# Patient Record
Sex: MALE | Race: WHITE | Employment: UNEMPLOYED | URBAN - METROPOLITAN AREA
[De-identification: names, ages, dates, MRNs, and addresses within clinical notes are randomized per-mention and may not be internally consistent; named-entity substitution may affect disease eponyms.]

---

## 2020-11-03 PROBLEM — I73.9 PERIPHERAL VASCULAR DISEASE (HCC): Status: RESOLVED | Noted: 2020-11-03 | Resolved: 2020-11-03

## 2020-11-03 PROBLEM — E78.5 HYPERLIPIDEMIA: Status: RESOLVED | Noted: 2020-11-03 | Resolved: 2020-11-03

## 2022-07-08 ENCOUNTER — HOSPITAL ENCOUNTER (INPATIENT)
Age: 62
LOS: 13 days | Discharge: NURSING, REHAB OR LONG-TERM CARE | DRG: 181 | End: 2022-07-21
Attending: EMERGENCY MEDICINE | Admitting: FAMILY MEDICINE
Payer: COMMERCIAL

## 2022-07-08 ENCOUNTER — APPOINTMENT (OUTPATIENT)
Dept: GENERAL RADIOLOGY | Age: 62
DRG: 181 | End: 2022-07-08
Payer: COMMERCIAL

## 2022-07-08 ENCOUNTER — APPOINTMENT (OUTPATIENT)
Dept: CT IMAGING | Age: 62
DRG: 181 | End: 2022-07-08
Payer: COMMERCIAL

## 2022-07-08 ENCOUNTER — ANESTHESIA EVENT (OUTPATIENT)
Dept: OPERATING ROOM | Age: 62
DRG: 181 | End: 2022-07-08
Payer: COMMERCIAL

## 2022-07-08 ENCOUNTER — ANESTHESIA (OUTPATIENT)
Dept: OPERATING ROOM | Age: 62
DRG: 181 | End: 2022-07-08
Payer: COMMERCIAL

## 2022-07-08 DIAGNOSIS — J96.02 ACUTE RESPIRATORY FAILURE WITH HYPERCAPNIA (HCC): ICD-10-CM

## 2022-07-08 DIAGNOSIS — R20.0 LEFT LEG NUMBNESS: ICD-10-CM

## 2022-07-08 DIAGNOSIS — T82.599A: Primary | ICD-10-CM

## 2022-07-08 DIAGNOSIS — E87.20 LACTIC ACIDOSIS: ICD-10-CM

## 2022-07-08 PROBLEM — I70.229 CRITICAL ISCHEMIA OF LOWER EXTREMITY (HCC): Status: ACTIVE | Noted: 2022-07-08

## 2022-07-08 LAB
-: NORMAL
ABSOLUTE EOS #: 0 K/UL (ref 0–0.4)
ABSOLUTE EOS #: 0 K/UL (ref 0–0.4)
ABSOLUTE EOS #: 0.14 K/UL (ref 0–0.4)
ABSOLUTE IMMATURE GRANULOCYTE: 0 K/UL (ref 0–0.3)
ABSOLUTE IMMATURE GRANULOCYTE: 0 K/UL (ref 0–0.3)
ABSOLUTE IMMATURE GRANULOCYTE: 0.3 K/UL (ref 0–0.3)
ABSOLUTE LYMPH #: 1.08 K/UL (ref 1–4.8)
ABSOLUTE LYMPH #: 2.68 K/UL (ref 1–4.8)
ABSOLUTE LYMPH #: 4.69 K/UL (ref 1–4.8)
ABSOLUTE MONO #: 0.54 K/UL (ref 0.1–0.8)
ABSOLUTE MONO #: 1.14 K/UL (ref 0.1–0.8)
ABSOLUTE MONO #: 1.19 K/UL (ref 0.1–0.8)
ALBUMIN SERPL-MCNC: 4.4 G/DL (ref 3.5–5.2)
ALBUMIN/GLOBULIN RATIO: 1.7 (ref 1–2.5)
ALLEN TEST: ABNORMAL
ALP BLD-CCNC: 93 U/L (ref 40–129)
ALT SERPL-CCNC: 25 U/L (ref 5–41)
ANION GAP SERPL CALCULATED.3IONS-SCNC: 12 MMOL/L (ref 9–17)
ANION GAP SERPL CALCULATED.3IONS-SCNC: 19 MMOL/L (ref 9–17)
ANION GAP: 11 MMOL/L (ref 7–16)
AST SERPL-CCNC: 27 U/L
BASOPHILS # BLD: 0 % (ref 0–2)
BASOPHILS # BLD: 0 % (ref 0–2)
BASOPHILS # BLD: 1 % (ref 0–2)
BASOPHILS ABSOLUTE: 0 K/UL (ref 0–0.2)
BASOPHILS ABSOLUTE: 0 K/UL (ref 0–0.2)
BASOPHILS ABSOLUTE: 0.3 K/UL (ref 0–0.2)
BILIRUB SERPL-MCNC: 0.35 MG/DL (ref 0.3–1.2)
BILIRUBIN URINE: NEGATIVE
BUN BLDV-MCNC: 18 MG/DL (ref 8–23)
BUN BLDV-MCNC: 18 MG/DL (ref 8–23)
CALCIUM IONIZED: 0.95 MMOL/L (ref 1.13–1.33)
CALCIUM SERPL-MCNC: 10 MG/DL (ref 8.6–10.4)
CALCIUM SERPL-MCNC: 8.4 MG/DL (ref 8.6–10.4)
CASTS UA: NORMAL /LPF (ref 0–8)
CHLORIDE BLD-SCNC: 104 MMOL/L (ref 98–107)
CHLORIDE BLD-SCNC: 107 MMOL/L (ref 98–107)
CO2: 16 MMOL/L (ref 20–31)
CO2: 22 MMOL/L (ref 20–31)
COLOR: YELLOW
CREAT SERPL-MCNC: 1.08 MG/DL (ref 0.7–1.2)
CREAT SERPL-MCNC: 1.16 MG/DL (ref 0.7–1.2)
EOSINOPHILS RELATIVE PERCENT: 0 % (ref 1–4)
EOSINOPHILS RELATIVE PERCENT: 0 % (ref 1–4)
EOSINOPHILS RELATIVE PERCENT: 1 % (ref 1–4)
EPITHELIAL CELLS UA: NORMAL /HPF (ref 0–5)
FIO2: 100
FIO2: 60
FIO2: 80
GFR AFRICAN AMERICAN: >60 ML/MIN
GFR AFRICAN AMERICAN: >60 ML/MIN
GFR NON-AFRICAN AMERICAN: 37 ML/MIN
GFR NON-AFRICAN AMERICAN: >60 ML/MIN
GFR NON-AFRICAN AMERICAN: >60 ML/MIN
GFR SERPL CREATININE-BSD FRML MDRD: 45 ML/MIN
GFR SERPL CREATININE-BSD FRML MDRD: ABNORMAL ML/MIN/{1.73_M2}
GLUCOSE BLD-MCNC: 125 MG/DL (ref 74–100)
GLUCOSE BLD-MCNC: 148 MG/DL (ref 74–100)
GLUCOSE BLD-MCNC: 150 MG/DL (ref 70–99)
GLUCOSE BLD-MCNC: 164 MG/DL (ref 74–100)
GLUCOSE BLD-MCNC: 169 MG/DL (ref 75–110)
GLUCOSE BLD-MCNC: 193 MG/DL (ref 70–99)
GLUCOSE BLD-MCNC: 218 MG/DL (ref 74–100)
GLUCOSE BLD-MCNC: 259 MG/DL (ref 75–110)
GLUCOSE BLD-MCNC: 308 MG/DL (ref 74–100)
GLUCOSE BLD-MCNC: 367 MG/DL (ref 74–100)
GLUCOSE URINE: ABNORMAL
HCO3 VENOUS: 16.6 MMOL/L (ref 22–29)
HCT VFR BLD CALC: 43.3 % (ref 40.7–50.3)
HCT VFR BLD CALC: 44.8 % (ref 40.7–50.3)
HCT VFR BLD CALC: 47.8 % (ref 40.7–50.3)
HEMOGLOBIN: 14.4 G/DL (ref 13–17)
HEMOGLOBIN: 14.6 G/DL (ref 13–17)
HEMOGLOBIN: 16 G/DL (ref 13–17)
IMMATURE GRANULOCYTES: 0 %
IMMATURE GRANULOCYTES: 0 %
IMMATURE GRANULOCYTES: 1 %
KETONES, URINE: NEGATIVE
LACTIC ACID, SEPSIS WHOLE BLOOD: 7.1 MMOL/L (ref 0.5–1.9)
LACTIC ACID, SEPSIS WHOLE BLOOD: 8 MMOL/L (ref 0.5–1.9)
LACTIC ACID, WHOLE BLOOD: 6.5 MMOL/L (ref 0.7–2.1)
LEUKOCYTE ESTERASE, URINE: NEGATIVE
LYMPHOCYTES # BLD: 33 % (ref 24–44)
LYMPHOCYTES # BLD: 4 % (ref 24–44)
LYMPHOCYTES # BLD: 9 % (ref 24–44)
MAGNESIUM: 2.1 MG/DL (ref 1.6–2.6)
MCH RBC QN AUTO: 31.5 PG (ref 25.2–33.5)
MCH RBC QN AUTO: 32 PG (ref 25.2–33.5)
MCH RBC QN AUTO: 32.1 PG (ref 25.2–33.5)
MCHC RBC AUTO-ENTMCNC: 32.6 G/DL (ref 28.4–34.8)
MCHC RBC AUTO-ENTMCNC: 33.3 G/DL (ref 28.4–34.8)
MCHC RBC AUTO-ENTMCNC: 33.5 G/DL (ref 28.4–34.8)
MCV RBC AUTO: 96 FL (ref 82.6–102.9)
MCV RBC AUTO: 96.2 FL (ref 82.6–102.9)
MCV RBC AUTO: 96.8 FL (ref 82.6–102.9)
MODE: ABNORMAL
MONOCYTES # BLD: 2 % (ref 1–7)
MONOCYTES # BLD: 4 % (ref 1–7)
MONOCYTES # BLD: 8 % (ref 1–7)
MORPHOLOGY: ABNORMAL
MORPHOLOGY: ABNORMAL
MORPHOLOGY: NORMAL
MYOGLOBIN: 120 NG/ML (ref 28–72)
MYOGLOBIN: ABNORMAL NG/ML (ref 28–72)
MYOGLOBIN: ABNORMAL NG/ML (ref 28–72)
NEGATIVE BASE EXCESS, ART: 12 (ref 0–2)
NEGATIVE BASE EXCESS, ART: 13 (ref 0–2)
NEGATIVE BASE EXCESS, ART: 18 (ref 0–2)
NEGATIVE BASE EXCESS, ART: 4 (ref 0–2)
NEGATIVE BASE EXCESS, ART: 4 (ref 0–2)
NEGATIVE BASE EXCESS, ART: 5 (ref 0–2)
NEGATIVE BASE EXCESS, ART: 7 (ref 0–2)
NEGATIVE BASE EXCESS, VEN: 18 (ref 0–2)
NITRITE, URINE: NEGATIVE
NRBC AUTOMATED: 0 PER 100 WBC
NRBC AUTOMATED: 0.1 PER 100 WBC
NRBC AUTOMATED: 0.1 PER 100 WBC
NUCLEATED RED BLOOD CELLS: 1 PER 100 WBC
O2 DEVICE/FLOW/%: ABNORMAL
O2 SAT, VEN: 45 % (ref 60–85)
PARTIAL THROMBOPLASTIN TIME: 23 SEC (ref 20.5–30.5)
PARTIAL THROMBOPLASTIN TIME: 82.5 SEC (ref 20.5–30.5)
PCO2, VEN: 76.6 MM HG (ref 41–51)
PDW BLD-RTO: 12.9 % (ref 11.8–14.4)
PDW BLD-RTO: 13.1 % (ref 11.8–14.4)
PDW BLD-RTO: 13.2 % (ref 11.8–14.4)
PH UA: 5.5 (ref 5–8)
PH VENOUS: 6.94 (ref 7.32–7.43)
PHOSPHORUS: 5.6 MG/DL (ref 2.5–4.5)
PLATELET # BLD: 274 K/UL (ref 138–453)
PLATELET # BLD: 320 K/UL (ref 138–453)
PLATELET # BLD: ABNORMAL K/UL (ref 138–453)
PLATELET, FLUORESCENCE: 283 K/UL (ref 138–453)
PLATELET, IMMATURE FRACTION: 12.6 % (ref 1.1–10.3)
PMV BLD AUTO: 12.9 FL (ref 8.1–13.5)
PMV BLD AUTO: 13 FL (ref 8.1–13.5)
PO2, VEN: 40.4 MM HG (ref 30–50)
POC BUN: 16 MG/DL (ref 8–26)
POC CHLORIDE: 115 MMOL/L (ref 98–107)
POC CREATININE: 1.86 MG/DL (ref 0.51–1.19)
POC HCO3: 12.1 MMOL/L (ref 21–28)
POC HCO3: 18.7 MMOL/L (ref 21–28)
POC HCO3: 20.3 MMOL/L (ref 21–28)
POC HCO3: 20.3 MMOL/L (ref 21–28)
POC HCO3: 20.9 MMOL/L (ref 21–28)
POC HCO3: 22 MMOL/L (ref 21–28)
POC HCO3: 23 MMOL/L (ref 21–28)
POC HEMATOCRIT: 37 % (ref 41–53)
POC HEMATOCRIT: 40 % (ref 41–53)
POC HEMATOCRIT: 40 % (ref 41–53)
POC HEMATOCRIT: 43 % (ref 41–53)
POC HEMATOCRIT: 43 % (ref 41–53)
POC HEMOGLOBIN: 12.5 G/DL (ref 13.5–17.5)
POC HEMOGLOBIN: 13.4 G/DL (ref 13.5–17.5)
POC HEMOGLOBIN: 13.5 G/DL (ref 13.5–17.5)
POC HEMOGLOBIN: 14.5 G/DL (ref 13.5–17.5)
POC HEMOGLOBIN: 14.7 G/DL (ref 13.5–17.5)
POC IONIZED CALCIUM: 1.06 MMOL/L (ref 1.15–1.33)
POC IONIZED CALCIUM: 1.26 MMOL/L (ref 1.15–1.33)
POC IONIZED CALCIUM: 1.35 MMOL/L (ref 1.15–1.33)
POC IONIZED CALCIUM: 1.42 MMOL/L (ref 1.15–1.33)
POC IONIZED CALCIUM: 1.43 MMOL/L (ref 1.15–1.33)
POC LACTIC ACID: 2.74 MMOL/L (ref 0.56–1.39)
POC LACTIC ACID: 3.85 MMOL/L (ref 0.56–1.39)
POC LACTIC ACID: 5.66 MMOL/L (ref 0.56–1.39)
POC O2 SATURATION: 100 % (ref 94–98)
POC O2 SATURATION: 100 % (ref 94–98)
POC O2 SATURATION: 90 % (ref 94–98)
POC O2 SATURATION: 91 % (ref 94–98)
POC O2 SATURATION: 92 % (ref 94–98)
POC O2 SATURATION: 96 % (ref 94–98)
POC O2 SATURATION: 99 % (ref 94–98)
POC PCO2: 37.4 MM HG (ref 35–48)
POC PCO2: 41.2 MM HG (ref 35–48)
POC PCO2: 44.4 MM HG (ref 35–48)
POC PCO2: 48.6 MM HG (ref 35–48)
POC PCO2: 55.4 MM HG (ref 35–48)
POC PCO2: 61.6 MM HG (ref 35–48)
POC PCO2: 82.6 MM HG (ref 35–48)
POC PH: 7 (ref 7.35–7.45)
POC PH: 7.08 (ref 7.35–7.45)
POC PH: 7.09 (ref 7.35–7.45)
POC PH: 7.22 (ref 7.35–7.45)
POC PH: 7.23 (ref 7.35–7.45)
POC PH: 7.3 (ref 7.35–7.45)
POC PH: 7.36 (ref 7.35–7.45)
POC PO2: 117.8 MM HG (ref 83–108)
POC PO2: 155.9 MM HG (ref 83–108)
POC PO2: 224.4 MM HG (ref 83–108)
POC PO2: 293.2 MM HG (ref 83–108)
POC PO2: 71.5 MM HG (ref 83–108)
POC PO2: 87.5 MM HG (ref 83–108)
POC PO2: 91 MM HG (ref 83–108)
POC POTASSIUM: 4 MMOL/L (ref 3.5–4.5)
POC POTASSIUM: 4.3 MMOL/L (ref 3.5–4.5)
POC POTASSIUM: 4.3 MMOL/L (ref 3.5–4.5)
POC POTASSIUM: 4.8 MMOL/L (ref 3.5–4.5)
POC POTASSIUM: 4.9 MMOL/L (ref 3.5–4.5)
POC SODIUM: 142 MMOL/L (ref 138–146)
POC SODIUM: 147 MMOL/L (ref 138–146)
POC SODIUM: 149 MMOL/L (ref 138–146)
POC TCO2: 20 MMOL/L (ref 22–30)
POC TCO2: 22 MMOL/L (ref 22–30)
POC TCO2: 24 MMOL/L (ref 22–30)
POTASSIUM SERPL-SCNC: 4.1 MMOL/L (ref 3.7–5.3)
POTASSIUM SERPL-SCNC: 5.2 MMOL/L (ref 3.7–5.3)
PRO-BNP: 4424 PG/ML
PRO-BNP: 85 PG/ML
PROTEIN UA: ABNORMAL
RBC # BLD: 4.5 M/UL (ref 4.21–5.77)
RBC # BLD: 4.63 M/UL (ref 4.21–5.77)
RBC # BLD: 4.98 M/UL (ref 4.21–5.77)
RBC UA: NORMAL /HPF (ref 0–4)
SAMPLE SITE: ABNORMAL
SEG NEUTROPHILS: 58 % (ref 36–66)
SEG NEUTROPHILS: 85 % (ref 36–66)
SEG NEUTROPHILS: 94 % (ref 36–66)
SEGMENTED NEUTROPHILS ABSOLUTE COUNT: 25.33 K/UL (ref 1.8–7.7)
SEGMENTED NEUTROPHILS ABSOLUTE COUNT: 25.38 K/UL (ref 1.8–7.7)
SEGMENTED NEUTROPHILS ABSOLUTE COUNT: 8.23 K/UL (ref 1.8–7.7)
SODIUM BLD-SCNC: 139 MMOL/L (ref 135–144)
SODIUM BLD-SCNC: 141 MMOL/L (ref 135–144)
SPECIFIC GRAVITY UA: 1.05 (ref 1–1.03)
TOTAL CK: 189 U/L (ref 39–308)
TOTAL CK: ABNORMAL U/L (ref 39–308)
TOTAL CK: ABNORMAL U/L (ref 39–308)
TOTAL PROTEIN: 7 G/DL (ref 6.4–8.3)
TROPONIN, HIGH SENSITIVITY: 18 NG/L (ref 0–22)
TROPONIN, HIGH SENSITIVITY: 27 NG/L (ref 0–22)
TROPONIN, HIGH SENSITIVITY: 430 NG/L (ref 0–22)
TURBIDITY: CLEAR
URINE HGB: ABNORMAL
UROBILINOGEN, URINE: NORMAL
WBC # BLD: 14.2 K/UL (ref 3.5–11.3)
WBC # BLD: 27 K/UL (ref 3.5–11.3)
WBC # BLD: 29.8 K/UL (ref 3.5–11.3)
WBC UA: NORMAL /HPF (ref 0–5)

## 2022-07-08 PROCEDURE — 84295 ASSAY OF SERUM SODIUM: CPT

## 2022-07-08 PROCEDURE — 82550 ASSAY OF CK (CPK): CPT

## 2022-07-08 PROCEDURE — 86850 RBC ANTIBODY SCREEN: CPT

## 2022-07-08 PROCEDURE — 96366 THER/PROPH/DIAG IV INF ADDON: CPT

## 2022-07-08 PROCEDURE — 2500000003 HC RX 250 WO HCPCS: Performed by: SURGERY

## 2022-07-08 PROCEDURE — 82947 ASSAY GLUCOSE BLOOD QUANT: CPT

## 2022-07-08 PROCEDURE — 10701619 HC VASCULAR GRAFT

## 2022-07-08 PROCEDURE — 6360000002 HC RX W HCPCS: Performed by: STUDENT IN AN ORGANIZED HEALTH CARE EDUCATION/TRAINING PROGRAM

## 2022-07-08 PROCEDURE — 2500000003 HC RX 250 WO HCPCS: Performed by: STUDENT IN AN ORGANIZED HEALTH CARE EDUCATION/TRAINING PROGRAM

## 2022-07-08 PROCEDURE — 81001 URINALYSIS AUTO W/SCOPE: CPT

## 2022-07-08 PROCEDURE — C1757 CATH, THROMBECTOMY/EMBOLECT: HCPCS | Performed by: SURGERY

## 2022-07-08 PROCEDURE — 37799 UNLISTED PX VASCULAR SURGERY: CPT

## 2022-07-08 PROCEDURE — 82803 BLOOD GASES ANY COMBINATION: CPT

## 2022-07-08 PROCEDURE — 2580000003 HC RX 258: Performed by: STUDENT IN AN ORGANIZED HEALTH CARE EDUCATION/TRAINING PROGRAM

## 2022-07-08 PROCEDURE — 2000000000 HC ICU R&B

## 2022-07-08 PROCEDURE — 6370000000 HC RX 637 (ALT 250 FOR IP): Performed by: STUDENT IN AN ORGANIZED HEALTH CARE EDUCATION/TRAINING PROGRAM

## 2022-07-08 PROCEDURE — 86920 COMPATIBILITY TEST SPIN: CPT

## 2022-07-08 PROCEDURE — 75710 ARTERY X-RAYS ARM/LEG: CPT | Performed by: SURGERY

## 2022-07-08 PROCEDURE — 2580000003 HC RX 258: Performed by: SURGERY

## 2022-07-08 PROCEDURE — 37226 PR REVSC OPN/PRQ FEM/POP W/STNT/ANGIOP SM VSL: CPT | Performed by: SURGERY

## 2022-07-08 PROCEDURE — 35302 RECHANNELING OF ARTERY: CPT | Performed by: SURGERY

## 2022-07-08 PROCEDURE — 96361 HYDRATE IV INFUSION ADD-ON: CPT

## 2022-07-08 PROCEDURE — 6360000002 HC RX W HCPCS

## 2022-07-08 PROCEDURE — 87040 BLOOD CULTURE FOR BACTERIA: CPT

## 2022-07-08 PROCEDURE — 04CL0ZZ EXTIRPATION OF MATTER FROM LEFT FEMORAL ARTERY, OPEN APPROACH: ICD-10-PCS | Performed by: SURGERY

## 2022-07-08 PROCEDURE — 36415 COLL VENOUS BLD VENIPUNCTURE: CPT

## 2022-07-08 PROCEDURE — 04CK0ZZ EXTIRPATION OF MATTER FROM RIGHT FEMORAL ARTERY, OPEN APPROACH: ICD-10-PCS | Performed by: SURGERY

## 2022-07-08 PROCEDURE — 93005 ELECTROCARDIOGRAM TRACING: CPT | Performed by: STUDENT IN AN ORGANIZED HEALTH CARE EDUCATION/TRAINING PROGRAM

## 2022-07-08 PROCEDURE — 82330 ASSAY OF CALCIUM: CPT

## 2022-07-08 PROCEDURE — B41DZZZ FLUOROSCOPY OF AORTA AND BILATERAL LOWER EXTREMITY ARTERIES: ICD-10-PCS | Performed by: SURGERY

## 2022-07-08 PROCEDURE — 70450 CT HEAD/BRAIN W/O DYE: CPT

## 2022-07-08 PROCEDURE — C1725 CATH, TRANSLUMIN NON-LASER: HCPCS | Performed by: SURGERY

## 2022-07-08 PROCEDURE — 87635 SARS-COV-2 COVID-19 AMP PRB: CPT

## 2022-07-08 PROCEDURE — 0BH18EZ INSERTION OF ENDOTRACHEAL AIRWAY INTO TRACHEA, VIA NATURAL OR ARTIFICIAL OPENING ENDOSCOPIC: ICD-10-PCS | Performed by: STUDENT IN AN ORGANIZED HEALTH CARE EDUCATION/TRAINING PROGRAM

## 2022-07-08 PROCEDURE — 6370000000 HC RX 637 (ALT 250 FOR IP)

## 2022-07-08 PROCEDURE — 71045 X-RAY EXAM CHEST 1 VIEW: CPT

## 2022-07-08 PROCEDURE — 80048 BASIC METABOLIC PNL TOTAL CA: CPT

## 2022-07-08 PROCEDURE — 2700000000 HC OXYGEN THERAPY PER DAY

## 2022-07-08 PROCEDURE — 94002 VENT MGMT INPAT INIT DAY: CPT

## 2022-07-08 PROCEDURE — P9045 ALBUMIN (HUMAN), 5%, 250 ML: HCPCS

## 2022-07-08 PROCEDURE — 75635 CT ANGIO ABDOMINAL ARTERIES: CPT

## 2022-07-08 PROCEDURE — 35875 REMOVAL OF CLOT IN GRAFT: CPT | Performed by: SURGERY

## 2022-07-08 PROCEDURE — C1768 GRAFT, VASCULAR: HCPCS | Performed by: SURGERY

## 2022-07-08 PROCEDURE — 2500000003 HC RX 250 WO HCPCS

## 2022-07-08 PROCEDURE — 2580000003 HC RX 258

## 2022-07-08 PROCEDURE — 94761 N-INVAS EAR/PLS OXIMETRY MLT: CPT

## 2022-07-08 PROCEDURE — 85014 HEMATOCRIT: CPT

## 2022-07-08 PROCEDURE — 04UK0KZ SUPPLEMENT RIGHT FEMORAL ARTERY WITH NONAUTOLOGOUS TISSUE SUBSTITUTE, OPEN APPROACH: ICD-10-PCS | Performed by: SURGERY

## 2022-07-08 PROCEDURE — 6360000002 HC RX W HCPCS: Performed by: SURGERY

## 2022-07-08 PROCEDURE — 5A1955Z RESPIRATORY VENTILATION, GREATER THAN 96 CONSECUTIVE HOURS: ICD-10-PCS | Performed by: STUDENT IN AN ORGANIZED HEALTH CARE EDUCATION/TRAINING PROGRAM

## 2022-07-08 PROCEDURE — 0KNT0ZZ RELEASE LEFT LOWER LEG MUSCLE, OPEN APPROACH: ICD-10-PCS | Performed by: SURGERY

## 2022-07-08 PROCEDURE — 83605 ASSAY OF LACTIC ACID: CPT

## 2022-07-08 PROCEDURE — 04UL0KZ SUPPLEMENT LEFT FEMORAL ARTERY WITH NONAUTOLOGOUS TISSUE SUBSTITUTE, OPEN APPROACH: ICD-10-PCS | Performed by: SURGERY

## 2022-07-08 PROCEDURE — 99285 EMERGENCY DEPT VISIT HI MDM: CPT

## 2022-07-08 PROCEDURE — C1725 CATH, TRANSLUMIN NON-LASER: HCPCS

## 2022-07-08 PROCEDURE — 6360000004 HC RX CONTRAST MEDICATION: Performed by: SURGERY

## 2022-07-08 PROCEDURE — 3700000000 HC ANESTHESIA ATTENDED CARE: Performed by: SURGERY

## 2022-07-08 PROCEDURE — C1894 INTRO/SHEATH, NON-LASER: HCPCS | Performed by: SURGERY

## 2022-07-08 PROCEDURE — 83735 ASSAY OF MAGNESIUM: CPT

## 2022-07-08 PROCEDURE — 85055 RETICULATED PLATELET ASSAY: CPT

## 2022-07-08 PROCEDURE — 36600 WITHDRAWAL OF ARTERIAL BLOOD: CPT

## 2022-07-08 PROCEDURE — 80053 COMPREHEN METABOLIC PANEL: CPT

## 2022-07-08 PROCEDURE — 84520 ASSAY OF UREA NITROGEN: CPT

## 2022-07-08 PROCEDURE — 6360000004 HC RX CONTRAST MEDICATION: Performed by: STUDENT IN AN ORGANIZED HEALTH CARE EDUCATION/TRAINING PROGRAM

## 2022-07-08 PROCEDURE — C1768 GRAFT, VASCULAR: HCPCS

## 2022-07-08 PROCEDURE — 83874 ASSAY OF MYOGLOBIN: CPT

## 2022-07-08 PROCEDURE — 36620 INSERTION CATHETER ARTERY: CPT

## 2022-07-08 PROCEDURE — 27602 DECOMPRESSION OF LOWER LEG: CPT | Performed by: SURGERY

## 2022-07-08 PROCEDURE — 86900 BLOOD TYPING SEROLOGIC ABO: CPT

## 2022-07-08 PROCEDURE — 3700000001 HC ADD 15 MINUTES (ANESTHESIA): Performed by: SURGERY

## 2022-07-08 PROCEDURE — 85025 COMPLETE CBC W/AUTO DIFF WBC: CPT

## 2022-07-08 PROCEDURE — 80051 ELECTROLYTE PANEL: CPT

## 2022-07-08 PROCEDURE — 85730 THROMBOPLASTIN TIME PARTIAL: CPT

## 2022-07-08 PROCEDURE — 82565 ASSAY OF CREATININE: CPT

## 2022-07-08 PROCEDURE — 84484 ASSAY OF TROPONIN QUANT: CPT

## 2022-07-08 PROCEDURE — 10701619 HC MISC CATH TRANSLUM ANGIO

## 2022-07-08 PROCEDURE — 2709999900 HC NON-CHARGEABLE SUPPLY: Performed by: SURGERY

## 2022-07-08 PROCEDURE — 94660 CPAP INITIATION&MGMT: CPT

## 2022-07-08 PROCEDURE — 3600000017 HC SURGERY HYBRID ADDL 15MIN: Performed by: SURGERY

## 2022-07-08 PROCEDURE — 96368 THER/DIAG CONCURRENT INF: CPT

## 2022-07-08 PROCEDURE — 3600000007 HC SURGERY HYBRID BASE: Performed by: SURGERY

## 2022-07-08 PROCEDURE — 84100 ASSAY OF PHOSPHORUS: CPT

## 2022-07-08 PROCEDURE — 84132 ASSAY OF SERUM POTASSIUM: CPT

## 2022-07-08 PROCEDURE — 96365 THER/PROPH/DIAG IV INF INIT: CPT

## 2022-07-08 PROCEDURE — 83880 ASSAY OF NATRIURETIC PEPTIDE: CPT

## 2022-07-08 PROCEDURE — 82374 ASSAY BLOOD CARBON DIOXIDE: CPT

## 2022-07-08 PROCEDURE — 047K0DZ DILATION OF RIGHT FEMORAL ARTERY WITH INTRALUMINAL DEVICE, OPEN APPROACH: ICD-10-PCS | Performed by: SURGERY

## 2022-07-08 PROCEDURE — 94681 O2 UPTK CO2 OUTP % O2 XTRC: CPT

## 2022-07-08 PROCEDURE — 96375 TX/PRO/DX INJ NEW DRUG ADDON: CPT

## 2022-07-08 PROCEDURE — 6370000000 HC RX 637 (ALT 250 FOR IP): Performed by: SURGERY

## 2022-07-08 PROCEDURE — A4217 STERILE WATER/SALINE, 500 ML: HCPCS | Performed by: SURGERY

## 2022-07-08 PROCEDURE — 86901 BLOOD TYPING SEROLOGIC RH(D): CPT

## 2022-07-08 DEVICE — PATCH VASC W0.8XL8CM PERIPH BOV PERICARD N PVC N DEHP CRSS: Type: IMPLANTABLE DEVICE | Site: GROIN | Status: FUNCTIONAL

## 2022-07-08 DEVICE — GRAFT STENT 0.035 IN 9 MMX5 CM 8 FRX120 CM VIABAHN: Type: IMPLANTABLE DEVICE | Site: OTHER | Status: FUNCTIONAL

## 2022-07-08 RX ORDER — NITROGLYCERIN 20 MG/100ML
5-200 INJECTION INTRAVENOUS CONTINUOUS
Status: DISCONTINUED | OUTPATIENT
Start: 2022-07-08 | End: 2022-07-11

## 2022-07-08 RX ORDER — FENTANYL CITRATE 50 UG/ML
100 INJECTION, SOLUTION INTRAMUSCULAR; INTRAVENOUS ONCE
Status: COMPLETED | OUTPATIENT
Start: 2022-07-08 | End: 2022-07-08

## 2022-07-08 RX ORDER — SODIUM CHLORIDE, SODIUM LACTATE, POTASSIUM CHLORIDE, CALCIUM CHLORIDE 600; 310; 30; 20 MG/100ML; MG/100ML; MG/100ML; MG/100ML
INJECTION, SOLUTION INTRAVENOUS CONTINUOUS
Status: DISCONTINUED | OUTPATIENT
Start: 2022-07-08 | End: 2022-07-13

## 2022-07-08 RX ORDER — INSULIN LISPRO 100 [IU]/ML
0-18 INJECTION, SOLUTION INTRAVENOUS; SUBCUTANEOUS
Status: DISCONTINUED | OUTPATIENT
Start: 2022-07-09 | End: 2022-07-09

## 2022-07-08 RX ORDER — FENTANYL CITRATE 50 UG/ML
50 INJECTION, SOLUTION INTRAMUSCULAR; INTRAVENOUS ONCE
Status: DISCONTINUED | OUTPATIENT
Start: 2022-07-08 | End: 2022-07-11

## 2022-07-08 RX ORDER — HEPARIN SODIUM 1000 [USP'U]/ML
INJECTION, SOLUTION INTRAVENOUS; SUBCUTANEOUS PRN
Status: DISCONTINUED | OUTPATIENT
Start: 2022-07-08 | End: 2022-07-08 | Stop reason: SDUPTHER

## 2022-07-08 RX ORDER — LIDOCAINE HYDROCHLORIDE 10 MG/ML
INJECTION, SOLUTION INFILTRATION; PERINEURAL
Status: DISPENSED
Start: 2022-07-08 | End: 2022-07-08

## 2022-07-08 RX ORDER — FENTANYL CITRATE 50 UG/ML
50 INJECTION, SOLUTION INTRAMUSCULAR; INTRAVENOUS ONCE
Status: COMPLETED | OUTPATIENT
Start: 2022-07-08 | End: 2022-07-08

## 2022-07-08 RX ORDER — HEPARIN SODIUM 1000 [USP'U]/ML
40 INJECTION, SOLUTION INTRAVENOUS; SUBCUTANEOUS PRN
Status: DISCONTINUED | OUTPATIENT
Start: 2022-07-08 | End: 2022-07-18

## 2022-07-08 RX ORDER — SODIUM CHLORIDE, SODIUM LACTATE, POTASSIUM CHLORIDE, AND CALCIUM CHLORIDE .6; .31; .03; .02 G/100ML; G/100ML; G/100ML; G/100ML
1000 INJECTION, SOLUTION INTRAVENOUS ONCE
Status: COMPLETED | OUTPATIENT
Start: 2022-07-08 | End: 2022-07-09

## 2022-07-08 RX ORDER — FENTANYL CITRATE 50 UG/ML
INJECTION, SOLUTION INTRAMUSCULAR; INTRAVENOUS PRN
Status: DISCONTINUED | OUTPATIENT
Start: 2022-07-08 | End: 2022-07-08 | Stop reason: SDUPTHER

## 2022-07-08 RX ORDER — HEPARIN SODIUM 1000 [USP'U]/ML
4000 INJECTION, SOLUTION INTRAVENOUS; SUBCUTANEOUS PRN
Status: DISCONTINUED | OUTPATIENT
Start: 2022-07-08 | End: 2022-07-18

## 2022-07-08 RX ORDER — PROPOFOL 10 MG/ML
INJECTION, EMULSION INTRAVENOUS PRN
Status: DISCONTINUED | OUTPATIENT
Start: 2022-07-08 | End: 2022-07-08 | Stop reason: SDUPTHER

## 2022-07-08 RX ORDER — ROCURONIUM BROMIDE 10 MG/ML
INJECTION, SOLUTION INTRAVENOUS PRN
Status: DISCONTINUED | OUTPATIENT
Start: 2022-07-08 | End: 2022-07-08 | Stop reason: SDUPTHER

## 2022-07-08 RX ORDER — SODIUM CHLORIDE 9 MG/ML
INJECTION, SOLUTION INTRAVENOUS PRN
Status: DISCONTINUED | OUTPATIENT
Start: 2022-07-08 | End: 2022-07-10

## 2022-07-08 RX ORDER — HEPARIN SODIUM 1000 [USP'U]/ML
INJECTION, SOLUTION INTRAVENOUS; SUBCUTANEOUS
Status: DISPENSED
Start: 2022-07-08 | End: 2022-07-09

## 2022-07-08 RX ORDER — CALCIUM CHLORIDE 100 MG/ML
INJECTION INTRAVENOUS; INTRAVENTRICULAR PRN
Status: DISCONTINUED | OUTPATIENT
Start: 2022-07-08 | End: 2022-07-08 | Stop reason: SDUPTHER

## 2022-07-08 RX ORDER — INSULIN LISPRO 100 [IU]/ML
0-9 INJECTION, SOLUTION INTRAVENOUS; SUBCUTANEOUS NIGHTLY
Status: DISCONTINUED | OUTPATIENT
Start: 2022-07-08 | End: 2022-07-09

## 2022-07-08 RX ORDER — IODIXANOL 320 MG/ML
INJECTION, SOLUTION INTRAVASCULAR
Status: DISPENSED
Start: 2022-07-08 | End: 2022-07-08

## 2022-07-08 RX ORDER — SODIUM CHLORIDE 9 MG/ML
INJECTION, SOLUTION INTRAVENOUS PRN
Status: DISCONTINUED | OUTPATIENT
Start: 2022-07-08 | End: 2022-07-13

## 2022-07-08 RX ORDER — HEPARIN SODIUM 1000 [USP'U]/ML
INJECTION, SOLUTION INTRAVENOUS; SUBCUTANEOUS
Status: DISPENSED
Start: 2022-07-08 | End: 2022-07-08

## 2022-07-08 RX ORDER — MAGNESIUM HYDROXIDE 1200 MG/15ML
LIQUID ORAL CONTINUOUS PRN
Status: COMPLETED | OUTPATIENT
Start: 2022-07-08 | End: 2022-07-08

## 2022-07-08 RX ORDER — HEPARIN SODIUM AND DEXTROSE 10000; 5 [USP'U]/100ML; G/100ML
5-30 INJECTION INTRAVENOUS CONTINUOUS
Status: DISCONTINUED | OUTPATIENT
Start: 2022-07-08 | End: 2022-07-18

## 2022-07-08 RX ORDER — DEXTROSE MONOHYDRATE 50 MG/ML
100 INJECTION, SOLUTION INTRAVENOUS PRN
Status: DISCONTINUED | OUTPATIENT
Start: 2022-07-08 | End: 2022-07-14

## 2022-07-08 RX ORDER — SODIUM CHLORIDE 0.9 % (FLUSH) 0.9 %
5-40 SYRINGE (ML) INJECTION EVERY 12 HOURS SCHEDULED
Status: DISCONTINUED | OUTPATIENT
Start: 2022-07-08 | End: 2022-07-13

## 2022-07-08 RX ORDER — NICARDIPINE HYDROCHLORIDE 0.1 MG/ML
INJECTION INTRAVENOUS
Status: DISPENSED
Start: 2022-07-08 | End: 2022-07-08

## 2022-07-08 RX ORDER — IODIXANOL 320 MG/ML
INJECTION, SOLUTION INTRAVASCULAR PRN
Status: DISCONTINUED | OUTPATIENT
Start: 2022-07-08 | End: 2022-07-08 | Stop reason: ALTCHOICE

## 2022-07-08 RX ORDER — SODIUM CHLORIDE, SODIUM LACTATE, POTASSIUM CHLORIDE, CALCIUM CHLORIDE 600; 310; 30; 20 MG/100ML; MG/100ML; MG/100ML; MG/100ML
INJECTION, SOLUTION INTRAVENOUS CONTINUOUS PRN
Status: DISCONTINUED | OUTPATIENT
Start: 2022-07-08 | End: 2022-07-08 | Stop reason: SDUPTHER

## 2022-07-08 RX ORDER — MAGNESIUM SULFATE IN WATER 40 MG/ML
2000 INJECTION, SOLUTION INTRAVENOUS ONCE
Status: COMPLETED | OUTPATIENT
Start: 2022-07-08 | End: 2022-07-09

## 2022-07-08 RX ORDER — ALBUMIN, HUMAN INJ 5% 5 %
SOLUTION INTRAVENOUS PRN
Status: DISCONTINUED | OUTPATIENT
Start: 2022-07-08 | End: 2022-07-08 | Stop reason: SDUPTHER

## 2022-07-08 RX ORDER — HEPARIN SODIUM 1000 [USP'U]/ML
2000 INJECTION, SOLUTION INTRAVENOUS; SUBCUTANEOUS PRN
Status: DISCONTINUED | OUTPATIENT
Start: 2022-07-08 | End: 2022-07-18

## 2022-07-08 RX ORDER — HEPARIN SODIUM AND DEXTROSE 10000; 5 [USP'U]/100ML; G/100ML
5-30 INJECTION INTRAVENOUS CONTINUOUS
Status: DISCONTINUED | OUTPATIENT
Start: 2022-07-08 | End: 2022-07-09

## 2022-07-08 RX ORDER — CALCIUM GLUCONATE 20 MG/ML
2000 INJECTION, SOLUTION INTRAVENOUS ONCE
Status: COMPLETED | OUTPATIENT
Start: 2022-07-08 | End: 2022-07-08

## 2022-07-08 RX ORDER — SODIUM CHLORIDE 0.9 % (FLUSH) 0.9 %
5-40 SYRINGE (ML) INJECTION PRN
Status: DISCONTINUED | OUTPATIENT
Start: 2022-07-08 | End: 2022-07-21 | Stop reason: HOSPADM

## 2022-07-08 RX ORDER — POLYETHYLENE GLYCOL 3350 17 G/17G
17 POWDER, FOR SOLUTION ORAL DAILY
Status: DISCONTINUED | OUTPATIENT
Start: 2022-07-08 | End: 2022-07-21 | Stop reason: HOSPADM

## 2022-07-08 RX ORDER — CALCIUM CHLORIDE 100 MG/ML
INJECTION INTRAVENOUS; INTRAVENTRICULAR
Status: COMPLETED
Start: 2022-07-08 | End: 2022-07-08

## 2022-07-08 RX ORDER — SODIUM CHLORIDE 9 MG/ML
INJECTION, SOLUTION INTRAVENOUS CONTINUOUS PRN
Status: DISCONTINUED | OUTPATIENT
Start: 2022-07-08 | End: 2022-07-08 | Stop reason: SDUPTHER

## 2022-07-08 RX ORDER — SODIUM CHLORIDE 9 MG/ML
30 INJECTION, SOLUTION INTRAVENOUS ONCE
Status: COMPLETED | OUTPATIENT
Start: 2022-07-08 | End: 2022-07-08

## 2022-07-08 RX ORDER — HEPARIN SODIUM 1000 [USP'U]/ML
80 INJECTION, SOLUTION INTRAVENOUS; SUBCUTANEOUS ONCE
Status: COMPLETED | OUTPATIENT
Start: 2022-07-08 | End: 2022-07-08

## 2022-07-08 RX ORDER — HEPARIN SODIUM 1000 [USP'U]/ML
80 INJECTION, SOLUTION INTRAVENOUS; SUBCUTANEOUS PRN
Status: DISCONTINUED | OUTPATIENT
Start: 2022-07-08 | End: 2022-07-18

## 2022-07-08 RX ORDER — HEPARIN SODIUM 1000 [USP'U]/ML
4000 INJECTION, SOLUTION INTRAVENOUS; SUBCUTANEOUS ONCE
Status: DISCONTINUED | OUTPATIENT
Start: 2022-07-08 | End: 2022-07-11

## 2022-07-08 RX ORDER — ONDANSETRON 2 MG/ML
4 INJECTION INTRAMUSCULAR; INTRAVENOUS EVERY 6 HOURS PRN
Status: DISCONTINUED | OUTPATIENT
Start: 2022-07-08 | End: 2022-07-21 | Stop reason: HOSPADM

## 2022-07-08 RX ORDER — PROPOFOL 10 MG/ML
5-50 INJECTION, EMULSION INTRAVENOUS CONTINUOUS
Status: DISCONTINUED | OUTPATIENT
Start: 2022-07-08 | End: 2022-07-14

## 2022-07-08 RX ORDER — NOREPINEPHRINE BIT/0.9 % NACL 16MG/250ML
1-100 INFUSION BOTTLE (ML) INTRAVENOUS CONTINUOUS
Status: DISCONTINUED | OUTPATIENT
Start: 2022-07-08 | End: 2022-07-14

## 2022-07-08 RX ORDER — CHLORHEXIDINE GLUCONATE 0.12 MG/ML
15 RINSE ORAL 2 TIMES DAILY
Status: DISCONTINUED | OUTPATIENT
Start: 2022-07-08 | End: 2022-07-13

## 2022-07-08 RX ORDER — LIDOCAINE HYDROCHLORIDE 10 MG/ML
INJECTION, SOLUTION EPIDURAL; INFILTRATION; INTRACAUDAL; PERINEURAL PRN
Status: DISCONTINUED | OUTPATIENT
Start: 2022-07-08 | End: 2022-07-08 | Stop reason: SDUPTHER

## 2022-07-08 RX ADMIN — Medication 25 MCG/HR: at 20:08

## 2022-07-08 RX ADMIN — ROCURONIUM BROMIDE 20 MG: 10 INJECTION INTRAVENOUS at 13:37

## 2022-07-08 RX ADMIN — PHENYLEPHRINE HYDROCHLORIDE 200 MCG: 10 INJECTION INTRAVENOUS at 17:09

## 2022-07-08 RX ADMIN — HEPARIN SODIUM 10000 UNITS: 1000 INJECTION, SOLUTION INTRAVENOUS; SUBCUTANEOUS at 13:03

## 2022-07-08 RX ADMIN — INSULIN LISPRO 2 UNITS: 100 INJECTION, SOLUTION INTRAVENOUS; SUBCUTANEOUS at 21:04

## 2022-07-08 RX ADMIN — PROPOFOL 30 MCG/KG/MIN: 10 INJECTION, EMULSION INTRAVENOUS at 20:34

## 2022-07-08 RX ADMIN — HEPARIN SODIUM 9260 UNITS: 1000 INJECTION INTRAVENOUS; SUBCUTANEOUS at 09:36

## 2022-07-08 RX ADMIN — Medication 25 MCG/MIN: at 18:42

## 2022-07-08 RX ADMIN — SODIUM BICARBONATE 50 MEQ: 84 INJECTION, SOLUTION INTRAVENOUS at 12:03

## 2022-07-08 RX ADMIN — PIPERACILLIN AND TAZOBACTAM 4500 MG: 4; .5 INJECTION, POWDER, LYOPHILIZED, FOR SOLUTION INTRAVENOUS; PARENTERAL at 08:28

## 2022-07-08 RX ADMIN — PHENYLEPHRINE HYDROCHLORIDE 200 MCG: 10 INJECTION INTRAVENOUS at 13:45

## 2022-07-08 RX ADMIN — SODIUM CHLORIDE, POTASSIUM CHLORIDE, SODIUM LACTATE AND CALCIUM CHLORIDE 1000 ML: 600; 310; 30; 20 INJECTION, SOLUTION INTRAVENOUS at 23:08

## 2022-07-08 RX ADMIN — PHENYLEPHRINE HYDROCHLORIDE 200 MCG: 10 INJECTION INTRAVENOUS at 14:42

## 2022-07-08 RX ADMIN — SODIUM BICARBONATE 50 MEQ: 84 INJECTION, SOLUTION INTRAVENOUS at 13:31

## 2022-07-08 RX ADMIN — CALCIUM GLUCONATE 2000 MG: 20 INJECTION, SOLUTION INTRAVENOUS at 20:18

## 2022-07-08 RX ADMIN — VASOPRESSIN 0.04 UNITS/MIN: 20 INJECTION PARENTERAL at 20:55

## 2022-07-08 RX ADMIN — FENTANYL CITRATE 50 MCG: 50 INJECTION, SOLUTION INTRAMUSCULAR; INTRAVENOUS at 05:08

## 2022-07-08 RX ADMIN — PHENYLEPHRINE HYDROCHLORIDE 200 MCG: 10 INJECTION INTRAVENOUS at 17:13

## 2022-07-08 RX ADMIN — HEPARIN SODIUM 1000 UNITS: 1000 INJECTION, SOLUTION INTRAVENOUS; SUBCUTANEOUS at 13:50

## 2022-07-08 RX ADMIN — ALBUMIN (HUMAN) 12.5 G: 12.5 INJECTION, SOLUTION INTRAVENOUS at 12:15

## 2022-07-08 RX ADMIN — SODIUM BICARBONATE 50 MEQ: 84 INJECTION, SOLUTION INTRAVENOUS at 13:00

## 2022-07-08 RX ADMIN — SODIUM CHLORIDE, POTASSIUM CHLORIDE, SODIUM LACTATE AND CALCIUM CHLORIDE: 600; 310; 30; 20 INJECTION, SOLUTION INTRAVENOUS at 20:01

## 2022-07-08 RX ADMIN — FENTANYL CITRATE 100 MCG: 0.05 INJECTION, SOLUTION INTRAMUSCULAR; INTRAVENOUS at 11:55

## 2022-07-08 RX ADMIN — CALCIUM CHLORIDE 1 G: 100 INJECTION INTRAVENOUS; INTRAVENTRICULAR at 12:02

## 2022-07-08 RX ADMIN — FENTANYL CITRATE 50 MCG: 0.05 INJECTION, SOLUTION INTRAMUSCULAR; INTRAVENOUS at 15:22

## 2022-07-08 RX ADMIN — SODIUM CHLORIDE 1000 ML: 9 INJECTION, SOLUTION INTRAVENOUS at 06:59

## 2022-07-08 RX ADMIN — HYDROMORPHONE HYDROCHLORIDE 1 MG: 1 INJECTION, SOLUTION INTRAMUSCULAR; INTRAVENOUS; SUBCUTANEOUS at 07:04

## 2022-07-08 RX ADMIN — PHENYLEPHRINE HYDROCHLORIDE 100 MCG: 10 INJECTION INTRAVENOUS at 15:50

## 2022-07-08 RX ADMIN — IOPAMIDOL 125 ML: 755 INJECTION, SOLUTION INTRAVENOUS at 06:28

## 2022-07-08 RX ADMIN — PHENYLEPHRINE HYDROCHLORIDE 100 MCG: 10 INJECTION INTRAVENOUS at 16:03

## 2022-07-08 RX ADMIN — FENTANYL CITRATE 50 MCG: 0.05 INJECTION, SOLUTION INTRAMUSCULAR; INTRAVENOUS at 15:06

## 2022-07-08 RX ADMIN — VANCOMYCIN HYDROCHLORIDE 2500 MG: 1 INJECTION, POWDER, LYOPHILIZED, FOR SOLUTION INTRAVENOUS at 09:02

## 2022-07-08 RX ADMIN — ROCURONIUM BROMIDE 30 MG: 10 INJECTION INTRAVENOUS at 17:38

## 2022-07-08 RX ADMIN — ROCURONIUM BROMIDE 50 MG: 10 INJECTION INTRAVENOUS at 11:55

## 2022-07-08 RX ADMIN — SODIUM CHLORIDE, POTASSIUM CHLORIDE, SODIUM LACTATE AND CALCIUM CHLORIDE: 600; 310; 30; 20 INJECTION, SOLUTION INTRAVENOUS at 11:48

## 2022-07-08 RX ADMIN — NITROGLYCERIN 50 MCG/MIN: 20 INJECTION INTRAVENOUS at 08:32

## 2022-07-08 RX ADMIN — SODIUM BICARBONATE 50 MEQ: 84 INJECTION, SOLUTION INTRAVENOUS at 14:55

## 2022-07-08 RX ADMIN — CHLORHEXIDINE GLUCONATE 15 ML: 1.2 SOLUTION ORAL at 22:54

## 2022-07-08 RX ADMIN — SODIUM CHLORIDE: 9 INJECTION, SOLUTION INTRAVENOUS at 11:48

## 2022-07-08 RX ADMIN — PHENYLEPHRINE HYDROCHLORIDE 200 MCG: 10 INJECTION INTRAVENOUS at 14:29

## 2022-07-08 RX ADMIN — HEPARIN SODIUM 10 UNITS/KG/HR: 10000 INJECTION, SOLUTION INTRAVENOUS at 19:40

## 2022-07-08 RX ADMIN — SODIUM BICARBONATE 50 MEQ: 84 INJECTION, SOLUTION INTRAVENOUS at 14:53

## 2022-07-08 RX ADMIN — PHENYLEPHRINE HYDROCHLORIDE 100 MCG: 10 INJECTION INTRAVENOUS at 16:21

## 2022-07-08 RX ADMIN — ROCURONIUM BROMIDE 30 MG: 10 INJECTION INTRAVENOUS at 16:56

## 2022-07-08 RX ADMIN — SODIUM BICARBONATE 50 MEQ: 84 INJECTION, SOLUTION INTRAVENOUS at 12:58

## 2022-07-08 RX ADMIN — ROCURONIUM BROMIDE 20 MG: 10 INJECTION INTRAVENOUS at 15:07

## 2022-07-08 RX ADMIN — SODIUM CHLORIDE, PRESERVATIVE FREE 20 MG: 5 INJECTION INTRAVENOUS at 22:47

## 2022-07-08 RX ADMIN — PROPOFOL 40 MCG/KG/MIN: 10 INJECTION, EMULSION INTRAVENOUS at 17:35

## 2022-07-08 RX ADMIN — CALCIUM CHLORIDE 1 G: 100 INJECTION INTRAVENOUS; INTRAVENTRICULAR at 12:54

## 2022-07-08 RX ADMIN — Medication 2000 MG: at 21:07

## 2022-07-08 RX ADMIN — PROPOFOL 150 MG: 10 INJECTION, EMULSION INTRAVENOUS at 11:55

## 2022-07-08 RX ADMIN — PHENYLEPHRINE HYDROCHLORIDE 150 MCG/MIN: 10 INJECTION INTRAVENOUS at 12:05

## 2022-07-08 RX ADMIN — FENTANYL CITRATE 100 MCG: 50 INJECTION, SOLUTION INTRAMUSCULAR; INTRAVENOUS at 05:32

## 2022-07-08 RX ADMIN — ROCURONIUM BROMIDE 30 MG: 10 INJECTION INTRAVENOUS at 14:09

## 2022-07-08 RX ADMIN — LIDOCAINE HYDROCHLORIDE 50 MG: 10 INJECTION, SOLUTION EPIDURAL; INFILTRATION; INTRACAUDAL; PERINEURAL at 11:55

## 2022-07-08 RX ADMIN — LIDOCAINE HYDROCHLORIDE 100 MG: 10 INJECTION, SOLUTION EPIDURAL; INFILTRATION; INTRACAUDAL; PERINEURAL at 12:14

## 2022-07-08 RX ADMIN — Medication 18 UNITS/KG/HR: at 09:54

## 2022-07-08 RX ADMIN — PHENYLEPHRINE HYDROCHLORIDE 200 MCG: 10 INJECTION INTRAVENOUS at 14:03

## 2022-07-08 RX ADMIN — FENTANYL CITRATE 50 MCG: 0.05 INJECTION, SOLUTION INTRAMUSCULAR; INTRAVENOUS at 14:38

## 2022-07-08 RX ADMIN — ROCURONIUM BROMIDE 50 MG: 10 INJECTION INTRAVENOUS at 12:38

## 2022-07-08 RX ADMIN — SODIUM CHLORIDE, PRESERVATIVE FREE 10 ML: 5 INJECTION INTRAVENOUS at 21:09

## 2022-07-08 RX ADMIN — SODIUM CHLORIDE 5 UNITS/HR: 9 INJECTION, SOLUTION INTRAVENOUS at 12:56

## 2022-07-08 RX ADMIN — MAGNESIUM SULFATE HEPTAHYDRATE 2000 MG: 40 INJECTION, SOLUTION INTRAVENOUS at 23:26

## 2022-07-08 RX ADMIN — HEPARIN SODIUM 2500 UNITS: 1000 INJECTION, SOLUTION INTRAVENOUS; SUBCUTANEOUS at 14:14

## 2022-07-08 RX ADMIN — SODIUM CHLORIDE 30 ML/KG/HR: 9 INJECTION, SOLUTION INTRAVENOUS at 07:38

## 2022-07-08 RX ADMIN — SODIUM BICARBONATE 50 MEQ: 84 INJECTION, SOLUTION INTRAVENOUS at 14:49

## 2022-07-08 ASSESSMENT — ENCOUNTER SYMPTOMS
FACIAL SWELLING: 0
DIARRHEA: 0
SHORTNESS OF BREATH: 1
NAUSEA: 0
VOMITING: 0
EYE PAIN: 0
ABDOMINAL DISTENTION: 1
BACK PAIN: 1
SHORTNESS OF BREATH: 0
CHEST TIGHTNESS: 0
COLOR CHANGE: 1
COUGH: 0
ABDOMINAL PAIN: 0

## 2022-07-08 ASSESSMENT — PAIN DESCRIPTION - LOCATION: LOCATION: GENERALIZED

## 2022-07-08 ASSESSMENT — PULMONARY FUNCTION TESTS
PIF_VALUE: 22
PIF_VALUE: 20

## 2022-07-08 ASSESSMENT — PAIN SCALES - GENERAL
PAINLEVEL_OUTOF10: 10
PAINLEVEL_OUTOF10: 10

## 2022-07-08 NOTE — ED NOTES
Patient repositioned. Bed linens and gown changed. William hugger placed on patient's lower extremities.       Katelin Gzt RN  07/08/22 5736

## 2022-07-08 NOTE — ED PROVIDER NOTES
[facility] [facility]  Emergency Department Encounter  EmergencyMedicineResident     This patient was seen during the COVID-19 crisis. There were limited resources and those resources we did have had to be conserved for the sickest of patients. Pt Name: Prince Mallory  MRN: [id number]  [place] 1960  Date of evaluation: 7/8/22  PCP: Bartolo Johnson (Inactive)    [address] [place] [address]       Chief Complaint   Patient presents with    Generalized Body Aches     10/10 FULL BODY PAIN       HISTORY OF PRESENT ILLNESS  (Location/Symptom, Timing/Onset, Context/Setting, Quality, Duration, Modifying Factors, Severity.)      Prince Mallory is a 64 y.o. male who presents today for evaluation of generalized body aches. He reports that he has had full body pain. Reporting that this primarily located around his hips. He denies chest pain or shortness of breath. He denies abdominal pain, or any bowel/bladder changes. He did state that he has new left lower extremity numbness. History of multiple vascular bypass surgeries in the past down in [place], [place]. 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 Has a foot drop boot in place. No notes or recent visits in our system since 2012. His past medical history includes peripheral artery disease, coronary artery disease, hypertension, carotid artery occlusion and hypertension with neuropathy. PAST MEDICAL / SURGICAL /SOCIAL / FAMILY HISTORY      has a past medical history of Anxiety, Arthritis associated with another disorder, CAD (coronary artery disease), Carotid artery occlusion, Family history of kidney stone, HTN (hypertension), Hyperlipidemia, Hypertension, Nephrolithiasis, Neuropathy, PAD (peripheral artery disease) ([address] Peripheral vascular disease ([address] Personal history of MRSA (methicillin resistant Staphylococcus aureus), Seasonal allergies, and Vasculopathy.        has a past surgical history that includes Tonsillectomy and adenoidectomy; Percutaneous Transluminal Coronary Angio; Carotid endarterectomy (08); IR Femoral Popliteal Bypass Graft ( Dr Daphney Rick); Femoral-tibial Bypass Graft (07  Cristal Rangel ); Balloon angioplasty, artery (11/23/10 Allie Muñoz); Aorto-femoral Bypass Graft (2011-[facility]); Abdominal adhesion surgery (2011-[facility]); and Coronary artery bypass graft (). Social History     Socioeconomic History    Marital status:      Spouse name: Not on file    Number of children: 1    Years of education: 15    Highest education level: Not on file   Occupational History    Occupation: disabled      Comment: SSI    Tobacco Use    Smoking status: Current Every Day Smoker     Packs/day: 2.00     Years: 33.00     Pack years: 66.00     Types: Cigarettes     Last attempt to quit: 3/10/2011     Years since quittin.3    Smokeless tobacco: Never Used    Tobacco comment: quite 2011    Substance and Sexual Activity    Alcohol use: No     Alcohol/week: 0.0 standard drinks    Drug use: No    Sexual activity: Yes     Partners: Female     Comment: with has trouble   Other Topics Concern    Not on file   Social History Narrative    Not on file     Social Determinants of Health     Financial Resource Strain:     Difficulty of Paying Living Expenses: Not on file   Food Insecurity:     Worried About Running Out of Food in the Last Year: Not on file    Nancy of Food in the Last Year: Not on file   Transportation Needs:     Lack of Transportation (Medical): Not on file    Lack of Transportation (Non-Medical):  Not on file   Physical Activity:     Days of Exercise per Week: Not on file    Minutes of Exercise per Session: Not on file   Stress:     Feeling of Stress : Not on file   Social Connections:     Frequency of Communication with Friends and Family: Not on file    Frequency of Social Gatherings with Friends and Family: Not on file    Attends Judaism Services: Not on file   Alberto Take 1 tablet by mouth nightly. 9/13/12   Perri Cortez MD   tamsulosin (FLOMAX) 0.4 MG capsule Take 1 capsule by mouth daily. 9/13/12   Perri Cortez MD   TRUETEST TEST strip TEST 2 TIMES DAILY 4/30/12   Paige Lucero MD   metformin (GLUCOPHAGE) 500 MG tablet TAKE 1 TABLET BY MOUTH 2 TIMES DAILY (WITH MEALS) FOR 30 DAYS. 2/21/12   Paige Lucero MD   aspirin  MG EC tablet Take 1 tablet by mouth daily. 1/25/12   Paige Lucero MD       REVIEW OF SYSTEMS    (2-9 systems for level 4, 10 or more forlevel 5)      Review of Systems   Constitutional: Positive for activity change and diaphoresis. Eyes: Negative for pain and visual disturbance. Respiratory: Negative for cough and shortness of breath. Cardiovascular: Negative for chest pain. Gastrointestinal: Negative for abdominal pain, diarrhea, nausea and vomiting. Genitourinary: Negative for difficulty urinating, dysuria and hematuria. Musculoskeletal: Positive for arthralgias, back pain and myalgias. Skin: Negative for rash and wound. Neurological: Positive for numbness. Negative for dizziness, light-headedness and headaches. Psychiatric/Behavioral: Positive for agitation. Negative for confusion. The patient is nervous/anxious. PHYSICAL EXAM   (up to 7 for level 4, 8 or more forlevel 5)      ED TRIAGE VITALS BP: (!) 178/124, Temp: 97.5 °F (36.4 °C), Heart Rate: 94, Resp: 21, SpO2: 99 %    Vitals:    07/08/22 1052 07/08/22 1057 07/08/22 1112 07/08/22 1127   BP: (!) 117/91 125/89 84/70 99/65   Pulse: (!) 128 (!) 128 (!) 131 (!) 120   Resp: 26 27     Temp:       SpO2: 94% 94% 97% 96%   Weight:             Physical Exam  Vitals and nursing note reviewed. Constitutional:       General: He is in acute distress. Appearance: Normal appearance. He is obese. He is ill-appearing. HENT:      Head: Normocephalic and atraumatic.       Nose: Nose normal.      Mouth/Throat:      Mouth: Mucous membranes are moist.   Eyes: Extraocular Movements: Extraocular movements intact. Pupils: Pupils are equal, round, and reactive to light. Cardiovascular:      Rate and Rhythm: Normal rate. Rhythm irregular. Pulses: Normal pulses. Heart sounds: Normal heart sounds. Comments: The patient had bl femoral and bl DP pulses 1-2+  Pulmonary:      Effort: Pulmonary effort is normal.      Breath sounds: Normal breath sounds. Abdominal:      General: Abdomen is flat. Palpations: Abdomen is soft. Musculoskeletal:         General: Swelling present. No tenderness or deformity. Normal range of motion. Cervical back: Normal range of motion. Right lower leg: Edema present. Left lower leg: No edema. Skin:     General: Skin is warm and dry. Capillary Refill: Capillary refill takes less than 2 seconds. Neurological:      General: No focal deficit present. Mental Status: He is alert and oriented to person, place, and time.    Psychiatric:         Mood and Affect: Mood normal.         Behavior: Behavior normal.           DIFFERENTIAL  DIAGNOSIS     PLAN (LABS / IMAGING / EKG):  Orders Placed This Encounter   Procedures    Blood Culture 1    Blood Culture 2    CTA CHEST ABDOMEN AORTA RUNOFF RECON    CT HEAD WO CONTRAST    XR CHEST PORTABLE    CBC with Auto Differential    Comprehensive Metabolic Panel    Troponin    Brain Natriuretic Peptide    Troponin    Lactic Acid    CK    Myoglobin, Serum    Lactate, Sepsis    Urinalysis with Reflex to Culture    APTT    BLOOD GAS, ARTERIAL    Hemoglobin and Hematocrit    Protime-INR    APTT    Microscopic Urinalysis    Hemoglobin and hematocrit, blood    SODIUM (POC)    POTASSIUM (POC)    CALCIUM, IONIC (POC)    TOTAL CO2    Hemoglobin and hematocrit, blood    POTASSIUM (POC)    CALCIUM, IONIC (POC)    Verify hospital blood product consent form has been signed and witnessed    Vital Signs For Blood Product Transfusion    Transfusion iodixanol (VISIPAQUE) 320 MG/ML injection        Note to Pharmacy: Sallyanne Saint: cabinet override    Ordered     07/08/22 1048 07/08/22 1048  thrombin 24753 units spray        Note to Pharmacy: Sallyanne Saint: cabinet override    Ordered     07/08/22 1048 07/08/22 1048  gelatin adsorbable (GELFOAM) 100 sponge        Note to Pharmacy: Sallyanne Saint: cabinet override    Ordered     07/08/22 1048 07/08/22 1048  heparin (porcine) 1000 UNIT/ML injection        Note to Pharmacy: Sallyanne Saint: cabinet override    Ordered     07/08/22 1048 07/08/22 1048  lidocaine 1 % injection        Note to Pharmacy: Sallyanne Saint: cabinet override    Ordered     07/08/22 0930 07/08/22 0925  heparin (porcine) injection 9,260 Units  ONCE         Last MAR action: Given - by Rich Marinelli on 07/08/22 at [facility]    07/08/22 0930 07/08/22 0925  heparin 25,000 units in dextrose 5 % 250 mL infusion (rate based)  CONTINUOUS         Last MAR action: New Bag - by Rich Marinelli on 07/08/22 at [facility]    07/08/22 0925 07/08/22 0925  heparin (porcine) injection 9,260 Units  PRN         Acknowledged [facility]    07/08/22 0925 07/08/22 0925  heparin (porcine) injection 4,630 Units  PRN         Acknowledged [facility]    07/08/22 0830 07/08/22 0827  nitroGLYCERIN 50 mg in dextrose 5% 250 mL infusion  CONTINUOUS        Question Answer Comment   Titrate Infusion?  Yes    Initial Infusion Dose: 5 mcg/min    Goal of Therapy is: SBP less than 160 mmHg    Contact Provider if: SBP less than 90 mmHg        Last MAR action: Rate/Dose Change - by Clarita Morales on 07/08/22 at [address] [place] [address]    07/08/22 0800 07/08/22 0746  piperacillin-tazobactam (ZOSYN) 4,500 mg in dextrose 5 % 100 mL IVPB (mini-bag)  ONCE        Question:  Antimicrobial Indications  Answer:  Sepsis of Unknown Etiology    Last MAR action: Stopped - by Rich Marinelli on 07/08/22 at [address]    07/08/22 0800 07/08/22 0746 vancomycin (VANCOCIN) 2,500 mg in dextrose 5 % 500 mL IVPB  ONCE        Question:  Antimicrobial Indications  Answer:  Sepsis of Unknown Etiology    Last MAR action: New Bag - by Graciela Dennison on 07/08/22 at [address] [facility], [facility]    07/08/22 0745 07/08/22 0730  0.9 % sodium chloride infusion  ONCE         Last MAR action: Stopped - by Graciela Dennison on 07/08/22 at [address] [facility], [facility]    07/08/22 0700 07/08/22 0659  HYDROmorphone (DILAUDID) injection 1 mg  ONCE         Last MAR action: Given - by Brian Gaston on 07/08/22 at [address] [facility], [facility]    07/08/22 0645 07/08/22 0632  0.9 % NaCl bolus  ONCE         Last MAR action: Stopped - by Graciela Dennison on 07/08/22 at [address] [facility], [facility] [address]    07/08/22 0606 07/08/22 0606  iopamidol (ISOVUE-370) 76 % injection 125 mL  IMG ONCE PRN         Last MAR action: Given - by Alin Barraza on 07/08/22 at [address]    07/08/22 0545 07/08/22 0530  fentaNYL (SUBLIMAZE) injection 100 mcg  ONCE         Last MAR action: Given - by Brian Gaston on 07/08/22 at [address]    07/08/22 0530 07/08/22 0526  fentaNYL (SUBLIMAZE) injection 50 mcg  ONCE         Last MAR action: Not Given - by Graciela Dennison on 07/08/22 [address]    07/08/22 0515 07/08/22 0505  fentaNYL (SUBLIMAZE) injection 50 mcg  ONCE         Last MAR action: Given - by Brian Gaston on 07/08/22 at [address]          DIAGNOSTIC RESULTS / EMERGENCY DEPARTMENT COURSE / MDM     IMPRESSION & INITIAL PLAN:  This is a 57-year-old male coming in today in acute distress. He is complaining of diffuse body aches. When he localizes it is to the bilateral hips with new numbness in his left lower extremity. He has significant past medical history for aortobifem graft with distal grafts as well secondary to peripheral artery disease. Our concern today with newfound numbness in the left side and pain out of control is for a vascular catastrophe.  He did have palpable pulses on exam, however his pain is so out of proportion that he may be infarcting more proximally. Plan to get CTA chest abdomen pelvis with runoff and CT head to evaluate this new complaint of numbness. He also has a history of CVA in 2012 with right-sided deficits. Patient does have a marked past medical history of narcotic abuse as well 2. Our other differential would include opiate withdrawal.      This patient will be signed out to Dr. Estrellita Martinez. LABS:  Results for orders placed or performed during the hospital encounter of 07/08/22   Blood Culture 1    Specimen: Blood   Result Value Ref Range    Specimen Description . BLOOD     Special Requests LAC 10ML     Culture NO GROWTH <24 HRS    Blood Culture 2    Specimen: Blood   Result Value Ref Range    Specimen Description . BLOOD     Special Requests L WRIST 10ML     Culture NO GROWTH <24 HRS    CBC with Auto Differential   Result Value Ref Range    WBC 14.2 (H) 3.5 - 11.3 k/uL    RBC 4.98 4.21 - 5.77 m/uL    Hemoglobin 16.0 13.0 - 17.0 g/dL    Hematocrit 47.8 40.7 - 50.3 %    MCV 96.0 82.6 - 102.9 fL    MCH 32.1 25.2 - 33.5 pg    MCHC 33.5 28.4 - 34.8 g/dL    RDW 12.9 11.8 - 14.4 %    Platelets 062 271 - 589 k/uL    MPV 13.0 8.1 - 13.5 fL    NRBC Automated 0.0 0.0 per 100 WBC    Immature Granulocytes 0 0 %    Seg Neutrophils 58 36 - 66 %    Lymphocytes 33 24 - 44 %    Monocytes 8 (H) 1 - 7 %    Eosinophils % 1 1 - 4 %    Basophils 0 0 - 2 %    Absolute Immature Granulocyte 0.00 0.00 - 0.30 k/uL    Segs Absolute 8.23 (H) 1.8 - 7.7 k/uL    Absolute Lymph # 4.69 1.0 - 4.8 k/uL    Absolute Mono # 1.14 (H) 0.1 - 0.8 k/uL    Absolute Eos # 0.14 0.0 - 0.4 k/uL    Basophils Absolute 0.00 0.0 - 0.2 k/uL    Morphology Normal    Comprehensive Metabolic Panel   Result Value Ref Range    Glucose 193 (H) 70 - 99 mg/dL    BUN 18 8 - 23 mg/dL    CREATININE 1.08 0.70 - 1.20 mg/dL    Calcium 10.0 8.6 - 10.4 mg/dL    Sodium 139 135 - 144 mmol/L    Potassium 4.1 3.7 - 5.3 mmol/L    Chloride 104 98 - 107 mmol/L    CO2 16 (L) 20 - 31 mmol/L    Anion Gap 19 (H) 9 - 17 mmol/L    Alkaline Phosphatase 93 40 - 129 U/L    ALT 25 5 - 41 U/L    AST 27 <40 U/L    Total Bilirubin 0.35 0.3 - 1.2 mg/dL    Total Protein 7.0 6.4 - 8.3 g/dL    Albumin 4.4 3.5 - 5.2 g/dL    Albumin/Globulin Ratio 1.7 1.0 - 2.5    GFR Non-African American >60 >60 mL/min    GFR African American >60 >60 mL/min    GFR Comment         Troponin   Result Value Ref Range    Troponin, High Sensitivity 27 (H) 0 - 22 ng/L   Brain Natriuretic Peptide   Result Value Ref Range    Pro-BNP 85 <300 pg/mL   Troponin   Result Value Ref Range    Troponin, High Sensitivity 18 0 - 22 ng/L   Lactic Acid   Result Value Ref Range    Lactic Acid, Whole Blood 6.5 (H) 0.7 - 2.1 mmol/L   CK   Result Value Ref Range    Total  39 - 308 U/L   Myoglobin, Serum   Result Value Ref Range    Myoglobin 120 (H) 28 - 72 ng/mL   Lactate, Sepsis   Result Value Ref Range    Lactic Acid, Sepsis, Whole Blood 7.1 (H) 0.5 - 1.9 mmol/L   Lactate, Sepsis   Result Value Ref Range    Lactic Acid, Sepsis, Whole Blood 8.0 (H) 0.5 - 1.9 mmol/L   Urinalysis with Reflex to Culture    Specimen: Urine   Result Value Ref Range    Color, UA Yellow Yellow    Turbidity UA Clear Clear    Glucose, Ur 1+ (A) NEGATIVE    Bilirubin Urine NEGATIVE NEGATIVE    Ketones, Urine NEGATIVE NEGATIVE    Specific Gravity, UA 1.046 (H) 1.005 - 1.030    Urine Hgb SMALL (A) NEGATIVE    pH, UA 5.5 5.0 - 8.0    Protein, UA TRACE (A) NEGATIVE    Urobilinogen, Urine Normal Normal    Nitrite, Urine NEGATIVE NEGATIVE    Leukocyte Esterase, Urine NEGATIVE NEGATIVE   APTT   Result Value Ref Range    PTT 23.0 20.5 - 30.5 sec   Microscopic Urinalysis   Result Value Ref Range    -          WBC, UA 0 TO 2 0 - 5 /HPF    RBC, UA None 0 - 4 /HPF    Casts UA  0 - 8 /LPF     2 TO 5 HYALINE Reference range defined for non-centrifuged specimen.     Epithelial Cells UA 0 TO 2 0 - 5 /HPF   Hemoglobin and hematocrit, blood   Result Value Ref Range    POC Hemoglobin 14.7 13.5 - 17.5 g/dL    POC Hematocrit 43 41 - 53 %   SODIUM (POC)   Result Value Ref Range    POC Sodium 142 138 - 146 mmol/L   POTASSIUM (POC)   Result Value Ref Range    POC Potassium 4.3 3.5 - 4.5 mmol/L   CALCIUM, IONIC (POC)   Result Value Ref Range    POC Ionized Calcium 1.35 (H) 1.15 - 1.33 mmol/L   TOTAL CO2   Result Value Ref Range    POC TCO2 20 (L) 22 - 30 mmol/L   Hemoglobin and hematocrit, blood   Result Value Ref Range    POC Hemoglobin 12.5 (L) 13.5 - 17.5 g/dL    POC Hematocrit 37 (L) 41 - 53 %   POTASSIUM (POC)   Result Value Ref Range    POC Potassium 4.0 3.5 - 4.5 mmol/L   CALCIUM, IONIC (POC)   Result Value Ref Range    POC Ionized Calcium 1.43 (H) 1.15 - 1.33 mmol/L   POC Glucose Fingerstick   Result Value Ref Range    POC Glucose 259 (H) 75 - 110 mg/dL   Venous Blood Gas, POC   Result Value Ref Range    pH, Oswaldo 6.943 (LL) 7.320 - 7.430    pCO2, Oswaldo 76.6 (HH) 41.0 - 51.0 mm Hg    pO2, Oswaldo 40.4 30.0 - 50.0 mm Hg    HCO3, Venous 16.6 (L) 22.0 - 29.0 mmol/L    Negative Base Excess, Oswaldo 18 (H) 0.0 - 2.0    O2 Sat, Oswaldo 45 (L) 60.0 - 85.0 %   Arterial Blood Gas, POC   Result Value Ref Range    POC pH 7.076 (LL) 7.350 - 7.450    POC pCO2 41.2 35.0 - 48.0 mm Hg    POC PO2 117.8 (H) 83.0 - 108.0 mm Hg    POC HCO3 12.1 (L) 21.0 - 28.0 mmol/L    Negative Base Excess, Art 18 (H) 0.0 - 2.0    POC O2 SAT 96 94.0 - 98.0 %    O2 Device/Flow/% BIPAP     Yunior Test NOT APPLICABLE     Sample Site Left Brachial Artery     Mode Bi-Level Ventilation     FIO2 80.0    Lactic Acid, POC   Result Value Ref Range    POC Lactic Acid 5.66 (H) 0.56 - 1.39 mmol/L   Arterial Blood Gas, POC   Result Value Ref Range    POC pH 7.091 (LL) 7.350 - 7.450    POC pCO2 61.6 (H) 35.0 - 48.0 mm Hg    POC PO2 87.5 83.0 - 108.0 mm Hg    POC HCO3 18.7 (L) 21.0 - 28.0 mmol/L    Negative Base Excess, Art 12 (H) 0.0 - 2.0    POC O2 SAT 92 (L) 94.0 - 98.0 %   POCT Glucose   Result Value Ref Range    POC Glucose 367 (H) 74 - 100 mg/dL   Arterial Blood Gas, POC   Result Value Ref Range    POC pH 7.229 (L) 7.350 - 7.450    POC pCO2 48.6 (H) 35.0 - 48.0 mm Hg    POC PO2 71.5 (L) 83.0 - 108.0 mm Hg    POC HCO3 20.3 (L) 21.0 - 28.0 mmol/L    Negative Base Excess, Art 7 (H) 0.0 - 2.0    POC O2 SAT 91 (L) 94.0 - 98.0 %   POCT Glucose   Result Value Ref Range    POC Glucose 308 (H) 74 - 100 mg/dL   TYPE AND SCREEN   Result Value Ref Range    Expiration Date 07/11/2022,2359     Arm Band Number [id number]     ABO/Rh A POSITIVE     Antibody Screen NEGATIVE     Unit Number [id number]     Product Code Leukocyte Reduced Red Cell     Unit Divison 00     Dispense Status ISSUED     Unit Issue Date/Time 480880935212     Product Code Blood Bank [id number]     Blood Bank Unit Type and Rh A POS     Blood Bank ISBT Product Blood Type 6200     Blood Bank Blood Product Expiration Date 628199866462     Transfusion Status OK TO TRANSFUSE     Crossmatch Result COMPATIBLE     Unit Number [id number]     Product Code Leukocyte Reduced Red Cell     Unit Divison 00     Dispense Status ISSUED     Unit Issue Date/Time 093119558304     Product Code Blood Bank [id number]     Blood Bank Unit Type and Rh A POS     Blood Bank ISBT Product Blood Type 6200     Blood Bank Blood Product Expiration Date 766527315845     Transfusion Status OK TO TRANSFUSE     Crossmatch Result COMPATIBLE    PREPARE PLASMA, 2 Units   Result Value Ref Range    Unit Number [id number]     Product Code FRESH PLASMA     Unit Divison 00     Dispense Status ALLOCATED     Transfusion Status OK TO TRANSFUSE     Unit Number [id number]     Product Code FRESH PLASMA     Unit Divison 00     Dispense Status ALLOCATED     Transfusion Status OK TO TRANSFUSE        RADIOLOGY:  XR CHEST PORTABLE   Final Result   Scattered pulmonary infiltrates concerning for pneumonia. CTA CHEST ABDOMEN AORTA RUNOFF RECON   Final Result   Occluded aortobifemoral graft. Prior infrarenal dissecting hematoma, as   reported; occluded native infrarenal aorta. Reconstituted flow in   moderate-severely diseased iliofemoral arteries. Patent right common femoral   below-knee graft. Likely occluded left popliteal artery; no below-knee   contrast enhancement demonstrated on this study (timing versus absent flow). Multiple additional vascular findings, as above. Interval worsening emphysema and mild interstitial disease as compared to   2012. Interval bullous changes upper lung zones, multicystic/larger on right   with mild peripheral enhancement/small posterior ovoid fluid collection   suggesting some degree of inflammation or infection; no large fluid-fluid   level or solid elements suggesting significant infection/fungal disease. No   consolidation, PTX or sizable pleural effusion. Hiatus hernia and esophageal mural prominence. Correlate for esophagitis. Edema/soft tissue swelling below knee on the right. Mild soft tissue   swelling left distal calf/foot. Some muscular atrophy right lower extremity. Additional unchanged or incidental findings, as detailed in the body of the   report above. Findings were discussed with Dr. Alfonso Jerome at 9:08 am on 7/8/2022. CT HEAD WO CONTRAST   Final Result   No acute intracranial abnormality. 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 Mild diffuse cerebral atrophy and minimal chronic white matter ischemic   change. Findings suggestive of acute on chronic right maxillary sinusitis. Correlate   clinically. ECG:  Rate controlled A. fib    All EKG's are interpreted by the Emergency Department Physician who either signsor Co-signs this chart in the absence of a cardiologist.    CONSULTS:  IP CONSULT TO CRITICAL CARE  IP CONSULT TO VASCULAR SURGERY    CRITICAL CARE:  See attending physician note    FINAL IMPRESSION      1.  Failing vascular bypass graft, initial encounter    2. Left leg numbness    3. Acute respiratory failure with hypercapnia (HCC)    4. Lactic acidosis          DISPOSITION / PLAN     DISPOSITION    Please see Dr. Asif Kerr note    PATIENT REFERRED TO:  No follow-up provider specified.     DISCHARGE MEDICATIONS:  New Prescriptions    No medications on file     Modified Medications    No medications on file        Nia Servin MD  Emergency Medicine Resident    (Please note that portions of this note were completed with a voice recognition program.  Efforts were made to edit the dictations but occasionally words are mis-transcribed.)         Nia Servin MD  Resident  07/08/22 1402 [facility] Miguel A LANG MD  Resident  07/08/22 4737

## 2022-07-08 NOTE — ADDENDUM NOTE
Addendum  created 07/08/22 1823 by Kaitlin Uriostegui, [address] recorded in [address] accepted, [employer] filed

## 2022-07-08 NOTE — ED PROVIDER NOTES
[facility] [address] [facility]  Emergency Department  Emergency Medicine Resident Sign-out     Care of Michel Tavarez was assumed from Dr. Tegan Giraldo and is being seen for Generalized Body Aches (10/10 FULL BODY PAIN)  . The patient's initial evaluation and plan have been discussed with the prior provider who initially evaluated the patient. EMERGENCY DEPARTMENT COURSE / MEDICAL DECISION MAKING:       MEDICATIONS GIVEN:  Orders Placed This Encounter   Medications    fentaNYL (SUBLIMAZE) injection 50 mcg    fentaNYL (SUBLIMAZE) injection 50 mcg    fentaNYL (SUBLIMAZE) injection 100 mcg    iopamidol (ISOVUE-370) 76 % injection 125 mL    0.9 % NaCl bolus    HYDROmorphone (DILAUDID) injection 1 mg    0.9 % sodium chloride infusion    piperacillin-tazobactam (ZOSYN) 4,500 mg in dextrose 5 % 100 mL IVPB (mini-bag)     Order Specific Question:   Antimicrobial Indications     Answer:   Sepsis of Unknown Etiology    vancomycin (VANCOCIN) 2,500 mg in dextrose 5 % 500 mL IVPB     Order Specific Question:   Antimicrobial Indications     Answer:   Sepsis of Unknown Etiology    nitroGLYCERIN 50 mg in dextrose 5% 250 mL infusion     Order Specific Question:   Titrate Infusion? Answer:   Yes     Order Specific Question:   Initial Infusion Dose: Answer:   5 mcg/min     Order Specific Question:   Goal of Therapy is:      Answer:   SBP less than 160 mmHg     Order Specific Question:   Contact Provider if:     Answer:   SBP less than 90 mmHg    heparin (porcine) injection 9,260 Units    heparin (porcine) injection 9,260 Units    heparin (porcine) injection 4,630 Units    heparin 25,000 units in dextrose 5 % 250 mL infusion (rate based)       LABS / RADIOLOGY:     Labs Reviewed   CBC WITH AUTO DIFFERENTIAL - Abnormal; Notable for the following components:       Result Value    WBC 14.2 (*)     Monocytes 8 (*)     Segs Absolute 8.23 (*)     Absolute Mono # 1.14 (*)     All other components within normal limits   COMPREHENSIVE METABOLIC PANEL - Abnormal; Notable for the following components:    Glucose 193 (*)     CO2 16 (*)     Anion Gap 19 (*)     All other components within normal limits   TROPONIN - Abnormal; Notable for the following components:    Troponin, High Sensitivity 27 (*)     All other components within normal limits   LACTIC ACID - Abnormal; Notable for the following components:    Lactic Acid, Whole Blood 6.5 (*)     All other components within normal limits   MYOGLOBIN, SERUM - Abnormal; Notable for the following components:    Myoglobin 120 (*)     All other components within normal limits   LACTATE, SEPSIS - Abnormal; Notable for the following components:    Lactic Acid, Sepsis, Whole Blood 7.1 (*)     All other components within normal limits   LACTATE, SEPSIS - Abnormal; Notable for the following components:    Lactic Acid, Sepsis, Whole Blood 8.0 (*)     All other components within normal limits   POC GLUCOSE FINGERSTICK - Abnormal; Notable for the following components:    POC Glucose 259 (*)     All other components within normal limits   VENOUS BLOOD GAS, POINT OF CARE - Abnormal; Notable for the following components:    pH, Oswaldo 6.943 (*)     pCO2, Oswaldo 76.6 (*)     HCO3, Venous 16.6 (*)     Negative Base Excess, Oswaldo 18 (*)     O2 Sat, Oswaldo 45 (*)     All other components within normal limits   CULTURE, BLOOD 1   CULTURE, BLOOD 1   BRAIN NATRIURETIC PEPTIDE   TROPONIN   CK   APTT   URINALYSIS WITH REFLEX TO CULTURE   APTT   APTT   BLOOD GAS, ARTERIAL       No results found. RECENT VITALS:     Temp: 97.5 °F (36.4 °C),  Heart Rate: (!) 132, Resp: (!) 31, BP: (!) 144/90, SpO2: 96 %      This patient is a 64 y.o. Male with complaints of full body pain. This patient has significant past medical history for aortobifem graft with extremity grafting as well to peripheral artery disease. The patient comes in via EMS complaining of \"body aches\". He screaming I hurt, I hurt, I hurt\".   Patient states that he has 10 out of 10 pain. He also has new left lower extremity numbness. The patient on chart review does have an FYI back in 2012 for opiate abuse and falling out of care from one of his providers for feeling urine tox screens. Our differentials for this patient is a vascular catastrophe versus opiate withdrawal.  Patient going for CTA chest abdomen pelvis with runoff, broad laboratory work-up and pain control. At the time of signout the patient has received 150 mcg of fentanyl for pain control. Hx of stroke with residual R sided deficits at baseline today. ED Course as of 07/08/22 1009   Fri Jul 08, 2022   0615 Care assumed from Dr. Abhi Powell [CD]   7534 Lactic Acid, Whole Blood(!): 6.5 [CD]   7592 Patient is tachycardic, hypertensive with a lactic acid of 6.5 and a leukocytosis. Adding on blood cultures and giving a liter of fluids. [CD]   7147 Myoglobin(!): 120 [CD]   0652 Troponin, High Sensitivity(!): 27  Increase  [CD]   7835 CT head with no acute intracranial abnormality. [CD]   0988 Patient dropped his sats down to 86% after getting the Dilaudid. Placed on a nonrebreather. [CD]   0729 Lactic Acid, Sepsis, Whole Blood(!): 7.1 [CD]   0731 Given the increase in lactic acid we will do the full 30 cc/kg fluid bolus which is 3.4 L total. [CD]   0822 Pt placed on BiPAP due to desat. VBG with pH of 6.9  [CD]   1260 On reevaluation patient has very wet lung sounds, new respiratory failure with a pH of 6.9. Placed on BiPAP. Discontinuing fluids starting a nitro drip. [CD]   3184 Discussed with critical care. Patient needs admission. Awaiting CTA chest abdomen pelvis with runoff. [CD]   0901 Lactic Acid, Sepsis, Whole Blood(!): 8.0 [CD]   0925 Starting patient on heparin. Vascular surgery at bedside. Patient's left foot is cold, and inability to move his ankle or toes [CD]   0946 Pt to go to the OR with vascular surgery.  CXR concerning for florid CHF, given the lactic acid and plan for

## 2022-07-08 NOTE — ED PROVIDER NOTES
[address] HANDOFF       Handoff taken on the following patient from prior Attending Physician:  Pt Name: Clint Villatoro  PCP:  Harriett Baker (Inactive)    Attestation  I was available and discussed any additional care issues that arose and coordinated the management plans with the resident(s) caring for the patient during my duty period. Any areas of disagreement with resident's documentation of care or procedures are noted on the chart. I was personally present for the key portions of any/all procedures during my duty period. I have documented in the chart those procedures where I was not present during the key portions. CHIEF COMPLAINT       Chief Complaint   Patient presents with    Generalized Body Aches     10/10 FULL BODY PAIN         CURRENT MEDICATIONS     Previous Medications  Previous Medications    ALPRAZOLAM (XANAX) 1 MG TABLET    Take 1 tablet by mouth 2 times daily as needed. ASPIRIN  MG EC TABLET    Take 1 tablet by mouth daily. DIPYRIDAMOLE-ASPIRIN (AGGRENOX)  MG PER SR CAPSULE    Take 1 capsule by mouth daily. ISOSORBIDE MONONITRATE (IMDUR) 30 MG CR TABLET    Take 30 mg by mouth daily. LISINOPRIL (PRINIVIL;ZESTRIL) 10 MG TABLET    Take 0.5 tablets by mouth daily. METFORMIN (GLUCOPHAGE) 500 MG TABLET    TAKE 1 TABLET BY MOUTH 2 TIMES DAILY (WITH MEALS) FOR 30 DAYS. METOPROLOL (LOPRESSOR) 25 MG TABLET    Take 1 tablet by mouth 2 times daily. Takes 1/2 tab 2 times a day    MIRTAZAPINE (REMERON) 30 MG TABLET    Take 1 tablet by mouth nightly. OXYCODONE (OXYCONTIN) 40 MG CR TABLET    Take 40 mg by mouth every 12 hours. OXYCODONE-ACETAMINOPHEN (PERCOCET)  MG PER TABLET    Take 1 tablet by mouth 3 times daily as needed. breakthrought pain     SIMVASTATIN (ZOCOR) 40 MG TABLET    Take 1 tablet by mouth nightly. TAMSULOSIN (FLOMAX) 0.4 MG CAPSULE    Take 1 capsule by mouth daily.     TRUETEST TEST STRIP    TEST 2 TIMES DAILY    VARENICLINE (CHANTIX STARTING MONTH PAK) 0.5 MG X 11 & 1 MG X 42 TABLET    Take by mouth. Encounter Medications  Orders Placed This Encounter   Medications    fentaNYL (SUBLIMAZE) injection 50 mcg    fentaNYL (SUBLIMAZE) injection 50 mcg    fentaNYL (SUBLIMAZE) injection 100 mcg    iopamidol (ISOVUE-370) 76 % injection 125 mL    0.9 % NaCl bolus    HYDROmorphone (DILAUDID) injection 1 mg    0.9 % sodium chloride infusion    piperacillin-tazobactam (ZOSYN) 4,500 mg in dextrose 5 % 100 mL IVPB (mini-bag)     Order Specific Question:   Antimicrobial Indications     Answer:   Sepsis of Unknown Etiology    vancomycin (VANCOCIN) 2,500 mg in dextrose 5 % 500 mL IVPB     Order Specific Question:   Antimicrobial Indications     Answer:   Sepsis of Unknown Etiology    nitroGLYCERIN 50 mg in dextrose 5% 250 mL infusion     Order Specific Question:   Titrate Infusion? Answer:   Yes     Order Specific Question:   Initial Infusion Dose: Answer:   5 mcg/min     Order Specific Question:   Goal of Therapy is: Answer:   SBP less than 160 mmHg     Order Specific Question:   Contact Provider if:     Answer:   SBP less than 90 mmHg    heparin (porcine) injection 9,260 Units    heparin (porcine) injection 9,260 Units    heparin (porcine) injection 4,630 Units    heparin 25,000 units in dextrose 5 % 250 mL infusion (rate based)       ALLERGIES     is allergic to tiffanie [morphine sulfate], methadone, and morphine. RECENT VITALS:   Temp: 97.5 °F (36.4 °C),  Heart Rate: (!) 135, Resp: (!) 42, BP: (!) 153/89    RADIOLOGY:   CT HEAD WO CONTRAST   Final Result   No acute intracranial abnormality. 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 Mild diffuse cerebral atrophy and minimal chronic white matter ischemic   change. Findings suggestive of acute on chronic right maxillary sinusitis. Correlate   clinically.          CTA CHEST ABDOMEN AORTA RUNOFF RECON (Results Pending)   XR CHEST PORTABLE    (Results Pending)       LABS:  Labs Reviewed   CBC WITH AUTO DIFFERENTIAL - Abnormal; Notable for the following components:       Result Value    WBC 14.2 (*)     Monocytes 8 (*)     Segs Absolute 8.23 (*)     Absolute Mono # 1.14 (*)     All other components within normal limits   COMPREHENSIVE METABOLIC PANEL - Abnormal; Notable for the following components:    Glucose 193 (*)     CO2 16 (*)     Anion Gap 19 (*)     All other components within normal limits   TROPONIN - Abnormal; Notable for the following components:    Troponin, High Sensitivity 27 (*)     All other components within normal limits   LACTIC ACID - Abnormal; Notable for the following components:    Lactic Acid, Whole Blood 6.5 (*)     All other components within normal limits   MYOGLOBIN, SERUM - Abnormal; Notable for the following components:    Myoglobin 120 (*)     All other components within normal limits   LACTATE, SEPSIS - Abnormal; Notable for the following components:    Lactic Acid, Sepsis, Whole Blood 7.1 (*)     All other components within normal limits   LACTATE, SEPSIS - Abnormal; Notable for the following components:    Lactic Acid, Sepsis, Whole Blood 8.0 (*)     All other components within normal limits   POC GLUCOSE FINGERSTICK - Abnormal; Notable for the following components:    POC Glucose 259 (*)     All other components within normal limits   VENOUS BLOOD GAS, POINT OF CARE - Abnormal; Notable for the following components:    pH, Oswaldo 6.943 (*)     pCO2, Oswaldo 76.6 (*)     HCO3, Venous 16.6 (*)     Negative Base Excess, Oswaldo 18 (*)     O2 Sat, Oswaldo 45 (*)     All other components within normal limits   CULTURE, BLOOD 1   CULTURE, BLOOD 1   BRAIN NATRIURETIC PEPTIDE   TROPONIN   CK   URINALYSIS WITH REFLEX TO CULTURE   APTT   APTT           PLAN/ TASKS OUTSTANDING     Patient with rising lactate. After fluids did require BiPAP due to respiratory distress.   CTA showing graft occlusion and multiple critical stenosis. Vascular at bedside and will take to OR. Patient ill-appearing, patient already given antibiotics. (Please note that portions of this note were completed with a voice recognition program.  Efforts were made to edit the dictations but occasionally words are mis-transcribed. )    Simon Bautista MD, MD,   Attending Emergency Physician        Umberto Brantley MD  07/08/22 3203

## 2022-07-08 NOTE — ED NOTES
Pt still calling out in pain. Resident notified.  CT contacted, pt will be next to [address] Susan Lainez RN  07/08/22 6082

## 2022-07-08 NOTE — ED NOTES
Writer received call from patient's ex-wife Laxmi Flores, [phone], who stated patient's not allowed to return to her home. Aide Reyesblaine stated she/her family is done with patient & he's not to return to them. Aide Jessica stated patient needs to go to a SNF or AL or somewhere on his own. Aide Lopez requested patient not be told this directly. Aide Jessica stated she cannot assist patient physically. Aide Jessica stated hospital is welcome to keep in contact with her for questions. Aide Lopez stated if hospital notifies her where patient's going, she'll bring his clothes to hospital before discharge. Writer relayed conversation to  Rahul Perez.       STACY Esparza  07/08/22 9658

## 2022-07-08 NOTE — ED NOTES
Pt to ED from home  Pt states he awoke from sleep with 10/10 sharp pain all over   Pain mostly in hips bilaterally  Pt diaphoretic and clammy   Pt alert and oriented   Pt keeps stating \" I hurt, I hurt\"   Stroke hx in 2012  Pt has extensive cardiac hx  Pt on full cardiac monitor   EKG done   IV initiated and lab work drawn   Bed locked in low position   Call light with in reach           Cherelle Evans, RN  07/08/22 60 [facility], RN  07/08/22 8855

## 2022-07-08 NOTE — ED PROVIDER NOTES
Vascular      [address]  ED     Emergency Department     Faculty Attestation    I performed a history and physical examination of the patient and discussed management with the resident. I reviewed the residents note and agree with the documented findings and plan of care. Any areas of disagreement are noted on the chart. I was personally present for the key portions of any procedures. I have documented in the chart those procedures where I was not present during the key portions. I have reviewed the emergency nurses triage note. I agree with the chief complaint, past medical history, past surgical history, allergies, medications, social and family history as documented unless otherwise noted below. For Physician Assistant/ Nurse Practitioner cases/documentation I have personally evaluated this patient and have completed at least one if not all key elements of the E/M (history, physical exam, and MDM). Additional findings are as noted. Patient arrives by EMS for severe sudden onset lower extremity pain. States bilateral hips. It woke him up from sleep. No injury no trauma. Patient has an extensive history aortic and pop bypasses. No recent surgeries. Denies any injury or trauma states he had a normal day yesterday. States he is never had pain like this before. On exam patient appears acutely ill is diaphoretic profusely sweating. Lungs clear heart regular but tachycardic. Abdomen is protuberant but soft no pulsatile mass but limited by habitus. However does have equal femoral and pedal pulses although both feet are cool but equal.  Chronic right lower extremity edema due to old stroke does wear a brace for this. However states decree sensation in the left from his baseline but no focal deficit. Will medicate, labs, EKG, plan for CTA's    EKG interpretation: Atrial fibrillation, ventricular rate 92. Normal intervals. Normal axis. Overall flat T waves throughout no acute ST changes. No old for comparison. Critical Care    CRITICAL CARE: There was a high probability of clinically significant/life threatening deterioration in this patient's condition which required my urgent intervention. Total critical care time was less than 30 minutes. This excludes any time for separately reportable procedures.        Yelena Guevara MD, Rafa Subramanian  Attending Emergency  Physician             Yelena Guevara MD  07/08/22 1636

## 2022-07-08 NOTE — ANESTHESIA PROCEDURE NOTES
Central Venous Line:    A central venous line was placed using surface landmarks, in the OR for the following indication(s): central venous access and CVP monitoring. Sterility preparation included the following: hand hygiene performed prior to procedure, maximum sterile barriers used and sterile technique used to drape from head to toe. The patient was placed in Trendelenburg position. The right internal jugular vein was prepped. The site was prepped with Chloraprep.double lumen was placed. During the procedure, the following specific steps were taken: target vein identified, needle advanced into vein and blood aspirated and guidewire advanced into vein. Intravenous verification was obtained by ultrasound and venous blood return. Post insertion care included: all ports aspirated, all ports flushed easily, guidewire removed intact, Biopatch applied, line sutured in place and dressing applied. During the procedure the patient experienced: patient tolerated procedure well with no complications and EBL < 5mL.       Outcomes: uncomplicated and patient tolerated procedure well  Real-time US image taken/store: yes  Anesthesia type: general..No  Staffing  Anesthesiologist: Marielle Estrada MD  Preanesthetic Checklist  Completed: patient identified, timeout performed and monitors applied/VS acknowledged

## 2022-07-08 NOTE — H&P
TRAUMA HISTORY AND PHYSICAL EXAMINATION    PATIENT NAME: Charly Chavarria  YOB: 1960  MEDICAL RECORD NO. [id number]   DATE: 7/8/2022  PRIMARY CARE PHYSICIAN: Arabella Duobis (Inactive)    IMPRESSION:     Patient Active Problem List   Diagnosis    Seasonal allergies    Anxiety    Carotid artery occlusion    CAD (coronary artery disease)    Hypertension    Neuropathy    PAD (peripheral artery disease) (HCC)    HTN (hypertension)    Arthritis associated with another disorder    Hernia, hiatal    Hyperlipidemia    CAD (coronary artery disease)    Critical ischemia of lower extremity ([address]       MEDICAL DECISION MAKING AND PLAN:     1. Neurological  1. Propofol  2. Hx of CVA with residual right sided weakness  2. Cardiovascular  1. Levophed 10 mcg. Vasopressin   2. MAP>65   3. Hx of aortobifemoral graft and CABGx3  4. Total aortobifemoral occulusion. POD#0 BL common fem art cut down. Thrombectomy of the aortobifemoral bypass. Thromboendarterectomy of the common femoral profundofemoral and SFA bilaterally with bovine pericardial patch angioplasty  5. No audible pulses BL LE post op  6. Heparin low dose  7. Elevated troponin. Cards following. Echo in AM. Trops q6h  3. Respiratory  1. PRVC FiO2 60% Peep 8 Rate 20   2. ABG 7.3/44/293/22  3. CXR scattered pulmonary opacities. F/u covid swab  4. Gastrointestinal  1. NPO  2. GI Prophylaxis pepcid  5. FEN/Renal  1. Urine Output decreased at 20cc/hr  2. BUN 18 Cr 1.08  3. Mg 2.1 Phos 5.4 Na 139 K 4.1 Ionized Ca 0.95 replaced 2g  4. 2L fluid given in ED   5. Rhabdomyolsis CK 41983 myoglobin 08172  6. 1L LR bolus in TICU. 125cc/hr LR  6. Heme  1. Hgb 13.4 Plt 320  2. 670cc cell save in OR. No product given  7. ID  1. Hypothermic 35C. Bear hugger in place  2. WBC 27  8. Endo  1. . HDSS  9. MSK  1. Concern for compartment syndrome in LLE  2. Left calf fasciotomy done in OR  3. Left thigh pressures checked via ortho showed 9.  Will continue to monitor  4. Ortho following for serial compartment checks  10. DVT Prophylaxis - Low dose heparin  11. Dispo - ICU  12. Lines - R IJ CVC, R brachial art line       CONSULT SERVICES    [] Neurosurgery     [x] Orthopedic Surgery    [] Cardiothoracic     [] Facial Trauma    [] Plastic Surgery (Burn)    [] Pediatric Surgery     [] Internal Medicine    [] Pulmonary Medicine    [] Other: Vascular, cards       HISTORY:     Chief Complaint:  Leg weakness/pain    HISTORY:     Prince Mallory is a 64 y.o. male Hx of aortobifemoral graft, CVA with residual R sided weakness, and CABGx3. that presented to the Emergency Department following who presented to ED with leg pain and weakness. CTA showed complete occulusion of aortobifemoral graft. Patient taken emergently to OR for thrombectomy. Patient transferred to TICU post OR due to instability. Patient presents to us hypothermic, hemodynamically unstable on pressors and intubated. MEDICATIONS:   []  None     []  Information not available due to exam limitations documented above    Prior to Admission medications    Medication Sig Start Date End Date Taking? Authorizing Provider   oxyCODONE (OXYCONTIN) 40 MG CR tablet Take 40 mg by mouth every 12 hours. Historical Provider, MD   oxyCODONE-acetaminophen (PERCOCET)  MG per tablet Take 1 tablet by mouth 3 times daily as needed. breakthrought pain     Historical Provider, MD   lisinopril (PRINIVIL;ZESTRIL) 10 MG tablet Take 0.5 tablets by mouth daily. 9/13/12   Kane Youngblood MD   dipyridamole-aspirin (AGGRENOX)  MG per SR capsule Take 1 capsule by mouth daily. 9/13/12   Tomer Noriega MD   simvastatin (ZOCOR) 40 MG tablet Take 1 tablet by mouth nightly. 9/13/12   Kane Youngblood MD   ALPRAZolam Tory Reeks) 1 MG tablet Take 1 tablet by mouth 2 times daily as needed. 9/13/12   Tomer Noriega MD   varenicline (CHANTIX STARTING MONTH PAK) 0.5 MG X 11 & 1 MG X 42 tablet Take by mouth.  9/13/12   Kane Youngblood MD isosorbide mononitrate (IMDUR) 30 MG CR tablet Take 30 mg by mouth daily. Historical Provider, MD   metoprolol (LOPRESSOR) 25 MG tablet Take 1 tablet by mouth 2 times daily. Takes 1/2 tab 2 times a day 9/13/12   Art Bauer MD   mirtazapine (REMERON) 30 MG tablet Take 1 tablet by mouth nightly. 9/13/12   Art Bauer MD   tamsulosin (FLOMAX) 0.4 MG capsule Take 1 capsule by mouth daily. 9/13/12   Art Bauer MD   TRUETEST TEST strip TEST 2 TIMES DAILY 4/30/12   Matthew Miller MD   metformin (GLUCOPHAGE) 500 MG tablet TAKE 1 TABLET BY MOUTH 2 TIMES DAILY (WITH MEALS) FOR 30 DAYS. 2/21/12   Matthew Miller MD   aspirin  MG EC tablet Take 1 tablet by mouth daily. 1/25/12   Matthew Miller MD       ALLERGIES:   []  None    []   Information not available due to exam limitations documented above     Faviola [morphine sulfate], Methadone, and Morphine    PAST MEDICAL HISTORY: []  None   []   Information not available due to exam limitations documented above      has a past medical history of Anxiety, Arthritis associated with another disorder, CAD (coronary artery disease), Carotid artery occlusion, Family history of kidney stone, HTN (hypertension), Hyperlipidemia, Hypertension, Nephrolithiasis, Neuropathy, PAD (peripheral artery disease) ([facility] [address] Peripheral vascular disease ([facility] [address] Personal history of MRSA (methicillin resistant Staphylococcus aureus), Seasonal allergies, and Vasculopathy. has a past surgical history that includes Tonsillectomy and adenoidectomy; Percutaneous Transluminal Coronary Angio; Carotid endarterectomy (07/19/08); IR Femoral Popliteal Bypass Graft (01/24/208 Dr Nimo Baig); Femoral-tibial Bypass Graft (12/31/07  Kiki Mohr ); Balloon angioplasty, artery (11/23/10 Allie Muñoz); Aorto-femoral Bypass Graft (2/11/2011-[facility]); Abdominal adhesion surgery (2/11/2011-[facility]); and Coronary artery bypass graft (2008).     FAMILY HISTORY   []   Information not available due to exam limitations documented above    family history includes Cancer (age of onset: 28) in his mother; Heart Disease in his sister; Hypertension in his father and sister. SOCIAL HISTORY  []   Information not available due to exam limitations documented above   reports that he has been smoking cigarettes. He has a 66.00 pack-year smoking history. He has never used smokeless tobacco.   reports no history of alcohol use. reports no history of drug use. Review of Systems:    []   Information not available due to exam limitations documented above    Review of Systems   Unable to perform ROS: Acuity of condition           PHYSICAL EXAMINATION:     [address] TO ARRIVAL     [x]No        []Yes      Variable  Score   Variable  Score  Eye opening [x]Spontaneous 4 Verbal  [x]Oriented  5     []To voice  3   []Confused  4    []To pain  2   []Inapp words  3    []None  1   []Incomp words 2       []None  1   Motor   [x]Obeys  6    []Localizes pain 5    []Withdraws(pain) 4    []Flexion(pain) 3  []Extension(pain) 2    []None  1     GCS Total = 3    PHYSICAL EXAMINATION    VITAL SIGNS:   Vitals:    07/08/22 2300   BP:    Pulse: 98   Resp:    Temp:    SpO2: 98%       Physical Exam  Constitutional:       General: He is in acute distress. Appearance: He is toxic-appearing. HENT:      Head: Normocephalic and atraumatic. Mouth/Throat:      Comments: ETT/OTG in place  Eyes:      Pupils: Pupils are equal, round, and reactive to light. Cardiovascular:      Rate and Rhythm: Tachycardia present. Heart sounds: Normal heart sounds. Pulmonary:      Comments: No palpable DP/PT pulses BL. L DP/PT audible on doppler. Faint R PT audible on doppler. Popliteal pulses BL on doppler  Abdominal:      General: There is no distension. Palpations: Abdomen is soft. Musculoskeletal:      Comments: +3 RL pitting edema   Skin:     Capillary Refill: Capillary refill takes 2 to 3 seconds. Comments: Warm skin   Neurological:      Comments: No facial droop              RADIOLOGY  XR CHEST PORTABLE   Final Result   Scattered pulmonary opacities consistent with multifocal airspace disease. Poor tubes and lines as above. XR CHEST PORTABLE   Final Result   Scattered pulmonary infiltrates concerning for pneumonia. CTA CHEST ABDOMEN AORTA RUNOFF RECON   Final Result   Occluded aortobifemoral graft. Prior infrarenal dissecting hematoma, as   reported; occluded native infrarenal aorta. Reconstituted flow in   moderate-severely diseased iliofemoral arteries. Patent right common femoral   below-knee graft. Likely occluded left popliteal artery; no below-knee   contrast enhancement demonstrated on this study (timing versus absent flow). Multiple additional vascular findings, as above. Interval worsening emphysema and mild interstitial disease as compared to   2012. Interval bullous changes upper lung zones, multicystic/larger on right   with mild peripheral enhancement/small posterior ovoid fluid collection   suggesting some degree of inflammation or infection; no large fluid-fluid   level or solid elements suggesting significant infection/fungal disease. No   consolidation, PTX or sizable pleural effusion. Hiatus hernia and esophageal mural prominence. Correlate for esophagitis. Edema/soft tissue swelling below knee on the right. Mild soft tissue   swelling left distal calf/foot. Some muscular atrophy right lower extremity. Additional unchanged or incidental findings, as detailed in the body of the   report above. Findings were discussed with Dr. Dave Kwong at 9:08 am on 7/8/2022. CT HEAD WO CONTRAST   Final Result   No acute intracranial abnormality. 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 Mild diffuse cerebral atrophy and minimal chronic white matter ischemic   change.       Findings suggestive of acute on chronic right maxillary sinusitis. Correlate   clinically.                LABS    Labs Reviewed   CBC WITH AUTO DIFFERENTIAL - Abnormal; Notable for the following components:       Result Value    WBC 14.2 (*)     Monocytes 8 (*)     Segs Absolute 8.23 (*)     Absolute Mono # 1.14 (*)     All other components within normal limits   COMPREHENSIVE METABOLIC PANEL - Abnormal; Notable for the following components:    Glucose 193 (*)     CO2 16 (*)     Anion Gap 19 (*)     All other components within normal limits   TROPONIN - Abnormal; Notable for the following components:    Troponin, High Sensitivity 27 (*)     All other components within normal limits   LACTIC ACID - Abnormal; Notable for the following components:    Lactic Acid, Whole Blood 6.5 (*)     All other components within normal limits   MYOGLOBIN, SERUM - Abnormal; Notable for the following components:    Myoglobin 120 (*)     All other components within normal limits   LACTATE, SEPSIS - Abnormal; Notable for the following components:    Lactic Acid, Sepsis, Whole Blood 7.1 (*)     All other components within normal limits   LACTATE, SEPSIS - Abnormal; Notable for the following components:    Lactic Acid, Sepsis, Whole Blood 8.0 (*)     All other components within normal limits   URINALYSIS WITH REFLEX TO CULTURE - Abnormal; Notable for the following components:    Glucose, Ur 1+ (*)     Specific Gravity, UA 1.046 (*)     Urine Hgb SMALL (*)     Protein, UA TRACE (*)     All other components within normal limits   CALCIUM, IONIC (POC) - Abnormal; Notable for the following components:    POC Ionized Calcium 1.35 (*)     All other components within normal limits   TOTAL CO2 - Abnormal; Notable for the following components:    POC TCO2 20 (*)     All other components within normal limits   HGB/HCT - Abnormal; Notable for the following components:    POC Hemoglobin 12.5 (*)     POC Hematocrit 37 (*)     All other components within normal limits   CALCIUM, IONIC (POC) - Abnormal; Notable for the following components:    POC Ionized Calcium 1.43 (*)     All other components within normal limits   POTASSIUM (POC) - Abnormal; Notable for the following components:    POC Potassium 4.9 (*)     All other components within normal limits   CALCIUM, IONIC (POC) - Abnormal; Notable for the following components:    POC Ionized Calcium 1.42 (*)     All other components within normal limits   HGB/HCT - Abnormal; Notable for the following components:    POC Hematocrit 40 (*)     All other components within normal limits   SODIUM (POC) - Abnormal; Notable for the following components:    POC Sodium 149 (*)     All other components within normal limits   BASIC METABOLIC PANEL - Abnormal; Notable for the following components:    Glucose 150 (*)     Calcium 8.4 (*)     All other components within normal limits   CBC WITH AUTO DIFFERENTIAL - Abnormal; Notable for the following components:    WBC 27.0 (*)     NRBC Automated 0.1 (*)     Seg Neutrophils 94 (*)     Lymphocytes 4 (*)     Eosinophils % 0 (*)     Segs Absolute 25.38 (*)     All other components within normal limits   PHOSPHORUS - Abnormal; Notable for the following components:    Phosphorus 5.6 (*)     All other components within normal limits   CK - Abnormal; Notable for the following components:     Total CK 12,473 (*)     All other components within normal limits   MYOGLOBIN, SERUM - Abnormal; Notable for the following components:    Myoglobin 37,910 (*)     All other components within normal limits   ELECTROLYTES PLUS - Abnormal; Notable for the following components:    POC Sodium 147 (*)     POC Potassium 4.8 (*)     POC Chloride 115 (*)     All other components within normal limits   HGB/HCT - Abnormal; Notable for the following components:    POC Hemoglobin 13.4 (*)     POC Hematocrit 40 (*)     All other components within normal limits   CALCIUM, IONIC (POC) - Abnormal; Notable for the following components:    POC Ionized for the following components:    POC Lactic Acid 5.66 (*)     All other components within normal limits   ARTERIAL BLOOD GAS, POC - Abnormal; Notable for the following components:    POC pH 7.091 (*)     POC pCO2 61.6 (*)     POC HCO3 18.7 (*)     Negative Base Excess, Art 12 (*)     POC O2 SAT 92 (*)     All other components within normal limits   POCT GLUCOSE - Abnormal; Notable for the following components:    POC Glucose 367 (*)     All other components within normal limits   ARTERIAL BLOOD GAS, POC - Abnormal; Notable for the following components:    POC pH 7.229 (*)     POC pCO2 48.6 (*)     POC PO2 71.5 (*)     POC HCO3 20.3 (*)     Negative Base Excess, Art 7 (*)     POC O2 SAT 91 (*)     All other components within normal limits   POCT GLUCOSE - Abnormal; Notable for the following components:    POC Glucose 308 (*)     All other components within normal limits   ARTERIAL BLOOD GAS, POC - Abnormal; Notable for the following components:    POC pH 6.998 (*)     POC pCO2 82.6 (*)     POC HCO3 20.3 (*)     Negative Base Excess, Art 13 (*)     POC O2 SAT 90 (*)     All other components within normal limits   POCT GLUCOSE - Abnormal; Notable for the following components:    POC Glucose 218 (*)     All other components within normal limits   ARTERIAL BLOOD GAS, POC - Abnormal; Notable for the following components:    POC pH 7.225 (*)     POC pCO2 55.4 (*)     POC PO2 224.4 (*)     Negative Base Excess, Art 5 (*)     POC O2  (*)     All other components within normal limits   POCT GLUCOSE - Abnormal; Notable for the following components:    POC Glucose 148 (*)     All other components within normal limits   ARTERIAL BLOOD GAS, POC - Abnormal; Notable for the following components:    POC pH 7.302 (*)     POC PO2 293.2 (*)     Negative Base Excess, Art 4 (*)     POC O2  (*)     All other components within normal limits   CREATININE W/GFR POINT OF CARE - Abnormal; Notable for the following components: POC Creatinine 1.86 (*)     GFR Comment 45 (*)     GFR Non- 37 (*)     All other components within normal limits   LACTIC ACID,POINT OF CARE - Abnormal; Notable for the following components:    POC Lactic Acid 3.85 (*)     All other components within normal limits   POCT GLUCOSE - Abnormal; Notable for the following components:    POC Glucose 125 (*)     All other components within normal limits   POC GLUCOSE FINGERSTICK - Abnormal; Notable for the following components:    POC Glucose 169 (*)     All other components within normal limits   ARTERIAL BLOOD GAS, POC - Abnormal; Notable for the following components:    POC PO2 155.9 (*)     POC HCO3 20.9 (*)     Negative Base Excess, Art 4 (*)     POC O2 SAT 99 (*)     All other components within normal limits   LACTIC ACID,POINT OF CARE - Abnormal; Notable for the following components:    POC Lactic Acid 2.74 (*)     All other components within normal limits   POCT GLUCOSE - Abnormal; Notable for the following components:    POC Glucose 164 (*)     All other components within normal limits   CULTURE, BLOOD 1   CULTURE, BLOOD 1   COVID-19, RAPID   BRAIN NATRIURETIC PEPTIDE   TROPONIN   CK   APTT   MICROSCOPIC URINALYSIS   HGB/HCT   SODIUM (POC)   POTASSIUM (POC)   POTASSIUM (POC)   HGB/HCT   POTASSIUM (POC)   CALCIUM, IONIC (POC)   TOTAL CO2   MAGNESIUM   APTT   APTT   APTT   APTT   APTT   PREVIOUS SPECIMEN   APTT   BRAIN NATRIURETIC PEPTIDE   CK   TROPONIN   TROPONIN   UREA N (POC)   POCT GLUCOSE   POCT GLUCOSE   POCT GLUCOSE   POCT GLUCOSE   POCT GLUCOSE   POCT GLUCOSE   POCT GLUCOSE   TYPE AND SCREEN   PREPARE PLASMA   PREPARE RBC (CROSSMATCH)   BLOOD BANK REQUEST         Richard Perez DO  7/8/22, 11:23 PM

## 2022-07-08 NOTE — ED NOTES
RN entered patient's room to administer medication. Upon entering room, pt respirations increasingly labored and pt appears more agitated. Pt O2 sat 89% on NRB. RT and Dr. Rangel Lambert notified.       Loren Uriostegui RN  07/08/22 [id number]

## 2022-07-08 NOTE — ED NOTES
RUCHI performed. RT and Dr. Jose Spencer notified. RT at bedside placing BIPAP on patient.       Prasanna Alexander, RN  07/08/22 [id number]

## 2022-07-08 NOTE — OP NOTE
Operative Note      Patient: Sterling Fernández  YOB: 1960  MRN: [id number]    Date of Procedure: 7/8/2022    Pre-Op Diagnosis: Bilateral lower extremity acute on chronic ischemia due to occluded aortobifemoral bypass graft    Post-Op Diagnosis: Same       Procedure:  1. Bilateral common femoral arterial access via open cutdown. 2.  Thrombectomy of the aortobifemoral bypass graft with Zulema catheters. 3.  Thromboendarterectomy of the common femoral profundofemoral and SFA bilaterally with bovine pericardial patch angioplasty. 4.  Aortography with pelvic runoff. 5.  Bilateral lower extremity arteriography. Surgeon(s):  Army Doss, MD Jackolyn Angelucci, MD    Assistant:   Grover Rivas  Anesthesia: General    Estimated Blood Loss (mL): 9854 cc    Complications: None    Specimens:   * No specimens in log *    Implants:  Implant Name Type Inv. Item Serial No.  Lot No. LRB No. Used Action   PATCH VASC W0.8XL8CM PERIPH BOV PERICARD N PVC N DEHP CRSS - [id number] Vascular grafts PATCH VASC W0.8XL8CM PERIPH BOV PERICARD N PVC N DEHP CRSS [id number] [facility] [id number] Left 1 Implanted   PATCH VASC W0.8XL8CM PERIPH BOV PERICARD N PVC N DEHP CRSS - [id number] Vascular grafts PATCH VASC W0.8XL8CM PERIPH BOV PERICARD N PVC N DEHP CRSS [id number] [facility] [id number] Right 1 Implanted   GRAFT STENT 0.035 IN 9 MMX5 CM 8 [id number] CM VIABAHN - [id number] Vascular grafts GRAFT STENT 0.035 IN 9 MMX5 CM 8 [id number] CM VIABAHN [de-identified]  [employer] [facility]  Right 1 Implanted         Drains:   Urinary Catheter Dexter-Temperature (Active)   Catheter Indications Need for fluid volume management of the critically ill patient in a critical care setting 07/08/22 0958       Findings: Bilateral lower extremity ischemia more severe on the left than the right. Endarterectomy and thrombectomy produced good inflow to both lower extremities. Aortography revealed a slight stenosis in the right limb of the aortobifemoral bypass graft proximally which was treated with a 9 x 50 Viabahn covered stent and angioplasty. X arterectomy occurred on the left distal external iliac artery preventing adequate repair and therefore it was ligated. Following the revascularization procedure I decided to go ahead with left calf 4 compartment fasciotomy. This I muscles did not allow knee extension and therefore I called orthopedic surgery to evaluate the thigh for compartment problems. It seems as though this may be more rigor mortis or something that is more chronic however I will rely on their expertise to evaluate and treat accordingly the thigh in terms of compartment problems. They agreed to follow the compartment. It would be my opinion not to perform thigh compartment fasciotomy is currently because I think he may eventually require an above-knee amputation if he truly has rigor mortis of the thigh musculature. The skin is viable and there does not seem to be any further ongoing skin ischemia. The calf muscles did contract and there was arterial bleeding during those dissections. Arteriography of the right lower extremity reveals a patent femoral to posterior tibial bypass graft with sluggish flow in the tibials. On the left the SFA is patent to and including the popliteal and the proximal tibials. Detailed Description of Procedure: The patient was brought to the operating room and placed in the supine position with his arms tucked at his sides. He did have an arterial line placed in the right distal brachial artery due to radial artery harvest on the right. He had a central line placed in the right IJ. He was chest abdomen groins and lower extremities were prepped and draped in a sterile fashion. Timeout was held before groin incisions were made on both sides through the existing scars longitudinally.   Bovie cautery was used to dissect down to the level of the graft. The graft was then dissected in a circumferential fashion up to and including the external iliac artery. On the right the external iliac artery was dissected circumferentially and the common femoral profundofemoral and SFA were dissected circumferentially. The femoral to posterior tibial graft was also dissected circumferentially. Vesseloops and other sundry clamps were used for control of those vessels. On the left my partner was performing dissection at which time the adventitia was removed from the artery causing a hole in the external iliac artery above the level of the aortobifemoral bypass graft. This however worsened as we tried to repair it and therefore I decided to ligate the artery as I thought that this was a tear in the adventitial I could not be repaired. The external iliac artery on the right is intact and does give circulation to the hypogastric artery. The graft and common femoral as well as profundofemoral arteries were all dissected in a circumferential fashion as well as the proximal SFA. Graftotomy was performed on the left after the patient was given 10,000 units of heparin. 3 minutes were allowed to elapse before performing this graftotomy and thrombus was removed directly from the common femoral profundofemoral and distal graft. The external iliac artery had no backbleeding at this point in time. The graft was then treated with a #5 Zulema balloon with thrombectomy producing outstanding inflow. The graft was clamped with an angled femoral DeBakey clamp and the SFA was then treated with a #3 Zulema for thrombectomy. A long piece of thrombus was removed from the SFA. The common femoral area was then repaired with a bovine pericardial patch using a 6-0 Prolene in a four-quadrant running fashion. Antegrade and retrograde flush were achieved with ease before restoring flow to the SFA and profundofemoral artery.   There was another hole in the SFA which was repaired with 6-0 Prolene on a C1 needle. On the right a longitudinal arteriotomy was made not to interrupt the blount of the femoral to posterior tibial bypass by placing the arteriotomy on the anterior portion of the graft spiraling down onto the lateral portion of the SFA. Limited endarterectomy of the SFA was performed and thrombus was removed from the graft and the common femoral artery. Thrombectomy catheter was then used in the profunda branches as well as the bypass graft. Small amounts of thrombus was removed. The #5 Zulema was then used to perform thrombectomy of the aortobifemoral bypass graft. Several passes were needed before adequate inflow was established. The inflow to the left was not altered. Bovine pericardial patch angioplasty was then performed to the arteriotomy in a four-quadrant running fashion using 6-0 Prolene on a CT1 needle. Before closing the endarterectomy completely an 8 Palestinian sheath was placed in the right repair between the sutures and the sutures were pulled tight. Flow was restored to the pelvis and then the profunda and then the SFA. The sheath was then used to admit a wire into the distal aorta above the level of the anastomosis. An Omni Flush catheter was placed at that location and distal aortography with pelvic runoff was obtained in 2 views. There was a stenosis in the right limb of the graft which was repaired using an 8 x 50 Viabahn covered stent and it was ballooned with an 8 x 40 balloon. Completion aortography with pelvic runoff revealed an excellent result. Bilateral lower extremity arteriography was performed. Images were saved. The above-noted findings were seen. There was no bleeding at the repairs and therefore the groins were closed in layers using 2-0 Vicryl in an interrupted simple style at the femoral sheath, deep subcutaneous tissue, Nati's fascia and superficial subcutaneous tissue.   The skin was closed with 4-0 nylon in an interrupted vertical mattress style. The patient tolerated this portion of the procedure well and there were no complications. All sponge needle and instrument counts were correct. The calf was examined on both sides and the left was certainly tighter than the right. I took the liberty of performing four-quadrant fasciotomy by making a medial incision and going through the superficial fascia and then incising the entirety of the soleus muscle to open the deep compartments posteriorly. The anterior and lateral compartments were opened through a single incision by opening the anterior compartment first and then the lateral compartment. I tried to bend the knee which would not bend more than approximately 15 to 20 degrees and therefore I am quite concerned that he may have rigor mortis of the thigh muscles. I discussed this with orthopedics and they are going ahead with a LevelUp needle to evaluate compartment pressures. I would rather not perform thigh compartment fasciotomies unless we had to as I think he will eventually require an above-knee amputation. The purpose of the revascularization today was to allow an above-knee amputation to heal.  We will follow his thigh compartment and whether or not his limb is viable on the left. The right is viable however nonfunctional due to his previous stroke and he admitted to this preoperatively.     Electronically signed by Dayanara Prajapati MD on 7/8/2022 at 4:36 PM

## 2022-07-08 NOTE — CONSULTS
Division of Vascular Surgery        New Consult      Physician Requesting Consult:  Dr. Dejah Perez    Reason for Consult:   Occluded aortobifemoral bypass    Chief Complaint:      Pain all over, legs are cold with decreased sensation and movement    History of Present Illness:      Sterling Fernández is a 64 y.o. who presented to the emergency department with complaints of pain all over. Patient was found to have a rising lactic acidosis. CTA was performed which showed patient had an occluded aortobifem graft. Vascular surgery was consulted for this. On evaluation patient was found to have new sensation in bilateral lower extremities, patient was not able to move either lower extremity with any meaningful effort. Patient has history of aortobifemoral bypass performed in 2011. Patient also has history of a right Fem popliteal bypass which was later converted to a Fem posterior tibial bypass. 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 Patient states that he typically has about 50% function in his right lower extremity. Patient states that the pain and decreased movement in his lower extremities started when he awoke this morning. Patient is in significant pain. Patient also have a difficult time with breathing today, feels very short of breath.     Medical History:     Past Medical History:   Diagnosis Date    Anxiety     Arthritis associated with another disorder     CAD (coronary artery disease)     with history of multiple MI    Carotid artery occlusion     Family history of kidney stone     HTN (hypertension)     Hyperlipidemia     Hypertension     Nephrolithiasis     multiple times    Neuropathy     PAD (peripheral artery disease) (formerly [facility])     Peripheral vascular disease ([facility] [address]     Personal history of MRSA (methicillin resistant Staphylococcus aureus)     Seasonal allergies     Vasculopathy        Surgical History:     Past Surgical History:   Procedure Laterality Date    ABDOMINAL ADHESION SURGERY  2/11/2011-[facility]    Reexploration of abdominal wound and reclosure of the abdominal wound with fascial sutures and retention sutures as well    AORTO-FEMORAL BYPASS GRAFT  2/11/2011-[facility]    ABF bypass of 16x8 PTFE graft. This is an end-to-side anastomosis proximally    BALLOON ANGIOPLASTY, ARTERY  11/23/10 Allie Muñoz    1. Placement of 5 sheath in the left femoral artery with duplex guidance. 2. Left Iliac arteriogram. 3. Attempted ballon angioplasty left iliac artery occlusion.  CAROTID ENDARTERECTOMY  07/19/08    Left carotid endarterectomy using shunt dacron patch aplasty    CORONARY ARTERY BYPASS GRAFT  2008    CABG x3     FEMORAL-TIBIAL BYPASS GRAFT  12/31/07  Adan Penny     Right femoral to posterior tibial artery bypass graft with in situ saphenous vein graft    IR FEMORAL POPLITEAL BYPASS GRAFT  01/24/208 Dr Mally Mann    1. Exploration of right fem-pop bypass graft. 2.Thrombectomy of right fem-post-tib saph vein graft.  3. Replacement of femoral to popliteal bypass graft from midthigh to near the anastomosis by reversed greater saph vein harvested from left leg    PTCA      PTCA with stent x6    TONSILLECTOMY AND ADENOIDECTOMY         Family History:     Family History   Problem Relation Age of Onset    Hypertension Father     Heart Disease Sister     Hypertension Sister     Cancer Mother 28        breast cancer       Allergies:       Faviola [morphine sulfate], Methadone, and Morphine    Medications:      Current Facility-Administered Medications   Medication Dose Route Frequency Provider Last Rate Last Admin    fentaNYL (SUBLIMAZE) injection 50 mcg  50 mcg IntraVENous Once Edinson Capps MD        vancomycin (VANCOCIN) 2,500 mg in dextrose 5 % 500 mL IVPB  2,500 mg IntraVENous Once Alcides Yen .7 mL/hr at 07/08/22 0902 2,500 mg at 07/08/22 0902    nitroGLYCERIN 50 mg in dextrose 5% 250 mL infusion 5-200 mcg/min IntraVENous Continuous Elin Donovan DO 30 mL/hr at 07/08/22 0910 100 mcg/min at 07/08/22 0910    heparin (porcine) injection 9,260 Units  80 Units/kg IntraVENous PRN Elin Donovan DO        heparin (porcine) injection 4,630 Units  40 Units/kg IntraVENous PRN Elin Donovan DO        heparin 25,000 units in dextrose 5 % 250 mL infusion (rate based)  5-30 Units/kg/hr IntraVENous Continuous Elin Donovan DO         Current Outpatient Medications   Medication Sig Dispense Refill    oxyCODONE (OXYCONTIN) 40 MG CR tablet Take 40 mg by mouth every 12 hours.  oxyCODONE-acetaminophen (PERCOCET)  MG per tablet Take 1 tablet by mouth 3 times daily as needed. breakthrought pain       lisinopril (PRINIVIL;ZESTRIL) 10 MG tablet Take 0.5 tablets by mouth daily. 30 tablet 2    dipyridamole-aspirin (AGGRENOX)  MG per SR capsule Take 1 capsule by mouth daily. 60 capsule 1    simvastatin (ZOCOR) 40 MG tablet Take 1 tablet by mouth nightly. 30 tablet 2    ALPRAZolam (XANAX) 1 MG tablet Take 1 tablet by mouth 2 times daily as needed. 60 tablet 0    varenicline (CHANTIX STARTING MONTH PAK) 0.5 MG X 11 & 1 MG X 42 tablet Take by mouth. 53 tablet 0    isosorbide mononitrate (IMDUR) 30 MG CR tablet Take 30 mg by mouth daily.  metoprolol (LOPRESSOR) 25 MG tablet Take 1 tablet by mouth 2 times daily. Takes 1/2 tab 2 times a day 30 tablet 1    mirtazapine (REMERON) 30 MG tablet Take 1 tablet by mouth nightly. 30 tablet 1    tamsulosin (FLOMAX) 0.4 MG capsule Take 1 capsule by mouth daily. 30 capsule 1    TRUETEST TEST strip TEST 2 TIMES DAILY 60 strip 11    metformin (GLUCOPHAGE) 500 MG tablet TAKE 1 TABLET BY MOUTH 2 TIMES DAILY (WITH MEALS) FOR 30 DAYS. 60 tablet 5    aspirin  MG EC tablet Take 1 tablet by mouth daily. 90 tablet 6     Social History:     Tobacco:    reports that he has been smoking cigarettes. He has a 66.00 pack-year smoking history.  He has never used smokeless tobacco.  Alcohol:      reports no history of alcohol use. Drug Use:  reports no history of drug use. Review of Systems:     Review of Systems   Constitutional: Positive for chills and diaphoresis. HENT: Negative for drooling and facial swelling. Respiratory: Positive for shortness of breath. Negative for chest tightness. Cardiovascular: Positive for leg swelling. Negative for chest pain. Gastrointestinal: Positive for abdominal distention. Negative for abdominal pain, nausea and vomiting. Genitourinary: Negative for difficulty urinating and penile pain. Musculoskeletal: Positive for gait problem. Negative for neck stiffness. Skin: Positive for color change and pallor. Allergic/Immunologic: Negative for immunocompromised state. Neurological: Positive for numbness. Negative for speech difficulty and headaches. Hematological: Negative for adenopathy. Psychiatric/Behavioral: Negative for agitation and behavioral problems. Physical Exam:     Vitals:  BP (!) 153/89   Pulse (!) 135   Temp 97.5 °F (36.4 °C)   Resp (!) 42   Wt 255 lb (115.7 kg)   SpO2 96%   BMI 36.07 kg/m²     Physical Exam  Constitutional:       General: He is in acute distress. Appearance: He is ill-appearing. HENT:      Head: Normocephalic and atraumatic. Right Ear: External ear normal.      Left Ear: External ear normal.      Nose: Nose normal.      Mouth/Throat:      Pharynx: Oropharynx is clear. Eyes:      Extraocular Movements: Extraocular movements intact. Cardiovascular:      Rate and Rhythm: Regular rhythm. Tachycardia present. Pulses:           Right popliteal pulse not accessible and left popliteal pulse not accessible. Right dorsalis pedis pulse not accessible and left dorsalis pedis pulse not accessible. Right posterior tibial pulse not accessible and left posterior tibial pulse not accessible. Pulmonary:      Effort: Respiratory distress present.       Breath sounds: Rales present. Chest:      Chest wall: No tenderness. Abdominal:      General: There is distension. Palpations: Abdomen is soft. Tenderness: There is no abdominal tenderness. Musculoskeletal:         General: Deformity present. Cervical back: Normal range of motion. Right lower leg: Edema present. Right foot: Deformity and Charcot foot present. Skin:     General: Skin is cool. Capillary Refill: Capillary refill takes more than 3 seconds. Coloration: Skin is mottled and pale. Comments: Lower extremities bilaterally mottled, cold to the touch, pale   Neurological:      Mental Status: He is alert and oriented to person, place, and time. Sensory: Sensory deficit present. Motor: Weakness present. Comments: Unable to feel palpation of the lower extremities, unable to perform any significant movement of the lower extremities   Psychiatric:         Mood and Affect: Mood normal.         Behavior: Behavior normal.       Imaging/Labs:     CT HEAD WO CONTRAST    Result Date: 7/8/2022  No acute intracranial abnormality. 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 Mild diffuse cerebral atrophy and minimal chronic white matter ischemic change. Findings suggestive of acute on chronic right maxillary sinusitis. Correlate clinically. XR CHEST PORTABLE    Result Date: 7/8/2022  Scattered pulmonary infiltrates concerning for pneumonia. CTA CHEST ABDOMEN AORTA RUNOFF RECON    Result Date: 7/8/2022  Occluded aortobifemoral graft. Prior infrarenal dissecting hematoma, as reported; occluded native infrarenal aorta. Reconstituted flow in moderate-severely diseased iliofemoral arteries. Patent right common femoral below-knee graft. Likely occluded left popliteal artery; no below-knee contrast enhancement demonstrated on this study (timing versus absent flow). Multiple additional vascular findings, as above.  Interval worsening emphysema and mild interstitial disease as compared to 2012. Interval bullous changes upper lung zones, multicystic/larger on right with mild peripheral enhancement/small posterior ovoid fluid collection suggesting some degree of inflammation or infection; no large fluid-fluid level or solid elements suggesting significant infection/fungal disease. No consolidation, PTX or sizable pleural effusion. Hiatus hernia and esophageal mural prominence. Correlate for esophagitis. Edema/soft tissue swelling below knee on the right. Mild soft tissue swelling left distal calf/foot. Some muscular atrophy right lower extremity. Additional unchanged or incidental findings, as detailed in the body of the report above. Findings were discussed with Dr. Jimbo Espinoza at 9:08 am on 7/8/2022.      Lab Results   Component Value Date    WBC 27.0 (H) 07/08/2022    HGB 14.4 07/08/2022    HCT 43.3 07/08/2022    MCV 96.2 07/08/2022    PLT See Reflexed IPF Result 07/08/2022     Lab Results   Component Value Date/Time     07/08/2022 07:03 PM     07/25/2011 09:50 AM    K 5.2 07/08/2022 07:03 PM    K 4.7 07/25/2011 09:50 AM     07/08/2022 07:03 PM     07/25/2011 09:50 AM    CO2 22 07/08/2022 07:03 PM    BUN 18 07/08/2022 07:03 PM    CREATININE 1.16 07/08/2022 07:03 PM    CREATININE 1.86 07/08/2022 06:18 PM    CREATININE 1.2 07/25/2011 09:50 AM    GLUCOSE 150 07/08/2022 07:03 PM    GLUCOSE 128 06/06/2012 07:29 PM    CALCIUM 8.4 07/08/2022 07:03 PM          Assessment and Plan:     27-year-old male with what appears to be acute occlusion of aortobifemoral graft    -Plan to take emergently to the operating room for bilateral groin cutdowns with thrombectomy, bilateral angiography of the lower extremities, possible fasciotomies  -Discussed risks, benefits, and alternatives to the procedure, patient wished to proceed  -Patient states he is a full code  -Prepared 2 units PRBC and FFP  -We will likely need TICU management after OR    Electronically signed by Heraclio Topete DO on 7/8/22 at 9:40 AM EDT      [address] [facility]  Office: [phone]

## 2022-07-08 NOTE — PROGRESS NOTES
Results for Altagracia Christine (MRN [id number]) as of 7/8/2022     Ref.  Range 7/8/2022 18:18   POC pH Latest Ref Range: 7.350 - 7.450  7.302 (L)   POC pCO2 Latest Ref Range: 35.0 - 48.0 mm Hg 44.4   POC PO2 Latest Ref Range: 83.0 - 108.0 mm Hg 293.2 (H)   POC HCO3 Latest Ref Range: 21.0 - 28.0 mmol/L 22.0   POC O2 SAT Latest Ref Range: 94.0 - 98.0 % 100 (H)   POC TCO2 Latest Ref Range: 22 - 30 mmol/L 22     ABG results reported to RN / Physician

## 2022-07-08 NOTE — PROGRESS NOTES
Pt admitted to room 1026 from OR. Pt sedated, paralyzed, and on vent via ETT. Monitors connected and vs obtained. Bed in lowest position, wheels locked, 3/4 side rails up.   Electronically signed by Dilia Kong RN on 7/8/2022 at 5:57 PM

## 2022-07-08 NOTE — CONSULTS
Zehra Jimenez                   CC/Reason for consult: Concern for left thigh compartment syndrome     HPI:      The patient is a 64 y.o. male who presented to [facility] with severe onset of sudden bilateral lower extremity pain which woke him from his sleep. No injuries or falls that triggered the pain per chart review. He has an extensive vascular history including aortic and popliteal bypasses. He was evaluated by vascular surgery and emergently taken to the OR for reperfusion of the bilateral lower extremities as it was found that both legs had bypasses occluded and neither extremity was perfusing. Bilateral common femoral arterial access via open cutdown was performed in addition to thromboendarterectomy and SFA bilaterally completed. Fasciotomy was performed by vascular surgery to the lower leg on the left. Orthopedic surgery was consulted stat intraoperatively for evaluation of possible left thigh compartment syndrome as it was noted that the left thigh was more firm compared with the contralateral side and vascular team noted that they were unable to flex the leg at the knee after they had reperfused the extremity. History was obtained from the vascular team and chart review. Per chart review, patient has PMH significant for CAD, carotid artery occlusion, HTN, HLD, neuropathy, PAD, history of MRSA. Per trauma team, patient predominantly uses his left leg as he only has about 50% function at baseline of his right leg secondary to prior history of stroke.          Past Medical History:    Past Medical History:   Diagnosis Date    Anxiety     Arthritis associated with another disorder     CAD (coronary artery disease)     with history of multiple MI    Carotid artery occlusion     Family history of kidney stone     HTN (hypertension)     Hyperlipidemia     Hypertension     Nephrolithiasis     multiple times    Neuropathy     [facility] [address]     Peripheral vascular disease ([facility] [address]     Personal history of MRSA (methicillin resistant Staphylococcus aureus)     Seasonal allergies     Vasculopathy        Past Surgical History:    Past Surgical History:   Procedure Laterality Date    ABDOMINAL ADHESION SURGERY  2/11/2011-[facility]    Reexploration of abdominal wound and reclosure of the abdominal wound with fascial sutures and retention sutures as well    AORTO-FEMORAL BYPASS GRAFT  2/11/2011-[facility]    ABF bypass of 16x8 PTFE graft. This is an end-to-side anastomosis proximally    BALLOON ANGIOPLASTY, ARTERY  11/23/10 Allie Muñoz    1. Placement of 5 sheath in the left femoral artery with duplex guidance. 2. Left Iliac arteriogram. 3. Attempted ballon angioplasty left iliac artery occlusion.  CAROTID ENDARTERECTOMY  07/19/08    Left carotid endarterectomy using shunt dacron patch aplasty    CORONARY ARTERY BYPASS GRAFT  2008    CABG x3     FEMORAL-TIBIAL BYPASS GRAFT  12/31/07  Verito Martinez     Right femoral to posterior tibial artery bypass graft with in situ saphenous vein graft    IR FEMORAL POPLITEAL BYPASS GRAFT  01/24/208 Dr Rosibel Baird    1. Exploration of right fem-pop bypass graft. 2.Thrombectomy of right fem-post-tib saph vein graft. 3. Replacement of femoral to popliteal bypass graft from midthigh to near the anastomosis by reversed greater saph vein harvested from left leg    PTCA      PTCA with stent x6    TONSILLECTOMY AND ADENOIDECTOMY         Medications Prior to Admission:   Prior to Admission medications    Medication Sig Start Date End Date Taking? Authorizing Provider   oxyCODONE (OXYCONTIN) 40 MG CR tablet Take 40 mg by mouth every 12 hours. Historical Provider, MD   oxyCODONE-acetaminophen (PERCOCET)  MG per tablet Take 1 tablet by mouth 3 times daily as needed. breakthrought pain     Historical Provider, MD   lisinopril (PRINIVIL;ZESTRIL) 10 MG tablet Take 0.5 tablets by mouth daily.  9/13/12 Jacey Israel MD   dipyridamole-aspirin (AGGRENOX)  MG per SR capsule Take 1 capsule by mouth daily. 12   Tomer Noriega MD   simvastatin (ZOCOR) 40 MG tablet Take 1 tablet by mouth nightly. 12   Jacey Israel MD   ALPRAZolam [place]) 1 MG tablet Take 1 tablet by mouth 2 times daily as needed. 12   Tomer Noriega MD   varenicline (CHANTIX STARTING MONTH ) 0.5 MG X 11 & 1 MG X 42 tablet Take by mouth. 12   Tomer Noriega MD   isosorbide mononitrate (IMDUR) 30 MG CR tablet Take 30 mg by mouth daily. Historical MD Rubina   metoprolol (LOPRESSOR) 25 MG tablet Take 1 tablet by mouth 2 times daily. Takes 1/2 tab 2 times a day 12   Jacey Israel MD   mirtazapine (REMERON) 30 MG tablet Take 1 tablet by mouth nightly. 12   Jacey Israel MD   tamsulosin (FLOMAX) 0.4 MG capsule Take 1 capsule by mouth daily. 12   Jacey Israel MD   TRUETEST TEST strip TEST 2 TIMES DAILY 12   Adolfo Matthew MD   metformin (GLUCOPHAGE) 500 MG tablet TAKE 1 TABLET BY MOUTH 2 TIMES DAILY (WITH MEALS) FOR 30 DAYS. 12   Adolfo Matthew MD   aspirin  MG EC tablet Take 1 tablet by mouth daily.  12   Adolfo Matthew MD       Allergies:    Faviola [morphine sulfate], Methadone, and Morphine    Social History:   Social History     Socioeconomic History    Marital status:      Spouse name: None    Number of children: 1    Years of education: 15    Highest education level: None   Occupational History    Occupation: disabled      Comment: SSI    Tobacco Use    Smoking status: Current Every Day Smoker     Packs/day: 2.00     Years: 33.00     Pack years: 66.00     Types: Cigarettes     Last attempt to quit: 3/10/2011     Years since quittin.3    Smokeless tobacco: Never Used    Tobacco comment: quite 2011    Substance and Sexual Activity    Alcohol use: No     Alcohol/week: 0.0 standard drinks    Drug use: No    Sexual activity: Yes     Partners: Female     Comment: with has trouble   Other Topics Concern    None   Social History Narrative    None     Social Determinants of Health     Financial Resource Strain:     Difficulty of Paying Living Expenses: Not on file   Food Insecurity:     Worried About Running Out of Food in the Last Year: Not on file    Nancy of Food in the Last Year: Not on file   Transportation Needs:     Lack of Transportation (Medical): Not on file    Lack of Transportation (Non-Medical): Not on file   Physical Activity:     Days of Exercise per Week: Not on file    Minutes of Exercise per Session: Not on file   Stress:     Feeling of Stress : Not on file   Social Connections:     Frequency of Communication with Friends and Family: Not on file    Frequency of Social Gatherings with Friends and Family: Not on file    Attends Bahai Services: Not on file    Active Member of [address] [place] [address] [employer] or Organizations: Not on file    Attends Club or Organization Meetings: Not on file    Marital Status: Not on file   Intimate Partner Violence:     Fear of Current or Ex-Partner: Not on file    Emotionally Abused: Not on file    Physically Abused: Not on file    Sexually Abused: Not on file   Housing Stability:     Unable to Pay for Housing in the Last Year: Not on file    Number of [place] in the Last Year: Not on file    Unstable Housing in the Last Year: Not on file       Family History:  Family History   Problem Relation Age of Onset    Hypertension Father     Heart Disease Sister     Hypertension Sister     Cancer Mother 28        breast cancer       REVIEW OF SYSTEMS:    Unable to assess due to patient being intubated. PHYSICAL EXAM:  BP 99/65   Pulse 94   Temp 97.5 °F (36.4 °C)   Resp 20   Wt 255 lb (115.7 kg)   SpO2 100%   BMI 36.07 kg/m²   Gen:  Intubated and sedated, evaluated in operating room     Chest: Mechanically ventilated    Heart: Regular rate     LLE: Leg draped with lower leg compartments released by vascular team. The left thigh musculature very soft and compressible to medial and posterior compartments. Anterior compartment with slight swelling compared to other two compartments but did remain overall soft and compressible. Unable to obtain motor or sensory exam secondary to intubation. On initial evaluation, was able to passively flex knee to approximately 45 degrees. On repeat evaluation after obtaining rossy needle, passive flexion to 95 degrees. LABS:  Recent Labs     07/08/22  0504   WBC 14.2*   HGB 16.0   HCT 47.8         K 4.1   BUN 18   CREATININE 1.08   GLUCOSE 193*        Radiology:   No imaging obtained on initial consultation. A/P: 64 y.o. male being seen for rule out of left thigh compartment syndrome     -[place] needle assessment performed intraoperatively to left anterior thigh compartment. Pressures within normal limits and did not exceed 30mmHg. Pressures obtained from three separate areas on anterior thigh compartment which were measured at 9 mmHg, 10 mmHg, and 13 mmHg.   -No acute orthopedic intervention   -Will continue to monitor thigh compartments overnight   -Defer wound management of lower legs to vascular surgery   -Pain control per primary   -Ice and elevation for pain/swelling  -Please page Ortho with any questions or concerns    Procedure Note:   Rossy Needle Insertion into Left Thigh Compartment: Patient was properly identified. Assumed consent for procedure secondary to patient intubated and sedated and due to urgency secondary to possible compartment syndrome. Located the anterior thigh compartment and positioned the Rossy Needle device and then zeroed the device at the proper insertion position. The needle was inserted through skin and through fascia and confirmed to be in the compartment being assessed. 0.3cc saline was injected into the anterior compartment of the thigh and pressure was measured to be 9mmHg.  This process was completed twice more with 10mmHg and 13 mmHg measured respectively. This was interpreted to be negative for compartment syndrome. Proceeding to fasciotomies was deferred. We will continue to monitor compartments overnight to ensure no development of compartment syndrome. Please notify ortho with any changes.        Maddison Viera, DO  PGY-3 Orthopedic Surgery  6:19 PM 7/8/2022

## 2022-07-08 NOTE — ANESTHESIA PROCEDURE NOTES
Arterial Line:    An arterial line was placed using ultrasound guidance, in the OR for the following indication(s): continuous blood pressure monitoring. A 20 gauge (size), 1 and 3/4 inch (length), Arrow (type) catheter was placed, Seldinger technique not used, into the right brachial artery, secured by Tegaderm and tape. Anesthesia type: General    Events:  patient tolerated procedure well with no complications.     Additional notes:  Arterial line inserted by surgeon Dr. Layla Li, ultrasound used and placed in  Brachial artery 7/8/2022 11:54 AM7/8/2022 12:00 PM  Anesthesiologist: Carlene Augustin MD  Preanesthetic Checklist  Completed: patient identified, IV checked, site marked, risks and benefits discussed, surgical/procedural consents, equipment checked, pre-op evaluation, timeout performed, anesthesia consent given, oxygen available, monitors applied/VS acknowledged, fire risk safety assessment completed and verbalized and blood product R/B/A discussed and consented

## 2022-07-08 NOTE — CARE COORDINATION
Patient resting on bipap at this time. No family currently available. Will defer initial interview until family available or patient able to participate    [phone] call from [facility] SW that patient's ex-wife called stating patient cannot return home with her and is requesting patient be placed in SNF.   Please refer to SW note for further details

## 2022-07-08 NOTE — ANESTHESIA PRE PROCEDURE
Department of Anesthesiology  Preprocedure Note       Name:  Abena Sarabia   Age:  64 y.o.  :  1960                                          MRN:  [id number]         Date:  2022      Surgeon: Christy Rodriguez):  Dre Prescott MD    Procedure: Procedure(s): FEMORAL CUTDOWN, POSSIBLE THROMBECTOMY, POSSIBLE AXILLARY BI-FEMORAL BYPASS    Medications prior to admission:   Prior to Admission medications    Medication Sig Start Date End Date Taking? Authorizing Provider   oxyCODONE (OXYCONTIN) 40 MG CR tablet Take 40 mg by mouth every 12 hours. Historical Provider, MD   oxyCODONE-acetaminophen (PERCOCET)  MG per tablet Take 1 tablet by mouth 3 times daily as needed. breakthrought pain     Historical Provider, MD   lisinopril (PRINIVIL;ZESTRIL) 10 MG tablet Take 0.5 tablets by mouth daily. 12   Perri Cortez MD   dipyridamole-aspirin (AGGRENOX)  MG per SR capsule Take 1 capsule by mouth daily. 12   Tomer Noriega MD   simvastatin (ZOCOR) 40 MG tablet Take 1 tablet by mouth nightly. 12   Perri Cortez MD   ALPRAZolam Cordella Loffler) 1 MG tablet Take 1 tablet by mouth 2 times daily as needed. 12   Tomer Noriega MD   varenicline (CHANTIX STARTING MONTH ) 0.5 MG X 11 & 1 MG X 42 tablet Take by mouth. 12   Tomer Noriega MD   isosorbide mononitrate (IMDUR) 30 MG CR tablet Take 30 mg by mouth daily. Historical Provider, MD   metoprolol (LOPRESSOR) 25 MG tablet Take 1 tablet by mouth 2 times daily. Takes 1/2 tab 2 times a day 12   Perri Cortez MD   mirtazapine (REMERON) 30 MG tablet Take 1 tablet by mouth nightly. 12   Perri Cortez MD   tamsulosin (FLOMAX) 0.4 MG capsule Take 1 capsule by mouth daily.  12   Perri Cortez MD   TRUETEST TEST strip TEST 2 TIMES DAILY 12   Paige Lucero MD   metformin (GLUCOPHAGE) 500 MG tablet TAKE 1 TABLET BY MOUTH 2 TIMES DAILY (WITH MEALS) FOR 30 DAYS. 12   Paige Lucero MD   aspirin  MG EC tablet Take 1 tablet by mouth daily. 1/25/12   Paul Raza MD       Current medications:    Current Facility-Administered Medications   Medication Dose Route Frequency Provider Last Rate Last Admin    fentaNYL (SUBLIMAZE) injection 50 mcg  50 mcg IntraVENous Once Alea Nicolas MD        vancomycin (VANCOCIN) 2,500 mg in dextrose 5 % 500 mL IVPB  2,500 mg IntraVENous Once Benedetta Backbone, .7 mL/hr at 07/08/22 0902 2,500 mg at 07/08/22 0902    nitroGLYCERIN 50 mg in dextrose 5% 250 mL infusion  5-200 mcg/min IntraVENous Continuous Benedetta Backbone, DO 33 mL/hr at 07/08/22 0957 110 mcg/min at 07/08/22 0957    heparin (porcine) injection 9,260 Units  80 Units/kg IntraVENous PRN Benedetta Backbone, DO        heparin (porcine) injection 4,630 Units  40 Units/kg IntraVENous PRN Benedetta Backbone, DO        heparin 25,000 units in dextrose 5 % 250 mL infusion (rate based)  5-30 Units/kg/hr IntraVENous Continuous Benedetta Backbone, DO 20.8 mL/hr at 07/08/22 0954 18 Units/kg/hr at 07/08/22 0954     Current Outpatient Medications   Medication Sig Dispense Refill    oxyCODONE (OXYCONTIN) 40 MG CR tablet Take 40 mg by mouth every 12 hours.  oxyCODONE-acetaminophen (PERCOCET)  MG per tablet Take 1 tablet by mouth 3 times daily as needed. breakthrought pain       lisinopril (PRINIVIL;ZESTRIL) 10 MG tablet Take 0.5 tablets by mouth daily. 30 tablet 2    dipyridamole-aspirin (AGGRENOX)  MG per SR capsule Take 1 capsule by mouth daily. 60 capsule 1    simvastatin (ZOCOR) 40 MG tablet Take 1 tablet by mouth nightly. 30 tablet 2    ALPRAZolam (XANAX) 1 MG tablet Take 1 tablet by mouth 2 times daily as needed. 60 tablet 0    varenicline (CHANTIX STARTING MONTH TYLOR) 0.5 MG X 11 & 1 MG X 42 tablet Take by mouth. 53 tablet 0    isosorbide mononitrate (IMDUR) 30 MG CR tablet Take 30 mg by mouth daily.  metoprolol (LOPRESSOR) 25 MG tablet Take 1 tablet by mouth 2 times daily.  Takes 1/2 tab 2 times a day 30 tablet 1    mirtazapine (REMERON) 30 MG tablet Take 1 tablet by mouth nightly. 30 tablet 1    tamsulosin (FLOMAX) 0.4 MG capsule Take 1 capsule by mouth daily. 30 capsule 1    TRUETEST TEST strip TEST 2 TIMES DAILY 60 strip 11    metformin (GLUCOPHAGE) 500 MG tablet TAKE 1 TABLET BY MOUTH 2 TIMES DAILY (WITH MEALS) FOR 30 DAYS. 60 tablet 5    aspirin  MG EC tablet Take 1 tablet by mouth daily. 90 tablet 6       Allergies: Allergies   Allergen Reactions    Faviola [Morphine Sulfate] Itching and Rash    Methadone Rash    Morphine Nausea And Vomiting      Sever \"headache\"       Problem List:    Patient Active Problem List   Diagnosis Code    Seasonal allergies J30.2    Anxiety F41.9    Carotid artery occlusion I65.29    CAD (coronary artery disease) I25.10    Hypertension I10    Neuropathy G62.9    PAD [facility]peripheral [facility] disease) ([facility] I73.9    HTN (hypertension) I10    Arthritis associated with another disorder M19.90    Hernia, hiatal K44.9    Hyperlipidemia E78.5    CAD (coronary artery disease) I25.10       Past Medical History:        Diagnosis Date    Anxiety     Arthritis associated with another disorder     CAD (coronary artery disease)     with history of multiple MI    Carotid artery occlusion     Family history of kidney stone     HTN (hypertension)     Hyperlipidemia     Hypertension     Nephrolithiasis     multiple times    Neuropathy     PAD [facility]peripheral artery disease) ([facility])     Peripheral vascular disease ([facility] [address]     Personal history of MRSA (methicillin resistant Staphylococcus aureus)     Seasonal allergies     Vasculopathy        Past Surgical History:        Procedure Laterality Date    ABDOMINAL ADHESION SURGERY  [facility]/11/2011-[facility]    Reexploration of abdominal wound and reclosure of the abdominal wound with fascial sutures and retention sutures as well    AORTO-FEMORAL BYPASS GRAFT  [facility]11/[facility]    ABF bypass of 16x8 PTFE graft. This is an end-to-side anastomosis proximally    BALLOON ANGIOPLASTY, ARTERY  11/23/10 Allie Muñoz    1. Placement of 5 sheath in the left femoral artery with duplex guidance. 2. Left Iliac arteriogram. 3. Attempted ballon angioplasty left iliac artery occlusion.  CAROTID ENDARTERECTOMY  08    Left carotid endarterectomy using shunt dacron patch aplasty    CORONARY ARTERY BYPASS GRAFT      CABG x3     FEMORAL-TIBIAL BYPASS GRAFT  07  Alvie Ray     Right femoral to posterior tibial artery bypass graft with in situ saphenous vein graft    IR FEMORAL POPLITEAL BYPASS GRAFT   Dr Mago Nowak    1. Exploration of right fem-pop bypass graft. 2.Thrombectomy of right fem-post-tib saph vein graft.  3. Replacement of femoral to popliteal bypass graft from midthigh to near the anastomosis by reversed greater saph vein harvested from left leg    PTCA      PTCA with stent x6    TONSILLECTOMY AND ADENOIDECTOMY         Social History:    Social History     Tobacco Use    Smoking status: Current Every Day Smoker     Packs/day: 2.00     Years: 33.00     Pack years: 66.00     Types: Cigarettes     Last attempt to quit: 3/10/2011     Years since quittin.3    Smokeless tobacco: Never Used    Tobacco comment: quite 2011    Substance Use Topics    Alcohol use: No     Alcohol/week: 0.0 standard drinks                                Ready to quit: Not Answered  Counseling given: Not Answered  Comment: quite 2011       Vital Signs (Current):   Vitals:    22 0847 22 0852 22 0900 22 0956   BP: (!) 154/92 (!) 153/89  (!) 144/90   Pulse: (!) 135 (!) 135 (!) 135 (!) 132   Resp: (!) 35 28 (!) 42 (!) 31   Temp:       SpO2: 95% 94% 96% 96%   Weight:                                                  BP Readings from Last 3 Encounters:   22 (!) 144/90   12 122/70   12 140/87       NPO Status:                                                                                 BMI: Wt Readings from Last 3 Encounters:   07/08/22 255 lb (115.7 kg)   09/13/12 174 lb 3.2 oz (79 kg)   01/25/12 207 lb 8 oz (94.1 kg)     Body mass index is 36.07 kg/m².     CBC:   Lab Results   Component Value Date/Time    WBC 14.2 07/08/2022 05:04 AM    RBC 4.98 07/08/2022 05:04 AM    RBC 4.69 06/06/2012 07:29 PM    HGB 16.0 07/08/2022 05:04 AM    HCT 47.8 07/08/2022 05:04 AM    HCT 46.6 07/25/2011 09:50 AM    MCV 96.0 07/08/2022 05:04 AM    RDW 12.9 07/08/2022 05:04 AM     07/08/2022 05:04 AM     06/06/2012 07:29 PM       CMP:   Lab Results   Component Value Date/Time     07/08/2022 05:04 AM     07/25/2011 09:50 AM    K 4.1 07/08/2022 05:04 AM    K 4.7 07/25/2011 09:50 AM     07/08/2022 05:04 AM     07/25/2011 09:50 AM    CO2 16 07/08/2022 05:04 AM    BUN 18 07/08/2022 05:04 AM    CREATININE 1.08 07/08/2022 05:04 AM    CREATININE 1.2 07/25/2011 09:50 AM    GFRAA >60 07/08/2022 05:04 AM    LABGLOM >60 07/08/2022 05:04 AM    GLUCOSE 193 07/08/2022 05:04 AM    GLUCOSE 128 06/06/2012 07:29 PM    PROT 7.0 07/08/2022 05:04 AM    PROT 7.1 07/25/2011 09:50 AM    CALCIUM 10.0 07/08/2022 05:04 AM    BILITOT 0.35 07/08/2022 05:04 AM    ALKPHOS 93 07/08/2022 05:04 AM    ALKPHOS 112 07/25/2011 09:50 AM    AST 27 07/08/2022 05:04 AM    ALT 25 07/08/2022 05:04 AM       POC Tests:   Recent Labs     07/08/22  0645   POCGLU 259*       Coags:   Lab Results   Component Value Date/Time    PROTIME 10.7 06/29/2012 08:47 PM    PROTIME 11.9 06/03/2012 01:19 AM    PROTIME 11.40 07/25/2011 09:50 AM    INR 1.0 06/29/2012 08:47 PM    APTT 23.0 07/08/2022 09:35 AM       HCG (If Applicable): No results found for: PREGTESTUR, PREGSERUM, HCG, HCGQUANT     ABGs:   Lab Results   Component Value Date/Time    PHART 7.30 04/22/2012 02:57 PM    PO2ART 66 04/22/2012 02:57 PM    KDD5OBC 42 04/22/2012 02:57 PM    QNM0QMH 20.6 04/22/2012 02:57 PM    B5GAZYAY 90 04/22/2012 02:57 PM        Type & Screen (If Applicable):  No results found for: LABABO, LABRH    Drug/Infectious Status (If Applicable):  No results found for: HIV, HEPCAB    COVID-19 Screening (If Applicable): No results found for: COVID19        Anesthesia Evaluation  Patient summary reviewed no history of anesthetic complications:   Airway: Mallampati: II  TM distance: >3 FB   Neck ROM: full  Mouth opening: > = 3 FB   Dental:          Pulmonary:Negative Pulmonary ROS and normal exam                               Cardiovascular:    (+) hypertension: no interval change, CAD: no interval change,       ECG reviewed  Rhythm: regular  Rate: normal  Echocardiogram reviewed                  Neuro/Psych:   Negative Neuro/Psych ROS              GI/Hepatic/Renal:   (+) hiatal hernia,           Endo/Other: Negative Endo/Other ROS                    Abdominal:             Vascular: negative vascular ROS. Other Findings:           Anesthesia Plan      general     ASA 3 - emergent       Induction: intravenous. arterial line    Anesthetic plan and risks discussed with patient. Use of blood products discussed with patient whom consented to blood products. Plan discussed with CRNA.                     Caden Burton MD   7/8/2022

## 2022-07-08 NOTE — ANESTHESIA POSTPROCEDURE EVALUATION
Department of Anesthesiology  Postprocedure Note    Patient: Slick Haque  MRN: [id number]  YOB: 1960  Date of evaluation: 7/8/2022      Procedure Summary     Date: 07/08/22 Room / Location: [facility] / [address] [place] [address]    Anesthesia Start: 1148 Anesthesia Stop: 1804    Procedure: AORTA BIFEMORAL GRAFT ENDARTERECTOMY, BILATERAL COMMON FEMORAL EMBOLECTOMY, BILATERAL LOWER EXTREMEITY ARTERECTOMY, AORTAGRAPHY, RIGHT LIMB OF BYPASS GRAFT STENT, BILATERAL FEMORAL BOVINE PATCHES (Bilateral ) Diagnosis:       Femoral artery occlusion (HCC)      (ADD-ON)    Surgeons: Silver Velázquez MD Responsible Provider: Leticia Garcia MD    Anesthesia Type: general ASA Status: 3 - Emergent          Anesthesia Type: No value filed.     Subha Phase I:      Subha Phase II:        Anesthesia Post Evaluation    Patient location during evaluation: ICU  Patient participation: complete - patient cannot participate  Level of consciousness: sedated and ventilated  Pain score: 0  Airway patency: patent  Nausea & Vomiting: no nausea and no vomiting  Complications: no  Cardiovascular status: hemodynamically stable  Respiratory status: acceptable, intubated and ventilator  Hydration status: stable

## 2022-07-09 ENCOUNTER — APPOINTMENT (OUTPATIENT)
Dept: GENERAL RADIOLOGY | Age: 62
DRG: 181 | End: 2022-07-09
Payer: COMMERCIAL

## 2022-07-09 LAB
ABO/RH: NORMAL
ABSOLUTE EOS #: <0.03 K/UL (ref 0–0.44)
ABSOLUTE IMMATURE GRANULOCYTE: 0.2 K/UL (ref 0–0.3)
ABSOLUTE LYMPH #: 1.76 K/UL (ref 1.1–3.7)
ABSOLUTE MONO #: 1.32 K/UL (ref 0.1–1.2)
ALBUMIN SERPL-MCNC: 2.6 G/DL (ref 3.5–5.2)
ALBUMIN/GLOBULIN RATIO: 1.4 (ref 1–2.5)
ALLEN TEST: ABNORMAL
ALP BLD-CCNC: 60 U/L (ref 40–129)
ALT SERPL-CCNC: 54 U/L (ref 5–41)
AMMONIA: 47 UMOL/L (ref 16–60)
ANION GAP SERPL CALCULATED.3IONS-SCNC: 11 MMOL/L (ref 9–17)
ANION GAP SERPL CALCULATED.3IONS-SCNC: 11 MMOL/L (ref 9–17)
ANION GAP SERPL CALCULATED.3IONS-SCNC: 14 MMOL/L (ref 9–17)
ANION GAP SERPL CALCULATED.3IONS-SCNC: 14 MMOL/L (ref 9–17)
ANION GAP SERPL CALCULATED.3IONS-SCNC: 17 MMOL/L (ref 9–17)
ANTIBODY SCREEN: NEGATIVE
ARM BAND NUMBER: NORMAL
AST SERPL-CCNC: 334 U/L
BASOPHILS # BLD: 0 % (ref 0–2)
BASOPHILS ABSOLUTE: 0.05 K/UL (ref 0–0.2)
BILIRUB SERPL-MCNC: 0.2 MG/DL (ref 0.3–1.2)
BLD PROD TYP BPU: NORMAL
BLD PROD TYP BPU: NORMAL
BPU ID: NORMAL
BPU ID: NORMAL
BUN BLDV-MCNC: 21 MG/DL (ref 8–23)
BUN BLDV-MCNC: 23 MG/DL (ref 8–23)
BUN BLDV-MCNC: 25 MG/DL (ref 8–23)
BUN BLDV-MCNC: 28 MG/DL (ref 8–23)
BUN BLDV-MCNC: 31 MG/DL (ref 8–23)
CALCIUM IONIZED: 1.05 MMOL/L (ref 1.13–1.33)
CALCIUM IONIZED: 1.07 MMOL/L (ref 1.13–1.33)
CALCIUM SERPL-MCNC: 7.6 MG/DL (ref 8.6–10.4)
CALCIUM SERPL-MCNC: 7.9 MG/DL (ref 8.6–10.4)
CALCIUM SERPL-MCNC: 8 MG/DL (ref 8.6–10.4)
CALCIUM SERPL-MCNC: 8 MG/DL (ref 8.6–10.4)
CALCIUM SERPL-MCNC: 8.1 MG/DL (ref 8.6–10.4)
CHLORIDE BLD-SCNC: 103 MMOL/L (ref 98–107)
CHLORIDE BLD-SCNC: 105 MMOL/L (ref 98–107)
CHLORIDE BLD-SCNC: 105 MMOL/L (ref 98–107)
CHLORIDE BLD-SCNC: 106 MMOL/L (ref 98–107)
CHLORIDE BLD-SCNC: 107 MMOL/L (ref 98–107)
CO2: 19 MMOL/L (ref 20–31)
CO2: 20 MMOL/L (ref 20–31)
CO2: 21 MMOL/L (ref 20–31)
CO2: 22 MMOL/L (ref 20–31)
CO2: 22 MMOL/L (ref 20–31)
CREAT SERPL-MCNC: 1.33 MG/DL (ref 0.7–1.2)
CREAT SERPL-MCNC: 1.56 MG/DL (ref 0.7–1.2)
CREAT SERPL-MCNC: 1.91 MG/DL (ref 0.7–1.2)
CREAT SERPL-MCNC: 2.46 MG/DL (ref 0.7–1.2)
CREAT SERPL-MCNC: 2.85 MG/DL (ref 0.7–1.2)
CROSSMATCH RESULT: NORMAL
CROSSMATCH RESULT: NORMAL
DISPENSE STATUS BLOOD BANK: NORMAL
DISPENSE STATUS BLOOD BANK: NORMAL
EKG ATRIAL RATE: 96 BPM
EKG P AXIS: 52 DEGREES
EKG P-R INTERVAL: 156 MS
EKG Q-T INTERVAL: 390 MS
EKG QRS DURATION: 82 MS
EKG QTC CALCULATION (BAZETT): 492 MS
EKG R AXIS: 42 DEGREES
EKG T AXIS: 145 DEGREES
EKG VENTRICULAR RATE: 96 BPM
EOSINOPHILS RELATIVE PERCENT: 0 % (ref 1–4)
EXPIRATION DATE: NORMAL
FIO2: 50
GFR AFRICAN AMERICAN: 27 ML/MIN
GFR AFRICAN AMERICAN: 33 ML/MIN
GFR AFRICAN AMERICAN: 44 ML/MIN
GFR AFRICAN AMERICAN: 55 ML/MIN
GFR AFRICAN AMERICAN: >60 ML/MIN
GFR NON-AFRICAN AMERICAN: 23 ML/MIN
GFR NON-AFRICAN AMERICAN: 27 ML/MIN
GFR NON-AFRICAN AMERICAN: 36 ML/MIN
GFR NON-AFRICAN AMERICAN: 45 ML/MIN
GFR NON-AFRICAN AMERICAN: 55 ML/MIN
GFR SERPL CREATININE-BSD FRML MDRD: ABNORMAL ML/MIN/{1.73_M2}
GLUCOSE BLD-MCNC: 115 MG/DL (ref 75–110)
GLUCOSE BLD-MCNC: 122 MG/DL (ref 75–110)
GLUCOSE BLD-MCNC: 128 MG/DL (ref 75–110)
GLUCOSE BLD-MCNC: 129 MG/DL (ref 70–99)
GLUCOSE BLD-MCNC: 132 MG/DL (ref 75–110)
GLUCOSE BLD-MCNC: 135 MG/DL (ref 75–110)
GLUCOSE BLD-MCNC: 140 MG/DL (ref 75–110)
GLUCOSE BLD-MCNC: 143 MG/DL (ref 75–110)
GLUCOSE BLD-MCNC: 154 MG/DL (ref 75–110)
GLUCOSE BLD-MCNC: 157 MG/DL (ref 75–110)
GLUCOSE BLD-MCNC: 159 MG/DL (ref 75–110)
GLUCOSE BLD-MCNC: 172 MG/DL (ref 70–99)
GLUCOSE BLD-MCNC: 185 MG/DL (ref 75–110)
GLUCOSE BLD-MCNC: 196 MG/DL (ref 75–110)
GLUCOSE BLD-MCNC: 219 MG/DL (ref 70–99)
GLUCOSE BLD-MCNC: 219 MG/DL (ref 74–100)
GLUCOSE BLD-MCNC: 225 MG/DL (ref 70–99)
GLUCOSE BLD-MCNC: 226 MG/DL (ref 75–110)
GLUCOSE BLD-MCNC: 237 MG/DL (ref 70–99)
HCT VFR BLD CALC: 39.3 % (ref 40.7–50.3)
HEMOGLOBIN: 13 G/DL (ref 13–17)
IMMATURE GRANULOCYTES: 1 %
LV EF: 23 %
LVEF MODALITY: NORMAL
LYMPHOCYTES # BLD: 8 % (ref 24–43)
MAGNESIUM: 2.4 MG/DL (ref 1.6–2.6)
MCH RBC QN AUTO: 31.6 PG (ref 25.2–33.5)
MCHC RBC AUTO-ENTMCNC: 33.1 G/DL (ref 28.4–34.8)
MCV RBC AUTO: 95.4 FL (ref 82.6–102.9)
MODE: ABNORMAL
MONOCYTES # BLD: 6 % (ref 3–12)
MYOGLOBIN: ABNORMAL NG/ML (ref 28–72)
NEGATIVE BASE EXCESS, ART: 3 (ref 0–2)
NRBC AUTOMATED: 0 PER 100 WBC
O2 DEVICE/FLOW/%: ABNORMAL
PARTIAL THROMBOPLASTIN TIME: 51.4 SEC (ref 20.5–30.5)
PARTIAL THROMBOPLASTIN TIME: 57.8 SEC (ref 20.5–30.5)
PARTIAL THROMBOPLASTIN TIME: 72.5 SEC (ref 20.5–30.5)
PDW BLD-RTO: 13.3 % (ref 11.8–14.4)
PHOSPHORUS: 4.9 MG/DL (ref 2.5–4.5)
PLATELET # BLD: ABNORMAL K/UL (ref 138–453)
PLATELET, FLUORESCENCE: 276 K/UL (ref 138–453)
PLATELET, IMMATURE FRACTION: 11.7 % (ref 1.1–10.3)
POC HCO3: 21.7 MMOL/L (ref 21–28)
POC LACTIC ACID: 3.37 MMOL/L (ref 0.56–1.39)
POC O2 SATURATION: 99 % (ref 94–98)
POC PCO2: 36.4 MM HG (ref 35–48)
POC PH: 7.38 (ref 7.35–7.45)
POC PO2: 123.6 MM HG (ref 83–108)
POTASSIUM SERPL-SCNC: 4.9 MMOL/L (ref 3.7–5.3)
POTASSIUM SERPL-SCNC: 5.2 MMOL/L (ref 3.7–5.3)
POTASSIUM SERPL-SCNC: 5.4 MMOL/L (ref 3.7–5.3)
POTASSIUM SERPL-SCNC: 5.5 MMOL/L (ref 3.7–5.3)
POTASSIUM SERPL-SCNC: 5.9 MMOL/L (ref 3.7–5.3)
RBC # BLD: 4.12 M/UL (ref 4.21–5.77)
SAMPLE SITE: ABNORMAL
SARS-COV-2, RAPID: NOT DETECTED
SEG NEUTROPHILS: 85 % (ref 36–65)
SEGMENTED NEUTROPHILS ABSOLUTE COUNT: 19.27 K/UL (ref 1.5–8.1)
SODIUM BLD-SCNC: 136 MMOL/L (ref 135–144)
SODIUM BLD-SCNC: 139 MMOL/L (ref 135–144)
SODIUM BLD-SCNC: 139 MMOL/L (ref 135–144)
SODIUM BLD-SCNC: 141 MMOL/L (ref 135–144)
SODIUM BLD-SCNC: 142 MMOL/L (ref 135–144)
SPECIMEN DESCRIPTION: NORMAL
TOTAL CK: ABNORMAL U/L (ref 39–308)
TOTAL PROTEIN: 4.4 G/DL (ref 6.4–8.3)
TRANSFUSION STATUS: NORMAL
TRANSFUSION STATUS: NORMAL
TROPONIN, HIGH SENSITIVITY: 440 NG/L (ref 0–22)
TROPONIN, HIGH SENSITIVITY: 451 NG/L (ref 0–22)
TROPONIN, HIGH SENSITIVITY: 454 NG/L (ref 0–22)
TROPONIN, HIGH SENSITIVITY: 470 NG/L (ref 0–22)
UNIT DIVISION: 0
UNIT DIVISION: 0
WBC # BLD: 22.6 K/UL (ref 3.5–11.3)

## 2022-07-09 PROCEDURE — 2700000000 HC OXYGEN THERAPY PER DAY

## 2022-07-09 PROCEDURE — 80048 BASIC METABOLIC PNL TOTAL CA: CPT

## 2022-07-09 PROCEDURE — 2500000003 HC RX 250 WO HCPCS: Performed by: ORTHOPAEDIC SURGERY

## 2022-07-09 PROCEDURE — 2500000003 HC RX 250 WO HCPCS: Performed by: STUDENT IN AN ORGANIZED HEALTH CARE EDUCATION/TRAINING PROGRAM

## 2022-07-09 PROCEDURE — 37799 UNLISTED PX VASCULAR SURGERY: CPT

## 2022-07-09 PROCEDURE — 6370000000 HC RX 637 (ALT 250 FOR IP): Performed by: STUDENT IN AN ORGANIZED HEALTH CARE EDUCATION/TRAINING PROGRAM

## 2022-07-09 PROCEDURE — 80053 COMPREHEN METABOLIC PANEL: CPT

## 2022-07-09 PROCEDURE — 85025 COMPLETE CBC W/AUTO DIFF WBC: CPT

## 2022-07-09 PROCEDURE — 2580000003 HC RX 258: Performed by: STUDENT IN AN ORGANIZED HEALTH CARE EDUCATION/TRAINING PROGRAM

## 2022-07-09 PROCEDURE — 82803 BLOOD GASES ANY COMBINATION: CPT

## 2022-07-09 PROCEDURE — 83605 ASSAY OF LACTIC ACID: CPT

## 2022-07-09 PROCEDURE — 93306 TTE W/DOPPLER COMPLETE: CPT

## 2022-07-09 PROCEDURE — 83735 ASSAY OF MAGNESIUM: CPT

## 2022-07-09 PROCEDURE — 6360000002 HC RX W HCPCS: Performed by: STUDENT IN AN ORGANIZED HEALTH CARE EDUCATION/TRAINING PROGRAM

## 2022-07-09 PROCEDURE — 84100 ASSAY OF PHOSPHORUS: CPT

## 2022-07-09 PROCEDURE — 2000000000 HC ICU R&B

## 2022-07-09 PROCEDURE — 94761 N-INVAS EAR/PLS OXIMETRY MLT: CPT

## 2022-07-09 PROCEDURE — 82140 ASSAY OF AMMONIA: CPT

## 2022-07-09 PROCEDURE — 82330 ASSAY OF CALCIUM: CPT

## 2022-07-09 PROCEDURE — 93010 ELECTROCARDIOGRAM REPORT: CPT | Performed by: INTERNAL MEDICINE

## 2022-07-09 PROCEDURE — 71045 X-RAY EXAM CHEST 1 VIEW: CPT

## 2022-07-09 PROCEDURE — 83874 ASSAY OF MYOGLOBIN: CPT

## 2022-07-09 PROCEDURE — 82947 ASSAY GLUCOSE BLOOD QUANT: CPT

## 2022-07-09 PROCEDURE — 82550 ASSAY OF CK (CPK): CPT

## 2022-07-09 PROCEDURE — 6360000002 HC RX W HCPCS: Performed by: SURGERY

## 2022-07-09 PROCEDURE — 85730 THROMBOPLASTIN TIME PARTIAL: CPT

## 2022-07-09 PROCEDURE — 84484 ASSAY OF TROPONIN QUANT: CPT

## 2022-07-09 PROCEDURE — 94003 VENT MGMT INPAT SUBQ DAY: CPT

## 2022-07-09 PROCEDURE — 85055 RETICULATED PLATELET ASSAY: CPT

## 2022-07-09 RX ORDER — CALCIUM GLUCONATE 20 MG/ML
1000 INJECTION, SOLUTION INTRAVENOUS ONCE
Status: COMPLETED | OUTPATIENT
Start: 2022-07-09 | End: 2022-07-09

## 2022-07-09 RX ORDER — SODIUM CHLORIDE, SODIUM LACTATE, POTASSIUM CHLORIDE, AND CALCIUM CHLORIDE .6; .31; .03; .02 G/100ML; G/100ML; G/100ML; G/100ML
2200 INJECTION, SOLUTION INTRAVENOUS ONCE
Status: COMPLETED | OUTPATIENT
Start: 2022-07-09 | End: 2022-07-09

## 2022-07-09 RX ORDER — INSULIN LISPRO 100 [IU]/ML
10 INJECTION, SOLUTION INTRAVENOUS; SUBCUTANEOUS ONCE
Status: DISCONTINUED | OUTPATIENT
Start: 2022-07-09 | End: 2022-07-11

## 2022-07-09 RX ORDER — SODIUM CHLORIDE, SODIUM LACTATE, POTASSIUM CHLORIDE, AND CALCIUM CHLORIDE .6; .31; .03; .02 G/100ML; G/100ML; G/100ML; G/100ML
500 INJECTION, SOLUTION INTRAVENOUS ONCE
Status: COMPLETED | OUTPATIENT
Start: 2022-07-09 | End: 2022-07-09

## 2022-07-09 RX ORDER — DEXTROSE MONOHYDRATE 25 G/50ML
12.5 INJECTION, SOLUTION INTRAVENOUS ONCE
Status: COMPLETED | OUTPATIENT
Start: 2022-07-09 | End: 2022-07-09

## 2022-07-09 RX ADMIN — DEXTROSE MONOHYDRATE 12.5 G: 70 INJECTION, SOLUTION INTRAVENOUS at 06:20

## 2022-07-09 RX ADMIN — VASOPRESSIN 0.04 UNITS/MIN: 20 INJECTION PARENTERAL at 21:29

## 2022-07-09 RX ADMIN — PROPOFOL 15 MCG/KG/MIN: 10 INJECTION, EMULSION INTRAVENOUS at 03:51

## 2022-07-09 RX ADMIN — VASOPRESSIN 0.04 UNITS/MIN: 20 INJECTION PARENTERAL at 12:29

## 2022-07-09 RX ADMIN — INSULIN LISPRO 6 UNITS: 100 INJECTION, SOLUTION INTRAVENOUS; SUBCUTANEOUS at 08:07

## 2022-07-09 RX ADMIN — CALCIUM GLUCONATE 1000 MG: 20 INJECTION, SOLUTION INTRAVENOUS at 07:49

## 2022-07-09 RX ADMIN — SODIUM CHLORIDE, POTASSIUM CHLORIDE, SODIUM LACTATE AND CALCIUM CHLORIDE: 600; 310; 30; 20 INJECTION, SOLUTION INTRAVENOUS at 16:48

## 2022-07-09 RX ADMIN — SODIUM CHLORIDE 4.98 UNITS/HR: 9 INJECTION, SOLUTION INTRAVENOUS at 09:36

## 2022-07-09 RX ADMIN — SODIUM CHLORIDE, POTASSIUM CHLORIDE, SODIUM LACTATE AND CALCIUM CHLORIDE 2200 ML: 600; 310; 30; 20 INJECTION, SOLUTION INTRAVENOUS at 14:44

## 2022-07-09 RX ADMIN — SODIUM CHLORIDE, PRESERVATIVE FREE 10 ML: 5 INJECTION INTRAVENOUS at 07:55

## 2022-07-09 RX ADMIN — SODIUM CHLORIDE, PRESERVATIVE FREE 10 ML: 5 INJECTION INTRAVENOUS at 20:06

## 2022-07-09 RX ADMIN — Medication 2000 MG: at 05:13

## 2022-07-09 RX ADMIN — PROPOFOL 15 MCG/KG/MIN: 10 INJECTION, EMULSION INTRAVENOUS at 12:27

## 2022-07-09 RX ADMIN — SODIUM CHLORIDE, PRESERVATIVE FREE 20 MG: 5 INJECTION INTRAVENOUS at 20:06

## 2022-07-09 RX ADMIN — SODIUM CHLORIDE, POTASSIUM CHLORIDE, SODIUM LACTATE AND CALCIUM CHLORIDE 500 ML: 600; 310; 30; 20 INJECTION, SOLUTION INTRAVENOUS at 05:57

## 2022-07-09 RX ADMIN — INSULIN HUMAN 10 UNITS: 100 INJECTION, SOLUTION PARENTERAL at 06:25

## 2022-07-09 RX ADMIN — CHLORHEXIDINE GLUCONATE 15 ML: 1.2 SOLUTION ORAL at 20:06

## 2022-07-09 RX ADMIN — Medication 50 MCG/HR: at 16:19

## 2022-07-09 RX ADMIN — HEPARIN SODIUM 8 UNITS/KG/HR: 10000 INJECTION, SOLUTION INTRAVENOUS at 23:28

## 2022-07-09 RX ADMIN — CALCIUM GLUCONATE 1000 MG: 20 INJECTION, SOLUTION INTRAVENOUS at 20:22

## 2022-07-09 RX ADMIN — Medication 2000 MG: at 12:13

## 2022-07-09 RX ADMIN — CHLORHEXIDINE GLUCONATE 15 ML: 1.2 SOLUTION ORAL at 07:47

## 2022-07-09 RX ADMIN — SODIUM CHLORIDE, POTASSIUM CHLORIDE, SODIUM LACTATE AND CALCIUM CHLORIDE: 600; 310; 30; 20 INJECTION, SOLUTION INTRAVENOUS at 16:25

## 2022-07-09 RX ADMIN — SODIUM CHLORIDE, PRESERVATIVE FREE 20 MG: 5 INJECTION INTRAVENOUS at 07:55

## 2022-07-09 RX ADMIN — SODIUM CHLORIDE, POTASSIUM CHLORIDE, SODIUM LACTATE AND CALCIUM CHLORIDE: 600; 310; 30; 20 INJECTION, SOLUTION INTRAVENOUS at 22:39

## 2022-07-09 ASSESSMENT — PULMONARY FUNCTION TESTS
PIF_VALUE: 21
PIF_VALUE: 19
PIF_VALUE: 20
PIF_VALUE: 20
PIF_VALUE: 21
PIF_VALUE: 19
PIF_VALUE: 22
PIF_VALUE: 21
PIF_VALUE: 19
PIF_VALUE: 22
PIF_VALUE: 20
PIF_VALUE: 22
PIF_VALUE: 19
PIF_VALUE: 21
PIF_VALUE: 22
PIF_VALUE: 21
PIF_VALUE: 18
PIF_VALUE: 22
PIF_VALUE: 21
PIF_VALUE: 19
PIF_VALUE: 21
PIF_VALUE: 18
PIF_VALUE: 21
PIF_VALUE: 21
PIF_VALUE: 23
PIF_VALUE: 20
PIF_VALUE: 20
PIF_VALUE: 18
PIF_VALUE: 19
PIF_VALUE: 22
PIF_VALUE: 15
PIF_VALUE: 20
PIF_VALUE: 21
PIF_VALUE: 16
PIF_VALUE: 21
PIF_VALUE: 20
PIF_VALUE: 17
PIF_VALUE: 22
PIF_VALUE: 20
PIF_VALUE: 21
PIF_VALUE: 13
PIF_VALUE: 22
PIF_VALUE: 21
PIF_VALUE: 23
PIF_VALUE: 20
PIF_VALUE: 22
PIF_VALUE: 14
PIF_VALUE: 20
PIF_VALUE: 20

## 2022-07-09 NOTE — PROGRESS NOTES
Physical Therapy        Physical Therapy Cancel Note      DATE: 2022    NAME: Brittany Hyde  MRN: [id number]   : 1960      Patient not seen this date for Physical Therapy due to: Other: Patient is currently intubated and sedated.       Electronically signed by Angeli Zhang PT on 2022 at 8:59 AM

## 2022-07-09 NOTE — CARE COORDINATION
Call to pt's emergency contact and spouse, Sandi Hendrickson. Phone goes directly to voicemail with generic voicemail message. Per previous CM and SW notes, pt lives with ex-wife, Sagrario Velásquez. She called and stated pt is unable to return to her house.  Voicemail left for Sagrario Velásquez requesting return call

## 2022-07-09 NOTE — PROGRESS NOTES
[address] [facility]  Speech Language Pathology    Date: 7/9/2022  Patient Name: Joi Gautam  YOB: 1960   AGE: 64 y.o. MRN: [id number]        Patient Not Available for Speech Therapy     Due to:  [] Testing  [] Hemodialysis  [] Cancelled by RN  [] Surgery   [x] Intubation/Sedation/Pain Medication  [] Medical instability  [] Other:    Next scheduled treatment:  7/10/22 or as medically appropriate    Completed by: Juliette Cabral, SLP, M. Ed.  [id number]

## 2022-07-09 NOTE — CONSULTS
Attestation signed by      Attending Physician Statement:    I have discussed the care of  Clint Villatoro , including pertinent history and exam findings, with the Cardiology fellow/resident. I have seen and examined the patient and the key elements of all parts of the encounter have been performed by me. I agree with the assessment, plan and orders as documented by the fellow/resident, after I modified exam findings and plan of treatments, and the final version is my approved version of the assessment. Additional Comments: Seen and examined, agree with below, intubated and sedated. Acute occlusion of lower extremity graft, s/p thrombectomy and fasciotomy. Sepsis, Rhabdomyolysis. CAROLYN. On Pressors. Developed NSTEMI. Will continue IV heparin. Trend Troponin. Obtain Echo. Has previous history of CABG, no records available. Will try to obtain records. Will need coronary angiography when other co morbidities resolve. [facility] Cardiology    Consult / H&P               Today's Date: 7/9/2022  Patient Name: Clint Villatoro  Date of admission: 7/8/2022  4:56 AM  Patient's age: 64 y.o., 1960  Admission Dx: Lactic acidosis [E87.2]  Left leg numbness [R20.0]  Acute respiratory failure with hypercapnia ([address] [J96.02]  Failing vascular bypass graft, initial encounter [T82.599A]  Critical ischemia of lower extremity ([address] [I70.229]    Reason for Consult:  Cardiac evaluation    Requesting Physician: Yovany King MD    CHIEF COMPLAINT:  Right leg pain    History Obtained From:  Chart review     HISTORY OF PRESENT ILLNESS:      The patient is a 64 y.o. male with history of coronary disease s/p CABG x3, hypertension, dyslipidemia, peripheral arterial disease presented with complaint of right leg weakness and pain. Had a CT scan done which showed complete occlusion of aortofemoral graft and he underwent thrombectomy. Orthopedic was also consulted for possible compartment syndrome. Cardiology was consulted because of elevated troponin. Initial troponin was 20 and second troponin was 400. ECG showed normal sinus rhythm with deep T wave inversion in anterior leads. No previous EKG available. Patient was started on heparin drip as per vascular surgery. Past Medical History:   has a past medical history of Anxiety, Arthritis associated with another disorder, CAD (coronary artery disease), Carotid artery occlusion, Family history of kidney stone, HTN (hypertension), Hyperlipidemia, Hypertension, Nephrolithiasis, Neuropathy, PAD (peripheral artery disease) ([address] Peripheral vascular disease ([facility] [address] Personal history of MRSA (methicillin resistant Staphylococcus aureus), Seasonal allergies, and Vasculopathy. Past Surgical History:   has a past surgical history that includes Tonsillectomy and adenoidectomy; Percutaneous Transluminal Coronary Angio; Carotid endarterectomy (07/19/08); IR Femoral Popliteal Bypass Graft (01/24/208 Dr Bonita Hsu); Femoral-tibial Bypass Graft (12/31/07  Clarice Hidalgo ); Balloon angioplasty, artery (11/23/10 Allie Muñoz); Aorto-femoral Bypass Graft (2/11/2011-[facility]); Abdominal adhesion surgery (2/11/2011-[facility]); and Coronary artery bypass graft (2008). Home Medications:    Prior to Admission medications    Medication Sig Start Date End Date Taking? Authorizing Provider   oxyCODONE (OXYCONTIN) 40 MG CR tablet Take 40 mg by mouth every 12 hours. Historical Provider, MD   oxyCODONE-acetaminophen (PERCOCET)  MG per tablet Take 1 tablet by mouth 3 times daily as needed. breakthrought pain     Historical Provider, MD   lisinopril (PRINIVIL;ZESTRIL) 10 MG tablet Take 0.5 tablets by mouth daily. 9/13/12   Perri Cortez MD   dipyridamole-aspirin (AGGRENOX)  MG per SR capsule Take 1 capsule by mouth daily. 9/13/12   Tomer Noriega MD   simvastatin (ZOCOR) 40 MG tablet Take 1 tablet by mouth nightly.  9/13/12   Perri Cortez MD   ALPRAZolam Cordella Lorc) 1 MG tablet Take 1 tablet by mouth 2 times daily as needed. 9/13/12   Tomer Noriega MD   varenicline (CHANTIX STARTING MONTH PAK) 0.5 MG X 11 & 1 MG X 42 tablet Take by mouth. 9/13/12   Tomer Noriega MD   isosorbide mononitrate (IMDUR) 30 MG CR tablet Take 30 mg by mouth daily. Historical Provider, MD   metoprolol (LOPRESSOR) 25 MG tablet Take 1 tablet by mouth 2 times daily. Takes 1/2 tab 2 times a day 9/13/12   Perri Cortez MD   mirtazapine (REMERON) 30 MG tablet Take 1 tablet by mouth nightly. 9/13/12   Perri Cortez MD   tamsulosin (FLOMAX) 0.4 MG capsule Take 1 capsule by mouth daily. 9/13/12   Perri Cortez MD   TRUETEST TEST strip TEST 2 TIMES DAILY 4/30/12   Paige Lucero MD   metformin (GLUCOPHAGE) 500 MG tablet TAKE 1 TABLET BY MOUTH 2 TIMES DAILY (WITH MEALS) FOR 30 DAYS. 2/21/12   Paige Lucero MD   aspirin  MG EC tablet Take 1 tablet by mouth daily.  1/25/12   Paige Lucero MD      Current Facility-Administered Medications: lactated ringers bolus, 500 mL, IntraVENous, Once  insulin lispro (HUMALOG) injection vial 10 Units, 10 Units, SubCUTAneous, Once  dextrose 50 % IV solution, 12.5 g, IntraVENous, Once  fentaNYL (SUBLIMAZE) injection 50 mcg, 50 mcg, IntraVENous, Once  nitroGLYCERIN 50 mg in dextrose 5% 250 mL infusion, 5-200 mcg/min, IntraVENous, Continuous  heparin (porcine) injection 9,260 Units, 80 Units/kg, IntraVENous, PRN  heparin (porcine) injection 4,630 Units, 40 Units/kg, IntraVENous, PRN  heparin 25,000 units in dextrose 5 % 250 mL infusion (rate based), 5-30 Units/kg/hr, IntraVENous, Continuous  0.9 % sodium chloride infusion, , IntraVENous, PRN  sodium chloride flush 0.9 % injection 5-40 mL, 5-40 mL, IntraVENous, 2 times per day  sodium chloride flush 0.9 % injection 5-40 mL, 5-40 mL, IntraVENous, PRN  0.9 % sodium chloride infusion, , IntraVENous, PRN  polyethylene glycol (GLYCOLAX) packet 17 g, 17 g, Oral, Daily  lactated ringers infusion, , IntraVENous, Continuous  ondansetron (ZOFRAN) injection 4 mg, 4 mg, IntraVENous, Q6H PRN  chlorhexidine (PERIDEX) 0.12 % solution 15 mL, 15 mL, Mouth/Throat, BID  famotidine (PEPCID) 20 mg in sodium chloride (PF) 10 mL injection, 20 mg, IntraVENous, BID  propofol injection, 5-50 mcg/kg/min, IntraVENous, Continuous  fentaNYL 50 mcg/mL 50 mL infusion,  mcg/hr, IntraVENous, Continuous  heparin (porcine) injection 4,000 Units, 4,000 Units, IntraVENous, Once  heparin (porcine) injection 4,000 Units, 4,000 Units, IntraVENous, PRN  heparin (porcine) injection 2,000 Units, 2,000 Units, IntraVENous, PRN  heparin 25,000 units in dextrose 5 % 250 mL infusion (rate based), 5-30 Units/kg/hr, IntraVENous, Continuous  norepinephrine (LEVOPHED) 16 mg in sodium chloride 0.9 % 250 mL infusion, 1-100 mcg/min, IntraVENous, Continuous  vasopressin 20 Units in dextrose 5 % 100 mL infusion, 0.04 Units/min, IntraVENous, Continuous  ceFAZolin (ANCEF) 2000 mg in sterile water 20 mL IV syringe, 2,000 mg, IntraVENous, Q8H  insulin lispro (HUMALOG) injection vial 0-18 Units, 0-18 Units, SubCUTAneous, TID WC  insulin lispro (HUMALOG) injection vial 0-9 Units, 0-9 Units, SubCUTAneous, Nightly  glucose chewable tablet 16 g, 4 tablet, Oral, PRN  dextrose bolus 10% 125 mL, 125 mL, IntraVENous, PRN **OR** dextrose bolus 10% 250 mL, 250 mL, IntraVENous, PRN  glucagon (rDNA) injection 1 mg, 1 mg, IntraMUSCular, PRN  dextrose 5 % solution, 100 mL/hr, IntraVENous, PRN    Allergies:  Faviola [morphine sulfate], Methadone, and Morphine    Social History:   reports that he has been smoking cigarettes. He has a 66.00 pack-year smoking history. He has never used smokeless tobacco. He reports that he does not drink alcohol and does not use drugs. Family History: family history includes Cancer (age of onset: 28) in his mother; Heart Disease in his sister; Hypertension in his father and sister. No h/o sudden cardiac death. No for premature CAD      PHYSICAL EXAM: BP 99/65   Pulse 88   Temp 98.2 °F (36.8 °C)   Resp 20   Wt 255 lb (115.7 kg)   SpO2 96%   BMI 36.07 kg/m²      Respiratory:  · Intubated and on mechanical ventilation   Cardiovascular:  · The apical impulse is not displaced  · Heart tones are crisp and normal. regular S1 and S2.  · Jugular venous pulsation Normal  · The carotid upstroke is normal in amplitude and contour without delay or bruit  · Peripheral pulses are symmetrical and full   Abdomen:   · No masses or tenderness  · Bowel sounds present  Labs:     CBC:   Recent Labs     07/08/22 2030 07/09/22  0439   WBC 29.8* 22.6*   HGB 14.6 13.0   HCT 44.8 39.3*    See Reflexed IPF Result     BMP:   Recent Labs     07/08/22 1903 07/09/22 0250    136   K 5.2 5.5*   CO2 22 22   BUN 18 21   CREATININE 1.16 1.33*   LABGLOM >60 55*   GLUCOSE 150* 219*     BNP: No results for input(s): BNP in the last 72 hours. PT/INR: No results for input(s): PROTIME, INR in the last 72 hours. APTT:  Recent Labs     07/08/22  1911 07/08/22  2338   APTT 82.5* 72.5*     CARDIAC ENZYMES:  Recent Labs     07/08/22  0504 07/08/22 1903 07/08/22 2030   CKTOTAL 189 12,473* 23,120*     FASTING LIPID PANEL:  Lab Results   Component Value Date/Time    HDL 29 06/30/2012 09:46 AM    LDLCALC 95 05/07/2011 03:21 AM    TRIG 144 06/30/2012 09:46 AM     LIVER PROFILE:  Recent Labs     07/08/22  0504 07/09/22  0250   AST 27 334*   ALT 25 54*   LABALBU 4.4 2.6*       IMPRESSION:    Patient Active Problem List   Diagnosis    Seasonal allergies    Anxiety    Carotid artery occlusion    CAD (coronary artery disease)    Hypertension    Neuropathy    [facility]AD (peripheral artery disease) [facility] [address]    HTN (hypertension)    Arthritis associated with another disorder    Hernia, hiatal    Hyperlipidemia    CAD (coronary artery disease)    Critical ischemia of lower extremity (HCC)       Assessment     1.   Complete occlusion of B/L aortofemoral graft s/p thrombectomy  2 history of coronary artery disease s/p CABG x3 in 2008  3 elevated troponin likely NSTEMI type I/acute coronary syndrome  4 HTN   5 HLD   6 DM     RECOMMENDATIONS:  1. Patient is currently intubated and sedated. 2. EKG  showed normal sinus rhythm with deep T wave inversion in anterior lead. 3. Currently on heparin drip for anticoagulation. We will start aspirin 81 mg if okay with vascular surgery. 4. 2D echocardiography pending. 5. Plan for left heart catheterization when other co morbidities resolve. Discussed with patient and Nurse.     Electronically signed by Eileen Pickett MD on 7/9/2022 at [facility]      [phone]

## 2022-07-09 NOTE — PLAN OF CARE
The following plan of care carried out per providers orders and in collaboration with provider: Pt Intubated, sedated. MAP maintained >65 on levo and vaso gtts. RLE PT pulses present, LLE DP + PT present. Heparin gtt initiated. Cola colored urine with low output <30cc/hr; possible rhabdo; 1.5L LR given. Trops elevated at 430  >> 451, EKG changes (Cardiology on board, plan for cath lab once pt is more stable)  K+ 5.9; insulin and dextrose administered. Mg replace, Calcium replaced. Bleeding noted from RLE vascular incision; assessed and redressed by vascular. PLAN: cardiology to see pt, ECHO to be done, Cath lab when stable. Trend trops. Myoglobin.   Problem: Discharge Planning  Goal: Discharge to home or other facility with appropriate resources  Outcome: Progressing

## 2022-07-09 NOTE — PROGRESS NOTES
Orthopedic Progress Note    Patient:  Ashley Sethi  YOB: 1960     64 y.o. male    Subjective:  Patient seen and examined at bedside this morning. Remains intubated and sedated. No new concerns regarding left thigh from nursing standpoint. Unable to assess pain/sensation secondary to intubation/sedation. Objective:   Vitals:    07/09/22 0700   BP:    Pulse: 93   Resp:    Temp:    SpO2: 97%     Gen: Intubated/sedated  Cardiovascular: Regular rate  Respiratory: Mechanically ventilated    MSK: Left lower extremity: Kerlix dressing around vascular surgery fasciotomy sites. Left thigh remains compressible and soft. Slight swelling present to anterior compartment but consistent with prior evaluation. Able to passively flex knee to > 100 degrees. Palpable DP/PT pulses distally. Recent Labs     07/09/22  0439   WBC 22.6*   HGB 13.0   HCT 39.3*   PLT See Reflexed IPF Result      K 5.9*   BUN 23   CREATININE 1.56*   GLUCOSE 225*       Meds: See rec for complete list    Impression: 64 y.o. male being seen and evaluated for ruleout of left thigh compartment syndrome      Plan:     -Patient's compartments have remained soft and compressible with no evidence of worsening swelling in the left thigh   -OK for WBAT LLE from ortho perspective   -Hemoglobin this AM 13.0   -Antibiotics/pain control per primary team discretion   -Ice (20 min, 1 hour off) and elevation for edema/pain control  -DVT ppx: EPC.   OK for chemical anticoagulation from ortho perspective   -If worsening signs/symptoms of compartment syndrome in left thigh, please page ortho on call.   -Vascular team to manage bilateral lower leg surgical wounds   -Please page Ortho on call with any questions or concerns    Sabiha Hooper DO  PGY-3 Orthopedic Surgery  7:49 AM 7/9/2022

## 2022-07-09 NOTE — PROGRESS NOTES
Orthopedic Compartment Check    Patient:  Sophie Aponte  YOB: 1960     64 y.o. male    Subjective:  Patient seen and examined. Intubated and remains on sedation. Per nursing, was opening eyes when weaning off propofol. Unable to assess pain/sensation secondary to intubation and sedation. Objective:   Vitals:    07/08/22 2100   BP:    Pulse:    Resp:    Temp: 98.2 °F (36.8 °C)   SpO2:      Gen: Intubated and sedated. MSK: Left thigh: Remains compressible. Slightly swollen in anterior compartment but able to be compressed and compatible with prior exam. Able to passively flex knee to 100 degrees. Able to palpate DP and PT pulses. Was able to doppler DP pulse. Unable to doppler PT pulse. Impression/plan: 64 y.o. male seen for left thigh compartment checks    -WB status - as tolerated from ortho standpoint.  Will defer official recommendations to vascular surgery.   -Compartment fasciotomies of the bilateral lower legs to be managed by vascular team. Ortho team will continue to check left thigh.   -Pain control per primary team discretion   -Constant ice (ok to alternate ice on/off every 20 min) and elevation to improve swelling  -Please page Ortho with any questions    Candance Soles, DO  PGY-3 Orthopedic Surgery  10:34 PM 7/8/2022

## 2022-07-09 NOTE — PROGRESS NOTES
Division of Vascular Surgery             Progress Note      Name: Luisa Castanon  MRN: [id number]         Overnight Events:      Patient seen and examined at the bedside. Early this morning there was some blood oozing from LLE fasciotomy incision, pressure was held which stopped bleeding and the leg was re-dressed. Subjective:     Patient has clinically improved since post-op. Pressor support has been decreased. Troponins were elevated and cards is following. He is currently sedated on Propofol 30 and Fentanyl 25. He is intubated with vent set to PRVC FiO2 50% PEEP 10 and . Incision sites appear clean and dry. Physical Exam:     Vitals:  /83   Pulse 96   Temp 99.1 °F (37.3 °C) (Oral)   Resp 25   Wt 255 lb 1.2 oz (115.7 kg)   SpO2 96%   BMI 36.08 kg/m²     General appearance - intubated on vent  Mental status - sedated on propofol and fentanyl  Head - normocephalic and atraumatic  Chest - supported on ventilator PRVC FiO2 50% PEEP 10   Heart - normal rate, regular rhythm, no murmurs  Abdomen - protuberant abdomen, soft, non-distended  Neurological - sedated on ventilator  Extremities - peripheral pulses not palpable, 5+ edema RLE, LLE fasciotomy bandage in place, appears clean and dry, BL groin cut down sites appear clean and dry, no signs of infection  Skin - no gross lesions, rashes, or induration noted  Vascular Exam - L DP/PT dopplerable, R PT faintly dopperable, feet are warm      Imaging:   CT HEAD WO CONTRAST    Result Date: 7/8/2022  No acute intracranial abnormality. 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 Mild diffuse cerebral atrophy and minimal chronic white matter ischemic change. Findings suggestive of acute on chronic right maxillary sinusitis. Correlate clinically. XR CHEST PORTABLE    Result Date: 7/9/2022  1. Tubes and right IJ approach central venous catheter as detailed above.  2. Bilateral airspace disease, likely multifocal pneumonia, left greater than right as detailed above. Follow-up is recommended to document resolution. XR CHEST PORTABLE    Result Date: 7/8/2022  Scattered pulmonary opacities consistent with multifocal airspace disease. Poor tubes and lines as above. XR CHEST PORTABLE    Result Date: 7/8/2022  Scattered pulmonary infiltrates concerning for pneumonia. CTA CHEST ABDOMEN AORTA RUNOFF RECON    Result Date: 7/8/2022  Occluded aortobifemoral graft. Prior infrarenal dissecting hematoma, as reported; occluded native infrarenal aorta. Reconstituted flow in moderate-severely diseased iliofemoral arteries. Patent right common femoral below-knee graft. Likely occluded left popliteal artery; no below-knee contrast enhancement demonstrated on this study (timing versus absent flow). Multiple additional vascular findings, as above. Interval worsening emphysema and mild interstitial disease as compared to 2012. Interval bullous changes upper lung zones, multicystic/larger on right with mild peripheral enhancement/small posterior ovoid fluid collection suggesting some degree of inflammation or infection; no large fluid-fluid level or solid elements suggesting significant infection/fungal disease. No consolidation, PTX or sizable pleural effusion. Hiatus hernia and esophageal mural prominence. Correlate for esophagitis. Edema/soft tissue swelling below knee on the right. Mild soft tissue swelling left distal calf/foot. Some muscular atrophy right lower extremity. Additional unchanged or incidental findings, as detailed in the body of the report above. Findings were discussed with Dr. Bassam Phelan at 9:08 am on 7/8/2022. Assessment:     1. 65 y/o male critically ill s/p BL femoral artery cutdown with thrombectomy of aortobifemoral graft      Plan:     1. Medical management per primary team  2. Neurovascular checks every 4 hours   3.  Trend serum myoglobin to assess ongoing rhabdomyolysis and limb ischemia  4.  Follow-up ortho recs regarding R thigh compartments         [address] [facility] [facility]: [phone]

## 2022-07-09 NOTE — PROGRESS NOTES
POST OP NOTE    SUBJECTIVE    Pt s/p BL common fem art cut down. Thrombectomy of the aortobifemoral bypass. Thromboendarterectomy of the common femoral profundofemoral and SFA bilaterally with bovine pericardial patch angioplasty. L calf fasciotomy     Patient appears critically ill. Intubated, sedated on pressor support. Minimal UOP. Sever rhabdomyolysis. LLE popliteal, DP/PT audible on doppler. RLE popliteal and PT audile on doppler. No DP/PT palpable    OBJECTIVE  VITALS:  BP 99/65   Pulse 97   Temp 98.2 °F (36.8 °C)   Resp 18   Wt 255 lb (115.7 kg)   SpO2 98%   BMI 36.07 kg/m²         GENERAL:  Intubated toxic appearing  CARDIOVASCULAR:  regular rate and rhythm   LUNGS:  rhonchi BL  ABDOMEN:   Abdomen soft, non-distended  INCISION: Incision clean/dry/intact    ASSESSMENT  1. POD# 0 s/p Pt s/p BL common fem art cut down. Thrombectomy of the aortobifemoral bypass. Thromboendarterectomy of the common femoral profundofemoral and SFA bilaterally with bovine pericardial patch angioplasty. L calf fasciotomy       PLAN  1. Pain management- Fentanyl, MMPT  2.  DVT proph- heparin  3. GI proph- pepcid  [address], DO  Trauma/Surgery Service  7/9/2022 at 12:06 AM

## 2022-07-09 NOTE — DISCHARGE INSTRUCTIONS
Orthopedic Instructions:  -Weight bearing status: As tolerated from an orthopedics standpoint to the left leg.  -Physical Therapy for strengthening and gait training. Occupational therapy for activities of daily living.  -Ice (20 minutes on and off 1 hour) and elevate (above heart) as needed for swelling/pain.  -Drink plenty of fluids.  -Call the office or come to Emergency Room if signs of infection appear (hot, swollen, red, draining pus, fever). If you start to have severe swelling in the left thigh, increased pain, new onset numbness or tingling, or other symptoms consistent with compartment syndrome, return to the emergency department for assessment.   -Take medications as prescribed.  -Wean off narcotics (Percocet/Norco) as soon as possible. Do not take Tylenol if still taking narcotics. -No alcoholic beverages or driving/operating while taking narcotics.  -Follow up with orthopedics if needed. Call [phone] to schedule.

## 2022-07-09 NOTE — PROGRESS NOTES
PROGRESS NOTE          PATIENT NAME: Valeria Olivera  MEDICAL RECORD NO. [id number]  DATE: 7/9/2022  SURGEON: Enzo Mcmullen MD  PRIMARY CARE PHYSICIAN: Erica Falk (Inactive)    HD: # 1    ASSESSMENT    Patient Active Problem List   Diagnosis    Seasonal allergies    Anxiety    Carotid artery occlusion    CAD (coronary artery disease)    Hypertension    Neuropathy    PAD [facility]peripheral artery disease) [facility]    HTN (hypertension)    Arthritis associated with another disorder    Hernia, hiatal    Hyperlipidemia    CAD (coronary artery disease)    Critical ischemia of lower extremity ([facility]       MEDICAL DECISION MAKING AND PLAN    1. Neurological  1. Propofol 30 Fentanyl 25  2. Hx of CVA with residual right sided weakness  2. Cardiovascular  1. Levophed 10 mcg. Vasopressin   2. MAP>65 HR 90's SBP 's  3. Hx of aortobifemoral graft and CABGx3  4. Total aortobifemoral occulusion. POD#1 BL common fem art cut down. Thrombectomy of the aortobifemoral bypass. Thromboendarterectomy of the common femoral profundofemoral and SFA bilaterally with bovine pericardial patch angioplasty  5. No palpable LE pulses. RLE doppler audible popliteal, DP/PT. LLE doppler audible popliteal and faint PT  6. Heparin low dose  7. Elevated troponin. T wave inversions on EKG. Cards following. Echo in AM. Trops q6h  3. Respiratory  1. PRVC FiO2 50% Peep 8 Rate 20   2. ABG 7.36/37/155/21  3. F/u AM CXR. Covid negative  4. Gastrointestinal  1. NPO  2. GI Prophylaxis pepcid  5. FEN/Renal  1. Urine Output decreased at 20cc/hr  2. BUN 22 Cr 1.33 (1.16). Kidney function worsening  3. Na 136 K 5.5. 10 of lispro 1/2 amp of d50  4[place] [address] myoglobin 18565  5. Multiple LR boluses in TICU. 125cc/hr LR  6. Heme  1. Hgb 13.4 Plt 320  2. 670cc cell save in OR. No product given  7. ID  1. Tmax 38.1  2. WBC 22 (29)  3. Blood cultures no growth. UA negative  4. Ancef q8 24hrs  5. Vanc and zosyn given in ED  8. Endo  1.  BG 168. HDSS   9. MSK  1. Concern for compartment syndrome in LLE  2. Left calf fasciotomy done in OR  3. Per ortho unlikely compartment syndrome in L thigh  4. Ortho following for serial compartment checks L thigh  10. DVT Prophylaxis - Low dose heparin  11. Dispo - ICU  12. Lines - R IJ CVC, R brachial art line     Chief Complaint: Total aortobifemoral occulusion    SUBJECTIVE    Leah Mahoney appears more stable this morning. Still having reduced UOP and requiring pressor support. Patient to get echo in AM. Will continue to monitor UOP and kidney function given significant rhabdomyolsis      OBJECTIVE  VITALS: Temp: Temp: 98.2 °F (36.8 °C)Temp  Av.9 °F (36.6 °C)  Min: 97.5 °F (36.4 °C)  Max: 98.2 °F (54.1 °C) BP Systolic (88YUB), JBX:433 , Min:84 , NJP:027   Diastolic (69DAG), CDI:52, Min:65, Max:129   Pulse Pulse  Av.8  Min: 87  Max: 141 Resp Resp  Av.6  Min: 17  Max: 42 Pulse ox SpO2  Av.4 %  Min: 89 %  Max: 100 %  GENERAL: intubated and sedated  NEURO: No facial droop  HEENT: PERRLA  : Dexter in place  LUNGS: symmetric chest wall rise  HEART: normal rate and regular rhythm  ABDOMEN: non-distended and soft  EXTREMITY: LLE dressing in place with oozing. RLE +3 pitting edema    I/O last 3 completed shifts: In: 5890 [I.V.:4000; Blood:675; IV Piggyback:250]  Out: 1500 [Urine:400; Blood:1100]    Drain/tube output: In: 0153 [I.V.:4000;  Blood:675]  Out: 1725 [Urine:525]    LAB:  CBC:   Recent Labs     22  0504 22  1903 07/08/22  2030   WBC 14.2* 27.0* 29.8*   HGB 16.0 14.4 14.6   HCT 47.8 43.3 44.8   MCV 96.0 96.2 96.8    See Reflexed IPF Result 274     BMP:   Recent Labs     22  0504 22  1818 22  1903     --  141   K 4.1  --  5.2     --  107   CO2 16*  --  22   BUN 18  --  18   CREATININE 1.08 1.86* 1.16   GLUCOSE 193*  --  150*     COAGS:   Recent Labs     22  0504 22  0935 22  19122  2338   APTT  --  23.0 82.5* 72.5*   PROT 7.0  --   --   --        RADIOLOGY:  CT HEAD WO CONTRAST    Result Date: 7/8/2022  No acute intracranial abnormality. 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 Mild diffuse cerebral atrophy and minimal chronic white matter ischemic change. Findings suggestive of acute on chronic right maxillary sinusitis. Correlate clinically. XR CHEST PORTABLE    Result Date: 7/8/2022  Scattered pulmonary opacities consistent with multifocal airspace disease. Poor tubes and lines as above. XR CHEST PORTABLE    Result Date: 7/8/2022  Scattered pulmonary infiltrates concerning for pneumonia. CTA CHEST ABDOMEN AORTA RUNOFF RECON    Result Date: 7/8/2022  Occluded aortobifemoral graft. Prior infrarenal dissecting hematoma, as reported; occluded native infrarenal aorta. Reconstituted flow in moderate-severely diseased iliofemoral arteries. Patent right common femoral below-knee graft. Likely occluded left popliteal artery; no below-knee contrast enhancement demonstrated on this study (timing versus absent flow). Multiple additional vascular findings, as above. Interval worsening emphysema and mild interstitial disease as compared to 2012. Interval bullous changes upper lung zones, multicystic/larger on right with mild peripheral enhancement/small posterior ovoid fluid collection suggesting some degree of inflammation or infection; no large fluid-fluid level or solid elements suggesting significant infection/fungal disease. No consolidation, PTX or sizable pleural effusion. Hiatus hernia and esophageal mural prominence. Correlate for esophagitis. Edema/soft tissue swelling below knee on the right. Mild soft tissue swelling left distal calf/foot. Some muscular atrophy right lower extremity. Additional unchanged or incidental findings, as detailed in the body of the report above. Findings were discussed with Dr. Dia Jimenez at 9:08 am on 7/8/2022.          Mariangel Archer, DO  7/9/22, 3:14 AM

## 2022-07-09 NOTE — PROGRESS NOTES
Comprehensive Nutrition Assessment    Type and Reason for Visit:  Initial    Nutrition Recommendations/Plan:   1. Continue NPO. Monitor for start of nutrition. 2. If TF desired recommend Diabetic at 35 mL/hr  + 1 protein modular to provide 1546 kcal, 95 g pro (with propofol at current rate)  3. If Immune Enhancing TF desired, recommend 40 mL/hr to provide 1622 kcal, 90 g pro (with propofol at current rate)  4. Monitor labs, wt, plan of care     Malnutrition Assessment:  Malnutrition Status:  Insufficient data (07/09/22 1214)    Context:  Chronic Illness       Nutrition Assessment:    Pt presents with leg pain and weakness; admitted for complete occulusion of aortobifemoral graft. PMH: CAD, HTN, HLD, PAD. S/p thrombectomy. Pt vented. Incomplete wt hx - unable to assess changes at this time. Nutrition Related Findings:    Labs reviewed. Meds reviewed- lactated ringers infusion, propofol, insulin drip. +2, +3 pitting generalized; +1 BUE; +3, +4 RLE; +2 LLE edema noted. Wound Type: Surgical Incision (L leg)       Current Nutrition Intake & Therapies:    Average Meal Intake: NPO  Average Supplements Intake: NPO  Diet NPO  Additional Calorie Sources:  · propofol @ 6.9 mL/hr = 182 kcal      Anthropometric Measures:  Height: 5' 10.5\" (179.1 cm) (no inital recorded height; 9/13/12 office visit)  Ideal Body Weight (IBW): 169 lbs (77 kg)       Current Body Weight: 255 lb 1.2 oz (115.7 kg), 150.9 % IBW.  Weight Source: Bed Scale  Current BMI (kg/m2): 36.1                          BMI Categories: Obese Class 2 (BMI 35.0 -39.9)    Estimated Daily Nutrient Needs:  Energy Requirements Based On: Kcal/kg  Weight Used for Energy Requirements: Current  Energy (kcal/day): 4941-2470 kcal/day (12-14 kcal/kg)  Weight Used for Protein Requirements: Ideal  Protein (g/day):  g/day (1.2-1.5 g/kg)  Method Used for Fluid Requirements: Other (Comment)  Fluid (ml/day): per MD    Nutrition Diagnosis:   · Inadequate oral intake related to (current medical condition) as evidenced by NPO or clear liquid status due to medical condition,intubation      Nutrition Interventions:   Food and/or Nutrient Delivery: Start Tube Feeding  Nutrition Education/Counseling: No recommendation at this time  Coordination of Nutrition Care: Continue to monitor while inpatient       Goals:  Previous Goal Met:  (goal set)  Goals: Tolerate nutrition support at goal rate,by next RD assessment       Nutrition Monitoring and Evaluation:   Behavioral-Environmental Outcomes: None Identified  Food/Nutrient Intake Outcomes: Enteral Nutrition Intake/Tolerance  Physical Signs/Symptoms Outcomes: Biochemical Data,Nutrition Focused Physical Findings,Skin,Weight,Fluid Status or Edema    Discharge Planning:     Too soon to determine     Evan Estrella N [facility] [address]  Contact: [phone]

## 2022-07-09 NOTE — CARE COORDINATION
07/09/22 1240   Service Assessment   Patient Orientation Sedated   History Provided By Other (see comment)  (ex-wife)   Primary Caregiver Other (Comment)  (ex step daughter)   Support Systems None   Prior Functional Level Assistance with the following:;Mobility;Housework   Can patient return to prior living arrangement No   Family able to assist with home care needs: No   Social/Functional History   Lives With Other (comment)  (ex step daughter)   Type of [address] Needs assistance   Ambulation Assistance Needs assistance   Transfer Assistance Independent   Active  No   Patient's  Info ex family members   Discharge Planning   Type of [place] Other (Comment)  (ex step daughter)   Current Services Prior To Admission [address]   DME Wheelchair   Type of [address] None   Patient expects to be discharged to: Unknown   Services At/After Discharge   Transition of Care Consult (CM Consult) SNF   Services At/After Discharge [facility] (SNF)   Condition of Participation: Discharge Planning   The Plan for Transition of Care is related to the following treatment goals: comfort   The Patient and/or Patient Representative was provided with a Choice of Provider? Patient Representative   Name of the Patient Representative who was provided with the Choice of Provider and agrees with the Discharge Plan?  ex wife Kathy Og   The Patient and/Or Patient Representative agree with the Discharge Plan? Yes  (Feliberto Glass is, pt intubated and sedated)   Freedom of Choice list was provided with basic dialogue that supports the patient's individualized plan of care/goals, treatment preferences, and shares the quality data associated with the providers? Yes     Received return call from Kathy Og [phone], pt's ex wife.  She relates that pt is not currently  to [place] as emergency contacts states. She relates that pt was living in [place] for 10 years and just recently came back to [place]. Pt has been living with her daughter, who is pt's ex step daughter. Aide Lopez relates that pt is unable to return because he cannot walk on his own and they are unable to care for pt. She has been looking for SNF or AL for pt without him knowing. Her SNF choice is [facility] or [facility]. Aide Jessica relates that pt has a son, Michelle Bianchi, who lives in [place]. He has not had contact with pt in a long time. Pt also has a sister, Gerald Mccartney, and brother, Kenneth Contreras, that both live in [place]. He also has a brother, Annie Cordova, that lives in [place].  She does not have any phone numbers for above relatives

## 2022-07-09 NOTE — PLAN OF CARE
Problem: Discharge Planning  Goal: Discharge to home or other facility with appropriate resources  7/9/2022 1234 by Marita Lund RN  Outcome: Not Progressing  7/9/2022 0740 by Niya Escalante RN  Outcome: Progressing

## 2022-07-10 ENCOUNTER — APPOINTMENT (OUTPATIENT)
Dept: GENERAL RADIOLOGY | Age: 62
DRG: 181 | End: 2022-07-10
Payer: COMMERCIAL

## 2022-07-10 ENCOUNTER — APPOINTMENT (OUTPATIENT)
Dept: ULTRASOUND IMAGING | Age: 62
DRG: 181 | End: 2022-07-10
Payer: COMMERCIAL

## 2022-07-10 LAB
-: ABNORMAL
ABSOLUTE EOS #: <0.03 K/UL (ref 0–0.44)
ABSOLUTE IMMATURE GRANULOCYTE: 0.12 K/UL (ref 0–0.3)
ABSOLUTE LYMPH #: 1.7 K/UL (ref 1.1–3.7)
ABSOLUTE MONO #: 0.91 K/UL (ref 0.1–1.2)
ALLEN TEST: ABNORMAL
AMORPHOUS: ABNORMAL
ANION GAP SERPL CALCULATED.3IONS-SCNC: 12 MMOL/L (ref 9–17)
ANION GAP SERPL CALCULATED.3IONS-SCNC: 12 MMOL/L (ref 9–17)
BACTERIA: ABNORMAL
BASOPHILS # BLD: 0 % (ref 0–2)
BASOPHILS ABSOLUTE: 0.04 K/UL (ref 0–0.2)
BILIRUBIN URINE: ABNORMAL
BLD PROD TYP BPU: NORMAL
BPU ID: NORMAL
BUN BLDV-MCNC: 36 MG/DL (ref 8–23)
BUN BLDV-MCNC: 39 MG/DL (ref 8–23)
CALCIUM IONIZED: 1.03 MMOL/L (ref 1.13–1.33)
CALCIUM SERPL-MCNC: 7.7 MG/DL (ref 8.6–10.4)
CALCIUM SERPL-MCNC: 7.9 MG/DL (ref 8.6–10.4)
CASTS UA: ABNORMAL /LPF (ref 0–2)
CASTS UA: ABNORMAL /LPF (ref 0–2)
CHLORIDE BLD-SCNC: 105 MMOL/L (ref 98–107)
CHLORIDE BLD-SCNC: 106 MMOL/L (ref 98–107)
CO2: 20 MMOL/L (ref 20–31)
CO2: 21 MMOL/L (ref 20–31)
COLOR: ABNORMAL
CREAT SERPL-MCNC: 3.32 MG/DL (ref 0.7–1.2)
CREAT SERPL-MCNC: 3.68 MG/DL (ref 0.7–1.2)
CREATININE URINE: 141.6 MG/DL (ref 39–259)
DISPENSE STATUS BLOOD BANK: NORMAL
EOSINOPHIL,URINE: NORMAL
EOSINOPHILS RELATIVE PERCENT: 0 % (ref 1–4)
EPITHELIAL CELLS UA: ABNORMAL /HPF (ref 0–5)
FIO2: 40
GFR AFRICAN AMERICAN: 20 ML/MIN
GFR AFRICAN AMERICAN: 23 ML/MIN
GFR NON-AFRICAN AMERICAN: 17 ML/MIN
GFR NON-AFRICAN AMERICAN: 19 ML/MIN
GFR SERPL CREATININE-BSD FRML MDRD: ABNORMAL ML/MIN/{1.73_M2}
GFR SERPL CREATININE-BSD FRML MDRD: ABNORMAL ML/MIN/{1.73_M2}
GLUCOSE BLD-MCNC: 126 MG/DL (ref 75–110)
GLUCOSE BLD-MCNC: 137 MG/DL (ref 75–110)
GLUCOSE BLD-MCNC: 144 MG/DL (ref 75–110)
GLUCOSE BLD-MCNC: 144 MG/DL (ref 75–110)
GLUCOSE BLD-MCNC: 145 MG/DL (ref 75–110)
GLUCOSE BLD-MCNC: 148 MG/DL (ref 75–110)
GLUCOSE BLD-MCNC: 149 MG/DL (ref 75–110)
GLUCOSE BLD-MCNC: 151 MG/DL (ref 74–100)
GLUCOSE BLD-MCNC: 163 MG/DL (ref 75–110)
GLUCOSE BLD-MCNC: 165 MG/DL (ref 75–110)
GLUCOSE BLD-MCNC: 167 MG/DL (ref 75–110)
GLUCOSE BLD-MCNC: 167 MG/DL (ref 75–110)
GLUCOSE BLD-MCNC: 168 MG/DL (ref 75–110)
GLUCOSE BLD-MCNC: 169 MG/DL (ref 75–110)
GLUCOSE BLD-MCNC: 170 MG/DL (ref 70–99)
GLUCOSE BLD-MCNC: 170 MG/DL (ref 75–110)
GLUCOSE BLD-MCNC: 176 MG/DL (ref 70–99)
GLUCOSE URINE: NEGATIVE
HCT VFR BLD CALC: 29.8 % (ref 40.7–50.3)
HEMOGLOBIN: 10 G/DL (ref 13–17)
IMMATURE GRANULOCYTES: 1 %
KETONES, URINE: NEGATIVE
LEUKOCYTE ESTERASE, URINE: ABNORMAL
LYMPHOCYTES # BLD: 9 % (ref 24–43)
MAGNESIUM: 2.1 MG/DL (ref 1.6–2.6)
MCH RBC QN AUTO: 32.7 PG (ref 25.2–33.5)
MCHC RBC AUTO-ENTMCNC: 33.6 G/DL (ref 28.4–34.8)
MCV RBC AUTO: 97.4 FL (ref 82.6–102.9)
MODE: ABNORMAL
MONOCYTES # BLD: 5 % (ref 3–12)
MYOGLOBIN: ABNORMAL NG/ML (ref 28–72)
NEGATIVE BASE EXCESS, ART: 2 (ref 0–2)
NITRITE, URINE: POSITIVE
NRBC AUTOMATED: 0 PER 100 WBC
O2 DEVICE/FLOW/%: ABNORMAL
PARTIAL THROMBOPLASTIN TIME: 54.1 SEC (ref 20.5–30.5)
PDW BLD-RTO: 13.8 % (ref 11.8–14.4)
PH UA: 6.5 (ref 5–8)
PHOSPHORUS: 5.5 MG/DL (ref 2.5–4.5)
PLATELET # BLD: ABNORMAL K/UL (ref 138–453)
PLATELET, FLUORESCENCE: 177 K/UL (ref 138–453)
PLATELET, IMMATURE FRACTION: 10.4 % (ref 1.1–10.3)
POC HCO3: 22.5 MMOL/L (ref 21–28)
POC LACTIC ACID: 1.77 MMOL/L (ref 0.56–1.39)
POC O2 SATURATION: 99 % (ref 94–98)
POC PCO2: 37.4 MM HG (ref 35–48)
POC PH: 7.39 (ref 7.35–7.45)
POC PO2: 143.6 MM HG (ref 83–108)
POTASSIUM SERPL-SCNC: 5.2 MMOL/L (ref 3.7–5.3)
POTASSIUM SERPL-SCNC: 5.3 MMOL/L (ref 3.7–5.3)
PROTEIN UA: ABNORMAL
RBC # BLD: 3.06 M/UL (ref 4.21–5.77)
RBC UA: ABNORMAL /HPF (ref 0–2)
SAMPLE SITE: ABNORMAL
SEG NEUTROPHILS: 85 % (ref 36–65)
SEGMENTED NEUTROPHILS ABSOLUTE COUNT: 15.34 K/UL (ref 1.5–8.1)
SODIUM BLD-SCNC: 138 MMOL/L (ref 135–144)
SODIUM BLD-SCNC: 138 MMOL/L (ref 135–144)
SPECIFIC GRAVITY UA: 1.02 (ref 1–1.03)
TOTAL CK: ABNORMAL U/L (ref 39–308)
TOTAL CK: ABNORMAL U/L (ref 39–308)
TOTAL PROTEIN, URINE: 123 MG/DL
TRANSFUSION STATUS: NORMAL
TROPONIN, HIGH SENSITIVITY: 325 NG/L (ref 0–22)
TROPONIN, HIGH SENSITIVITY: 344 NG/L (ref 0–22)
TROPONIN, HIGH SENSITIVITY: 376 NG/L (ref 0–22)
TURBIDITY: ABNORMAL
UNIT DIVISION: 0
URINE HGB: NEGATIVE
UROBILINOGEN, URINE: NORMAL
WBC # BLD: 18.1 K/UL (ref 3.5–11.3)
WBC UA: ABNORMAL /HPF (ref 0–5)

## 2022-07-10 PROCEDURE — 94761 N-INVAS EAR/PLS OXIMETRY MLT: CPT

## 2022-07-10 PROCEDURE — 76770 US EXAM ABDO BACK WALL COMP: CPT

## 2022-07-10 PROCEDURE — 82803 BLOOD GASES ANY COMBINATION: CPT

## 2022-07-10 PROCEDURE — 6360000002 HC RX W HCPCS: Performed by: SURGERY

## 2022-07-10 PROCEDURE — 87205 SMEAR GRAM STAIN: CPT

## 2022-07-10 PROCEDURE — 6360000002 HC RX W HCPCS: Performed by: STUDENT IN AN ORGANIZED HEALTH CARE EDUCATION/TRAINING PROGRAM

## 2022-07-10 PROCEDURE — 2580000003 HC RX 258: Performed by: STUDENT IN AN ORGANIZED HEALTH CARE EDUCATION/TRAINING PROGRAM

## 2022-07-10 PROCEDURE — 82330 ASSAY OF CALCIUM: CPT

## 2022-07-10 PROCEDURE — 02HV33Z INSERTION OF INFUSION DEVICE INTO SUPERIOR VENA CAVA, PERCUTANEOUS APPROACH: ICD-10-PCS | Performed by: SURGERY

## 2022-07-10 PROCEDURE — 71045 X-RAY EXAM CHEST 1 VIEW: CPT

## 2022-07-10 PROCEDURE — 99223 1ST HOSP IP/OBS HIGH 75: CPT | Performed by: INTERNAL MEDICINE

## 2022-07-10 PROCEDURE — 2700000000 HC OXYGEN THERAPY PER DAY

## 2022-07-10 PROCEDURE — 90945 DIALYSIS ONE EVALUATION: CPT

## 2022-07-10 PROCEDURE — 81001 URINALYSIS AUTO W/SCOPE: CPT

## 2022-07-10 PROCEDURE — 83874 ASSAY OF MYOGLOBIN: CPT

## 2022-07-10 PROCEDURE — 82570 ASSAY OF URINE CREATININE: CPT

## 2022-07-10 PROCEDURE — 2500000003 HC RX 250 WO HCPCS: Performed by: STUDENT IN AN ORGANIZED HEALTH CARE EDUCATION/TRAINING PROGRAM

## 2022-07-10 PROCEDURE — 83605 ASSAY OF LACTIC ACID: CPT

## 2022-07-10 PROCEDURE — 6360000002 HC RX W HCPCS: Performed by: INTERNAL MEDICINE

## 2022-07-10 PROCEDURE — 84100 ASSAY OF PHOSPHORUS: CPT

## 2022-07-10 PROCEDURE — 37799 UNLISTED PX VASCULAR SURGERY: CPT

## 2022-07-10 PROCEDURE — 82550 ASSAY OF CK (CPK): CPT

## 2022-07-10 PROCEDURE — 84484 ASSAY OF TROPONIN QUANT: CPT

## 2022-07-10 PROCEDURE — 82947 ASSAY GLUCOSE BLOOD QUANT: CPT

## 2022-07-10 PROCEDURE — 2000000000 HC ICU R&B

## 2022-07-10 PROCEDURE — 85025 COMPLETE CBC W/AUTO DIFF WBC: CPT

## 2022-07-10 PROCEDURE — 2580000003 HC RX 258: Performed by: INTERNAL MEDICINE

## 2022-07-10 PROCEDURE — 6360000002 HC RX W HCPCS: Performed by: HEALTH CARE PROVIDER

## 2022-07-10 PROCEDURE — 80048 BASIC METABOLIC PNL TOTAL CA: CPT

## 2022-07-10 PROCEDURE — 84156 ASSAY OF PROTEIN URINE: CPT

## 2022-07-10 PROCEDURE — 83735 ASSAY OF MAGNESIUM: CPT

## 2022-07-10 PROCEDURE — 85055 RETICULATED PLATELET ASSAY: CPT

## 2022-07-10 PROCEDURE — 6370000000 HC RX 637 (ALT 250 FOR IP): Performed by: STUDENT IN AN ORGANIZED HEALTH CARE EDUCATION/TRAINING PROGRAM

## 2022-07-10 PROCEDURE — 85730 THROMBOPLASTIN TIME PARTIAL: CPT

## 2022-07-10 PROCEDURE — 94003 VENT MGMT INPAT SUBQ DAY: CPT

## 2022-07-10 RX ORDER — MIDODRINE HYDROCHLORIDE 5 MG/1
5 TABLET ORAL
Status: DISCONTINUED | OUTPATIENT
Start: 2022-07-10 | End: 2022-07-17

## 2022-07-10 RX ORDER — METOCLOPRAMIDE HYDROCHLORIDE 5 MG/ML
10 INJECTION INTRAMUSCULAR; INTRAVENOUS EVERY 6 HOURS
Status: DISCONTINUED | OUTPATIENT
Start: 2022-07-10 | End: 2022-07-11

## 2022-07-10 RX ORDER — FUROSEMIDE 10 MG/ML
40 INJECTION INTRAMUSCULAR; INTRAVENOUS ONCE
Status: COMPLETED | OUTPATIENT
Start: 2022-07-10 | End: 2022-07-10

## 2022-07-10 RX ORDER — HEPARIN SODIUM 1000 [USP'U]/ML
1500 INJECTION, SOLUTION INTRAVENOUS; SUBCUTANEOUS PRN
Status: DISCONTINUED | OUTPATIENT
Start: 2022-07-10 | End: 2022-07-11

## 2022-07-10 RX ORDER — ASPIRIN 81 MG/1
81 TABLET, CHEWABLE ORAL DAILY
Status: DISCONTINUED | OUTPATIENT
Start: 2022-07-10 | End: 2022-07-21 | Stop reason: HOSPADM

## 2022-07-10 RX ORDER — CALCIUM GLUCONATE 20 MG/ML
1000 INJECTION, SOLUTION INTRAVENOUS ONCE
Status: COMPLETED | OUTPATIENT
Start: 2022-07-10 | End: 2022-07-10

## 2022-07-10 RX ORDER — HEPARIN SODIUM 1000 [USP'U]/ML
1600 INJECTION, SOLUTION INTRAVENOUS; SUBCUTANEOUS PRN
Status: DISCONTINUED | OUTPATIENT
Start: 2022-07-10 | End: 2022-07-11

## 2022-07-10 RX ADMIN — CHLORHEXIDINE GLUCONATE 15 ML: 1.2 SOLUTION ORAL at 23:03

## 2022-07-10 RX ADMIN — CHLORHEXIDINE GLUCONATE 15 ML: 1.2 SOLUTION ORAL at 08:32

## 2022-07-10 RX ADMIN — ALTEPLASE 4 MG: 2.2 INJECTION, POWDER, LYOPHILIZED, FOR SOLUTION INTRAVENOUS at 23:46

## 2022-07-10 RX ADMIN — PROPOFOL 10 MCG/KG/MIN: 10 INJECTION, EMULSION INTRAVENOUS at 03:03

## 2022-07-10 RX ADMIN — HEPARIN SODIUM 1500 UNITS: 1000 INJECTION INTRAVENOUS; SUBCUTANEOUS at 14:00

## 2022-07-10 RX ADMIN — METOCLOPRAMIDE 10 MG: 5 INJECTION, SOLUTION INTRAMUSCULAR; INTRAVENOUS at 18:06

## 2022-07-10 RX ADMIN — Medication 1500 ML/HR: at 18:05

## 2022-07-10 RX ADMIN — CEFTRIAXONE SODIUM 1000 MG: 1 INJECTION, POWDER, FOR SOLUTION INTRAMUSCULAR; INTRAVENOUS at 23:00

## 2022-07-10 RX ADMIN — SODIUM CHLORIDE, PRESERVATIVE FREE 10 ML: 5 INJECTION INTRAVENOUS at 08:32

## 2022-07-10 RX ADMIN — SODIUM CHLORIDE, PRESERVATIVE FREE 10 ML: 5 INJECTION INTRAVENOUS at 23:04

## 2022-07-10 RX ADMIN — Medication 1500 ML/HR: at 18:03

## 2022-07-10 RX ADMIN — HEPARIN SODIUM 1500 UNITS: 1000 INJECTION INTRAVENOUS; SUBCUTANEOUS at 21:27

## 2022-07-10 RX ADMIN — HEPARIN SODIUM 1500 UNITS: 1000 INJECTION INTRAVENOUS; SUBCUTANEOUS at 20:23

## 2022-07-10 RX ADMIN — HEPARIN SODIUM 1500 UNITS: 1000 INJECTION INTRAVENOUS; SUBCUTANEOUS at 23:37

## 2022-07-10 RX ADMIN — FUROSEMIDE 40 MG: 10 INJECTION, SOLUTION INTRAMUSCULAR; INTRAVENOUS at 15:08

## 2022-07-10 RX ADMIN — HEPARIN SODIUM 1600 UNITS: 1000 INJECTION INTRAVENOUS; SUBCUTANEOUS at 14:00

## 2022-07-10 RX ADMIN — SODIUM CHLORIDE, PRESERVATIVE FREE 20 MG: 5 INJECTION INTRAVENOUS at 08:31

## 2022-07-10 RX ADMIN — Medication 1500 ML/HR: at 18:04

## 2022-07-10 RX ADMIN — HEPARIN SODIUM 1600 UNITS: 1000 INJECTION INTRAVENOUS; SUBCUTANEOUS at 23:37

## 2022-07-10 RX ADMIN — SODIUM CHLORIDE, PRESERVATIVE FREE 20 MG: 5 INJECTION INTRAVENOUS at 23:03

## 2022-07-10 RX ADMIN — PROPOFOL 10 MCG/KG/MIN: 10 INJECTION, EMULSION INTRAVENOUS at 15:03

## 2022-07-10 RX ADMIN — HEPARIN SODIUM 1600 UNITS: 1000 INJECTION INTRAVENOUS; SUBCUTANEOUS at 21:27

## 2022-07-10 RX ADMIN — HEPARIN SODIUM 1600 UNITS: 1000 INJECTION INTRAVENOUS; SUBCUTANEOUS at 20:23

## 2022-07-10 RX ADMIN — CALCIUM GLUCONATE 1000 MG: 20 INJECTION, SOLUTION INTRAVENOUS at 04:35

## 2022-07-10 RX ADMIN — METOCLOPRAMIDE 10 MG: 5 INJECTION, SOLUTION INTRAMUSCULAR; INTRAVENOUS at 23:03

## 2022-07-10 RX ADMIN — POLYETHYLENE GLYCOL 3350 17 G: 17 POWDER, FOR SOLUTION ORAL at 08:31

## 2022-07-10 ASSESSMENT — PULMONARY FUNCTION TESTS
PIF_VALUE: 21
PIF_VALUE: 20
PIF_VALUE: 19
PIF_VALUE: 20
PIF_VALUE: 21
PIF_VALUE: 21
PIF_VALUE: 19
PIF_VALUE: 19
PIF_VALUE: 20
PIF_VALUE: 21
PIF_VALUE: 16
PIF_VALUE: 22
PIF_VALUE: 20
PIF_VALUE: 21
PIF_VALUE: 20
PIF_VALUE: 21

## 2022-07-10 NOTE — PROCEDURES
PROCEDURE NOTE - CENTRAL VENOUS LINE PLACEMENT    PATIENT NAME: Apollo Galdamez  MEDICAL RECORD NO. [id number]  DATE: 7/10/2022  ATTENDING PHYSICIAN: Dr. Janey Cage DIAGNOSIS:  Need for Dialysis  POSTOPERATIVE DIAGNOSIS:  Same  PROCEDURE PERFORMED:  Right Internal Jugular Vein Central Line Insertion  PERFORMING PHYSICIAN: Sherita Conti DO  ANESTHESIA:  Local utilizing 1% lidocaine  ESTIMATED BLOOD LOSS:  Less than 25 ml  COMPLICATIONS:  None immediately appreciated. DISCUSSION:  Apollo Galdamez is a 64y.o.-year-old male who requires central IV access for dialysis. The history and physical examination were reviewed and confirmed. CONSENT: Unable to be obtained due to the emergent nature of this procedure and patient's current condition. PROCEDURE:  A timeout was initiated by the bedside nurse and was confirmed by those present. The patient was placed in a supine position. The skin overlying the CVC in the Right Internal Jugular Vein was prepped with chlorhexadine and draped in sterile fashion. A guidewire was placed through the hub with no resistance. Ultrasound confirmed presence of wire in the vein. The dilator was inserted into the skin and vein over guidewire using Seldinger technique. The dilator was then removed and the second dilator was inserted. This dilator was then removed and the  12F 20cm becca catheter was placed in the vein over the guidewire using Seldinger technique. The guidewire was then removed and all ports aspirated and flushed appropriately and were heparin locked. The catheter then secured using silk suture and a temporary sterile dressing was applied. No immediate complication was evident. All sponge, instrument and needle counts were correct at the completion of the procedure. Postprocedural chest x-ray showed good position of the catheter with no evidence of hemothorax or pneumothorax.  The patient tolerated the procedure well with no immediate complication evident.      Roberto Sabillon,   2:41 PM, 7/10/22

## 2022-07-10 NOTE — PROGRESS NOTES
(276)  2. 670cc cell save in OR. No product given  7. ID  1. Tmax 38.1    2. WBC 18 (22)  3. Blood cultures no growth. UA negative  4. Ancef q8 24hrs. Ended   5. Vanc and zosyn given in ED  8. Endo  1. , 168. Insulin gtt  9. MSK  1. Concern for compartment syndrome in LLE  2. Left calf fasciotomy done in OR  3. Per ortho unlikely compartment syndrome in L thigh  4. Ortho following for serial compartment checks L thigh  10. DVT Prophylaxis - Low dose heparin  11. [address]- ICU  12. Lines - R IJ CVC, R brachial art line       Chief Complaint: lower extremity weakness    SUBJECTIVE    Jorge Coca Maru evaluated at bedside. No acute events overnight. Appears less toxic this morning. Patient requiring minimal pressor support at this time. All LE pulses now audible on doppler    OBJECTIVE  VITALS: Temp: Temp: 100 °F (37.8 °C)Temp  Av.7 °F (37.6 °C)  Min: 98.8 °F (37.1 °C)  Max: 100.6 °F (19.8 °C) BP Systolic (71AOU), EVJ:555 , Min:94 , OOL:420   Diastolic (74UII), ZS, Min:66, Max:83   Pulse Pulse  Av.6  Min: 79  Max: 102 Resp Resp  Av.7  Min: 18  Max: 24 Pulse ox SpO2  Av.7 %  Min: 95 %  Max: 99 %  GENERAL: intubated and sedated  NEURO: no facial droop  HEENT: PERRLA  : normal  LUNGS: clear to ausculation, without wheezes, rales or rhonci  HEART: normal rate and regular rhythm  ABDOMEN: soft, non-tender, non-distended, bowel sounds present in all 4 quadrants and no guarding or peritoneal signs present  EXTREMITY: LLE +3 pitting edema. BL popliteal, DP/PT pulses on doppler    I/O last 3 completed shifts: In: 62672.1 [I.V.:9756.5; IV Piggyback:1462.6]  Out: 1702 [Urine:552; Emesis/NG output:1150]    Drain/tube output:   In: 83008.5 [I.V.:9850.8]  Out: 1397 [Urine:347]    LAB:  CBC:   Recent Labs     22  2030 22  0439 07/10/22  0410   WBC 29.8* 22.6* 18.1*   HGB 14.6 13.0 10.0*   HCT 44.8 39.3* 29.8*   MCV 96.8 95.4 97.4    See Reflexed IPF Result See Reflexed IPF Result BMP:   Recent Labs     07/09/22  1622 07/09/22  2206 07/10/22  0602    139 138   K 4.9 5.2 5.2    106 106   CO2 21 22 20   BUN 28* 31* 36*   CREATININE 2.46* 2.85* 3.32*   GLUCOSE 129* 172* 170*     COAGS:   Recent Labs     07/08/22  0504 07/08/22  0935 07/08/22  2338 07/09/22  0250 07/09/22  0439 07/09/22  1209 07/10/22  0410   APTT  --    < >   < >  --  57.8* 51.4* 54.1*   PROT 7.0  --   --  4.4*  --   --   --     < > = values in this interval not displayed. RADIOLOGY:  CT HEAD WO CONTRAST    Result Date: 7/8/2022  No acute intracranial abnormality. 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 Mild diffuse cerebral atrophy and minimal chronic white matter ischemic change. Findings suggestive of acute on chronic right maxillary sinusitis. Correlate clinically. XR CHEST PORTABLE    Result Date: 7/9/2022  1. Tubes and right IJ approach central venous catheter as detailed above. 2. Bilateral airspace disease, likely multifocal pneumonia, left greater than right as detailed above. Follow-up is recommended to document resolution. XR CHEST PORTABLE    Result Date: 7/8/2022  Scattered pulmonary opacities consistent with multifocal airspace disease. Poor tubes and lines as above. XR CHEST PORTABLE    Result Date: 7/8/2022  Scattered pulmonary infiltrates concerning for pneumonia. CTA CHEST ABDOMEN AORTA RUNOFF RECON    Result Date: 7/8/2022  Occluded aortobifemoral graft. Prior infrarenal dissecting hematoma, as reported; occluded native infrarenal aorta. Reconstituted flow in moderate-severely diseased iliofemoral arteries. Patent right common femoral below-knee graft. Likely occluded left popliteal artery; no below-knee contrast enhancement demonstrated on this study (timing versus absent flow). Multiple additional vascular findings, as above. Interval worsening emphysema and mild interstitial disease as compared to 2012.   Interval bullous changes upper lung zones, multicystic/larger on right with mild peripheral enhancement/small posterior ovoid fluid collection suggesting some degree of inflammation or infection; no large fluid-fluid level or solid elements suggesting significant infection/fungal disease. No consolidation, PTX or sizable pleural effusion. Hiatus hernia and esophageal mural prominence. Correlate for esophagitis. Edema/soft tissue swelling below knee on the right. Mild soft tissue swelling left distal calf/foot. Some muscular atrophy right lower extremity. Additional unchanged or incidental findings, as detailed in the body of the report above. Findings were discussed with Dr. Adams Hampton at 9:08 am on 7/8/2022.          Monnie Runner, DO  7/10/22, 9:08 AM

## 2022-07-10 NOTE — PROGRESS NOTES
Division of Vascular Surgery             Progress Note      Name: Brittany Hyde  MRN: [id number]         Overnight Events:     Patient seen and examined at the bedside. Produced 47 cc of dark brown urine overnight. Attempted to wean pressor support with limited success. Intermittent pressor support is still required. Subjective:     Pt remains intubated and is responsive to voice and pain. He will intermittently follow commands. Having difficult weaning pressor support. Everytime levo rate is lowered pt's systolic pressure will drop to 70s. Troponins, CK, and myoglobin are all trending down, however creatinine has bumped to 3.68 from 1.08 at admission and urine output has continued to decline. Fasciotomy dressings were changed. Compartments feel soft and pt's knee is able to be passively flexed much more than was able immediately post-op. Pt has faintly dopplerable DP/PT pulses in BLE. Physical Exam:     Vitals:  /70   Pulse 94   Temp 100 °F (37.8 °C) (Bladder)   Resp 18   Ht 5' 10.5\" (1.791 m) Comment: no inital recorded height; 9/13/12 office visit  Wt 230 lb 9.6 oz (104.6 kg)   SpO2 96%   BMI 32.62 kg/m²     General appearance - intubated and sedated  Mental status - sedated, intermittently responsive to voice and pain  Head - normocephalic and atraumatic  Chest - intubated on ventilator PVRC, FiO2 30%, PEEP 10,    Heart - normal rate, regular rhythm on monitor  Abdomen - soft, non-tender, non-distended  Neurological - sedated on propofol and fentanyl  Extremities -  5+ edema to RLE, unable to illicit any motor function from either lower extremity although this may be due to pt's mental status/sedation, LLE fasciotomy incision  Skin - BL groin incisions clean and dry, no signs of infection  Vascular Exam - faintly dopplerable DP/PT pulses bilaterally      Imaging:   CT HEAD WO CONTRAST    Result Date: 7/8/2022  No acute intracranial abnormality.  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 Mild diffuse cerebral atrophy and minimal chronic white matter ischemic change. Findings suggestive of acute on chronic right maxillary sinusitis. Correlate clinically. XR CHEST PORTABLE    Result Date: 7/10/2022  1. Tubes and right IJ line as detailed above. 2. Improvement in aeration of both lung since the prior study with residual airspace disease at the left upper and left lower lung zones as well as right parahilar region. No new focal airspace disease. 3. Prior median sternotomy and CABG. XR CHEST PORTABLE    Result Date: 7/9/2022  1. Tubes and right IJ approach central venous catheter as detailed above. 2. Bilateral airspace disease, likely multifocal pneumonia, left greater than right as detailed above. Follow-up is recommended to document resolution. XR CHEST PORTABLE    Result Date: 7/8/2022  Scattered pulmonary opacities consistent with multifocal airspace disease. Poor tubes and lines as above. XR CHEST PORTABLE    Result Date: 7/8/2022  Scattered pulmonary infiltrates concerning for pneumonia. CTA CHEST ABDOMEN AORTA RUNOFF RECON    Result Date: 7/8/2022  Occluded aortobifemoral graft. Prior infrarenal dissecting hematoma, as reported; occluded native infrarenal aorta. Reconstituted flow in moderate-severely diseased iliofemoral arteries. Patent right common femoral below-knee graft. Likely occluded left popliteal artery; no below-knee contrast enhancement demonstrated on this study (timing versus absent flow). Multiple additional vascular findings, as above. Interval worsening emphysema and mild interstitial disease as compared to 2012. Interval bullous changes upper lung zones, multicystic/larger on right with mild peripheral enhancement/small posterior ovoid fluid collection suggesting some degree of inflammation or infection; no large fluid-fluid level or solid elements suggesting significant infection/fungal disease.   No consolidation, PTX or sizable pleural effusion. Hiatus hernia and esophageal mural prominence. Correlate for esophagitis. Edema/soft tissue swelling below knee on the right. Mild soft tissue swelling left distal calf/foot. Some muscular atrophy right lower extremity. Additional unchanged or incidental findings, as detailed in the body of the report above. Findings were discussed with Dr. Cristal Michael at 9:08 am on 7/8/2022. Assessment:     1. 65 y/o male critically ill s/p BL femoral artery cutdown with thrombectomy of aortobifemoral graft      Plan:     1. Medical management per primary  2. Neurovascular checks every 4 hours  3. Continue heparin ggt  4. Recommend consult to nephrology for oliguria and rising creatinine   5.  Fasciotomy dressing changes BID with wet curlex over fasciotomy incisions    Charly Patel D.O  General Surgery Resident, PGY-1      [address] [facility]: [phone]

## 2022-07-10 NOTE — FLOWSHEET NOTE
[facility] NOTE    Room # 1026/1026-01   Name: Sushil Melendez            Catholic: [facility]     Reason for visit: [address]    I spk w/ the family. Admit Date & Time: 7/8/2022  4:56 AM    Assessment:  Sushil Melendez is a 64 y.o. male in the hospital because of [address].      Intervention: This writer was asked by pt's nurse to attempt to make contact w/ family. I called the emergency contact #  listed and was able to reach Al Syracuse (pt's ex-brother-in-law). Pacoxu Kiarra said that he would call the hospital to attempt to speak w/ nurse. This writer will also call pt's nurse to notify him that contact was made w/ this family member    Outcome:    Family member expressed gratitude for visit  Plan:  Chaplains will remain available to offer spiritual and emotional support as needed. Electronically signed by Melanie Jovel, on 7/10/2022 at 4:02 PM.  Radha       07/10/22 1559   Encounter Summary   Service Provided For: Family   Referral/Consult From: Nurse   Support System Family members   Last Encounter  07/10/22   Complexity of Encounter Low   Begin Time 1555   End Time  1600   Total Time Calculated 5 min   Assessment/Intervention/Outcome   Assessment Calm   Intervention Sustaining Presence/Ministry of presence; Active listening   Outcome Expressed Gratitude

## 2022-07-10 NOTE — PROGRESS NOTES
[facility]   Progress Note                   Date:   7/9/2022  Patient name: Michel Tavarez  Date of admission:  7/8/2022  4:56 AM  MRN:   [id number]  YOB: 1960  PCP: Moira Ness (Inactive)    Reason for Admission:      Subjective: Intubated and sedated   Had ECHO done yesterday- EF 15 %  Urine output decreased   On 3 mcg of levophed. Medications:   Scheduled Meds:   insulin lispro  10 Units SubCUTAneous Once    fentanNYL  50 mcg IntraVENous Once    sodium chloride flush  5-40 mL IntraVENous 2 times per day    polyethylene glycol  17 g Oral Daily    chlorhexidine  15 mL Mouth/Throat BID    famotidine (PEPCID) injection  20 mg IntraVENous BID    heparin (porcine)  4,000 Units IntraVENous Once       Continuous Infusions:   insulin (HUMAN R) non-weight based infusion 0.72 Units/hr (07/09/22 2102)    nitroGLYCERIN Stopped (07/08/22 1147)    sodium chloride      sodium chloride      lactated ringers 100 mL/hr at 07/09/22 2051    propofol 10 mcg/kg/min (07/09/22 2051)    fentaNYL 50 mcg/mL 50 mcg/hr (07/09/22 2051)    heparin (PORCINE) Infusion 8 Units/kg/hr (07/09/22 1321)    norepinephrine Stopped (07/09/22 1910)    vasopressin (Septic Shock) infusion 0.04 Units/min (07/09/22 2129)    dextrose         CBC:   Recent Labs     07/08/22  1903 07/08/22  2030 07/09/22  0439   WBC 27.0* 29.8* 22.6*   HGB 14.4 14.6 13.0   PLT See Reflexed IPF Result 274 See Reflexed IPF Result     BMP:    Recent Labs     07/09/22  0439 07/09/22  1000 07/09/22  1622    139 142   K 5.9* 5.4* 4.9    105 107   CO2 19* 20 21   BUN 23 25* 28*   CREATININE 1.56* 1.91* 2.46*   GLUCOSE 225* 237* 129*     Hepatic:   Recent Labs     07/08/22  0504 07/09/22  0250   AST 27 334*   ALT 25 54*   BILITOT 0.35 0.20*   ALKPHOS 93 60     Troponin: No results for input(s): TROPONINI in the last 72 hours. BNP: No results for input(s): BNP in the last 72 hours.   Lipids: No results for input(s): CHOL, HDL in the last 72 hours. Invalid input(s): LDLCALCU  INR: No results for input(s): INR in the last 72 hours. Objective:   Vitals: /74   Pulse 90   Temp 100.2 °F (37.9 °C) (Bladder)   Resp 18   Ht 5' 10.5\" (1.791 m) Comment: no inital recorded height; 9/13/12 office visit  Wt 255 lb 1.2 oz (115.7 kg)   SpO2 97%   BMI 36.08 kg/m²       HEENT: Head: Normocephalic, no lesions, without obvious abnormality  Neck: no JVD  Lungs: clear to auscultation bilaterally, no basilar rales, no wheezing   Heart: regular rate and rhythm, S1, S2 normal, no murmur, click, rub or gallop  Abdomen: soft, non-tender; bowel sounds normal  Extremities: No LE edema        ECHO   Summary  Poor sound transmission. Global left ventricular systolic function is severely reduced. Calculated  ejection fraction 23% by Andrade's method. Visually estimated EF 15%. Akinetic apex. No significant valvular regurgitation or stenosis seen. Assessment / Acute Cardiac Problems:   Assessment      1. 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 Patient is currently intubated and sedated. 2. EKG  showed normal sinus rhythm with deep T wave inversion in anterior lead. 3. Currently on heparin drip for anticoagulation. We will start aspirin 81 mg if okay with vascular surgery. 4. 2D echocardiography showed EF 15 % with akinetic apex  5. Plan for left heart catheterization when other co morbidities resolve. Will need nephrology clearance for cardia cath. Discussed with patient and nursing. Nina Sequeira MD MD  Fellow, [facility]   Pager - [phone]    Attending note,   Seen and examined agree with above. Intubated. Off pressors.   Echocardiogram showed severely reduced ejection fraction. On IV heparin for non-STEMI. He does have previous history of CABG. We will try to obtain records. Acute kidney injury with possible CVVHD. Rhabdomyolysis. S/p thrombectomy for occluded lower extremity graft. Rhabdomyolysis. The patient will need coronary angiography when comorbidities resolve.

## 2022-07-10 NOTE — PROGRESS NOTES
Spoke with Herb from the emergency contact list, he states he is the ex-brother-in-law and the patient was  to his sister Shelia Boyle. Shelia Boyle then called and said she was legally  from the patient and there is not a spouse, eithercurrent or ex, by the name of Gris Patten. Pt. Was living most recently with his step-daughter [facility], who is no longer willing to take care of him, do to his needs. Shelia Boyle will attempt to make contact with the patients sister,Bhargavi. There is a bio son named Kory Monahan that is estranged from the patient and no one this writer has spoken to has any contact info for him.

## 2022-07-10 NOTE — PROGRESS NOTES
Occupational [address]  Occupational Therapy Not Seen Note    DATE: 7/10/2022    NAME: Carleen Rick  MRN: [id number]   : 1960      Patient not seen this date for Occupational Therapy due to:    Strict Bedrest: Intubated and Sedated. Next Scheduled Treatment: Attempt on  as appropriate.     Electronically signed by Mary Carmen Beverly OT on 7/10/2022 at 9:13 AM

## 2022-07-10 NOTE — PROGRESS NOTES
Phone call placed to david gao at 11:06am for consent for procedure, no answer, VM left. Phone call placed to Tiffanie gannon at 1209pm, No answer, VM left. Phone call placed to Linda Lujan, At1 1210pm, no answer, no option to leave VM.   Dr. Sujit Steel updated

## 2022-07-10 NOTE — PROGRESS NOTES
Physician Progress Note      Ismael BONNER #:                  [id number]  :                       1960  ADMIT DATE:       2022 4:56 AM  DISCH DATE:  RESPONDING  PROVIDER #:        Zechariah GILES          QUERY TEXT:    Pt admitted with  complete occulusion of aortobifemoral graft. Pt noted to   have Creatinie shift from 1.16 to 1.86. If possible, please document in the   progress notes and discharge summary if you are evaluating and/or treating any   of the following: The medical record reflects the following:  Risk Factors:  complete occulusion of aortobifemoral graft. HTN CAD, PAD  Clinical Indicators: Cre  1.16>1.56>1.86, Myoglobin 00053, K 5.2>5.9,  CK   71348, Mg 2.1 Phos 5.4 Na 139 ,  Urine Output decreased at 20cc/hr  Treatment: 1L LR bolus, 0.9 % NaCl bolus 1L , IVF at 125ml/hr, ICU monitoring    Defined by Kidney Disease Improving Global Outcomes (KDIGO) clinical practice   guideline for acute kidney injury:  -Increase in SCr by greater than or equal to 0.3 mg/dl within 48 hours; or  -Increase or decrease in SCr to greater than or equal to 1.5 times baseline,   which is known or presumed to have occurred within the prior 7 days; or  -Urine volume < 0.5ml/kg/h for 6 hours    Thank you, Please call if questions  Clarissa Valverde RN CDS  [phone]  Options provided:  -- Acute kidney failure  -- Acute kidney failure with acute tubular necrosis  -- Acute kidney injury  -- Other - I will add my own diagnosis  -- Disagree - Not applicable / Not valid  -- Disagree - Clinically unable to determine / Unknown  -- Refer to Clinical Documentation Reviewer    PROVIDER RESPONSE TEXT:    This patient has an Acute kidney injury. Query created by: Geneva Guan on 2022 7:40 AM      QUERY TEXT:    Pt admitted with  complete occulusion of aortobifemoral graft Pt noted to have   been placed on pressors.  If possible, please document in the progress notes   and discharge summary if you are evaluating and/or treating any of the   following: The medical record reflects the following:  Risk Factors:  complete occulusion of aortobifemoral graft with emergency   surgical procedure  Clinical Indicators: WBC 14.2>27.0>29.8, Cre  1.16>1.56>1.86, PRO -BNP  85>   4424, K 5.2>5.9, Creatine Kinase 79471, Sodium 147>149, Lactic Acid,  7.1>8.0,   [id number], TROP 18>27>430>451, LotndavtkahNjvpea33.6, Negative Base   Excess 18, O2 Sat Oswaldo 45, pCO2 Venous 76.6,  pH Venous 6.943, BP: 84/65,   Pulse: 141>131>120, Resp: 36>42, SpO2: 89 on vent, XR CHEST ;Scattered   pulmonary infiltrates concerning for pneumonia. Treatment: Intubated on vent, 1L LR bolus, 0.9 % NaCl bolus 1L , IVF at   125ml/hr,  calcium gluconate 2000 mg IV, ceFAZolin IV, Zosyn IV, Vancomycin   IV,niCARdipine IV, nitroGLYCERIN gtt, LEVOPHED gtt,  vasopressin gtt, ICU   monitoring    Thank you, Please call if questions  Beth M. Ferrel Cranker RN [facility]  [phone]  Options provided:  -- Cardiogenic Shock  -- Hemorrhagic Shock  -- Traumatic Shock  -- Septic Shock  -- Hypovolemic Shock  -- Other - I will add my own diagnosis  -- Disagree - Not applicable / Not valid  -- Disagree - Clinically unable to determine / Unknown  -- Refer to Clinical Documentation Reviewer    PROVIDER RESPONSE TEXT:    This patient is in hypovolemic shock. Query created by: Matt Hall on 7/9/2022 7:56 AM      QUERY TEXT:    Pt admitted with Pt admitted with  complete occulusion of aortobifemoral   graft. Pt noted to have ***. If possible, please document in the progress   notes and discharge summary if you are evaluating and/or treating any of the   following: The medical record reflects the following:   The medical record reflects the following:  Risk Factors:  complete occulusion of aortobifemoral graft with emergency   surgical procedure  Clinical Indicators: WBC 14.2>27.0>29.8, Cre  1.16>1.56>1.86, PRO -BNP  85>   4424, K 5.2>5.9, Creatine Kinase 89094, Sodium 147>149, Lactic Acid,  7.1>8.0,   [id number], TROP 18>27>430>451, KwccjfutuifPykart40.6, Negative Base   Excess 18, O2 Sat Oswaldo 45, pCO2 Venous 76.6,  pH Venous 6.943, BP: 84/65,   Pulse: 141>131>120, Resp: 36>42, SpO2: 89 on vent, XR CHEST ;Scattered   pulmonary infiltrates concerning for pneumonia. Treatment: Intubated on vent, 1L LR bolus, 0.9 % NaCl bolus 1L , IVF at   125ml/hr,  calcium gluconate 2000 mg IV, ceFAZolin IV, Zosyn IV, Vancomycin   IV,niCARdipine IV, nitroGLYCERIN gtt, LEVOPHED gtt,  vasopressin gtt, ICU   monitoring    Thank you, Please call if questions  Clarissa Herrera RN [facility]  [phone]  Options provided:  -- Acute respiratory failure with hypoxia  -- Acute respiratory failure with hypercapnia  -- Acute respiratory failure with hypoxia and hypercapnia  -- Acute on chronic respiratory failure with hypoxia  -- Acute on chronic respiratory failure with hypercapnia  -- Acute on chronic respiratory failure with hypoxia and hypercapnia  -- Other - I will add my own diagnosis  -- Disagree - Not applicable / Not valid  -- Disagree - Clinically unable to determine / Unknown  -- Refer to Clinical Documentation Reviewer    PROVIDER RESPONSE TEXT:    This patient is in acute respiratory failure with hypoxia.     Query created by: [place] on 7/9/2022 7:59 AM      Electronically signed by:  Leonor Price 7/9/2022 9:54 PM

## 2022-07-10 NOTE — PROGRESS NOTES
Spoke with Samantha Ayon, she is the patients only biological sister, there are no biological brothers or sisters. She has agreed to answer phone calls from this facility and make  Medical decisions as they are presented.

## 2022-07-10 NOTE — FLOWSHEET NOTE
Insulin drip multiplier was dropped to 0.01 at 2100, at 2300 blood glucose was below goal of 140-180 at 122. Per Dr. Papa Cotton: continue running insulin drip at lowest multiplier, do not stop insulin drip.

## 2022-07-10 NOTE — CONSULTS
Renal Consult Note    Patient :  Dede Narayanan; 64 y.o. MRN# [id number]  Location:  [facility]1067-49  Attending:  Ai Marie MD  Admit Date:  7/8/2022   Hospital Day: 2    Reason for Consult:     Asked by Dr Ai Marie MD to see for CAROLYN/Elevated Creatinine. History Obtained From:     Electronic medical record, patient's nurse and trauma physician    History of Present Illness:     Dede Narayanan; 64 y.o. male with past medical history of PVDs/p femoral bypass, HTN, CABG in 2008, CVA who presented several days ago for generalized body aches, and lower extremity numbness. He had CTA of aorta which showed occluded aortabifemoral graft. He underwent surgery on 7/8/22 Bilateral common femoral arterial access via open cutdown, thrombectomy of the aortobifemoral bypass graft thromboendarterectomy of the common femoral profundofemoral and SFA bilaterally with bovine pericardial patch angioplasty. He is in ICU intubated FIO2 40%/PEEP 12; on pressor support with Levophed 1mcq/min. Vasopressin currently off, however, SBP 70-80. He is receiving LR at 125/hr. He is currently 13 liters positive since admission. Cardiology was consulted yesterday due to elevated troponin. ECHO completed and EF 15%. He was also noted to have EKG changes. Previous cardiac [place]. They would like to proceed with cardiac cath once stable. Nephrology is consulted today due to CAROLYN and likely need of CRRT therapy. His creatinine on admission 7/8/22 was 1.08 and today up to 3.68, with potassium fluctuations 5.2-5.3. Yesterday it was 5.9. He received insulin, dextrose and calcium. Baseline creatinine seems to be around 1-1.2 mg/dl. Additionally myoglobin [place] [address], [place] [address], [place] [address]. Home medications include lisinopril and metformin.     UOP has been minimal 347.    +++ history of recent contrast exposure 7/8/22  No h/o prolonged NSAIDs use in the past,   No h/o nephrolithiasis, No recent skin rashes or arthralgias   No hematuria or pyuria noticed in the recent past.   Doesn't report any reduction in the urine output recently. Non report of any obstructive urinary symptoms (urgency, frequency, weak stream, straining while urination). No h/o recurrent UTIs in the past.    Past History/Allergies? Social History:     Past Medical History:   Diagnosis Date    Anxiety     Arthritis associated with another disorder     CAD (coronary artery disease)     with history of multiple MI    Carotid artery occlusion     Family history of kidney stone     HTN (hypertension)     Hyperlipidemia     Hypertension     Nephrolithiasis     multiple times    Neuropathy     [facility]     Peripheral vascular disease ([facility] [address]     Personal history of MRSA (methicillin resistant Staphylococcus aureus)     Seasonal allergies     Vasculopathy      Past Surgical History:   Procedure Laterality Date    ABDOMINAL ADHESION SURGERY  2/11/2011-[facility]    Reexploration of abdominal wound and reclosure of the abdominal wound with fascial sutures and retention sutures as well    AORTO-FEMORAL BYPASS GRAFT  [facility]/11/2011[facility]    ABF bypass of 16x8 PTFE graft. This is an end-to-side anastomosis proximally    BALLOON ANGIOPLASTY, ARTERY  11/23/10 Allie Muñoz    1. Placement of 5 sheath in the left femoral artery with duplex guidance. 2. Left Iliac arteriogram. 3. Attempted ballon angioplasty left iliac artery occlusion.  CAROTID ENDARTERECTOMY  07/19/08    Left carotid endarterectomy using shunt dacron patch aplasty    CORONARY ARTERY BYPASS GRAFT  2008    CABG x3     FEMORAL-TIBIAL BYPASS GRAFT  12/31/07  Royce Green     Right femoral to posterior tibial artery bypass graft with in situ saphenous vein graft    IR FEMORAL POPLITEAL BYPASS GRAFT  01/24/208 Dr Stephan Fenton    1. Exploration of right fem-pop bypass graft.  2.Thrombectomy of right fem-post-tib saph vein graft. 3. Replacement of femoral to popliteal bypass graft from midthigh to near the anastomosis by reversed greater saph vein harvested from left leg    PTCA      PTCA with stent x6    TONSILLECTOMY AND ADENOIDECTOMY           Allergies   Allergen Reactions    Faviola [Morphine Sulfate] Itching and Rash    Methadone Rash    Morphine Nausea And Vomiting      Sever \"headache\"       Social History     Socioeconomic History    Marital status:      Spouse name: Not on file    Number of children: 1    Years of education: 15    Highest education level: Not on file   Occupational History    Occupation: disabled      Comment: SSI    Tobacco Use    Smoking status: Current Every Day Smoker     Packs/day: 2.00     Years: 33.00     Pack years: 66.00     Types: Cigarettes     Last attempt to quit: 3/10/2011     Years since quittin.3    Smokeless tobacco: Never Used    Tobacco comment: quite 2011    Substance and Sexual Activity    Alcohol use: No     Alcohol/week: 0.0 standard drinks    Drug use: No    Sexual activity: Yes     Partners: Female     Comment: with has trouble   Other Topics Concern    Not on file   Social History Narrative    Not on file     Social Determinants of Health     Financial Resource Strain:     Difficulty of Paying Living Expenses: Not on file   Food Insecurity:     Worried About Running Out of Food in the Last Year: Not on file    Nancy of Food in the Last Year: Not on file   Transportation Needs:     Lack of Transportation (Medical): Not on file    Lack of Transportation (Non-Medical):  Not on file   Physical Activity:     Days of Exercise per Week: Not on file    Minutes of Exercise per Session: Not on file   Stress:     Feeling of Stress : Not on file   Social Connections:     Frequency of Communication with Friends and Family: Not on file    Frequency of Social Gatherings with Friends and Family: Not on file    Attends Zoroastrian Services: Not on file    Active Member of Clubs or Organizations: Not on file    Attends Club or Organization Meetings: Not on file    Marital Status: Not on file   Intimate Partner Violence:     Fear of Current or Ex-Partner: Not on file    Emotionally Abused: Not on file    Physically Abused: Not on file    Sexually Abused: Not on file   Housing Stability:     Unable to Pay for Housing in the Last Year: Not on file    Number of [place] in the Last Year: Not on file    Unstable Housing in the Last Year: Not on file       Family History:        Family History   Problem Relation Age of Onset    Hypertension Father     Heart Disease Sister     Hypertension Sister     Cancer Mother 28        breast cancer       Outpatient Medications:     Medications Prior to Admission: oxyCODONE (OXYCONTIN) 40 MG CR tablet, Take 40 mg by mouth every 12 hours. oxyCODONE-acetaminophen (PERCOCET)  MG per tablet, Take 1 tablet by mouth 3 times daily as needed. breakthrought pain   lisinopril (PRINIVIL;ZESTRIL) 10 MG tablet, Take 0.5 tablets by mouth daily. dipyridamole-aspirin (AGGRENOX)  MG per SR capsule, Take 1 capsule by mouth daily. simvastatin (ZOCOR) 40 MG tablet, Take 1 tablet by mouth nightly. ALPRAZolam (XANAX) 1 MG tablet, Take 1 tablet by mouth 2 times daily as needed. varenicline (CHANTIX STARTING MONTH PAK) 0.5 MG X 11 & 1 MG X 42 tablet, Take by mouth.  isosorbide mononitrate (IMDUR) 30 MG CR tablet, Take 30 mg by mouth daily. metoprolol (LOPRESSOR) 25 MG tablet, Take 1 tablet by mouth 2 times daily. Takes 1/2 tab 2 times a day  mirtazapine (REMERON) 30 MG tablet, Take 1 tablet by mouth nightly. tamsulosin (FLOMAX) 0.4 MG capsule, Take 1 capsule by mouth daily. TRUETEST TEST strip, TEST 2 TIMES DAILY  metformin (GLUCOPHAGE) 500 MG tablet, TAKE 1 TABLET BY MOUTH 2 TIMES DAILY (WITH MEALS) FOR 30 DAYS. aspirin  MG EC tablet, Take 1 tablet by mouth daily.     Current Medications:     Scheduled Meds:    aspirin  81 mg Oral Daily    insulin lispro  10 Units SubCUTAneous Once    fentanNYL  50 mcg IntraVENous Once    sodium chloride flush  5-40 mL IntraVENous 2 times per day    polyethylene glycol  17 g Oral Daily    chlorhexidine  15 mL Mouth/Throat BID    famotidine (PEPCID) injection  20 mg IntraVENous BID    heparin (porcine)  4,000 Units IntraVENous Once     Continuous Infusions:    insulin (HUMAN R) non-weight based infusion 1.1 Units/hr (07/10/22 1025)    nitroGLYCERIN Stopped (22 1147)    sodium chloride      sodium chloride      lactated ringers 100 mL/hr at 07/10/22 0715    propofol 10 mcg/kg/min (07/10/22 0715)    fentaNYL 50 mcg/mL 50 mcg/hr (07/10/22 0715)    heparin (PORCINE) Infusion 8 Units/kg/hr (07/10/22 0715)    norepinephrine 1 mcg/min (07/10/22 08)    vasopressin (Septic Shock) infusion Stopped (07/10/22 0502)    dextrose       PRN Meds:  heparin (porcine), heparin (porcine), sodium chloride, sodium chloride flush, sodium chloride, ondansetron, heparin (porcine), heparin (porcine), glucose, dextrose bolus **OR** dextrose bolus, glucagon (rDNA), dextrose    Review of Systems:     Constitutional:Unable to assess given acuity of condition. ER reported lower extremity pain and weakness only. Input/Output:       I/O last 3 completed shifts: In: 62568.1 [I.V.:9756.5; IV Piggyback:1462.6]  Out: 1702 [Urine:552; Emesis/NG output:1150]    Vital Signs:   Temperature:  Temp: 100 °F (37.8 °C)  TMax:   Temp (24hrs), Av.7 °F (37.6 °C), Min:98.8 °F (37.1 °C), Max:100.6 °F (38.1 °C)    Respirations:  Resp: 18  Pulse:   Heart Rate: 97  BP:    BP: 122/70  BP Range: Systolic (17OWS), FTQ:538 , Min:100 , NAGA:786       Diastolic (86KWJ), RBF:17, Min:66, Max:76      Physical Examination:     General:  Intubated and sedated  HEENT: Atraumatic, normocephalic, no throat congestion, moist mucosa.   Eyes:   Pupils equal, round and reactive to light, EOMI.  Neck:   No JVD, no thyromegaly, no lymphadenopathy. Chest:   Clear no rales appreciated  Cardiac:  S1 S2 RR, no murmurs, gallops or rubs, JVP not raised. Abdomen: Soft, non-tender, non distended, BS audible. :   No suprapubic or flank tenderness. Neuro:  Intubated  SKIN:  No rashes, good skin turgor. Extremities:  ++ lower extremity edema, No clubbing, No cyanosis    Labs:       Recent Labs     07/08/22  0504 07/08/22  1903 07/08/22  2030 07/09/22  0439 07/10/22  0410   WBC 14.2*   < > 29.8* 22.6* 18.1*   RBC 4.98   < > 4.63 4.12* 3.06*   HGB 16.0   < > 14.6 13.0 10.0*   HCT 47.8   < > 44.8 39.3* 29.8*   MCV 96.0   < > 96.8 95.4 97.4   MCH 32.1   < > 31.5 31.6 32.7   MCHC 33.5   < > 32.6 33.1 33.6   RDW 12.9   < > 13.2 13.3 13.8      < > 274 See Reflexed IPF Result See Reflexed IPF Result   MPV 13.0  --  12.9  --   --     < > = values in this interval not displayed.       BMP:   Recent Labs     07/09/22  1622 07/09/22  2206 07/10/22  0602    139 138   K 4.9 5.2 5.2    106 106   CO2 21 22 20   BUN 28* 31* 36*   CREATININE 2.46* 2.85* 3.32*   GLUCOSE 129* 172* 170*   CALCIUM 7.6* 7.9* 7.9*      Phosphorus:     Recent Labs     07/08/22  1903 07/09/22  0439 07/10/22  0602   PHOS 5.6* 4.9* 5.5*     Magnesium:    Recent Labs     07/08/22  1903 07/09/22  0439 07/10/22  0602   MG 2.1 2.4 2.1     Albumin:    Recent Labs     07/08/22  0504 07/09/22  0250   LABALBU 4.4 2.6*     SPEP:  Lab Results   Component Value Date/Time    PROT 4.4 07/09/2022 02:50 AM    PROT 7.1 07/25/2011 09:50 AM       Urinalysis/Chemistries:      Lab Results   Component Value Date/Time    NITRU NEGATIVE 07/08/2022 09:54 AM    COLORU Yellow 07/08/2022 09:54 AM    PHUR 5.5 07/08/2022 09:54 AM    WBCUA 0 TO 2 07/08/2022 09:54 AM    RBCUA None 07/08/2022 09:54 AM    MUCUS 1+ 06/30/2012 09:40 PM    TRICHOMONAS NOT REPORTED 06/30/2012 09:40 PM    YEAST NOT REPORTED 06/30/2012 09:40 PM    BACTERIA NOT REPORTED 06/30/2012 09:40 PM    SPECGRAV 1.046 07/08/2022 09:54 AM    LEUKOCYTESUR NEGATIVE 07/08/2022 09:54 AM    UROBILINOGEN Normal 07/08/2022 09:54 AM    BILIRUBINUR NEGATIVE 07/08/2022 09:54 AM    BILIRUBINUR NEGATIVE 06/06/2012 05:45 AM    GLUCOSEU 1+ 07/08/2022 09:54 AM    GLUCOSEU NEGATIVE 06/06/2012 05:45 AM    KETUA NEGATIVE 07/08/2022 09:54 AM    AMORPHOUS NOT REPORTED 06/30/2012 09:40 PM     Urine Creatinine:     Lab Results   Component Value Date/Time    LABCREA 86.5 12/27/2011 12:51 PM       Radiology:     CXR:   1. Tubes and right IJ line as detailed above. 2. Improvement in aeration of both lung since the prior study with residual   airspace disease at the left upper and left lower lung zones as well as right   parahilar region.  No new focal airspace disease. 3. Prior median sternotomy and CABG. Assessment:     1. Acute Kidney Injury: ATN with contrast nephropathy, circulatory shock, and hypotension requiring pressor support and now postop. Baseline creatinine seems to be around 1-1.2 mg/dl. 2. Hyperkalemia-likely due to underlying renal dysfunction. 3. 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 4. Rhabdomyolysis  5. Hypotension-requiring pressor support. 6. Total aortobifemoral occulusion s/p Bilateral common femoral arterial access via open cutdown, thrombectomy of the aortobifemoral bypass graft thromboendarterectomy of the common femoral profundofemoral and SFA bilaterally with bovine pericardial patch angioplasty on 7/8/2021  7. Left lower extremity compartment syndrome s/p fasciotomy. 8.  Decreasing urine output. Plan:   1. Currently on Ringer's lactate 125 cc an hour per trauma surgery. 2.  Will give 1 dose of Lasix 40 mg IV.   3.  Discussed the case in detail with trauma attending Dr. Telma Solis and it was decided that patient will require urgent/emergent initiation of dialysis due to worsening renal function, decreased urine output, in the setting of poor EF and status post contrast exposure along with volume overload. Multiple attempts were made to reach the family but were not able to. Dr. Zulay Oquendo did speak with ethics and patient will require initiation of dialysis due to the urgent nature. Ellender Monreal catheter will be placed in by trauma surgery. 4.  Will initiate CVVHD due to hemodynamic instability, patient being on 2 pressors. CVVHD orders were confirmed with patient's nurse. Will run on blood flow rate of 200, 1.5 L/h, 2K potassium bath, will try for -25 cc an hour fluid removal and try for -ve 50 cc an hour as tolerated. 5.  Scheduled blood work including CBC BMP magnesium, phosphorus, ionic calcium and ABG ordered. 6.  Wean off pressor support as tolerated. 7.  Will Check Renal Ultrasound to r/o element of obstruction and to assess the kidney size/echotexture. 8.  Comprehensive urine testing including Urinalysis, Urine sodium, potassium, chloride, Urine protein and creatinine to quantify the proteinuria if any at all. 9.  Will Order serum and urine protein electrophoresis to r/o element to occult paraprotein disease. 10.  Will order Hepatitis B and C, ANTOINE, Complement levels. 11.  Will follow. Nutrition   Please ensure that patient is on a renal diet/TF. Avoid nephrotoxic drugs/contrast exposure. Thank you for the consultation. Please do not hesitate to contact us for any further questions/concerns. We will continue to follow along with you. Nurys Javier CNP  [facility]      Attending Physician Statement  I have discussed the care of this patient, including pertinent history and exam findings, with the Resident/CNP. I have reviewed and edited the key elements of all parts of the encounter with the Resident/CNP. I agree with the assessment, plan and orders as documented by the Resident/CNP. Jeevan Macdonald MD   Nephrology [address] [place] [address] Drive    This note is created with the assistance of a speech-recognition program. While intending to generate a document that actually reflects the content of the visit, no guarantees can be provided that every mistake has been identified and corrected by editing.

## 2022-07-10 NOTE — PROCEDURES
PROCEDURE NOTE - CENTRAL VENOUS LINE PLACEMENT    PATIENT NAME: Daija Wayne  MEDICAL RECORD NO. [id number]  DATE: 7/10/2022  ATTENDING PHYSICIAN: Dr. Modesto Ambriz DIAGNOSIS:  vascular access and centrally administered medications  POSTOPERATIVE DIAGNOSIS:  Same  PROCEDURE PERFORMED:  Left Internal Jugular Vein Central Line Insertion  PERFORMING PHYSICIAN: Vinod Michel DO  ANESTHESIA:  Local utilizing 1% lidocaine  ESTIMATED BLOOD LOSS:  Less than 25 ml  COMPLICATIONS:  None immediately appreciated. DISCUSSION:  Daija Wayne is a 64y.o.-year-old male who requires central IV access vascular access and centrally administered medications. The history and physical examination were reviewed and confirmed. CONSENT: Unable to be obtained due to patient's condition. PROCEDURE:  A timeout was initiated by the bedside nurse and was confirmed by those present. The patient was placed in a supine position. The skin overlying the Left Internal Jugular Vein was prepped with chlorhexadine and draped in sterile fashion. The skin was infiltrated with local anesthetic. The vessel and surrounding anatomy was visualized using ultrasound. Through the anesthetized region, the introducer needle was inserted into the internal jugular vein returning dark red non pulsatile blood. A guidewire was placed through the center of the needle with no resistance. Ultrasound confirmed presence of wire in the vein. A small incision made in the skin with a #11 scalpel blade. The dilator was inserted into the skin and vein over guidewire using Seldinger technique. The dilator was then removed and the 7F 20cm catheter was placed in the vein over the guidewire using Seldinger technique. The guidewire was then removed and all ports aspirated and flushed appropriately. The catheter then secured using silk suture and a temporary sterile dressing was applied. No immediate complication was evident.   All sponge, instrument and needle counts were correct at the completion of the procedure. Postprocedural chest x-ray showed good position of the catheter with no evidence of hemothorax or pneumothorax. The patient tolerated the procedure well with no immediate complication evident.      Maryuri Flanagan DO  2:40 PM, 7/10/22

## 2022-07-11 ENCOUNTER — APPOINTMENT (OUTPATIENT)
Dept: GENERAL RADIOLOGY | Age: 62
DRG: 181 | End: 2022-07-11
Payer: COMMERCIAL

## 2022-07-11 LAB
ANION GAP SERPL CALCULATED.3IONS-SCNC: 10 MMOL/L (ref 9–17)
ANION GAP SERPL CALCULATED.3IONS-SCNC: 11 MMOL/L (ref 9–17)
ANION GAP SERPL CALCULATED.3IONS-SCNC: 14 MMOL/L (ref 9–17)
ANION GAP SERPL CALCULATED.3IONS-SCNC: 17 MMOL/L (ref 9–17)
ANTICARDIOLIPIN IGA ANTIBODY: NORMAL APL
ANTICARDIOLIPIN IGG ANTIBODY: NORMAL GPL
BUN BLDV-MCNC: 42 MG/DL (ref 8–23)
BUN BLDV-MCNC: 43 MG/DL (ref 8–23)
BUN BLDV-MCNC: 44 MG/DL (ref 8–23)
BUN BLDV-MCNC: 47 MG/DL (ref 8–23)
CALCIUM IONIZED: 1.04 MMOL/L (ref 1.13–1.33)
CALCIUM IONIZED: 1.13 MMOL/L (ref 1.13–1.33)
CALCIUM IONIZED: 1.19 MMOL/L (ref 1.13–1.33)
CALCIUM SERPL-MCNC: 7.7 MG/DL (ref 8.6–10.4)
CALCIUM SERPL-MCNC: 8.3 MG/DL (ref 8.6–10.4)
CALCIUM SERPL-MCNC: 8.7 MG/DL (ref 8.6–10.4)
CALCIUM SERPL-MCNC: 8.7 MG/DL (ref 8.6–10.4)
CARDIOLIPIN AB IGM: NORMAL MPL
CHLORIDE BLD-SCNC: 104 MMOL/L (ref 98–107)
CHLORIDE BLD-SCNC: 105 MMOL/L (ref 98–107)
CHLORIDE BLD-SCNC: 106 MMOL/L (ref 98–107)
CHLORIDE BLD-SCNC: 108 MMOL/L (ref 98–107)
CO2: 18 MMOL/L (ref 20–31)
CO2: 20 MMOL/L (ref 20–31)
CO2: 21 MMOL/L (ref 20–31)
CO2: 24 MMOL/L (ref 20–31)
CREAT SERPL-MCNC: 3.39 MG/DL (ref 0.7–1.2)
CREAT SERPL-MCNC: 3.4 MG/DL (ref 0.7–1.2)
CREAT SERPL-MCNC: 3.74 MG/DL (ref 0.7–1.2)
CREAT SERPL-MCNC: 4.22 MG/DL (ref 0.7–1.2)
DILUTE RUSSELL VIPER VENOM TIME: NORMAL
ESTIMATED AVERAGE GLUCOSE: 169 MG/DL
FIO2: 30
GFR AFRICAN AMERICAN: 17 ML/MIN
GFR AFRICAN AMERICAN: 20 ML/MIN
GFR AFRICAN AMERICAN: 22 ML/MIN
GFR AFRICAN AMERICAN: 23 ML/MIN
GFR NON-AFRICAN AMERICAN: 14 ML/MIN
GFR NON-AFRICAN AMERICAN: 17 ML/MIN
GFR NON-AFRICAN AMERICAN: 19 ML/MIN
GFR NON-AFRICAN AMERICAN: 19 ML/MIN
GFR SERPL CREATININE-BSD FRML MDRD: ABNORMAL ML/MIN/{1.73_M2}
GLUCOSE BLD-MCNC: 127 MG/DL (ref 74–100)
GLUCOSE BLD-MCNC: 136 MG/DL (ref 70–99)
GLUCOSE BLD-MCNC: 137 MG/DL (ref 70–99)
GLUCOSE BLD-MCNC: 142 MG/DL (ref 75–110)
GLUCOSE BLD-MCNC: 143 MG/DL (ref 75–110)
GLUCOSE BLD-MCNC: 147 MG/DL (ref 74–100)
GLUCOSE BLD-MCNC: 153 MG/DL (ref 75–110)
GLUCOSE BLD-MCNC: 154 MG/DL (ref 75–110)
GLUCOSE BLD-MCNC: 159 MG/DL (ref 75–110)
GLUCOSE BLD-MCNC: 161 MG/DL (ref 75–110)
GLUCOSE BLD-MCNC: 164 MG/DL (ref 74–100)
GLUCOSE BLD-MCNC: 173 MG/DL (ref 70–99)
GLUCOSE BLD-MCNC: 191 MG/DL (ref 75–110)
GLUCOSE BLD-MCNC: 203 MG/DL (ref 70–99)
HBA1C MFR BLD: 7.5 % (ref 4–6)
HCO3 VENOUS: 24 MMOL/L (ref 22–29)
HCT VFR BLD CALC: 21.9 % (ref 40.7–50.3)
HCT VFR BLD CALC: 25.1 % (ref 40.7–50.3)
HCT VFR BLD CALC: 25.5 % (ref 40.7–50.3)
HCT VFR BLD CALC: 26.9 % (ref 40.7–50.3)
HEMOGLOBIN: 7.3 G/DL (ref 13–17)
HEMOGLOBIN: 8.1 G/DL (ref 13–17)
HEMOGLOBIN: 8.1 G/DL (ref 13–17)
HEMOGLOBIN: 8.7 G/DL (ref 13–17)
HOMOCYSTEINE: 26.3 UMOL/L
INR BLD: NORMAL
MAGNESIUM: 2.1 MG/DL (ref 1.6–2.6)
MAGNESIUM: 2.1 MG/DL (ref 1.6–2.6)
MAGNESIUM: 2.2 MG/DL (ref 1.6–2.6)
MAGNESIUM: 2.2 MG/DL (ref 1.6–2.6)
MCH RBC QN AUTO: 31.5 PG (ref 25.2–33.5)
MCH RBC QN AUTO: 32.1 PG (ref 25.2–33.5)
MCH RBC QN AUTO: 32.2 PG (ref 25.2–33.5)
MCH RBC QN AUTO: 32.3 PG (ref 25.2–33.5)
MCHC RBC AUTO-ENTMCNC: 31.8 G/DL (ref 28.4–34.8)
MCHC RBC AUTO-ENTMCNC: 32.3 G/DL (ref 28.4–34.8)
MCHC RBC AUTO-ENTMCNC: 32.3 G/DL (ref 28.4–34.8)
MCHC RBC AUTO-ENTMCNC: 33.3 G/DL (ref 28.4–34.8)
MCV RBC AUTO: 96.9 FL (ref 82.6–102.9)
MCV RBC AUTO: 99.2 FL (ref 82.6–102.9)
MCV RBC AUTO: 99.6 FL (ref 82.6–102.9)
MCV RBC AUTO: 99.6 FL (ref 82.6–102.9)
MODE: ABNORMAL
MYOGLOBIN: ABNORMAL NG/ML (ref 28–72)
MYOGLOBIN: ABNORMAL NG/ML (ref 28–72)
NEGATIVE BASE EXCESS, ART: 2 (ref 0–2)
NEGATIVE BASE EXCESS, ART: 2 (ref 0–2)
NEGATIVE BASE EXCESS, ART: 4 (ref 0–2)
NEGATIVE BASE EXCESS, VEN: 1 (ref 0–2)
NRBC AUTOMATED: 0.2 PER 100 WBC
NRBC AUTOMATED: 0.3 PER 100 WBC
NRBC AUTOMATED: 0.3 PER 100 WBC
NRBC AUTOMATED: 0.5 PER 100 WBC
O2 DEVICE/FLOW/%: ABNORMAL
O2 SAT, VEN: 98 % (ref 60–85)
PARTIAL THROMBOPLASTIN TIME: 21 SEC (ref 20.5–30.5)
PARTIAL THROMBOPLASTIN TIME: 24.2 SEC (ref 20.5–30.5)
PARTIAL THROMBOPLASTIN TIME: 64.7 SEC (ref 20.5–30.5)
PARTIAL THROMBOPLASTIN TIME: >120 SEC (ref 20.5–30.5)
PARTIAL THROMBOPLASTIN TIME: NORMAL SEC
PATIENT TEMP: 36.2
PATIENT TEMP: 37.4
PCO2, VEN: 39.3 MM HG (ref 41–51)
PDW BLD-RTO: 13.7 % (ref 11.8–14.4)
PDW BLD-RTO: 13.7 % (ref 11.8–14.4)
PDW BLD-RTO: 13.8 % (ref 11.8–14.4)
PDW BLD-RTO: 13.9 % (ref 11.8–14.4)
PH VENOUS: 7.39 (ref 7.32–7.43)
PHOSPHORUS: 4.3 MG/DL (ref 2.5–4.5)
PHOSPHORUS: 4.5 MG/DL (ref 2.5–4.5)
PHOSPHORUS: 5.4 MG/DL (ref 2.5–4.5)
PHOSPHORUS: 5.6 MG/DL (ref 2.5–4.5)
PLATELET # BLD: 114 K/UL (ref 138–453)
PLATELET # BLD: 132 K/UL (ref 138–453)
PLATELET # BLD: 136 K/UL (ref 138–453)
PLATELET # BLD: 147 K/UL (ref 138–453)
PMV BLD AUTO: 12.7 FL (ref 8.1–13.5)
PMV BLD AUTO: 13 FL (ref 8.1–13.5)
PO2, VEN: 110.7 MM HG (ref 30–50)
POC HCO3: 21.2 MMOL/L (ref 21–28)
POC HCO3: 22.6 MMOL/L (ref 21–28)
POC HCO3: 22.8 MMOL/L (ref 21–28)
POC LACTIC ACID: 0.85 MMOL/L (ref 0.56–1.39)
POC LACTIC ACID: 1.04 MMOL/L (ref 0.56–1.39)
POC LACTIC ACID: 1.16 MMOL/L (ref 0.56–1.39)
POC LACTIC ACID: 3.3 MMOL/L (ref 0.56–1.39)
POC O2 SATURATION: 97 % (ref 94–98)
POC O2 SATURATION: 98 % (ref 94–98)
POC O2 SATURATION: 99 % (ref 94–98)
POC PCO2 TEMP: 35 MM HG
POC PCO2: 34.7 MM HG (ref 35–48)
POC PCO2: 38.7 MM HG (ref 35–48)
POC PCO2: 39.3 MM HG (ref 35–48)
POC PH TEMP: 7.42
POC PH: 7.35 (ref 7.35–7.45)
POC PH: 7.37 (ref 7.35–7.45)
POC PH: 7.42 (ref 7.35–7.45)
POC PO2 TEMP: 91 MM HG
POC PO2: 111.2 MM HG (ref 83–108)
POC PO2: 138.6 MM HG (ref 83–108)
POC PO2: 88.8 MM HG (ref 83–108)
POTASSIUM SERPL-SCNC: 4.4 MMOL/L (ref 3.7–5.3)
POTASSIUM SERPL-SCNC: 4.5 MMOL/L (ref 3.7–5.3)
POTASSIUM SERPL-SCNC: 4.7 MMOL/L (ref 3.7–5.3)
POTASSIUM SERPL-SCNC: 4.8 MMOL/L (ref 3.7–5.3)
PROTHROMBIN TIME: NORMAL SEC
RBC # BLD: 2.26 M/UL (ref 4.21–5.77)
RBC # BLD: 2.52 M/UL (ref 4.21–5.77)
RBC # BLD: 2.57 M/UL (ref 4.21–5.77)
RBC # BLD: 2.7 M/UL (ref 4.21–5.77)
REASON FOR REJECTION: NORMAL
SAMPLE SITE: ABNORMAL
SODIUM BLD-SCNC: 136 MMOL/L (ref 135–144)
SODIUM BLD-SCNC: 139 MMOL/L (ref 135–144)
SODIUM BLD-SCNC: 140 MMOL/L (ref 135–144)
SODIUM BLD-SCNC: 143 MMOL/L (ref 135–144)
TOTAL CK: ABNORMAL U/L (ref 39–308)
TRIGL SERPL-MCNC: 288 MG/DL
TROPONIN, HIGH SENSITIVITY: 178 NG/L (ref 0–22)
TROPONIN, HIGH SENSITIVITY: 200 NG/L (ref 0–22)
TROPONIN, HIGH SENSITIVITY: 208 NG/L (ref 0–22)
TROPONIN, HIGH SENSITIVITY: 233 NG/L (ref 0–22)
TROPONIN, HIGH SENSITIVITY: 264 NG/L (ref 0–22)
WBC # BLD: 11.6 K/UL (ref 3.5–11.3)
WBC # BLD: 14.6 K/UL (ref 3.5–11.3)
WBC # BLD: 16.9 K/UL (ref 3.5–11.3)
WBC # BLD: 19.8 K/UL (ref 3.5–11.3)
ZZ NTE CLEAN UP: ORDERED TEST: NORMAL
ZZ NTE WITH NAME CLEAN UP: SPECIMEN SOURCE: NORMAL

## 2022-07-11 PROCEDURE — 71045 X-RAY EXAM CHEST 1 VIEW: CPT

## 2022-07-11 PROCEDURE — 83036 HEMOGLOBIN GLYCOSYLATED A1C: CPT

## 2022-07-11 PROCEDURE — 85610 PROTHROMBIN TIME: CPT

## 2022-07-11 PROCEDURE — 6360000002 HC RX W HCPCS: Performed by: STUDENT IN AN ORGANIZED HEALTH CARE EDUCATION/TRAINING PROGRAM

## 2022-07-11 PROCEDURE — 2580000003 HC RX 258: Performed by: STUDENT IN AN ORGANIZED HEALTH CARE EDUCATION/TRAINING PROGRAM

## 2022-07-11 PROCEDURE — 94003 VENT MGMT INPAT SUBQ DAY: CPT

## 2022-07-11 PROCEDURE — 84100 ASSAY OF PHOSPHORUS: CPT

## 2022-07-11 PROCEDURE — 2500000003 HC RX 250 WO HCPCS: Performed by: STUDENT IN AN ORGANIZED HEALTH CARE EDUCATION/TRAINING PROGRAM

## 2022-07-11 PROCEDURE — 82803 BLOOD GASES ANY COMBINATION: CPT

## 2022-07-11 PROCEDURE — 82330 ASSAY OF CALCIUM: CPT

## 2022-07-11 PROCEDURE — 36415 COLL VENOUS BLD VENIPUNCTURE: CPT

## 2022-07-11 PROCEDURE — 93005 ELECTROCARDIOGRAM TRACING: CPT

## 2022-07-11 PROCEDURE — 84484 ASSAY OF TROPONIN QUANT: CPT

## 2022-07-11 PROCEDURE — 90945 DIALYSIS ONE EVALUATION: CPT | Performed by: INTERNAL MEDICINE

## 2022-07-11 PROCEDURE — 6360000002 HC RX W HCPCS: Performed by: INTERNAL MEDICINE

## 2022-07-11 PROCEDURE — 05HM33Z INSERTION OF INFUSION DEVICE INTO RIGHT INTERNAL JUGULAR VEIN, PERCUTANEOUS APPROACH: ICD-10-PCS | Performed by: STUDENT IN AN ORGANIZED HEALTH CARE EDUCATION/TRAINING PROGRAM

## 2022-07-11 PROCEDURE — 2700000000 HC OXYGEN THERAPY PER DAY

## 2022-07-11 PROCEDURE — 6370000000 HC RX 637 (ALT 250 FOR IP): Performed by: STUDENT IN AN ORGANIZED HEALTH CARE EDUCATION/TRAINING PROGRAM

## 2022-07-11 PROCEDURE — 90945 DIALYSIS ONE EVALUATION: CPT

## 2022-07-11 PROCEDURE — 84478 ASSAY OF TRIGLYCERIDES: CPT

## 2022-07-11 PROCEDURE — 81240 F2 GENE: CPT

## 2022-07-11 PROCEDURE — 85306 CLOT INHIBIT PROT S FREE: CPT

## 2022-07-11 PROCEDURE — 94761 N-INVAS EAR/PLS OXIMETRY MLT: CPT

## 2022-07-11 PROCEDURE — 2580000003 HC RX 258: Performed by: INTERNAL MEDICINE

## 2022-07-11 PROCEDURE — 85730 THROMBOPLASTIN TIME PARTIAL: CPT

## 2022-07-11 PROCEDURE — 6360000002 HC RX W HCPCS: Performed by: SURGERY

## 2022-07-11 PROCEDURE — 85302 CLOT INHIBIT PROT C ANTIGEN: CPT

## 2022-07-11 PROCEDURE — 85613 RUSSELL VIPER VENOM DILUTED: CPT

## 2022-07-11 PROCEDURE — 2000000000 HC ICU R&B

## 2022-07-11 PROCEDURE — 81241 F5 GENE: CPT

## 2022-07-11 PROCEDURE — 82550 ASSAY OF CK (CPK): CPT

## 2022-07-11 PROCEDURE — 85300 ANTITHROMBIN III ACTIVITY: CPT

## 2022-07-11 PROCEDURE — 6370000000 HC RX 637 (ALT 250 FOR IP): Performed by: NURSE PRACTITIONER

## 2022-07-11 PROCEDURE — 86147 CARDIOLIPIN ANTIBODY EA IG: CPT

## 2022-07-11 PROCEDURE — 37799 UNLISTED PX VASCULAR SURGERY: CPT

## 2022-07-11 PROCEDURE — 6360000002 HC RX W HCPCS: Performed by: HEALTH CARE PROVIDER

## 2022-07-11 PROCEDURE — 85027 COMPLETE CBC AUTOMATED: CPT

## 2022-07-11 PROCEDURE — 82947 ASSAY GLUCOSE BLOOD QUANT: CPT

## 2022-07-11 PROCEDURE — 83735 ASSAY OF MAGNESIUM: CPT

## 2022-07-11 PROCEDURE — 83090 ASSAY OF HOMOCYSTEINE: CPT

## 2022-07-11 PROCEDURE — 83605 ASSAY OF LACTIC ACID: CPT

## 2022-07-11 PROCEDURE — 83874 ASSAY OF MYOGLOBIN: CPT

## 2022-07-11 PROCEDURE — 80048 BASIC METABOLIC PNL TOTAL CA: CPT

## 2022-07-11 RX ORDER — HEPARIN SODIUM 1000 [USP'U]/ML
1400 INJECTION, SOLUTION INTRAVENOUS; SUBCUTANEOUS PRN
Status: DISCONTINUED | OUTPATIENT
Start: 2022-07-11 | End: 2022-07-21 | Stop reason: HOSPADM

## 2022-07-11 RX ORDER — SODIUM CHLORIDE 9 MG/ML
INJECTION, SOLUTION INTRAVENOUS CONTINUOUS
Status: DISCONTINUED | OUTPATIENT
Start: 2022-07-11 | End: 2022-07-17

## 2022-07-11 RX ORDER — CALCIUM GLUCONATE 20 MG/ML
2000 INJECTION, SOLUTION INTRAVENOUS ONCE
Status: COMPLETED | OUTPATIENT
Start: 2022-07-11 | End: 2022-07-11

## 2022-07-11 RX ORDER — HEPARIN SODIUM 1000 [USP'U]/ML
1300 INJECTION, SOLUTION INTRAVENOUS; SUBCUTANEOUS PRN
Status: DISCONTINUED | OUTPATIENT
Start: 2022-07-11 | End: 2022-07-21 | Stop reason: HOSPADM

## 2022-07-11 RX ORDER — METOCLOPRAMIDE HYDROCHLORIDE 5 MG/ML
5 INJECTION INTRAMUSCULAR; INTRAVENOUS EVERY 6 HOURS
Status: COMPLETED | OUTPATIENT
Start: 2022-07-11 | End: 2022-07-13

## 2022-07-11 RX ADMIN — CHLORHEXIDINE GLUCONATE 15 ML: 1.2 SOLUTION ORAL at 08:38

## 2022-07-11 RX ADMIN — SODIUM CHLORIDE 1 UNITS/HR: 9 INJECTION, SOLUTION INTRAVENOUS at 17:56

## 2022-07-11 RX ADMIN — Medication 1500 ML/HR: at 09:14

## 2022-07-11 RX ADMIN — HEPARIN SODIUM 4000 UNITS: 1000 INJECTION INTRAVENOUS; SUBCUTANEOUS at 14:10

## 2022-07-11 RX ADMIN — MIDODRINE HYDROCHLORIDE 5 MG: 5 TABLET ORAL at 08:38

## 2022-07-11 RX ADMIN — SODIUM CHLORIDE: 9 INJECTION, SOLUTION INTRAVENOUS at 21:14

## 2022-07-11 RX ADMIN — CALCIUM GLUCONATE 2000 MG: 20 INJECTION, SOLUTION INTRAVENOUS at 02:58

## 2022-07-11 RX ADMIN — Medication 1500 ML/HR: at 09:12

## 2022-07-11 RX ADMIN — PROPOFOL 20 MCG/KG/MIN: 10 INJECTION, EMULSION INTRAVENOUS at 14:49

## 2022-07-11 RX ADMIN — SODIUM CHLORIDE, PRESERVATIVE FREE 10 ML: 5 INJECTION INTRAVENOUS at 08:38

## 2022-07-11 RX ADMIN — SODIUM CHLORIDE, PRESERVATIVE FREE 20 MG: 5 INJECTION INTRAVENOUS at 08:38

## 2022-07-11 RX ADMIN — MIDODRINE HYDROCHLORIDE 5 MG: 5 TABLET ORAL at 12:04

## 2022-07-11 RX ADMIN — Medication 3 MCG/MIN: at 02:59

## 2022-07-11 RX ADMIN — CEFTRIAXONE SODIUM 1000 MG: 1 INJECTION, POWDER, FOR SOLUTION INTRAMUSCULAR; INTRAVENOUS at 21:40

## 2022-07-11 RX ADMIN — ASPIRIN 81 MG: 81 TABLET, CHEWABLE ORAL at 08:38

## 2022-07-11 RX ADMIN — HEPARIN SODIUM 1500 UNITS: 1000 INJECTION INTRAVENOUS; SUBCUTANEOUS at 01:15

## 2022-07-11 RX ADMIN — METOCLOPRAMIDE 5 MG: 5 INJECTION, SOLUTION INTRAMUSCULAR; INTRAVENOUS at 18:42

## 2022-07-11 RX ADMIN — Medication 1500 ML/HR: at 09:10

## 2022-07-11 RX ADMIN — HEPARIN SODIUM 1600 UNITS: 1000 INJECTION INTRAVENOUS; SUBCUTANEOUS at 01:15

## 2022-07-11 RX ADMIN — HEPARIN SODIUM 4 UNITS/KG/HR: 10000 INJECTION, SOLUTION INTRAVENOUS at 07:26

## 2022-07-11 RX ADMIN — POLYETHYLENE GLYCOL 3350 17 G: 17 POWDER, FOR SOLUTION ORAL at 08:38

## 2022-07-11 RX ADMIN — METOCLOPRAMIDE 5 MG: 5 INJECTION, SOLUTION INTRAMUSCULAR; INTRAVENOUS at 12:03

## 2022-07-11 RX ADMIN — HEPARIN SODIUM 1300 UNITS: 1000 INJECTION INTRAVENOUS; SUBCUTANEOUS at 18:30

## 2022-07-11 RX ADMIN — PROPOFOL 10 MCG/KG/MIN: 10 INJECTION, EMULSION INTRAVENOUS at 21:58

## 2022-07-11 RX ADMIN — HEPARIN SODIUM 4000 UNITS: 1000 INJECTION INTRAVENOUS; SUBCUTANEOUS at 20:42

## 2022-07-11 RX ADMIN — PROPOFOL 20 MCG/KG/MIN: 10 INJECTION, EMULSION INTRAVENOUS at 01:45

## 2022-07-11 RX ADMIN — Medication 75 MCG/HR: at 07:25

## 2022-07-11 RX ADMIN — PROPOFOL 20 MCG/KG/MIN: 10 INJECTION, EMULSION INTRAVENOUS at 07:24

## 2022-07-11 RX ADMIN — HEPARIN SODIUM 1400 UNITS: 1000 INJECTION INTRAVENOUS; SUBCUTANEOUS at 18:46

## 2022-07-11 RX ADMIN — METOCLOPRAMIDE 10 MG: 5 INJECTION, SOLUTION INTRAMUSCULAR; INTRAVENOUS at 05:40

## 2022-07-11 ASSESSMENT — PULMONARY FUNCTION TESTS
PIF_VALUE: 20
PIF_VALUE: 20
PIF_VALUE: 19
PIF_VALUE: 19
PIF_VALUE: 13
PIF_VALUE: 19
PIF_VALUE: 21
PIF_VALUE: 31
PIF_VALUE: 19
PIF_VALUE: 20
PIF_VALUE: 19
PIF_VALUE: 21
PIF_VALUE: 18
PIF_VALUE: 22
PIF_VALUE: 21

## 2022-07-11 NOTE — CARE COORDINATION
Transitional planning. Intubated Sedated FiO2 30%, PEEP of 8, CRRT, EF 15%, Cardiac Cath - need Nephrology Clearance. Possible extubation 7/12, Needs PT ordrer, SNF referrals to [facility] and [facility] with [facility] admissions confirmed receipt of referral  Dejan with [facility] [place] stated they did not receive referral. Resent referral and provided him with requested pt information.

## 2022-07-11 NOTE — PROGRESS NOTES
After giving cathflo and allowing it to dwell per pharmacy- Dr. Armand Fuentes informed that arterial catheter still has no blood return- recommendation per nephro to replace line. 0194: Dr. Armand Fuentes bedside- assess patient and gtts. Plan for exchange of Fabricio catheter.  Supplies gathered

## 2022-07-11 NOTE — PROGRESS NOTES
PROGRESS NOTE      PATIENT NAME: Abena Sarabia  MEDICAL RECORD NO. [id number]  DATE: 2022  SURGEON: Lola Clarke MD  PRIMARY CARE PHYSICIAN: Arash Hernandez (Inactive)    HD: # 3    ASSESSMENT    Patient Active Problem List   Diagnosis    Seasonal allergies    Anxiety    Carotid artery occlusion    CAD (coronary artery disease)    Hypertension    Neuropathy    [facility]AD [facility]peripheral artery disease) [facility] [address] [facility]    HTN (hypertension)    Arthritis associated with another disorder    Hernia, hiatal    Hyperlipidemia    CAD (coronary artery disease)    Critical ischemia of lower extremity ([facility] [address] [facility]    CAROLYN (acute kidney injury) ([facility] [address] [facility]    Hyperkalemia    Encounter for continuous venovenous hemodiafiltration ([facility]HD) ([facility]    Failing vascular bypass graft    Arterial hypotension    Non-traumatic rhabdomyolysis    Cardiomyopathy ([facility] [address]    Decreased urine output       MEDICAL DECISION MAKING AND PLAN    1. Neurological  1. Fentanyl and propofol drips   2. Hx of CVA with residual right sided weakness  2. Cardiovascular  1. HR: 80 - 105  2. MAP: 65 - 97  3. Levophed gtt to maintain MAP > 65  4. Hx of aortobifemoral graft and CABGx3  5. Total aortobifemoral occulusion. S/p  BL common fem art cut down. Thrombectomy of the aortobifemoral bypass. Thromboendarterectomy of the common femoral profundofemoral and SFA bilaterally with bovine pericardial patch angioplasty 2022. 6. Continue heparin gtt  7. Elevated troponins: 264 (325).  Trend. 8. Echo EF 15-23% with akinetic apex. 1. Cardiology following   2. Cards recommends cath when more stable, will need nephrology assistance in clearance. 3. ASA 81 mg daily   3. Heme:  1. Hb: 8.7 (8.1)  2. Plt: 147 (136)  4. Respiratory  1. PRVC: 550/18/10/30%  2. AB.347/38.7/138.6/21.2  3. PF: 462  4. CXR: stable   5. Gastrointestinal  1. NPO; trickle feeds   2. GI Prophylaxis pepcid  6. FEN/Renal  1. UOP: 0.2 cc/kg/hr over past 24 hours   2.  Na/K+ : 140 (138) / 4.5 (5. 3)  3. BUN/Cr: 47 (39) / 4.22 (3.68)  1. CAROLYN secondary to rhabdo. Normal Cr on arrival  4. Nephrology consulted and initiated CRRT on 7/10 for acute renal failure. Follow up renal u/s and further nephro recs   5. Rhabdomyolysis. Enzymes down trending. 1. CK: 20,744 (25,980)  2. Noman: 22,535 (35,000)  6. IVF: LR 125cc/hr  7. ID  1. Tmax: 100.4 (38)   2. WBC: 19.8 (16.9)  3. UTI positive on 7/10. Rocephin q24 x7days  4. Ancef q8 24hrs. Ended   5. Vanc and zosyn given in ED  8. Endo  1. B  2. Insulin drip: 29.7 units over past 24 hours   9. MSK  1. Concern for compartment syndrome in LLE  2. S/p left calf fasciotomy 22  3. Per ortho unlikely compartment syndrome in L thigh s/o  10. Dispo   1. Continue ICU care   11. Lines  1. ETT, OGT, saldana, right brachial arterial line, R IJ temporary dialysis catheter, L IJ CVC     Chief Complaint: LLE weakness    SUBJECTIVE    Leah Parker Mahoney evaluated at bedside. Fabricio catheter issues overnight. Exchanged catheter and patient was able to get CVVHD. Patient still requiring pressor support at this time. Audible doppler pulses BL LE      OBJECTIVE  VITALS: Temp: Temp: 100 °F (37.8 °C)Temp  Av.9 °F (37.7 °C)  Min: 99.7 °F (37.6 °C)  Max: 100 °F (88.2 °C) BP Systolic (60VHQ), JWV:840 , Min:105 , TIS:344   Diastolic (30CHK), TOP:47, Min:69, Max:89   Pulse Pulse  Av.2  Min: 80  Max: 105 Resp Resp  Av  Min: 18  Max: 18 Pulse ox SpO2  Av.7 %  Min: 95 %  Max: 100 %  GENERAL: intubated, sedated, no apparent distress   NEURO: intubated, sedated   HEENT: PERRLA  LUNGS: symmetric chest rise and fall; tolerating mechanical ventilation   HEART: normal rate and regular rhythm  ABDOMEN: soft, non-tender, non-distended  EXTREMITY: LLE +3 pitting edema. BL popliteal, DP/PT pulses on doppler    I/O last 3 completed shifts: In: 48428.7 [I.V.:10454.1; IV Piggyback:1462.6]  Out: 1722 [Urine:672; Emesis/NG output:1050]    Drain/tube output:   In: 4217.3 [I.V.:2754.7]  Out: 3930 [Urine:397]    LAB:  CBC:   Recent Labs     07/09/22  0439 07/10/22  0410 07/11/22  0005   WBC 22.6* 18.1* 16.9*   HGB 13.0 10.0* 8.1*   HCT 39.3* 29.8* 25.5*   MCV 95.4 97.4 99.2   PLT See Reflexed IPF Result See Reflexed IPF Result 136*     BMP:   Recent Labs     07/10/22  0602 07/10/22  1021 07/11/22  0005    138 140   K 5.2 5.3 4.5    105 106   CO2 20 21 20   BUN 36* 39* 47*   CREATININE 3.32* 3.68* 4.22*   GLUCOSE 170* 176* 203*     COAGS:   Recent Labs     07/08/22  0504 07/08/22  0935 07/09/22  0250 07/09/22  0439 07/09/22  1209 07/10/22  0410 07/11/22  0007   APTT  --    < >  --    < > 51.4* 54.1* >120.0*   PROT 7.0  --  4.4*  --   --   --   --     < > = values in this interval not displayed. RADIOLOGY:  ECHO Complete 2D W Doppler W Color    Result Date: 7/9/2022  [facility] [facility] [place] [facility] (TTE)  Patient Name [address] [place] [address] Date of Study               07/09/2022               GEOFFREY    Date of      1960  Gender                      Male  Birth   Age          64 year(s)  Race                           Room Number  1026        Height:                     73 inch, 185.42 cm   Corporate ID [id number]    Weight:                     255 pounds, 115.7 kg  #   Patient Acct [de-identified]   BSA:          2.39 m^2      BMI:      33.64  #                                                              kg/m^2   MR #         [id number]     Sonographer                 Juan José Thompson   Accession #  [id number]  Interpreting Physician      Peewee Schwartz   Fellow                   Referring Nurse                           Practitioner   Interpreting             Referring Physician         Aislinn Mcintosh DO  Fellow  Type of Study   TTE procedure:2D Echocardiogram, M-Mode, Doppler, Color Doppler.   Procedure Date Date: 07/09/2022 Start: 10:16 AM Study Location: [address] [place] [address] Technical Quality: Adequate visualization Indications:Elevated troponin and Congestive heart failure. Critical Result: Informed Dr. Elise Webster and Dr. Meka Garg @ 10:40am History / Tech. Comments: Echo done at patient bedside. Patient Status: Inpatient Height: 73 inches Weight: 255.01 pounds BSA: 2.39 m^2 BMI: 33.64 kg/m^2 Allergies   - Morphine.   - Morphine. CONCLUSIONS Summary Poor sound transmission. Global left ventricular systolic function is severely reduced. Calculated ejection fraction 23% by Andrade's method. Visually estimated EF 15%. Akinetic apex. No significant valvular regurgitation or stenosis seen. Signature ----------------------------------------------------------------------------  Electronically signed by Janet English(Sonographer) on 07/09/2022  10:50 AM ---------------------------------------------------------------------------- ----------------------------------------------------------------------------  Electronically signed by Peewee Schwartz(Interpreting physician) on  07/09/2022 10:53 AM ---------------------------------------------------------------------------- FINDINGS Left Atrium Left atrium is normal in size. Left Ventricle Left ventricle is normal in size. Global left ventricular systolic function is severely reduced. Calculated ejection fraction 23% by Andrade's method. Visually estimated EF 15%. Akinetic apex. Right Atrium Right atrium is normal in size. Right Ventricle Right ventricular function appears normal . Mitral Valve Normal mitral valve structure and function. No mitral regurgitation. Aortic Valve Aortic valve is trileaflet and opens adequately. No aortic insufficiency. Tricuspid Valve Normal tricuspid valve structure and function. No tricuspid regurgitation. Pulmonic Valve The pulmonic valve is normal in structure. No pulmonic insufficiency. Pericardial Effusion No pericardial effusion seen.  M-mode / 2D Measurements & Calculations:   LVIDd:3.1 cm(3.7 - 5.6 cm)        Diastolic PZCZZJ:05 ml  WFWBA:0.2 cm(2.2 - 4.0 cm)        Systolic GKAAWH:50.4 ml  QTHR:1.1 cm(0.6 - 1.1 cm)         Aortic Root:2.7 cm(2.0 - 3.7 cm)  LVPWd:1.3 cm(0.6 - 1.1 cm)        LA volume/Index: 32.8 ml /14m^2  Fractional Shortenin.45 %  Calculated LVEF (%): 23.89 %      RVDd:2.7 cm   Mitral:                                    Aortic   Valve Area (P1/2-Time): 6.47 cm^2          Peak Velocity: 0.73 m/s  Peak E-Wave: 0.56 m/s                      Mean Velocity: 0.47 m/s                                             Peak Gradient: 2.15 mmHg  Peak Gradient: 1.24 mmHg                   Mean Gradient: 1 mmHg  Deceleration Time: 115 msec  P1/2t: 34 msec                                             AV VTI: 9.89 cm                                              Pulmonic:                                              Peak Velocity: 0.76 m/s                                             Peak Gradient: 2.31 mmHg  Septal Wall E' velocity:0.06 m/s Lateral Wall E' velocity:0.06 m/s    XR CHEST (SINGLE VIEW FRONTAL)    Result Date: 7/10/2022  EXAMINATION: ONE X-RAY VIEW OF THE CHEST 7/10/2022 2:59 pm COMPARISON: 07/10/2022 HISTORY: ORDERING SYSTEM PROVIDED HISTORY:  Fabricio catheter insertion right IJ. TECHNOLOGIST PROVIDED HISTORY: Fabricio catheter insertion right IJ. FINDINGS: Right IJ catheter tip overlies the superior cavoatrial junction. IJ CVC catheter tip overlies the mid/distal SVC. The heart is similar in size to the prior study. No pleural effusion or pneumothorax is seen. Mild left basilar atelectasis. Endotracheal tube tip is 4.5 cm above the dayna. Enteric tube tip goes below the level of the diaphragm and out of the field of view. Right IJ CVC catheter tip overlies the superior cavoatrial junction. Left IJ CVC catheter tip overlies the mid/distal SVC. No pneumothorax is seen. Mild left basilar atelectasis.      XR CHEST PORTABLE    Result Date: 2022  EXAMINATION: ONE XRAY VIEW OF THE CHEST 2022 5:47 am COMPARISON: 2022 HISTORY: ORDERING SYSTEM PROVIDED HISTORY: intubated TECHNOLOGIST PROVIDED HISTORY: intubated Reason for Exam: Upright port, intubated FINDINGS: The right costophrenic angle is excluded from the exam.  Cardiomediastinal silhouette is stable. Similar position of devices. The lungs are clear. No pleural effusion or pneumothorax. No gross bony abnormality. Stable chest.     XR CHEST PORTABLE    Result Date: 7/11/2022  EXAMINATION: ONE XRAY VIEW OF THE CHEST 7/11/2022 1:37 am COMPARISON: 07/11/2022 HISTORY: ORDERING SYSTEM PROVIDED HISTORY: new RIJ line TECHNOLOGIST PROVIDED HISTORY: new RIJ line Reason for Exam: Supine port, New IJ FINDINGS: Right IJ temporary dialysis catheter and left IJ central venous catheter projects over the proximal SVC. The previous temporary dialysis catheter has been removed. The endotracheal tube appears in appropriate position. The enteric catheter courses below the level the film. Median sternotomy wires are identified. Cardiac and mediastinal silhouettes appear similar. Patchy infiltrates are seen in the lungs bilaterally. No acute osseous abnormality. No postprocedural pneumothorax. 1. The deeper temporary dialysis catheter on the previous exam is been removed, with a new temporary dialysis catheter seen overlying the proximal superior vena cava. 2. Other tubes and lines as above. 3. Patchy infiltrates in the lungs bilaterally which may represent edema or pneumonia. XR CHEST PORTABLE    Result Date: 7/11/2022  EXAMINATION: ONE XRAY VIEW OF THE CHEST 7/11/2022 12:21 am COMPARISON: 07/10/2022 HISTORY: ORDERING SYSTEM PROVIDED HISTORY: becca catheter reposition TECHNOLOGIST PROVIDED HISTORY: [place] catheter reposition Reason for Exam: Upright port, becca cath FINDINGS: Endotracheal tube is 5-6 cm above the dayna. NG tube courses into the stomach and off the exam.  Left jugular line and right jugular catheter are present.   The tips appear stable with the right jugular catheter near the cavoatrial junction and the left jugular line over the SVC. [place] Sternotomy wires, clips, and postsurgical changes are stable. Some hazy increased density of the medial left lower lung is present. The diaphragm is still visualized and the costophrenic angles appear acceptable. Subtle opacities in the mid upper lungs are noted as well. The heart and vasculature are stable. No pneumothorax is identified. 1.  The right jugular catheter appears in a stable position with the tip at the cavoatrial junction. 2. Endotracheal tube, nasogastric tube, and left jugular line are also redemonstrated. 3.  Some hazy opacities in the lungs which could be subtle edema or inflammatory. XR CHEST PORTABLE    Result Date: 7/10/2022  EXAMINATION: ONE XRAY VIEW OF THE CHEST 7/10/2022 4:35 am COMPARISON: 07/09/2022, 544 hours HISTORY: ORDERING SYSTEM PROVIDED HISTORY: intubated TECHNOLOGIST PROVIDED HISTORY: intubated 66-year-old intubated male FINDINGS: Portable supine view of the chest. Prior median sternotomy and CABG. Endotracheal tube distal tip overlying the mid trachea approximately 5.1 cm above the level of the dayna. Enteric tube traverses the GE junction with distal tip excluded from the field of view. Right IJ approach central venous catheter distal tip stable in position overlying the lower SVC. Cardiac monitor leads overlie the chest. Cardiac and mediastinal contours remain unchanged. Atherosclerotic calcification of the aorta. No pneumothorax. No free air. Improvement in aeration of the lungs since the prior exam with mild residual airspace disease within the left upper and left lower lung zones and right parahilar region. No new focal airspace consolidation. Visualized osseous structures remain unchanged. 1. Tubes and right IJ line as detailed above.  2. Improvement in aeration of both lung since the prior study with residual airspace disease at the left upper and left lower lung zones as well as right parahilar region. No new focal airspace disease. 3. Prior median sternotomy and CABG.          Zulay Rojas DO  7/11/22, 3:57 AM   Electronically signed by Krystin Noble DO on 7/11/2022 at 7:08 AM

## 2022-07-11 NOTE — PLAN OF CARE
comfort, nutrition and hydration  Taken 7/11/2022 1400 by Wisam Sethi RN  Remains free of injury from restraints (restraint for interference with medical device): Every 2 hours: Monitor safety, psychosocial status, comfort, nutrition and hydration  Taken 7/11/2022 1200 by Wisam Sethi RN  Remains free of injury from restraints (restraint for interference with medical device): Every 2 hours: Monitor safety, psychosocial status, comfort, nutrition and hydration  Taken 7/11/2022 1000 by Wisam Sethi RN  Remains free of injury from restraints (restraint for interference with medical device): Every 2 hours: Monitor safety, psychosocial status, comfort, nutrition and hydration  Taken 7/11/2022 0800 by Wisam Sethi RN  Remains free of injury from restraints (restraint for interference with medical device): Every 2 hours: Monitor safety, psychosocial status, comfort, nutrition and hydration  Taken 7/11/2022 0600 by Allie Gaona RN  Remains free of injury from restraints (restraint for interference with medical device): Every 2 hours: Monitor safety, psychosocial status, comfort, nutrition and hydration     Problem: Respiratory - Adult  Goal: Achieves optimal ventilation and oxygenation  7/11/2022 1919 by Wisam Sethi RN  Outcome: Not Progressing  7/11/2022 0821 by Alec Daley RCP  Outcome: Progressing

## 2022-07-11 NOTE — PROCEDURES
PROCEDURE NOTE - CENTRAL VENOUS LINE PLACEMENT    PATIENT NAME: Diane Bailon  MEDICAL RECORD NO. [id number]  DATE: 7/11/2022  ATTENDING PHYSICIAN: Dr. Antonina Johns DIAGNOSIS:  Need for Dialysis  POSTOPERATIVE DIAGNOSIS:  Same  PROCEDURE PERFORMED:  Right Internal Jugular Vein Central Line Insertion  PERFORMING PHYSICIAN: Cary Smalls DO  ANESTHESIA:  Local utilizing 1% lidocaine  ESTIMATED BLOOD LOSS:  Less than 25 ml  COMPLICATIONS:  None immediately appreciated. DISCUSSION:  Diane Bailon is a 64y.o.-year-old male who requires central IV access for dialysis. The history and physical examination were reviewed and confirmed. CONSENT: Unable to be obtained due to the emergent nature of this procedure and patient's current condition. PROCEDURE:  A timeout was initiated by the bedside nurse and was confirmed by those present. The patient was placed in a supine position. The skin overlying the CVC in the Right Internal Jugular Vein was prepped with chlorhexadine and draped in sterile fashion. A guidewire was placed through the hub with no resistance. Ultrasound confirmed presence of wire in the vein. The dilator was inserted into the skin and vein over guidewire using Seldinger technique. The dilator was then removed and the second dilator was inserted. This dilator was then removed and the  14F 15cm becca catheter was placed in the vein over the guidewire using Seldinger technique. The guidewire was then removed and all ports aspirated and flushed appropriately and were heparin locked. The catheter then secured using silk suture and a temporary sterile dressing was applied. No immediate complication was evident. All sponge, instrument and needle counts were correct at the completion of the procedure. Postprocedural chest xray is pending at this time. The patient tolerated the procedure well with no immediate complication evident.      Cary Smalls DO  6:40 PM, 7/10/22

## 2022-07-11 NOTE — PROGRESS NOTES
Patient CRRT clotted off and was discontinued, unable to rinseback blood. Venous sheath found to be clotted as well. Order received from Dr. Paras Guadarrama for cathflo, which was unsuccessful to instill. Dr. Paras Guadarrama to ask Dr. Keisha Bianchi about a possible tunnel cath. Awaiting orders.

## 2022-07-11 NOTE — PROCEDURES
PROCEDURE NOTE - ANN CATHETER EXCHANGE    PATIENT NAME: Sophie Aponte  MEDICAL RECORD NO. [id number]  DATE: 7/11/2022  ATTENDING PHYSICIAN: Brian Casper MD    PREOPERATIVE DIAGNOSIS:  ACUTE RENAL FAILURE  POSTOPERATIVE DIAGNOSIS:  Same  PROCEDURE PERFORMED:  Right Internal Jugular Vein Central Line Insertion  PERFORMING PHYSICIAN: Sharron Pruitt DO  ANESTHESIA:  Local utilizing 1% lidocaine  ESTIMATED BLOOD LOSS:  Less than 25 ml  COMPLICATIONS:  None immediately appreciated. DISCUSSION:  Sophie Aponte is a 64y.o.-year-old male who requires central IV access acute renal failure CVVHD. The history and physical examination were reviewed and confirmed. CONSENT: Unable to be obtained due to the emergent nature of this procedure. PROCEDURE:  A timeout was initiated by the bedside nurse and was confirmed by those present. The patient was placed in a supine position. The skin overlying the Right Internal Jugular Vein was prepped with chlorhexadine and draped in sterile fashion. The skin was infiltrated with local anesthetic. The vessel and surrounding anatomy was visualized using ultrasound. A guidewire was placed through the center of the needle with no resistance. Ultrasound confirmed presence of wire in the vein. The dilator was inserted into the skin and vein over guidewire using Seldinger technique. The dilator was then removed and the 14F 15cm catheter was placed in the vein over the guidewire using Seldinger technique. The guidewire was then removed and all ports aspirated and flushed appropriately. The catheter then secured using silk suture and a temporary sterile dressing was applied. No immediate complication was evident. All sponge, instrument and needle counts were correct at the completion of the procedure. Postprocedural chest x-ray showed good position of the catheter with no evidence of hemothorax or pneumothorax.  The patient tolerated the procedure well with no immediate

## 2022-07-11 NOTE — PROGRESS NOTES
Upon arriving CRRT machine had venous pressure alarms. Venous pressures upward of 400s-500s. Multiple RN including Lelia Morris and Sriram Davila to bedside to troubleshoot CRRT. Unable to successfully get it restarted. 2018: perfect serve sent to Dr. Yovany Cardona; informing high of high venous and access pressures- that there may be a line issue. 2023: informed Dr. Yovany Cardona that we unfortunately had to stop CRRT treatment at this time. Lines were cared for per protocol. Per Dr. Yovany Cardona had trauma team come bedside and assess line.

## 2022-07-11 NOTE — PROGRESS NOTES
Division of Vascular Surgery             Progress Note      Name: Michel Tavarez  MRN: [id number]         Overnight Events:     Nothing acute overnight. Subjective:     Patient seen and examined at the bedside. Continues to be intubated on sedation and on pressors for BP support. Is on Continuous dialysis. Tube feeds started yesterday. No BMs. Made 0.2cc/kg/hr of urine yesterday. Physical Exam:     Vitals:  /69   Pulse 86   Temp 100 °F (37.8 °C) (Bladder)   Resp 18   Ht 5' 10.5\" (1.791 m) Comment: no inital recorded height; 9/13/12 office visit  Wt 232 lb 2.3 oz (105.3 kg)   SpO2 99%   BMI 32.84 kg/m²     General appearance - intubated and sedated  Mental status - sedated, intermittently responsive to voice and pain  Head - normocephalic and atraumatic  Chest - intubated on ventilator PVRC, FiO2 30%, PEEP 10,    Heart - normal rate, regular rhythm on monitor  Abdomen - soft, non-tender, non-distended  Neurological - sedated on propofol and fentanyl  Extremities -  edema to RLE, unable to illicit any motor function from either lower extremity although this may be due to pt's mental status/sedation, LLE fasciotomy incision  Skin - BL groin incisions clean and dry, no signs of infection  Vascular Exam - faintly dopplerable DP/PT pulses bilaterally      Imaging:   XR CHEST (SINGLE VIEW FRONTAL)    Result Date: 7/10/2022  Right IJ CVC catheter tip overlies the superior cavoatrial junction. Left IJ CVC catheter tip overlies the mid/distal SVC. No pneumothorax is seen. Mild left basilar atelectasis. XR CHEST PORTABLE    Result Date: 7/11/2022  Stable chest.     XR CHEST PORTABLE    Result Date: 7/11/2022  1. The deeper temporary dialysis catheter on the previous exam is been removed, with a new temporary dialysis catheter seen overlying the proximal superior vena cava. 2. Other tubes and lines as above.  3. Patchy infiltrates in the lungs bilaterally which may represent edema or pneumonia. XR CHEST PORTABLE    Result Date: 7/11/2022  1. The right jugular catheter appears in a stable position with the tip at the cavoatrial junction. 2. Endotracheal tube, nasogastric tube, and left jugular line are also redemonstrated. 3.  Some hazy opacities in the lungs which could be subtle edema or inflammatory. XR CHEST PORTABLE    Result Date: 7/10/2022  1. Tubes and right IJ line as detailed above. 2. Improvement in aeration of both lung since the prior study with residual airspace disease at the left upper and left lower lung zones as well as right parahilar region. No new focal airspace disease. 3. Prior median sternotomy and CABG. Assessment:     1. 65 y/o male critically ill s/p BL femoral artery cutdown with thrombectomy of aortobifemoral graft, LLE fasciotomy, bilateral angiography      Plan:     1. Appreciate critical care management from surgical critical care  2. Cont neurovascular checks  3. Continue heparin ggt  4. Appreciate nephrology recomendations  5.  Fasciotomy dressing changes BID with wet curlex over fasciotomy incisions    Johnny Best DO PGY 4  General Surgery Resident  07/11/22 8:38 AM        [address] [facility]: [phone]

## 2022-07-11 NOTE — PROGRESS NOTES
[facility]   Progress Note                   Date:   7/11/2022  Patient name: Ashley Paul  Date of admission:  7/8/2022  4:56 AM  MRN:   [id number]  YOB: 1960  PCP: Roberto Flannery (Inactive)    Reason for Admission:      Subjective:     Patient was seen and evaluated at bedside, continues to remain intubated and sedated, on pressor support with levophed. MAP 65 mm Hg, HR 86/min, NST with a lot of ectopy. He was anuric with worsening renal function yesterday and was started on CRRT, tolerating the same well. Labs and imaging reviewed, K and Mg WNL, Ca WNL, Phos 5.6. Troponin down trended to 233, [place] [address]. WBC 19.8, Hb 8.7. He is currently on ASA, midodrine.      Medications:   Scheduled Meds:   aspirin  81 mg Oral Daily    metoclopramide  10 mg IntraVENous Q6H    midodrine  5 mg Oral TID WC    cefTRIAXone (ROCEPHIN) IV  1,000 mg IntraVENous Q24H    insulin lispro  10 Units SubCUTAneous Once    fentanNYL  50 mcg IntraVENous Once    sodium chloride flush  5-40 mL IntraVENous 2 times per day    polyethylene glycol  17 g Oral Daily    chlorhexidine  15 mL Mouth/Throat BID    famotidine (PEPCID) injection  20 mg IntraVENous BID    heparin (porcine)  4,000 Units IntraVENous Once       Continuous Infusions:   CRRT dialysis builder 1,500 mL/hr (07/10/22 1805)    insulin (HUMAN R) non-weight based infusion 2.49 Units/hr (07/11/22 0536)    nitroGLYCERIN Stopped (07/08/22 1147)    sodium chloride      lactated ringers 10 mL/hr at 07/11/22 0536    propofol 25 mcg/kg/min (07/11/22 0536)    fentaNYL 50 mcg/mL 75 mcg/hr (07/11/22 0536)    heparin (PORCINE) Infusion 4 Units/kg/hr (07/11/22 0536)    norepinephrine 3 mcg/min (07/11/22 0536)    vasopressin (Septic Shock) infusion Stopped (07/10/22 0502)    dextrose         CBC:   Recent Labs     07/10/22  0410 07/11/22  0005 07/11/22 0627   WBC 18.1* 16.9* 19.8*   HGB 10.0* 8.1* 8.7*   PLT See Reflexed IPF Result 136* 147     BMP:    Recent Labs     07/10/22  0602 07/10/22  1021 07/11/22  0005    138 140   K 5.2 5.3 4.5    105 106   CO2 20 21 20   BUN 36* 39* 47*   CREATININE 3.32* 3.68* 4.22*   GLUCOSE 170* 176* 203*     Hepatic:   Recent Labs     07/09/22  0250   *   ALT 54*   BILITOT 0.20*   ALKPHOS 60     Troponin: No results for input(s): TROPONINI in the last 72 hours. BNP: No results for input(s): BNP in the last 72 hours. Lipids: No results for input(s): CHOL, HDL in the last 72 hours. Invalid input(s): LDLCALCU  INR: No results for input(s): INR in the last 72 hours. Objective:   Vitals: /72   Pulse 86   Temp 100 °F (37.8 °C) (Bladder)   Resp 18   Ht 5' 10.5\" (1.791 m) Comment: no inital recorded height; 9/13/12 office visit  Wt 230 lb 9.6 oz (104.6 kg)   SpO2 100%   BMI 32.62 kg/m²       HEENT: Head: Normocephalic, no lesions, without obvious abnormality  Neck: no JVD  Lungs: clear to auscultation bilaterally, no basilar rales, no wheezing   Heart: regular rate and rhythm, S1, S2 normal, no murmur, click, rub or gallop  Abdomen: soft, non-tender; bowel sounds normal  Extremities: No LE edema      ECHO   Summary  Poor sound transmission. Global left ventricular systolic function is severely reduced. Calculated  ejection fraction 23% by Andrade's method. Visually estimated EF 15%. Akinetic apex. No significant valvular regurgitation or stenosis seen. Assessment / Acute Cardiac Problems:   Assessment      1. 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 Patient is currently intubated and sedated. 2. EKG  showed normal sinus rhythm with deep T wave inversion in anterior lead. 3. Currently on heparin drip for anticoagulation.  Started on ASA 81 mg daily, continue the same. 4. 2D echocardiography showed EF 15 % with akinetic apex  5. Plan for left heart catheterization when other co morbidities resolve. Will need nephrology clearance for cardia cath. 6. Will try to obtain records from [facility] regarding prior CABG in 2008. Discussed with patient and nursing. Carmen Jackson MD  Fellow, [id number] [facility]    Attending note,   Seen and examined agree with above. Intubated. Off pressors. Echocardiogram showed severely reduced ejection fraction. On IV heparin for non-STEMI. He does have previous history of CABG. We will try to obtain records. Acute kidney injury with possible CVVHD. Rhabdomyolysis. S/p thrombectomy for occluded lower extremity graft. Rhabdomyolysis. Wean Off pressors. The patient will need coronary angiography when comorbidities resolve.

## 2022-07-11 NOTE — PROGRESS NOTES
Nephrology Progress Note      SUBJECTIVE      71-year-old gentleman with a history of peripheral vascular disease, CABG, chronic hypertension and previous CVA presented with bilateral lower extremity numbness and pain rather sudden and ended up having CTA of the aorta showing occluded previous aortobifem graft. He underwent vascular surgery on 2022 where and he had thrombectomy of the aortobifem bypass, endarterectomy of the common femoral and profundofemoral and SFA bilaterally. Urine output had significantly dropped and he ended up requiring CVVHD which was initiated yesterday. He was quite hypotensive requiring vasopressin and Levophed yesterday, vasopressin is off today. Blood pressures are running in the high 90s. Overall he is 10 to 12 L positive. Cardiac echo shows very poor LV function. Current urinary continues to be on the low side    OBJECTIVE      CURRENT TEMPERATURE:  Temp: 100 °F (37.8 °C)  MAXIMUM TEMPERATURE OVER 24HRS:  Temp (24hrs), Av °F (37.8 °C), Min:100 °F (37.8 °C), Max:100 °F (37.8 °C)    CURRENT RESPIRATORY RATE:  Resp: 18  CURRENT PULSE:  Heart Rate: 74  CURRENT BLOOD PRESSURE:  BP: 106/68  24HR BLOOD PRESSURE RANGE:  Systolic (12IAO), QIF:434 , Min:91 , WN   ; Diastolic (55GQO), ATY:00, Min:65, Max:89    24HR INTAKE/OUTPUT:    Intake/Output Summary (Last 24 hours) at 2022 1018  Last data filed at 2022 1006  Gross per 24 hour   Intake 972.56 ml   Output 1335 ml   Net -362.44 ml     WEIGHT :  Patient Vitals for the past 96 hrs (Last 3 readings):   Weight   22 0600 232 lb 2.3 oz (105.3 kg)   07/10/22 0226 230 lb 9.6 oz (104.6 kg)   22 0600 255 lb 1.2 oz (115.7 kg)     PHYSICAL EXAM      GENERAL APPEARANCE: Patient intubated and sedated   SKIN: warm and dry, no rash or erythema  EYES: conjunctivae pale and sclera anicteric  ENT: ETT in place  NECK: + JVD. No carotid bruits and no carotid lymphadenopathy . PULMONARY: lungs are clear to auscultation. PRN  glucagon (rDNA) injection 1 mg, PRN  dextrose 5 % solution, PRN          LABS      CBC:   Recent Labs     07/08/22  2030 07/09/22  0439 07/10/22  0410 07/11/22  0005 07/11/22  0627   WBC 29.8*   < > 18.1* 16.9* 19.8*   RBC 4.63   < > 3.06* 2.57* 2.70*   HGB 14.6   < > 10.0* 8.1* 8.7*   HCT 44.8   < > 29.8* 25.5* 26.9*   MCV 96.8   < > 97.4 99.2 99.6   MCH 31.5   < > 32.7 31.5 32.2   MCHC 32.6   < > 33.6 31.8 32.3   RDW 13.2   < > 13.8 13.9 13.8      < > See Reflexed IPF Result 136* 147   MPV 12.9  --   --  13.0 13.0    < > = values in this interval not displayed.       BMP:   Recent Labs     07/10/22  1021 07/11/22  0005 07/11/22 0627    140 143   K 5.3 4.5 4.7    106 108*   CO2 21 20 18*   BUN 39* 47* 44*   CREATININE 3.68* 4.22* 3.74*   GLUCOSE 176* 203* 136*   CALCIUM 7.7* 7.7* 8.7      BNP:No results found for: BNP  PHOSPHORUS:    Recent Labs     07/10/22  0602 07/11/22  0005 07/11/22 0627   PHOS 5.5* 5.4* 5.6*     MAGNESIUM:   Recent Labs     07/10/22  0602 07/11/22  0005 07/11/22 0627   MG 2.1 2.1 2.2     ALBUMIN:   Recent Labs     07/09/22  0250   LABALBU 2.6*       URINE CREATININE:    Lab Results   Component Value Date/Time    LABCREA 141.6 07/10/2022 12:28 PM     URINE EOSINOPHILS:   Lab Results   Component Value Date/Time    UREO NONE SEEN 07/10/2022 12:28 PM     URINALYSIS:  U/A:   Lab Results   Component Value Date/Time    NITRU POSITIVE 07/10/2022 12:28 PM    [address] 07/10/2022 12:28 PM    PHUR 6.5 07/10/2022 12:28 PM    WBCUA 10 TO 20 07/10/2022 12:28 PM    RBCUA 20 TO 50 07/10/2022 12:28 PM    MUCUS 1+ 06/30/2012 09:40 PM    TRICHOMONAS NOT REPORTED 06/30/2012 09:40 PM    YEAST NOT REPORTED 06/30/2012 09:40 PM    BACTERIA MODERATE 07/10/2022 12:28 PM    SPECGRAV 1.025 07/10/2022 12:28 PM    LEUKOCYTESUR TRACE 07/10/2022 12:28 PM    UROBILINOGEN Normal 07/10/2022 12:28 PM    BILIRUBINUR NEGATIVE  Verified by ictotest. 07/10/2022 12:28 PM    BILIRUBINUR NEGATIVE

## 2022-07-11 NOTE — CONSULTS
Clinical Ethics Consultation Note    History and Context:  65 yo male admitted with leg pain and weakness. CTA showed complete occulusion of aortobifemoral graft. Patient taken emergently to OR for thrombectomy. Patient transferred to TICU post OR due to instability. Patient hypothermic, hemodynamically unstable on pressors and intubated    PMH: aortobifemoral graft, CVA with residual R sided weakness, and CABGx3. He needs dialysis so team can proceed with needed care. Principal Problem:    Critical ischemia of lower extremity ([address]  Active Problems:    CAROLYN (acute kidney injury) ([address]    Hyperkalemia    Encounter for continuous venovenous hemodiafiltration (CVVHD) ([facility])    Failing vascular bypass graft    Arterial hypotension    Non-traumatic rhabdomyolysis    Cardiomyopathy ([facility]    Decreased urine output  Resolved Problems:    * No resolved hospital problems. *    Age: 64 y.o. Gender: male  Gender Identity: male  Admitted Date: 7/8/2022  Consult Date: 07/10/22  MRN: [id number]  Valid Advanced Medical Directive: No   Consult: MD   Ethical Question(s) and Concern(s):  Can nephrology do dialysis with no consent because we have not yet located any family to be his legal surrogate. Process:  I spoke with the attending MD and reviewed the chart. Analysis:  While the medical/surgical teams are doing what they can to reach the legal surrogate, all life saving care is provided to the patient. Recommendations:  Provide all indicated life saving care for the patient. Proceed to find family members who can provide contact information for any family members. Closing: Thank you. Please call or perfect serve me with comments or questions.   Stefan Bianchi MD  N/A    Primary Ethical Theme: Primary Ethical Themes: Unrepresented patient  Secondary Ethical Theme:

## 2022-07-11 NOTE — PROGRESS NOTES
Right IJ Fabricio catheter exchanged from 20cm to a 15 cm by Dr. Gage Alvarez. Writer and Charge OFE bui at bedside. Patients vitals stable during procedure. Xray completed at bedside.

## 2022-07-11 NOTE — PROGRESS NOTES
Verified with Dr. Gary French that dressing changes were BID- YES. And photo sent by Dr. Jonatan Braun since he was bedside to update vascular team on current status. Pulses still doppler. No orders received at this time.

## 2022-07-12 ENCOUNTER — APPOINTMENT (OUTPATIENT)
Dept: GENERAL RADIOLOGY | Age: 62
DRG: 181 | End: 2022-07-12
Payer: COMMERCIAL

## 2022-07-12 LAB
ACTION: NORMAL
ALLEN TEST: ABNORMAL
ANION GAP SERPL CALCULATED.3IONS-SCNC: 11 MMOL/L (ref 9–17)
ANION GAP SERPL CALCULATED.3IONS-SCNC: 11 MMOL/L (ref 9–17)
ANION GAP SERPL CALCULATED.3IONS-SCNC: 12 MMOL/L (ref 9–17)
ANION GAP SERPL CALCULATED.3IONS-SCNC: 17 MMOL/L (ref 9–17)
BUN BLDV-MCNC: 37 MG/DL (ref 8–23)
BUN BLDV-MCNC: 38 MG/DL (ref 8–23)
BUN BLDV-MCNC: 41 MG/DL (ref 8–23)
BUN BLDV-MCNC: 43 MG/DL (ref 8–23)
CALCIUM IONIZED: 1.1 MMOL/L (ref 1.13–1.33)
CALCIUM IONIZED: 1.15 MMOL/L (ref 1.13–1.33)
CALCIUM IONIZED: 1.18 MMOL/L (ref 1.13–1.33)
CALCIUM IONIZED: 1.21 MMOL/L (ref 1.13–1.33)
CALCIUM SERPL-MCNC: 8.2 MG/DL (ref 8.6–10.4)
CALCIUM SERPL-MCNC: 8.5 MG/DL (ref 8.6–10.4)
CALCIUM SERPL-MCNC: 8.7 MG/DL (ref 8.6–10.4)
CALCIUM SERPL-MCNC: 9.1 MG/DL (ref 8.6–10.4)
CHLORIDE BLD-SCNC: 101 MMOL/L (ref 98–107)
CHLORIDE BLD-SCNC: 102 MMOL/L (ref 98–107)
CHLORIDE BLD-SCNC: 104 MMOL/L (ref 98–107)
CHLORIDE BLD-SCNC: 105 MMOL/L (ref 98–107)
CO2: 19 MMOL/L (ref 20–31)
CO2: 22 MMOL/L (ref 20–31)
CO2: 23 MMOL/L (ref 20–31)
CO2: 25 MMOL/L (ref 20–31)
CREAT SERPL-MCNC: 2.36 MG/DL (ref 0.7–1.2)
CREAT SERPL-MCNC: 2.82 MG/DL (ref 0.7–1.2)
CREAT SERPL-MCNC: 2.88 MG/DL (ref 0.7–1.2)
CREAT SERPL-MCNC: 3.03 MG/DL (ref 0.7–1.2)
DATE AND TIME: NORMAL
EKG ATRIAL RATE: 89 BPM
EKG P AXIS: 83 DEGREES
EKG P-R INTERVAL: 144 MS
EKG Q-T INTERVAL: 384 MS
EKG QRS DURATION: 84 MS
EKG QTC CALCULATION (BAZETT): 467 MS
EKG R AXIS: 2 DEGREES
EKG T AXIS: 130 DEGREES
EKG VENTRICULAR RATE: 89 BPM
FIO2: 30
GFR AFRICAN AMERICAN: 26 ML/MIN
GFR AFRICAN AMERICAN: 27 ML/MIN
GFR AFRICAN AMERICAN: 28 ML/MIN
GFR AFRICAN AMERICAN: 34 ML/MIN
GFR NON-AFRICAN AMERICAN: 21 ML/MIN
GFR NON-AFRICAN AMERICAN: 22 ML/MIN
GFR NON-AFRICAN AMERICAN: 23 ML/MIN
GFR NON-AFRICAN AMERICAN: 28 ML/MIN
GFR SERPL CREATININE-BSD FRML MDRD: ABNORMAL ML/MIN/{1.73_M2}
GLUCOSE BLD-MCNC: 112 MG/DL (ref 74–100)
GLUCOSE BLD-MCNC: 124 MG/DL (ref 75–110)
GLUCOSE BLD-MCNC: 129 MG/DL (ref 70–99)
GLUCOSE BLD-MCNC: 142 MG/DL (ref 74–100)
GLUCOSE BLD-MCNC: 145 MG/DL (ref 70–99)
GLUCOSE BLD-MCNC: 145 MG/DL (ref 75–110)
GLUCOSE BLD-MCNC: 147 MG/DL (ref 74–100)
GLUCOSE BLD-MCNC: 147 MG/DL (ref 75–110)
GLUCOSE BLD-MCNC: 150 MG/DL (ref 75–110)
GLUCOSE BLD-MCNC: 151 MG/DL (ref 75–110)
GLUCOSE BLD-MCNC: 152 MG/DL (ref 70–99)
GLUCOSE BLD-MCNC: 155 MG/DL (ref 70–99)
GLUCOSE BLD-MCNC: 160 MG/DL (ref 75–110)
GLUCOSE BLD-MCNC: 166 MG/DL (ref 75–110)
HCT VFR BLD CALC: 20.8 % (ref 40.7–50.3)
HCT VFR BLD CALC: 21.6 % (ref 40.7–50.3)
HCT VFR BLD CALC: 23 % (ref 40.7–50.3)
HCT VFR BLD CALC: 23.1 % (ref 40.7–50.3)
HEMOGLOBIN: 6.8 G/DL (ref 13–17)
HEMOGLOBIN: 7.2 G/DL (ref 13–17)
MAGNESIUM: 2.1 MG/DL (ref 1.6–2.6)
MAGNESIUM: 2.2 MG/DL (ref 1.6–2.6)
MCH RBC QN AUTO: 31.2 PG (ref 25.2–33.5)
MCH RBC QN AUTO: 31.4 PG (ref 25.2–33.5)
MCH RBC QN AUTO: 32.1 PG (ref 25.2–33.5)
MCH RBC QN AUTO: 32.1 PG (ref 25.2–33.5)
MCHC RBC AUTO-ENTMCNC: 31.2 G/DL (ref 28.4–34.8)
MCHC RBC AUTO-ENTMCNC: 31.3 G/DL (ref 28.4–34.8)
MCHC RBC AUTO-ENTMCNC: 32.7 G/DL (ref 28.4–34.8)
MCHC RBC AUTO-ENTMCNC: 33.3 G/DL (ref 28.4–34.8)
MCV RBC AUTO: 100.9 FL (ref 82.6–102.9)
MCV RBC AUTO: 96.4 FL (ref 82.6–102.9)
MCV RBC AUTO: 98.1 FL (ref 82.6–102.9)
MCV RBC AUTO: 99.6 FL (ref 82.6–102.9)
MODE: ABNORMAL
MYOGLOBIN: 6825 NG/ML (ref 28–72)
NEGATIVE BASE EXCESS, ART: 1 (ref 0–2)
NOTIFY: NORMAL
NRBC AUTOMATED: 0.6 PER 100 WBC
NRBC AUTOMATED: 0.6 PER 100 WBC
NRBC AUTOMATED: 0.7 PER 100 WBC
NRBC AUTOMATED: 0.8 PER 100 WBC
O2 DEVICE/FLOW/%: ABNORMAL
PARTIAL THROMBOPLASTIN TIME: 45.5 SEC (ref 20.5–30.5)
PARTIAL THROMBOPLASTIN TIME: 45.6 SEC (ref 20.5–30.5)
PARTIAL THROMBOPLASTIN TIME: 70.1 SEC (ref 20.5–30.5)
PDW BLD-RTO: 13.6 % (ref 11.8–14.4)
PDW BLD-RTO: 13.7 % (ref 11.8–14.4)
PDW BLD-RTO: 13.7 % (ref 11.8–14.4)
PDW BLD-RTO: 13.8 % (ref 11.8–14.4)
PHOSPHORUS: 3.1 MG/DL (ref 2.5–4.5)
PHOSPHORUS: 3.3 MG/DL (ref 2.5–4.5)
PHOSPHORUS: 3.6 MG/DL (ref 2.5–4.5)
PHOSPHORUS: 3.7 MG/DL (ref 2.5–4.5)
PLATELET # BLD: ABNORMAL K/UL (ref 138–453)
PLATELET, FLUORESCENCE: 128 K/UL (ref 138–453)
PLATELET, FLUORESCENCE: 131 K/UL (ref 138–453)
PLATELET, FLUORESCENCE: 145 K/UL (ref 138–453)
PLATELET, FLUORESCENCE: 162 K/UL (ref 138–453)
PLATELET, IMMATURE FRACTION: 11.6 % (ref 1.1–10.3)
PLATELET, IMMATURE FRACTION: 13.3 % (ref 1.1–10.3)
PLATELET, IMMATURE FRACTION: 13.7 % (ref 1.1–10.3)
PLATELET, IMMATURE FRACTION: 14.5 % (ref 1.1–10.3)
POC HCO3: 21 MMOL/L (ref 21–28)
POC HCO3: 24.3 MMOL/L (ref 21–28)
POC HCO3: 24.6 MMOL/L (ref 21–28)
POC HCO3: 24.6 MMOL/L (ref 21–28)
POC LACTIC ACID: 0.61 MMOL/L (ref 0.56–1.39)
POC LACTIC ACID: 0.99 MMOL/L (ref 0.56–1.39)
POC LACTIC ACID: 1.01 MMOL/L (ref 0.56–1.39)
POC LACTIC ACID: 1.1 MMOL/L (ref 0.56–1.39)
POC O2 SATURATION: 67 % (ref 94–98)
POC O2 SATURATION: 98 % (ref 94–98)
POC O2 SATURATION: 99 % (ref 94–98)
POC O2 SATURATION: 99 % (ref 94–98)
POC PCO2: 26.7 MM HG (ref 35–48)
POC PCO2: 37.8 MM HG (ref 35–48)
POC PCO2: 40.5 MM HG (ref 35–48)
POC PCO2: 42.4 MM HG (ref 35–48)
POC PH: 7.37 (ref 7.35–7.45)
POC PH: 7.39 (ref 7.35–7.45)
POC PH: 7.42 (ref 7.35–7.45)
POC PH: 7.5 (ref 7.35–7.45)
POC PO2: 100.8 MM HG (ref 83–108)
POC PO2: 107.2 MM HG (ref 83–108)
POC PO2: 150.3 MM HG (ref 83–108)
POC PO2: 35.9 MM HG (ref 83–108)
POSITIVE BASE EXCESS, ART: 0 (ref 0–3)
POTASSIUM SERPL-SCNC: 4.2 MMOL/L (ref 3.7–5.3)
POTASSIUM SERPL-SCNC: 4.3 MMOL/L (ref 3.7–5.3)
POTASSIUM SERPL-SCNC: 4.3 MMOL/L (ref 3.7–5.3)
POTASSIUM SERPL-SCNC: 4.4 MMOL/L (ref 3.7–5.3)
RBC # BLD: 2.12 M/UL (ref 4.21–5.77)
RBC # BLD: 2.24 M/UL (ref 4.21–5.77)
RBC # BLD: 2.29 M/UL (ref 4.21–5.77)
RBC # BLD: 2.31 M/UL (ref 4.21–5.77)
READ BACK: YES
SAMPLE SITE: ABNORMAL
SODIUM BLD-SCNC: 135 MMOL/L (ref 135–144)
SODIUM BLD-SCNC: 138 MMOL/L (ref 135–144)
SODIUM BLD-SCNC: 139 MMOL/L (ref 135–144)
SODIUM BLD-SCNC: 140 MMOL/L (ref 135–144)
TOTAL CK: ABNORMAL U/L (ref 39–308)
TROPONIN, HIGH SENSITIVITY: 157 NG/L (ref 0–22)
TROPONIN, HIGH SENSITIVITY: 164 NG/L (ref 0–22)
WBC # BLD: 13.9 K/UL (ref 3.5–11.3)
WBC # BLD: 13.9 K/UL (ref 3.5–11.3)
WBC # BLD: 14.1 K/UL (ref 3.5–11.3)
WBC # BLD: 17.6 K/UL (ref 3.5–11.3)

## 2022-07-12 PROCEDURE — 85055 RETICULATED PLATELET ASSAY: CPT

## 2022-07-12 PROCEDURE — 90945 DIALYSIS ONE EVALUATION: CPT | Performed by: INTERNAL MEDICINE

## 2022-07-12 PROCEDURE — 86850 RBC ANTIBODY SCREEN: CPT

## 2022-07-12 PROCEDURE — 93005 ELECTROCARDIOGRAM TRACING: CPT | Performed by: SURGERY

## 2022-07-12 PROCEDURE — 83735 ASSAY OF MAGNESIUM: CPT

## 2022-07-12 PROCEDURE — 85730 THROMBOPLASTIN TIME PARTIAL: CPT

## 2022-07-12 PROCEDURE — 85027 COMPLETE CBC AUTOMATED: CPT

## 2022-07-12 PROCEDURE — 2580000003 HC RX 258: Performed by: STUDENT IN AN ORGANIZED HEALTH CARE EDUCATION/TRAINING PROGRAM

## 2022-07-12 PROCEDURE — 2580000003 HC RX 258: Performed by: INTERNAL MEDICINE

## 2022-07-12 PROCEDURE — 2500000003 HC RX 250 WO HCPCS: Performed by: STUDENT IN AN ORGANIZED HEALTH CARE EDUCATION/TRAINING PROGRAM

## 2022-07-12 PROCEDURE — 6360000002 HC RX W HCPCS: Performed by: INTERNAL MEDICINE

## 2022-07-12 PROCEDURE — 6360000002 HC RX W HCPCS: Performed by: SURGERY

## 2022-07-12 PROCEDURE — 2500000003 HC RX 250 WO HCPCS: Performed by: HEALTH CARE PROVIDER

## 2022-07-12 PROCEDURE — 83605 ASSAY OF LACTIC ACID: CPT

## 2022-07-12 PROCEDURE — 6370000000 HC RX 637 (ALT 250 FOR IP): Performed by: STUDENT IN AN ORGANIZED HEALTH CARE EDUCATION/TRAINING PROGRAM

## 2022-07-12 PROCEDURE — P9016 RBC LEUKOCYTES REDUCED: HCPCS

## 2022-07-12 PROCEDURE — 82550 ASSAY OF CK (CPK): CPT

## 2022-07-12 PROCEDURE — 86900 BLOOD TYPING SEROLOGIC ABO: CPT

## 2022-07-12 PROCEDURE — 84484 ASSAY OF TROPONIN QUANT: CPT

## 2022-07-12 PROCEDURE — 2000000000 HC ICU R&B

## 2022-07-12 PROCEDURE — 2700000000 HC OXYGEN THERAPY PER DAY

## 2022-07-12 PROCEDURE — 6360000002 HC RX W HCPCS: Performed by: STUDENT IN AN ORGANIZED HEALTH CARE EDUCATION/TRAINING PROGRAM

## 2022-07-12 PROCEDURE — 86901 BLOOD TYPING SEROLOGIC RH(D): CPT

## 2022-07-12 PROCEDURE — 86920 COMPATIBILITY TEST SPIN: CPT

## 2022-07-12 PROCEDURE — 82947 ASSAY GLUCOSE BLOOD QUANT: CPT

## 2022-07-12 PROCEDURE — 36430 TRANSFUSION BLD/BLD COMPNT: CPT

## 2022-07-12 PROCEDURE — 71045 X-RAY EXAM CHEST 1 VIEW: CPT

## 2022-07-12 PROCEDURE — 94003 VENT MGMT INPAT SUBQ DAY: CPT

## 2022-07-12 PROCEDURE — 82330 ASSAY OF CALCIUM: CPT

## 2022-07-12 PROCEDURE — 6370000000 HC RX 637 (ALT 250 FOR IP): Performed by: NURSE PRACTITIONER

## 2022-07-12 PROCEDURE — 82803 BLOOD GASES ANY COMBINATION: CPT

## 2022-07-12 PROCEDURE — 84100 ASSAY OF PHOSPHORUS: CPT

## 2022-07-12 PROCEDURE — 83874 ASSAY OF MYOGLOBIN: CPT

## 2022-07-12 PROCEDURE — 37799 UNLISTED PX VASCULAR SURGERY: CPT

## 2022-07-12 PROCEDURE — 80048 BASIC METABOLIC PNL TOTAL CA: CPT

## 2022-07-12 RX ORDER — METHOCARBAMOL 750 MG/1
750 TABLET, FILM COATED ORAL EVERY 6 HOURS
Status: DISCONTINUED | OUTPATIENT
Start: 2022-07-12 | End: 2022-07-14

## 2022-07-12 RX ORDER — SODIUM CHLORIDE 9 MG/ML
INJECTION, SOLUTION INTRAVENOUS PRN
Status: DISCONTINUED | OUTPATIENT
Start: 2022-07-12 | End: 2022-07-13

## 2022-07-12 RX ORDER — DEXMEDETOMIDINE HYDROCHLORIDE 4 UG/ML
.1-1.5 INJECTION, SOLUTION INTRAVENOUS CONTINUOUS
Status: DISCONTINUED | OUTPATIENT
Start: 2022-07-12 | End: 2022-07-17

## 2022-07-12 RX ADMIN — POLYETHYLENE GLYCOL 3350 17 G: 17 POWDER, FOR SOLUTION ORAL at 08:37

## 2022-07-12 RX ADMIN — METHOCARBAMOL TABLETS 750 MG: 750 TABLET, COATED ORAL at 21:37

## 2022-07-12 RX ADMIN — METOCLOPRAMIDE 5 MG: 5 INJECTION, SOLUTION INTRAMUSCULAR; INTRAVENOUS at 18:12

## 2022-07-12 RX ADMIN — Medication 1500 ML/HR: at 09:30

## 2022-07-12 RX ADMIN — Medication 1500 ML/HR: at 00:22

## 2022-07-12 RX ADMIN — SODIUM CHLORIDE, PRESERVATIVE FREE 20 MG: 5 INJECTION INTRAVENOUS at 08:36

## 2022-07-12 RX ADMIN — CHLORHEXIDINE GLUCONATE 15 ML: 1.2 SOLUTION ORAL at 08:35

## 2022-07-12 RX ADMIN — SODIUM CHLORIDE: 9 INJECTION, SOLUTION INTRAVENOUS at 15:50

## 2022-07-12 RX ADMIN — Medication 1500 ML/HR: at 09:32

## 2022-07-12 RX ADMIN — Medication 1500 ML/HR: at 00:23

## 2022-07-12 RX ADMIN — ASPIRIN 81 MG: 81 TABLET, CHEWABLE ORAL at 08:35

## 2022-07-12 RX ADMIN — MIDODRINE HYDROCHLORIDE 5 MG: 5 TABLET ORAL at 18:12

## 2022-07-12 RX ADMIN — MIDODRINE HYDROCHLORIDE 5 MG: 5 TABLET ORAL at 12:04

## 2022-07-12 RX ADMIN — METOCLOPRAMIDE 5 MG: 5 INJECTION, SOLUTION INTRAMUSCULAR; INTRAVENOUS at 12:04

## 2022-07-12 RX ADMIN — VASOPRESSIN 0.04 UNITS/MIN: 20 INJECTION PARENTERAL at 21:02

## 2022-07-12 RX ADMIN — CEFTRIAXONE SODIUM 1000 MG: 1 INJECTION, POWDER, FOR SOLUTION INTRAMUSCULAR; INTRAVENOUS at 21:29

## 2022-07-12 RX ADMIN — SODIUM CHLORIDE, POTASSIUM CHLORIDE, SODIUM LACTATE AND CALCIUM CHLORIDE: 600; 310; 30; 20 INJECTION, SOLUTION INTRAVENOUS at 06:07

## 2022-07-12 RX ADMIN — Medication 1500 ML/HR: at 19:42

## 2022-07-12 RX ADMIN — METOCLOPRAMIDE 5 MG: 5 INJECTION, SOLUTION INTRAMUSCULAR; INTRAVENOUS at 23:57

## 2022-07-12 RX ADMIN — METOCLOPRAMIDE 5 MG: 5 INJECTION, SOLUTION INTRAMUSCULAR; INTRAVENOUS at 06:25

## 2022-07-12 RX ADMIN — PROPOFOL 20 MCG/KG/MIN: 10 INJECTION, EMULSION INTRAVENOUS at 04:09

## 2022-07-12 RX ADMIN — Medication 1500 ML/HR: at 19:43

## 2022-07-12 RX ADMIN — HEPARIN SODIUM 14 UNITS/KG/HR: 10000 INJECTION, SOLUTION INTRAVENOUS at 22:39

## 2022-07-12 RX ADMIN — DEXMEDETOMIDINE HYDROCHLORIDE 0.5 MCG/KG/HR: 4 INJECTION, SOLUTION INTRAVENOUS at 21:47

## 2022-07-12 RX ADMIN — METOCLOPRAMIDE 5 MG: 5 INJECTION, SOLUTION INTRAMUSCULAR; INTRAVENOUS at 00:47

## 2022-07-12 RX ADMIN — SODIUM CHLORIDE, PRESERVATIVE FREE 10 ML: 5 INJECTION INTRAVENOUS at 23:58

## 2022-07-12 RX ADMIN — Medication 1500 ML/HR: at 00:24

## 2022-07-12 RX ADMIN — HEPARIN SODIUM 14 UNITS/KG/HR: 10000 INJECTION, SOLUTION INTRAVENOUS at 07:43

## 2022-07-12 RX ADMIN — HEPARIN SODIUM 2000 UNITS: 1000 INJECTION INTRAVENOUS; SUBCUTANEOUS at 10:43

## 2022-07-12 RX ADMIN — Medication 1500 ML/HR: at 09:34

## 2022-07-12 RX ADMIN — MIDODRINE HYDROCHLORIDE 5 MG: 5 TABLET ORAL at 08:35

## 2022-07-12 RX ADMIN — SODIUM CHLORIDE, PRESERVATIVE FREE 10 ML: 5 INJECTION INTRAVENOUS at 08:37

## 2022-07-12 RX ADMIN — Medication 1500 ML/HR: at 19:44

## 2022-07-12 RX ADMIN — DEXMEDETOMIDINE HYDROCHLORIDE 0.4 MCG/KG/HR: 4 INJECTION, SOLUTION INTRAVENOUS at 12:43

## 2022-07-12 ASSESSMENT — PULMONARY FUNCTION TESTS
PIF_VALUE: 15
PIF_VALUE: 10
PIF_VALUE: 2
PIF_VALUE: 19
PIF_VALUE: 11
PIF_VALUE: 11
PIF_VALUE: 20
PIF_VALUE: 13
PIF_VALUE: 11
PIF_VALUE: 19
PIF_VALUE: 11
PIF_VALUE: 11
PIF_VALUE: 18
PIF_VALUE: 18
PIF_VALUE: 17
PIF_VALUE: 11
PIF_VALUE: 11
PIF_VALUE: 16
PIF_VALUE: 17
PIF_VALUE: 12

## 2022-07-12 NOTE — PROGRESS NOTES
Physical Therapy        Physical Therapy Cancel Note      DATE: 2022    NAME: Hanh Gallegos  MRN: [id number]   : 1960      Patient not seen this date for Physical Therapy due to: Other: intubated, however possible exutbation today.  PT will check back       Electronically signed by Harry Duval PT on 2022 at 10:56 AM

## 2022-07-12 NOTE — PROGRESS NOTES
Nephrology Progress Note      SUBJECTIVE      The patient is seen and evaluated in the setting of CVVHD. The patient remains on the ventilator but is much more awake and alert as his sedation has been weaned. There is a chance the patient may be extubated later today. Patient continues to have a dressing with a wound seeping leftlower extremity. The patient continues to have a temporary dialysis catheter in place for his CVVHD. That fluid removal is averaging about 40 mL an hour or so. The patient per the nurse, has had about 1.1 L of fluid removed with the CVVHD apparatus suggested a morning. There was a period of time and then patient was not able have dialysis secondary to clotting of the access. The patient has had 3 separate temporary dialysis catheters in place during this admission. The patient does have 0.9 normal saline running at 100 mL an hour pre-filter to help prevent clotting. He is not requiring any vasopressors at this time. He is awake and alert off the sedation. 69-year-old gentleman with a history of peripheral vascular disease, CABG, chronic hypertension and previous CVA presented with bilateral lower extremity numbness and pain rather sudden and ended up having CTA of the aorta showing occluded previous aortobifem graft. He underwent vascular surgery on 7/18/2022 where and he had thrombectomy of the aortobifem bypass, endarterectomy of the common femoral and profundofemoral and SFA bilaterally. Urine output had significantly dropped and he ended up requiring CVVHD which was initiated 7/10/2022. He was quite hypotensive requiring vasopressin and Levophed with vasopressors now off. Cardiac catheterization likely to occur later in the hospitalization. Cardiac echo shows very poor LV function. The patient continues oliguric. Intake is 2090 mL and output 3091 mL, with 313 mL of urine in the other 2778 mL via the CRRT machine.     OBJECTIVE      CURRENT TEMPERATURE:  Temp: 100 °F (37.8 °C)  MAXIMUM TEMPERATURE OVER 24HRS:  No data recorded.     CURRENT RESPIRATORY RATE:  Resp: 18  CURRENT PULSE:  Heart Rate: (!) 117  CURRENT BLOOD PRESSURE:  BP: 128/68  24HR BLOOD PRESSURE RANGE:  Systolic (80ANG), YKI:113 , Min:95 , GVM:253   ; Diastolic (04MYE), EWN:45, Min:54, Max:72    24HR INTAKE/OUTPUT:      Intake/Output Summary (Last 24 hours) at 7/12/2022 1110  Last data filed at 7/12/2022 1100  Gross per 24 hour   Intake 2034.55 ml   Output 3331 ml   Net -1296.45 ml     WEIGHT :  Patient Vitals for the past 96 hrs (Last 3 readings):   Weight   07/11/22 0600 232 lb 2.3 oz (105.3 kg)   07/10/22 0226 230 lb 9.6 oz (104.6 kg)   07/09/22 0600 255 lb 1.2 oz (115.7 kg)     PHYSICAL EXAM      GENERAL APPEARANCE: patient intubated and on the ventilator but awake and alert with sedation weaned this morning in preparation for possible extubation  SKIN: Dressing over the left distal lower extremity below the knee with seepage  EYES: conjunctivae pale and sclera anicteric  ENT: oral endotracheal tube in place  NECK: + JVD, midline trachea, no accessory muscle use  PULMONARY: bilateral air entry with equal breath sounds and no rhonchi or rales appreciated   CARDIOVASCULAR: tachycardic rate, regular rhythm with the patient off vasopressors with positive S1 and S2 and no rubs appreciated   ABDOMEN: soft, bowel sounds present, not significantly distended, no obvious ascites  EXTREMITIES: + Lower extremity bilateral edema, with dressing left lower extremity below the knee as documented above     CURRENT MEDICATIONS      dexmedetomidine (PRECEDEX) 400 mcg in sodium chloride 0.9 % 100 mL infusion, Continuous  famotidine (PEPCID) 20 mg in sodium chloride (PF) 10 mL injection, Daily  metoclopramide (REGLAN) injection 5 mg, Q6H  alteplase (CATHFLO) injection 1 mg, Once  heparin (porcine) injection 1,300 Units, PRN  heparin (porcine) injection 1,400 Units, PRN  0.9 % sodium chloride infusion, Continuous  aspirin chewable tablet 81 mg, Daily  midodrine (PROAMATINE) tablet 5 mg, TID WC  prismaSATE BK 0/3.5 5,000 mL with potassium chloride 10 mEq solution, Continuous  cefTRIAXone (ROCEPHIN) 1,000 mg in sterile water 10 mL IV syringe, Q24H  insulin regular (HUMULIN R;NOVOLIN R) 100 Units in sodium chloride 0.9 % 100 mL infusion, Continuous  heparin (porcine) injection 9,260 Units, PRN  heparin (porcine) injection 4,630 Units, PRN  sodium chloride flush 0.9 % injection 5-40 mL, 2 times per day  sodium chloride flush 0.9 % injection 5-40 mL, PRN  0.9 % sodium chloride infusion, PRN  polyethylene glycol (GLYCOLAX) packet 17 g, Daily  lactated ringers infusion, Continuous  ondansetron (ZOFRAN) injection 4 mg, Q6H PRN  chlorhexidine (PERIDEX) 0.12 % solution 15 mL, BID  propofol injection, Continuous  fentaNYL 50 mcg/mL 50 mL infusion, Continuous  heparin (porcine) injection 4,000 Units, PRN  heparin (porcine) injection 2,000 Units, PRN  heparin 25,000 units in dextrose 5 % 250 mL infusion (rate based), Continuous  norepinephrine (LEVOPHED) 16 mg in sodium chloride 0.9 % 250 mL infusion, Continuous  glucose chewable tablet 16 g, PRN  dextrose bolus 10% 125 mL, PRN   Or  dextrose bolus 10% 250 mL, PRN  glucagon (rDNA) injection 1 mg, PRN  dextrose 5 % solution, PRN          LABS      CBC:   Recent Labs     07/11/22  0627 07/11/22  0627 07/11/22  1241 07/11/22  1241 07/11/22 2001 07/12/22  0212 07/12/22  0753   WBC 19.8*   < > 14.6*   < > 11.6* 13.9* 14.1*   RBC 2.70*   < > 2.52*   < > 2.26* 2.24* 2.29*   HGB 8.7*   < > 8.1*   < > 7.3* 7.2* 7.2*   HCT 26.9*   < > 25.1*   < > 21.9* 21.6* 23.1*   MCV 99.6   < > 99.6   < > 96.9 96.4 100.9   MCH 32.2   < > 32.1   < > 32.3 32.1 31.4   MCHC 32.3   < > 32.3   < > 33.3 33.3 31.2   RDW 13.8   < > 13.7   < > 13.7 13.7 13.8      < > 132*   < > 114* See Reflexed IPF Result See Reflexed IPF Result   MPV 13.0  --  13.0  --  12.7  --   --     < > = values in this interval not displayed.       BMP: Recent Labs     07/11/22  1748 07/11/22  2355 07/12/22  0523    135 138   K 4.4 4.4 4.3    101 105   CO2 21 23 22   BUN 43* 43* 41*   CREATININE 3.40* 3.03* 2.88*   GLUCOSE 173* 152* 155*   CALCIUM 8.3* 8.2* 8.5*      BNP:No results found for: BNP  PHOSPHORUS:    Recent Labs     07/11/22 2001 07/12/22 0212 07/12/22  0753   PHOS 4.3 3.7 3.6     MAGNESIUM:   Recent Labs     07/11/22 2001 07/12/22 0212 07/12/22  0753   MG 2.2 2.1 2.1     ALBUMIN:   No results for input(s): LABALBU in the last 72 hours. URINE CREATININE:    Lab Results   Component Value Date/Time    LABCREA 141.6 07/10/2022 12:28 PM     URINE EOSINOPHILS:   Lab Results   Component Value Date/Time    UREO NONE SEEN 07/10/2022 12:28 PM     URINALYSIS:  U/A:   Lab Results   Component Value Date/Time    NITRU POSITIVE 07/10/2022 12:28 PM    [address] 07/10/2022 12:28 PM    PHUR 6.5 07/10/2022 12:28 PM    WBCUA 10 TO 20 07/10/2022 12:28 PM    RBCUA 20 TO 50 07/10/2022 12:28 PM    MUCUS 1+ 06/30/2012 09:40 PM    TRICHOMONAS NOT REPORTED 06/30/2012 09:40 PM    YEAST NOT REPORTED 06/30/2012 09:40 PM    BACTERIA MODERATE 07/10/2022 12:28 PM    SPECGRAV 1.025 07/10/2022 12:28 PM    LEUKOCYTESUR TRACE 07/10/2022 12:28 PM    UROBILINOGEN Normal 07/10/2022 12:28 PM    BILIRUBINUR NEGATIVE  Verified by ictotest. 07/10/2022 12:28 PM    BILIRUBINUR NEGATIVE 06/06/2012 05:45 AM    GLUCOSEU NEGATIVE 07/10/2022 12:28 PM    GLUCOSEU NEGATIVE 06/06/2012 05:45 AM    KETUA NEGATIVE 07/10/2022 12:28 PM    AMORPHOUS 3+ 07/10/2022 12:28 PM           ASSESSMENT      1. Acute Kidney Injury: ATN with contrast nephropathy, pigment nephropathy, shock requiring pressor support and now postop. Baseline creatinine seems to be around 1-1.2 mg/dl. 2. Hyperkalemia-likely due to underlying renal dysfunction, improved with CRRT  3. New onset cardiomyopathy with EF 15-25%, cardiology on board-will likely need cardiac catheterization later in the hospitalization.   4. Rhabdomyolysis  5. Hypotension-was requiring vasopressor support but now off vasopressors with the patient now with stable blood pressure and sinus tachycardia off sedation and still intubated  6. Total aortobifemoral occulusion s/p Bilateral common femoral arterial access via open cutdown, thrombectomy of the aortobifemoral bypass graft thromboendarterectomy of the common femoral profundofemoral and SFA bilaterally with bovine pericardial patch angioplasty on 7/8/2021  7. Left lower extremity compartment syndrome s/p fasciotomy, with dressing in place with drainage ongoing requiring consistent dressing changes  8. Acute respiratory failure, ventilator dependent    PLAN      1. He is currently maintaining -40 to -50 mL of negative fluid balance with CVVHD. CVVHD will be continued in a stable fashion. He is off vasopressors from this morning now that he is off sedation and is a bit uncomfortable and agitated on the ventilator off sedation  2. Monitor hemodynamics  3. Follow up labs ordered. 4. Following along       Please do not hesitate to call with questions. Discussed with the patient's ICU nurse at the bedside for an extended period.     This note is created with the assistance of a speech-recognition program. While intending to generate a document that actually reflects the content of the visit, no guarantees can be provided that every mistake has been identified and corrected by editing    Electronically signed by Luis Loo MD on 7/12/2022 at 11:10 AM

## 2022-07-12 NOTE — PLAN OF CARE
Problem: Respiratory - Adult  Goal: Achieves optimal ventilation and oxygenation  7/12/2022 0644 by Theo Beverly RCP  Flowsheets (Taken 7/12/2022 4641)  Achieves optimal ventilation and oxygenation:   Assess for changes in respiratory status   Assess for changes in mentation and behavior   Position to facilitate oxygenation and minimize respiratory effort   Oxygen supplementation based on oxygen saturation or arterial blood gases   Assess the need for suctioning and aspirate as needed   Respiratory therapy support as indicated

## 2022-07-12 NOTE — PROGRESS NOTES
[facility]   Progress Note                   Date:   7/12/2022  Patient name: Diane Bailon  Date of admission:  7/8/2022  4:56 AM  MRN:   [id number]  YOB: 1960  PCP: Charmaine Garcia (Inactive)    Reason for Admission:      Subjective:     Patient was seen and evaluated at bedside, remains intubated and sedated, on pressor support and heparin gtt. Labs and imaging reviewed, trop down trended, Cr improved. Hb 7.2. Plt 131. He is currently on ASA, midodrine. Medications:   Scheduled Meds:   famotidine (PEPCID) injection  20 mg IntraVENous Daily    metoclopramide  5 mg IntraVENous Q6H    alteplase  1 mg IntraCATHeter Once    aspirin  81 mg Oral Daily    midodrine  5 mg Oral TID WC    cefTRIAXone (ROCEPHIN) IV  1,000 mg IntraVENous Q24H    sodium chloride flush  5-40 mL IntraVENous 2 times per day    polyethylene glycol  17 g Oral Daily    chlorhexidine  15 mL Mouth/Throat BID       Continuous Infusions:   sodium chloride 100 mL/hr at 07/11/22 2114    CRRT dialysis builder 1,500 mL/hr (07/12/22 0024)    insulin (HUMAN R) non-weight based infusion 0.87 Units/hr (07/12/22 0625)    sodium chloride      lactated ringers 50 mL/hr at 07/12/22 0625    propofol 15 mcg/kg/min (07/12/22 0625)    fentaNYL 50 mcg/mL 75 mcg/hr (07/12/22 0625)    heparin (PORCINE) Infusion 14 Units/kg/hr (07/12/22 0743)    norepinephrine 12 mcg/min (07/12/22 0608)    dextrose         CBC:   Recent Labs     07/11/22 2001 07/12/22  0212 07/12/22  0753   WBC 11.6* 13.9* 14.1*   HGB 7.3* 7.2* 7.2*   * See Reflexed IPF Result See Reflexed IPF Result     BMP:    Recent Labs     07/11/22  1748 07/11/22  2355 07/12/22  0523    135 138   K 4.4 4.4 4.3    101 105   CO2 21 23 22   BUN 43* 43* 41*   CREATININE 3.40* 3.03* 2.88*   GLUCOSE 173* 152* 155*     Hepatic:   No results for input(s): AST, ALT, ALB, BILITOT, ALKPHOS in the last 72 hours.   Troponin: No results for input(s): TROPONINI in the last 72 hours. BNP: No results for input(s): BNP in the last 72 hours. Lipids: No results for input(s): CHOL, HDL in the last 72 hours. Invalid input(s): LDLCALCU  INR:   Recent Labs     07/11/22  1748   INR 0.9       Objective:   Vitals: /68   Pulse 78   Temp 100 °F (37.8 °C) (Bladder)   Resp 18   Ht 5' 10.5\" (1.791 m) Comment: no inital recorded height; 9/13/12 office visit  Wt 232 lb 2.3 oz (105.3 kg)   SpO2 98%   BMI 32.84 kg/m²       HEENT: Intubated and sedated   Head: Normocephalic, no lesions, without obvious abnormality  Neck: no JVD  Lungs: clear to auscultation bilaterally, no basilar rales, no wheezing   Heart: regular rate and rhythm, S1, S2 normal, no murmur, click, rub or gallop  Abdomen: soft, non-tender; bowel sounds normal  Extremities: No LE edema      ECHO   Summary  Poor sound transmission. Global left ventricular systolic function is severely reduced. Calculated  ejection fraction 23% by Andrade's method. Visually estimated EF 15%. Akinetic apex. No significant valvular regurgitation or stenosis seen. Assessment / Acute Cardiac Problems:   Assessment      1. 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 Patient is currently intubated and sedated. 2. EKG  showed normal sinus rhythm with deep T wave inversion in anterior lead. 3. Currently on heparin drip for anticoagulation. Started on ASA 81 mg daily, continue the same. 4. 2D echocardiography showed EF 15 % with akinetic apex. 5. Plan for left heart catheterization when other co morbidities resolve. Will need nephrology clearance for cardia cath. 6. Will try to obtain records from [facility] regarding prior CABG in 2008. Discussed with patient and nursing.      Ara De Jesus MD  Fellow, [address] [facility]    Attending note,   Seen and examined agree with above.  Intubated.  Off pressors.  Echocardiogram showed severely reduced ejection fraction.  On IV heparin for non-STEMI. Luz Salvador does have previous history of CABG.  We will try to obtain records.  Acute kidney injury with possible CVVHD.  Rhabdomyolysis.  S/p thrombectomy for occluded lower extremity graft.  Rhabdomyolysis.    Wean Off pressors.   The patient will need coronary angiography when comorbidities resolve.

## 2022-07-12 NOTE — PROGRESS NOTES
Comprehensive Nutrition Assessment    Type and Reason for Visit:  Reassess    Nutrition Recommendations/Plan:   -Restart TF vs start oral diet, as able. -If TF resumes, suggest change to Peptide Based High Protein formula goal 50 mL/hr with propofol running (=1200 kcal, 105 g pro/day from TF). -Will continue to follow. Malnutrition Assessment:  Malnutrition Status:  Insufficient data (07/09/22 1214)    Context:  Chronic Illness     Findings of the 6 clinical characteristics of malnutrition:  Energy Intake:  Unable to assess  Weight Loss:  Unable to assess     Body Fat Loss:  No significant body fat loss     Muscle Mass Loss:  No significant muscle mass loss    Fluid Accumulation: Moderate,  Generalized,Extremities   Strength:  Not Performed    Nutrition Assessment:    Chart reviewed. Pt remains on vent and CRRT at this time. Per RN, Renal TF currently on hold for possible extubation, but was tolerating well at 25 mL/hr prior to hold. Meds/labs reviewed. Nutrition Related Findings:    Meds/labs reviewed Wound Type: Surgical Incision (L leg)       Current Nutrition Intake & Therapies:    Average Meal Intake: NPO  Average Supplements Intake: NPO  Diet NPO  ADULT TUBE FEEDING; Nasogastric; Renal Formula; Continuous; 25; No; 30; Q 4 hours  Current Tube Feeding (TF) Orders:  · Feeding Route: Nasogastric  · Formula: Renal Formula  · Schedule: Continuous  · Feeding Regimen: 25 mL/hr --currently on hold  · Current TF & Flush Orders Provides: Renal at 25 mL/hr =1080 kcal and 49 g pro/day    Additional Calorie Sources:  Propofol off at time of visit for possible extubation; was at 10.4 mL/hr =up to 275 kcal/day      Anthropometric Measures:  Height: 5' 10.5\" (179.1 cm) (no inital recorded height; 9/13/12 office visit)  Ideal Body Weight (IBW): 169 lbs (77 kg)    Admission Body Weight: 255 lb (115.7 kg)  Current Body Weight: 232 lb 2.3 oz (105.3 kg), 150.9 % IBW.  Weight Source: Bed Scale  Current BMI (kg/m2): 32.8                          BMI Categories: Obese Class 1 (BMI 30.0-34. 9)    Estimated Daily Nutrient Needs:  Energy Requirements Based On: Kcal/kg  Weight Used for Energy Requirements: Current  Energy (kcal/day): 1500 kcal/day  Weight Used for Protein Requirements: Ideal  Protein (g/day): 115 g pro/day  Method Used for Fluid Requirements: Other (Comment)  Fluid (ml/day): per MD    Nutrition Diagnosis:   · Inadequate oral intake related to impaired respiratory function as evidenced by NPO or clear liquid status due to medical condition,nutrition support - enteral nutrition    Nutrition Interventions:   Food and/or Nutrient Delivery: Restart TF vs start oral diet as able. If TF resumes, suggest change to Peptide Based High Protein formula goal 50 mL/hr with propofol running (=1200 kcal, 105 g pro/day from TF). Nutrition Education/Counseling: No recommendation at this time  Coordination of Nutrition Care: Continue to monitor while inpatient       Goals:  Previous Goal Met: Progressing toward Goal(s)  Goals: Tolerate nutrition support at goal rate,by next RD assessment       Nutrition Monitoring and Evaluation:   Behavioral-Environmental Outcomes: None Identified  Food/Nutrient Intake Outcomes: Enteral Nutrition Intake/Tolerance,Diet Advancement/Tolerance  Physical Signs/Symptoms Outcomes: Biochemical Data,Nutrition Focused Physical Findings,Skin,Weight,Fluid Status or Edema    Discharge Planning:     Too soon to determine     [address], LD  Contact: [phone]

## 2022-07-12 NOTE — PLAN OF CARE
Problem: Respiratory - Adult  Goal: Achieves optimal ventilation and oxygenation  7/12/2022 0914 by Mindy Ambriz RCP  Outcome: Progressing

## 2022-07-12 NOTE — PROGRESS NOTES
Division of Vascular Surgery             Progress Note      Name: Cindy Bearden  MRN: [id number]         Overnight Events:     Nothing acute overnight. Subjective:     Patient seen and examined at the bedside. Continues to be intubated on sedation, had some increase in pressor support overnight, CRRT running net negative. Tolerating low rate TF, no BM. Physical Exam:     Vitals:  BP (!) 141/72   Pulse 81   Temp 100 °F (37.8 °C) (Bladder)   Resp 18   Ht 5' 10.5\" (1.791 m) Comment: no inital recorded height; 9/13/12 office visit  Wt 232 lb 2.3 oz (105.3 kg)   SpO2 98%   BMI 32.84 kg/m²     General appearance - intubated and sedated  Mental status - sedated, intermittently responsive to voice and pain  Head - normocephalic and atraumatic  Chest - intubated on ventilator PVRC, FiO2 30%, PEEP 8,    Heart - normal rate, regular rhythm on monitor  Abdomen - soft, non-tender, non-distended  Neurological - sedated on propofol and fentanyl  Extremities -  edema to RLE, unable to illicit any motor function from either lower extremity although this may be due to pt's mental status/sedation, LLE fasciotomy incision  Skin - BL groin incisions clean and dry, no signs of infection  Vascular Exam - doppler signals; strong left DP, weak left PT, weak right PT       Imaging:   XR CHEST (SINGLE VIEW FRONTAL)    Result Date: 7/10/2022  Right IJ CVC catheter tip overlies the superior cavoatrial junction. Left IJ CVC catheter tip overlies the mid/distal SVC. No pneumothorax is seen. Mild left basilar atelectasis. XR CHEST PORTABLE    Result Date: 7/11/2022  Stable chest.     XR CHEST PORTABLE    Result Date: 7/11/2022  1. The deeper temporary dialysis catheter on the previous exam is been removed, with a new temporary dialysis catheter seen overlying the proximal superior vena cava. 2. Other tubes and lines as above. 3. Patchy infiltrates in the lungs bilaterally which may represent edema or pneumonia. XR CHEST PORTABLE    Result Date: 7/11/2022  1. The right jugular catheter appears in a stable position with the tip at the cavoatrial junction. 2. Endotracheal tube, nasogastric tube, and left jugular line are also redemonstrated. 3.  Some hazy opacities in the lungs which could be subtle edema or inflammatory. XR CHEST PORTABLE    Result Date: 7/10/2022  1. Tubes and right IJ line as detailed above. 2. Improvement in aeration of both lung since the prior study with residual airspace disease at the left upper and left lower lung zones as well as right parahilar region. No new focal airspace disease. 3. Prior median sternotomy and CABG. Assessment:     1. 63 y/o male critically ill s/p BL femoral artery cutdown with thrombectomy of aortobifemoral graft, LLE fasciotomy, bilateral lower extremity angiography      Plan:     1. Appreciate critical care management from surgical critical care  2. Cont neurovascular checks  3. Continue heparin ggt  4. Appreciate nephrology recommendations  5. Follow up hypercoagulable work up  6.  Fasciotomy dressing changes BID with wet curlex over fasciotomy incisions    Emily Randall DO PGY 4  General Surgery Resident  07/12/22 7:01 AM        [address] [facility]: [phone]

## 2022-07-12 NOTE — PROGRESS NOTES
Patient tolerated cpap ps throughout the day. Not able to respond enough to commands to wean to extubation. Placed back on AC/PRVC per trauma team.  Will attempt again tomorrow.

## 2022-07-12 NOTE — PROGRESS NOTES
PROGRESS NOTE          PATIENT NAME: Rancho Sauer  MEDICAL RECORD NO. [id number]  DATE: 2022  SURGEON: Adria Small MD  PRIMARY CARE PHYSICIAN: Umberto Mena (Inactive)    HD: # 4    ASSESSMENT    Patient Active Problem List   Diagnosis    Seasonal allergies    Anxiety    Carotid artery occlusion    CAD (coronary artery disease)    Hypertension    Neuropathy    [facility]    HTN (hypertension)    Arthritis associated with another disorder    Hernia, hiatal    Hyperlipidemia    CAD (coronary artery disease)    Critical ischemia of lower extremity [facility]    CAROLYN (acute kidney injury) ([facility]    Hyperkalemia    Encounter for continuous venovenous hemodiafiltration (CVVHD) ([facility])    Failing vascular bypass graft    Arterial hypotension    Non-traumatic rhabdomyolysis    Cardiomyopathy ([facility]    Decreased urine output       MEDICAL DECISION MAKING AND PLAN    1. Neurological  1. Fentanyl and propofol drips   2. Hx of CVA with residual right sided weakness  2. Cardiovascular  1. HR: 74 - 1116  2. MAP: 66 - 90  3. Levophed gtt to maintain MAP > 65  4. Hx of aortobifemoral graft and CABGx3  5. Total aortobifemoral occulusion. S/p  BL common fem art cut down. Thrombectomy of the aortobifemoral bypass. Thromboendarterectomy of the common femoral profundofemoral and SFA bilaterally with bovine pericardial patch angioplasty 2022. 6. Continue heparin gtt  7. Elevated troponins: Continue to downtrend  8. Echo EF 15-23% with akinetic apex. 1. Cardiology following   2. Cards recommends cath when more stable, will need nephrology assistance in clearance. 3. ASA 81 mg daily   3. Heme:  1. Hb: 7.2 (7.3)  2. Plt: 145 (114)  4. Respiratory  1. PRVC: 550/18/8/30%  2. AB.42/38/101/24  3. PF: 336  4. CXR: stable   5. Gastrointestinal  1. NPO; trickle feeds   2. GI Prophylaxis pepcid  6. FEN/Renal  1. UOP: 0.2 cc/kg/hr over past 24 hours   2.  Na/K+ : 135 (136) / 4.4 (4. 4)  3. BUN/Cr: 43 (43) / 3.03 (3.4)  1. CAROLYN secondary to rhabdo. Normal Cr on arrival  4. Nephrology consulted and initiated CRRT on 7/10 for acute renal failure. Follow up renal u/s and further nephro recs   5. Rhabdomyolysis. Enzymes down trending.   1. CK: 11k (19k)  2. Noman: 12k (18k)   6. IVF: LR 125cc/hr  7. ID  1. Tmax: 37.8  2. WBC: 13.9 (11.6)  3. UTI positive on 7/10. Rocephin q24 x7days  4. Ancef q8 24hrs. Ended    5. Vanc and zosyn given in ED  8. Endo  1. B  2. Insulin drip: 24 units over past 24 hours   9. MSK  1. Concern for compartment syndrome in LLE  2. S/p left calf fasciotomy 22  3. Per ortho unlikely compartment syndrome in L thigh s/o  10. Dispo   1. Continue ICU care   11. Lines  1. ETT, OGT, saldana, right brachial arterial line, R IJ temporary dialysis catheter, L IJ CVC          Chief Complaint: LLE weakness    SUBJECTIVE    Jose C Maru no acute events overnight. Patient tolerated CVVHD well. Still requiring levophed for pressor support. Plan to attempt extubation today    OBJECTIVE  VITALS: Temp: Temp: 100 °F (37.8 °C)No data recorded BP Systolic (59YRX), KTB:661 , Min:91 , WBL:904   Diastolic (86XDE), PSR:25, Min:54, Max:73   Pulse Pulse  Av.7  Min: 74  Max: 116 Resp No data recorded Pulse ox SpO2  Av.6 %  Min: 93 %  Max: 100 %  GENERAL: intubated, sedated, no apparent distress   NEURO: intubated, sedated   HEENT: PERRLA  LUNGS: symmetric chest rise and fall; tolerating mechanical ventilation   HEART: normal rate and regular rhythm  ABDOMEN: soft, non-tender, non-distended  EXTREMITY: LLE +3 pitting edema. BL popliteal, DP/PT pulses on doppler    I/O last 3 completed shifts: In: 1514.6 [I.V.:1414.6; IV Piggyback:100]  Out:  [Urine:470]    Drain/tube output:   In: 193.1 [I.V.:1088.1; NG/GT:751]  Out:  [Urine:398]    LAB:  CBC:   Recent Labs     22  1241 22  0212   WBC 14.6* 11.6* 13.9*   HGB 8.1* 7.3* 7.2*   HCT 25.1* 21.9* 21.6*   MCV 99.6 96.9 96.4   * 114* See Reflexed IPF Result     BMP:   Recent Labs     07/11/22  1030 07/11/22  1748 07/11/22  2355    136 135   K 4.8 4.4 4.4    104 101   CO2 24 21 23   BUN 42* 43* 43*   CREATININE 3.39* 3.40* 3.03*   GLUCOSE 137* 173* 152*     COAGS:   Recent Labs     07/11/22  1748 07/11/22 2001 07/12/22  0212   APTT 21.1 24.2 45.5*   INR 0.9  --   --        RADIOLOGY:  XR CHEST (SINGLE VIEW FRONTAL)    Result Date: 7/11/2022  Right IJ CVC catheter tip overlies the superior cavoatrial junction. Left IJ CVC catheter tip overlies the mid/distal SVC. No pneumothorax is seen. Mild left basilar atelectasis. CT HEAD WO CONTRAST    Result Date: 7/8/2022  No acute intracranial abnormality. 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 Mild diffuse cerebral atrophy and minimal chronic white matter ischemic change. Findings suggestive of acute on chronic right maxillary sinusitis. Correlate clinically. US RENAL COMPLETE    Result Date: 7/11/2022  Unremarkable kidneys. Hepatic steatosis. XR CHEST PORTABLE    Result Date: 7/11/2022  Mild bibasilar opacities compatible with atelectasis. Aspiration and pneumonia are not excluded. XR CHEST PORTABLE    Result Date: 7/11/2022  Stable chest.     XR CHEST PORTABLE    Result Date: 7/11/2022  1. The deeper temporary dialysis catheter on the previous exam is been removed, with a new temporary dialysis catheter seen overlying the proximal superior vena cava. 2. Other tubes and lines as above. 3. Patchy infiltrates in the lungs bilaterally which may represent edema or pneumonia. XR CHEST PORTABLE    Result Date: 7/11/2022  1. The right jugular catheter appears in a stable position with the tip at the cavoatrial junction. 2. Endotracheal tube, nasogastric tube, and left jugular line are also redemonstrated. 3.  Some hazy opacities in the lungs which could be subtle edema or inflammatory.      XR CHEST PORTABLE    Result Date: 7/10/2022  1. Tubes and right IJ line as detailed above. 2. Improvement in aeration of both lung since the prior study with residual airspace disease at the left upper and left lower lung zones as well as right parahilar region. No new focal airspace disease. 3. Prior median sternotomy and CABG. XR CHEST PORTABLE    Result Date: 7/9/2022  1. Tubes and right IJ approach central venous catheter as detailed above. 2. Bilateral airspace disease, likely multifocal pneumonia, left greater than right as detailed above. Follow-up is recommended to document resolution. XR CHEST PORTABLE    Result Date: 7/8/2022  Scattered pulmonary opacities consistent with multifocal airspace disease. Poor tubes and lines as above. XR CHEST PORTABLE    Result Date: 7/8/2022  Scattered pulmonary infiltrates concerning for pneumonia. CTA CHEST ABDOMEN AORTA RUNOFF RECON    Result Date: 7/8/2022  Occluded aortobifemoral graft. Prior infrarenal dissecting hematoma, as reported; occluded native infrarenal aorta. Reconstituted flow in moderate-severely diseased iliofemoral arteries. Patent right common femoral below-knee graft. Likely occluded left popliteal artery; no below-knee contrast enhancement demonstrated on this study (timing versus absent flow). Multiple additional vascular findings, as above. Interval worsening emphysema and mild interstitial disease as compared to 2012. Interval bullous changes upper lung zones, multicystic/larger on right with mild peripheral enhancement/small posterior ovoid fluid collection suggesting some degree of inflammation or infection; no large fluid-fluid level or solid elements suggesting significant infection/fungal disease. No consolidation, PTX or sizable pleural effusion. Hiatus hernia and esophageal mural prominence. Correlate for esophagitis. Edema/soft tissue swelling below knee on the right. Mild soft tissue swelling left distal calf/foot.   Some muscular atrophy right lower extremity. Additional unchanged or incidental findings, as detailed in the body of the report above. Findings were discussed with Dr. Felicia Acuna at 9:08 am on 7/8/2022.          Patra Sandhoff, DO  7/12/22, 5:42 AM

## 2022-07-13 ENCOUNTER — APPOINTMENT (OUTPATIENT)
Dept: GENERAL RADIOLOGY | Age: 62
DRG: 181 | End: 2022-07-13
Payer: COMMERCIAL

## 2022-07-13 LAB
ABO/RH: NORMAL
ALLEN TEST: ABNORMAL
ALLEN TEST: ABNORMAL
ANION GAP SERPL CALCULATED.3IONS-SCNC: 12 MMOL/L (ref 9–17)
ANION GAP SERPL CALCULATED.3IONS-SCNC: 12 MMOL/L (ref 9–17)
ANION GAP SERPL CALCULATED.3IONS-SCNC: 16 MMOL/L (ref 9–17)
ANION GAP SERPL CALCULATED.3IONS-SCNC: 9 MMOL/L (ref 9–17)
ANTIBODY SCREEN: NEGATIVE
ARM BAND NUMBER: NORMAL
BLD PROD TYP BPU: NORMAL
BLOOD BANK BLOOD PRODUCT EXPIRATION DATE: NORMAL
BLOOD BANK ISBT PRODUCT BLOOD TYPE: 6200
BLOOD BANK PRODUCT CODE: NORMAL
BLOOD BANK UNIT TYPE AND RH: NORMAL
BPU ID: NORMAL
BUN BLDV-MCNC: 32 MG/DL (ref 8–23)
BUN BLDV-MCNC: 33 MG/DL (ref 8–23)
BUN BLDV-MCNC: 34 MG/DL (ref 8–23)
BUN BLDV-MCNC: 34 MG/DL (ref 8–23)
CALCIUM IONIZED: 1.2 MMOL/L (ref 1.13–1.33)
CALCIUM IONIZED: 1.21 MMOL/L (ref 1.13–1.33)
CALCIUM IONIZED: 1.23 MMOL/L (ref 1.13–1.33)
CALCIUM IONIZED: 1.25 MMOL/L (ref 1.13–1.33)
CALCIUM SERPL-MCNC: 8.9 MG/DL (ref 8.6–10.4)
CALCIUM SERPL-MCNC: 9 MG/DL (ref 8.6–10.4)
CALCIUM SERPL-MCNC: 9.2 MG/DL (ref 8.6–10.4)
CALCIUM SERPL-MCNC: 9.6 MG/DL (ref 8.6–10.4)
CHLORIDE BLD-SCNC: 101 MMOL/L (ref 98–107)
CHLORIDE BLD-SCNC: 101 MMOL/L (ref 98–107)
CHLORIDE BLD-SCNC: 103 MMOL/L (ref 98–107)
CHLORIDE BLD-SCNC: 104 MMOL/L (ref 98–107)
CO2: 21 MMOL/L (ref 20–31)
CO2: 21 MMOL/L (ref 20–31)
CO2: 23 MMOL/L (ref 20–31)
CO2: 23 MMOL/L (ref 20–31)
CREAT SERPL-MCNC: 1.85 MG/DL (ref 0.7–1.2)
CREAT SERPL-MCNC: 1.9 MG/DL (ref 0.7–1.2)
CREAT SERPL-MCNC: 1.94 MG/DL (ref 0.7–1.2)
CREAT SERPL-MCNC: 2.15 MG/DL (ref 0.7–1.2)
CROSSMATCH RESULT: NORMAL
CULTURE: NORMAL
CULTURE: NORMAL
DISPENSE STATUS BLOOD BANK: NORMAL
EKG ATRIAL RATE: 87 BPM
EKG P AXIS: 51 DEGREES
EKG P-R INTERVAL: 150 MS
EKG Q-T INTERVAL: 416 MS
EKG QRS DURATION: 80 MS
EKG QTC CALCULATION (BAZETT): 500 MS
EKG R AXIS: 6 DEGREES
EKG T AXIS: -98 DEGREES
EKG VENTRICULAR RATE: 87 BPM
EXPIRATION DATE: NORMAL
FIO2: 30
FIO2: 30
GFR AFRICAN AMERICAN: 38 ML/MIN
GFR AFRICAN AMERICAN: 43 ML/MIN
GFR AFRICAN AMERICAN: 44 ML/MIN
GFR AFRICAN AMERICAN: 45 ML/MIN
GFR NON-AFRICAN AMERICAN: 31 ML/MIN
GFR NON-AFRICAN AMERICAN: 35 ML/MIN
GFR NON-AFRICAN AMERICAN: 36 ML/MIN
GFR NON-AFRICAN AMERICAN: 37 ML/MIN
GFR SERPL CREATININE-BSD FRML MDRD: ABNORMAL ML/MIN/{1.73_M2}
GLUCOSE BLD-MCNC: 119 MG/DL (ref 75–110)
GLUCOSE BLD-MCNC: 123 MG/DL (ref 70–99)
GLUCOSE BLD-MCNC: 125 MG/DL (ref 70–99)
GLUCOSE BLD-MCNC: 126 MG/DL (ref 75–110)
GLUCOSE BLD-MCNC: 144 MG/DL (ref 75–110)
GLUCOSE BLD-MCNC: 147 MG/DL (ref 75–110)
GLUCOSE BLD-MCNC: 149 MG/DL (ref 75–110)
GLUCOSE BLD-MCNC: 149 MG/DL (ref 75–110)
GLUCOSE BLD-MCNC: 154 MG/DL (ref 74–100)
GLUCOSE BLD-MCNC: 157 MG/DL (ref 75–110)
GLUCOSE BLD-MCNC: 158 MG/DL (ref 75–110)
GLUCOSE BLD-MCNC: 158 MG/DL (ref 75–110)
GLUCOSE BLD-MCNC: 159 MG/DL (ref 70–99)
GLUCOSE BLD-MCNC: 160 MG/DL (ref 75–110)
GLUCOSE BLD-MCNC: 160 MG/DL (ref 75–110)
GLUCOSE BLD-MCNC: 181 MG/DL (ref 70–99)
GLUCOSE BLD-MCNC: 186 MG/DL (ref 74–100)
HCO3 VENOUS: 24.5 MMOL/L (ref 22–29)
HCT VFR BLD CALC: 23.4 % (ref 40.7–50.3)
HCT VFR BLD CALC: 23.7 % (ref 40.7–50.3)
HCT VFR BLD CALC: 24.1 % (ref 40.7–50.3)
HEMOGLOBIN: 7.7 G/DL (ref 13–17)
HEMOGLOBIN: 7.8 G/DL (ref 13–17)
HEMOGLOBIN: 7.9 G/DL (ref 13–17)
Lab: NORMAL
Lab: NORMAL
MAGNESIUM: 1.8 MG/DL (ref 1.6–2.6)
MAGNESIUM: 1.9 MG/DL (ref 1.6–2.6)
MAGNESIUM: 2.1 MG/DL (ref 1.6–2.6)
MCH RBC QN AUTO: 31.1 PG (ref 25.2–33.5)
MCH RBC QN AUTO: 31.5 PG (ref 25.2–33.5)
MCH RBC QN AUTO: 31.7 PG (ref 25.2–33.5)
MCHC RBC AUTO-ENTMCNC: 32.8 G/DL (ref 28.4–34.8)
MCHC RBC AUTO-ENTMCNC: 32.9 G/DL (ref 28.4–34.8)
MCHC RBC AUTO-ENTMCNC: 32.9 G/DL (ref 28.4–34.8)
MCV RBC AUTO: 94.4 FL (ref 82.6–102.9)
MCV RBC AUTO: 96 FL (ref 82.6–102.9)
MCV RBC AUTO: 96.3 FL (ref 82.6–102.9)
NEGATIVE BASE EXCESS, ART: 1 (ref 0–2)
NRBC AUTOMATED: 0.7 PER 100 WBC
NRBC AUTOMATED: 0.8 PER 100 WBC
NRBC AUTOMATED: 0.9 PER 100 WBC
O2 DEVICE/FLOW/%: ABNORMAL
O2 DEVICE/FLOW/%: ABNORMAL
O2 SAT, VEN: 99 % (ref 60–85)
PARTIAL THROMBOPLASTIN TIME: 60 SEC (ref 20.5–30.5)
PARTIAL THROMBOPLASTIN TIME: 61.9 SEC (ref 20.5–30.5)
PCO2, VEN: 37.3 MM HG (ref 41–51)
PDW BLD-RTO: 14.6 % (ref 11.8–14.4)
PDW BLD-RTO: 15.1 % (ref 11.8–14.4)
PDW BLD-RTO: 15.2 % (ref 11.8–14.4)
PH VENOUS: 7.42 (ref 7.32–7.43)
PHOSPHORUS: 2.2 MG/DL (ref 2.5–4.5)
PHOSPHORUS: 2.5 MG/DL (ref 2.5–4.5)
PHOSPHORUS: 3.5 MG/DL (ref 2.5–4.5)
PHOSPHORUS: 3.5 MG/DL (ref 2.5–4.5)
PHOSPHORUS: 4 MG/DL (ref 2.5–4.5)
PLATELET # BLD: ABNORMAL K/UL (ref 138–453)
PLATELET, FLUORESCENCE: 132 K/UL (ref 138–453)
PLATELET, FLUORESCENCE: 143 K/UL (ref 138–453)
PLATELET, FLUORESCENCE: 145 K/UL (ref 138–453)
PLATELET, IMMATURE FRACTION: 14.7 % (ref 1.1–10.3)
PLATELET, IMMATURE FRACTION: 15.1 % (ref 1.1–10.3)
PLATELET, IMMATURE FRACTION: 15.8 % (ref 1.1–10.3)
PO2, VEN: 138.8 MM HG (ref 30–50)
POC HCO3: 24 MMOL/L (ref 21–28)
POC LACTIC ACID: 0.81 MMOL/L (ref 0.56–1.39)
POC LACTIC ACID: 0.87 MMOL/L (ref 0.56–1.39)
POC O2 SATURATION: 99 % (ref 94–98)
POC PCO2: 38.6 MM HG (ref 35–48)
POC PH: 7.4 (ref 7.35–7.45)
POC PO2: 142.5 MM HG (ref 83–108)
POSITIVE BASE EXCESS, VEN: 0 (ref 0–3)
POTASSIUM SERPL-SCNC: 3.9 MMOL/L (ref 3.7–5.3)
POTASSIUM SERPL-SCNC: 3.9 MMOL/L (ref 3.7–5.3)
POTASSIUM SERPL-SCNC: 4.1 MMOL/L (ref 3.7–5.3)
POTASSIUM SERPL-SCNC: 4.4 MMOL/L (ref 3.7–5.3)
RBC # BLD: 2.43 M/UL (ref 4.21–5.77)
RBC # BLD: 2.51 M/UL (ref 4.21–5.77)
RBC # BLD: 2.51 M/UL (ref 4.21–5.77)
SAMPLE SITE: ABNORMAL
SAMPLE SITE: ABNORMAL
SODIUM BLD-SCNC: 133 MMOL/L (ref 135–144)
SODIUM BLD-SCNC: 136 MMOL/L (ref 135–144)
SODIUM BLD-SCNC: 136 MMOL/L (ref 135–144)
SODIUM BLD-SCNC: 141 MMOL/L (ref 135–144)
SPECIMEN DESCRIPTION: NORMAL
SPECIMEN DESCRIPTION: NORMAL
TRANSFUSION STATUS: NORMAL
UNIT DIVISION: 0
UNIT ISSUE DATE/TIME: NORMAL
WBC # BLD: 15.8 K/UL (ref 3.5–11.3)
WBC # BLD: 15.8 K/UL (ref 3.5–11.3)
WBC # BLD: 16.3 K/UL (ref 3.5–11.3)

## 2022-07-13 PROCEDURE — 85730 THROMBOPLASTIN TIME PARTIAL: CPT

## 2022-07-13 PROCEDURE — 2580000003 HC RX 258: Performed by: STUDENT IN AN ORGANIZED HEALTH CARE EDUCATION/TRAINING PROGRAM

## 2022-07-13 PROCEDURE — 6370000000 HC RX 637 (ALT 250 FOR IP): Performed by: STUDENT IN AN ORGANIZED HEALTH CARE EDUCATION/TRAINING PROGRAM

## 2022-07-13 PROCEDURE — 2500000003 HC RX 250 WO HCPCS: Performed by: STUDENT IN AN ORGANIZED HEALTH CARE EDUCATION/TRAINING PROGRAM

## 2022-07-13 PROCEDURE — 2580000003 HC RX 258: Performed by: INTERNAL MEDICINE

## 2022-07-13 PROCEDURE — 83605 ASSAY OF LACTIC ACID: CPT

## 2022-07-13 PROCEDURE — 2580000003 HC RX 258: Performed by: NURSE PRACTITIONER

## 2022-07-13 PROCEDURE — 84100 ASSAY OF PHOSPHORUS: CPT

## 2022-07-13 PROCEDURE — 94003 VENT MGMT INPAT SUBQ DAY: CPT

## 2022-07-13 PROCEDURE — 83735 ASSAY OF MAGNESIUM: CPT

## 2022-07-13 PROCEDURE — 6370000000 HC RX 637 (ALT 250 FOR IP): Performed by: NURSE PRACTITIONER

## 2022-07-13 PROCEDURE — 6360000002 HC RX W HCPCS: Performed by: NURSE PRACTITIONER

## 2022-07-13 PROCEDURE — 90945 DIALYSIS ONE EVALUATION: CPT | Performed by: INTERNAL MEDICINE

## 2022-07-13 PROCEDURE — 2500000003 HC RX 250 WO HCPCS: Performed by: NURSE PRACTITIONER

## 2022-07-13 PROCEDURE — 36415 COLL VENOUS BLD VENIPUNCTURE: CPT

## 2022-07-13 PROCEDURE — 82803 BLOOD GASES ANY COMBINATION: CPT

## 2022-07-13 PROCEDURE — 2500000003 HC RX 250 WO HCPCS: Performed by: HEALTH CARE PROVIDER

## 2022-07-13 PROCEDURE — 6360000002 HC RX W HCPCS: Performed by: STUDENT IN AN ORGANIZED HEALTH CARE EDUCATION/TRAINING PROGRAM

## 2022-07-13 PROCEDURE — 85055 RETICULATED PLATELET ASSAY: CPT

## 2022-07-13 PROCEDURE — 2000000000 HC ICU R&B

## 2022-07-13 PROCEDURE — 37799 UNLISTED PX VASCULAR SURGERY: CPT

## 2022-07-13 PROCEDURE — 6360000002 HC RX W HCPCS: Performed by: INTERNAL MEDICINE

## 2022-07-13 PROCEDURE — 82330 ASSAY OF CALCIUM: CPT

## 2022-07-13 PROCEDURE — 71045 X-RAY EXAM CHEST 1 VIEW: CPT

## 2022-07-13 PROCEDURE — 2700000000 HC OXYGEN THERAPY PER DAY

## 2022-07-13 PROCEDURE — 80048 BASIC METABOLIC PNL TOTAL CA: CPT

## 2022-07-13 PROCEDURE — 94761 N-INVAS EAR/PLS OXIMETRY MLT: CPT

## 2022-07-13 PROCEDURE — 85027 COMPLETE CBC AUTOMATED: CPT

## 2022-07-13 RX ORDER — DEXTROSE AND SODIUM CHLORIDE 5; .45 G/100ML; G/100ML
INJECTION, SOLUTION INTRAVENOUS CONTINUOUS
Status: DISCONTINUED | OUTPATIENT
Start: 2022-07-13 | End: 2022-07-14

## 2022-07-13 RX ORDER — HALOPERIDOL 5 MG/ML
2.5 INJECTION INTRAMUSCULAR ONCE
Status: COMPLETED | OUTPATIENT
Start: 2022-07-13 | End: 2022-07-14

## 2022-07-13 RX ORDER — FAMOTIDINE 20 MG/1
20 TABLET, FILM COATED ORAL 2 TIMES DAILY
Status: DISCONTINUED | OUTPATIENT
Start: 2022-07-13 | End: 2022-07-15

## 2022-07-13 RX ORDER — CHOLECALCIFEROL (VITAMIN D3) 125 MCG
5 CAPSULE ORAL NIGHTLY PRN
Status: DISCONTINUED | OUTPATIENT
Start: 2022-07-13 | End: 2022-07-14

## 2022-07-13 RX ORDER — INSULIN LISPRO 100 [IU]/ML
0-18 INJECTION, SOLUTION INTRAVENOUS; SUBCUTANEOUS EVERY 4 HOURS
Status: DISCONTINUED | OUTPATIENT
Start: 2022-07-13 | End: 2022-07-14

## 2022-07-13 RX ADMIN — INSULIN LISPRO 3 UNITS: 100 INJECTION, SOLUTION INTRAVENOUS; SUBCUTANEOUS at 23:48

## 2022-07-13 RX ADMIN — Medication 1500 ML/HR: at 05:32

## 2022-07-13 RX ADMIN — DEXTROSE AND SODIUM CHLORIDE: 5; 450 INJECTION, SOLUTION INTRAVENOUS at 22:45

## 2022-07-13 RX ADMIN — VASOPRESSIN 0.04 UNITS/MIN: 20 INJECTION PARENTERAL at 04:29

## 2022-07-13 RX ADMIN — SODIUM CHLORIDE, PRESERVATIVE FREE 20 MG: 5 INJECTION INTRAVENOUS at 08:22

## 2022-07-13 RX ADMIN — POTASSIUM PHOSPHATE, MONOBASIC AND POTASSIUM PHOSPHATE, DIBASIC 15 MMOL: 224; 236 INJECTION, SOLUTION, CONCENTRATE INTRAVENOUS at 21:20

## 2022-07-13 RX ADMIN — MIDODRINE HYDROCHLORIDE 5 MG: 5 TABLET ORAL at 08:23

## 2022-07-13 RX ADMIN — MIDODRINE HYDROCHLORIDE 5 MG: 5 TABLET ORAL at 13:50

## 2022-07-13 RX ADMIN — METOCLOPRAMIDE 5 MG: 5 INJECTION, SOLUTION INTRAMUSCULAR; INTRAVENOUS at 06:12

## 2022-07-13 RX ADMIN — METHOCARBAMOL TABLETS 750 MG: 750 TABLET, COATED ORAL at 20:31

## 2022-07-13 RX ADMIN — Medication 1500 ML/HR: at 05:33

## 2022-07-13 RX ADMIN — SODIUM CHLORIDE, PRESERVATIVE FREE 10 ML: 5 INJECTION INTRAVENOUS at 08:53

## 2022-07-13 RX ADMIN — POLYETHYLENE GLYCOL 3350 17 G: 17 POWDER, FOR SOLUTION ORAL at 08:22

## 2022-07-13 RX ADMIN — Medication 1500 ML/HR: at 05:31

## 2022-07-13 RX ADMIN — SODIUM CHLORIDE, POTASSIUM CHLORIDE, SODIUM LACTATE AND CALCIUM CHLORIDE: 600; 310; 30; 20 INJECTION, SOLUTION INTRAVENOUS at 03:28

## 2022-07-13 RX ADMIN — Medication 5 MG: at 23:38

## 2022-07-13 RX ADMIN — MIDODRINE HYDROCHLORIDE 5 MG: 5 TABLET ORAL at 17:51

## 2022-07-13 RX ADMIN — HEPARIN SODIUM 14 UNITS/KG/HR: 10000 INJECTION, SOLUTION INTRAVENOUS at 15:24

## 2022-07-13 RX ADMIN — METHOCARBAMOL TABLETS 750 MG: 750 TABLET, COATED ORAL at 13:50

## 2022-07-13 RX ADMIN — METHOCARBAMOL TABLETS 750 MG: 750 TABLET, COATED ORAL at 03:15

## 2022-07-13 RX ADMIN — SODIUM CHLORIDE, PRESERVATIVE FREE 10 ML: 5 INJECTION INTRAVENOUS at 19:57

## 2022-07-13 RX ADMIN — DEXTROSE AND SODIUM CHLORIDE 75 ML/HR: 5; 450 INJECTION, SOLUTION INTRAVENOUS at 10:44

## 2022-07-13 RX ADMIN — METHOCARBAMOL TABLETS 750 MG: 750 TABLET, COATED ORAL at 08:22

## 2022-07-13 RX ADMIN — Medication 75 MCG/HR: at 02:09

## 2022-07-13 RX ADMIN — DEXMEDETOMIDINE HYDROCHLORIDE 0.5 MCG/KG/HR: 4 INJECTION, SOLUTION INTRAVENOUS at 05:49

## 2022-07-13 RX ADMIN — CEFTRIAXONE SODIUM 1000 MG: 1 INJECTION, POWDER, FOR SOLUTION INTRAMUSCULAR; INTRAVENOUS at 19:58

## 2022-07-13 RX ADMIN — CHLORHEXIDINE GLUCONATE 15 ML: 1.2 SOLUTION ORAL at 08:22

## 2022-07-13 RX ADMIN — ASPIRIN 81 MG: 81 TABLET, CHEWABLE ORAL at 08:22

## 2022-07-13 RX ADMIN — SODIUM CHLORIDE: 9 INJECTION, SOLUTION INTRAVENOUS at 01:32

## 2022-07-13 RX ADMIN — FAMOTIDINE 20 MG: 20 TABLET, FILM COATED ORAL at 20:26

## 2022-07-13 ASSESSMENT — PAIN DESCRIPTION - LOCATION: LOCATION: BUTTOCKS

## 2022-07-13 ASSESSMENT — PAIN SCALES - WONG BAKER
WONGBAKER_NUMERICALRESPONSE: 8

## 2022-07-13 ASSESSMENT — PULMONARY FUNCTION TESTS
PIF_VALUE: 18
PIF_VALUE: 18
PIF_VALUE: 17

## 2022-07-13 NOTE — PLAN OF CARE
Problem: Discharge Planning  Goal: Discharge to home or other facility with appropriate resources  Outcome: Progressing     Problem: Pain  Goal: Verbalizes/displays adequate comfort level or baseline comfort level  Outcome: Progressing     Problem: Skin/Tissue Integrity  Goal: Absence of new skin breakdown  Description: 1. Monitor for areas of redness and/or skin breakdown  2. Assess vascular access sites hourly  3. Every 4-6 hours minimum:  Change oxygen saturation probe site  4. Every 4-6 hours:  If on nasal continuous positive airway pressure, respiratory therapy assess nares and determine need for appliance change or resting period. Outcome: Progressing     Problem: Safety - Adult  Goal: Free from fall injury  Outcome: Progressing  Flowsheets (Taken 7/12/2022 2340)  Free From Fall Injury:   Instruct family/caregiver on patient safety   Based on caregiver fall risk screen, instruct family/caregiver to ask for assistance with transferring infant if caregiver noted to have fall risk factors     Problem: ABCDS Injury Assessment  Goal: Absence of physical injury  Outcome: Progressing  Flowsheets (Taken 7/12/2022 2340)  Absence of Physical Injury: Implement safety measures based on patient assessment     Problem: Safety - Medical Restraint  Goal: Remains free of injury from restraints (Restraint for Interference with Medical Device)  Description: INTERVENTIONS:  1. Determine that other, less restrictive measures have been tried or would not be effective before applying the restraint  2. Evaluate the patient's condition at the time of restraint application  3. Inform patient/family regarding the reason for restraint  4.  Q2H: Monitor safety, psychosocial status, comfort, nutrition and hydration  Outcome: Progressing  Flowsheets  Taken 7/12/2022 2200 by Pasquale Sheldon RN  Remains free of injury from restraints (restraint for interference with medical device):   Determine that other, less restrictive measures have been tried or would not be effective before applying the restraint   Evaluate the patient's condition at the time of restraint application   Inform patient/family regarding the reason for restraint   Every 2 hours: Monitor safety, psychosocial status, comfort, nutrition and hydration  Taken 7/12/2022 2000 by Theresa Jose RN  Remains free of injury from restraints (restraint for interference with medical device):   Determine that other, less restrictive measures have been tried or would not be effective before applying the restraint   Evaluate the patient's condition at the time of restraint application   Inform patient/family regarding the reason for restraint   Every 2 hours: Monitor safety, psychosocial status, comfort, nutrition and hydration  Taken 7/12/2022 1800 by Wisam Sethi RN  Remains free of injury from restraints (restraint for interference with medical device): Every 2 hours: Monitor safety, psychosocial status, comfort, nutrition and hydration  Taken 7/12/2022 1600 by Wiasm Sethi RN  Remains free of injury from restraints (restraint for interference with medical device): Every 2 hours: Monitor safety, psychosocial status, comfort, nutrition and hydration  Taken 7/12/2022 1400 by Wisam Sethi RN  Remains free of injury from restraints (restraint for interference with medical device): Every 2 hours: Monitor safety, psychosocial status, comfort, nutrition and hydration  Taken 7/12/2022 1200 by Wisam Sethi RN  Remains free of injury from restraints (restraint for interference with medical device): Every 2 hours: Monitor safety, psychosocial status, comfort, nutrition and hydration  Taken 7/12/2022 1000 by Wisam Sethi RN  Remains free of injury from restraints (restraint for interference with medical device): Every 2 hours: Monitor safety, psychosocial status, comfort, nutrition and hydration     Problem: Respiratory - Adult  Goal: Achieves optimal ventilation and oxygenation  Outcome: Progressing Problem: Confusion  Goal: Confusion, delirium, dementia, or psychosis is improved or at baseline  Description: INTERVENTIONS:  1. Assess for possible contributors to thought disturbance, including medications, impaired vision or hearing, underlying metabolic abnormalities, dehydration, psychiatric diagnoses, and notify attending LIP  2. [place] high risk fall precautions, as indicated  3. Provide frequent short contacts to provide reality reorientation, refocusing and direction  4. Decrease environmental stimuli, including noise as appropriate  5. Monitor and intervene to maintain adequate nutrition, hydration, elimination, sleep and activity  6. If unable to ensure safety without constant attention obtain sitter and review sitter guidelines with assigned personnel  7.  Initiate Psychosocial CNS and Spiritual Care consult, as indicated  Outcome: Progressing

## 2022-07-13 NOTE — PROGRESS NOTES
PROGRESS NOTE      PATIENT NAME: Ashley Paul  MEDICAL RECORD NO. [id number]  DATE: 2022  SURGEON: Elijah Viera MD  PRIMARY CARE PHYSICIAN: Roberto Flannery (Inactive)    HD: # 5    ASSESSMENT    Patient Active Problem List   Diagnosis    Seasonal allergies    Anxiety    Carotid artery occlusion    CAD (coronary artery disease)    Hypertension    Neuropathy    [facility]peripheral [facility]    HTN (hypertension)    Arthritis associated with another disorder    Hernia, hiatal    Hyperlipidemia    CAD (coronary artery disease)    Critical ischemia of lower extremity [facility]    CAROLYN (acute kidney injury) [facility] [facility]    Hyperkalemia    Encounter for continuous venovenous hemodiafiltration (CVVHD) ([facility])    Failing vascular bypass graft    Arterial hypotension    Non-traumatic rhabdomyolysis    Cardiomyopathy ([facility] [address] [facility]    Decreased urine output       MEDICAL DECISION MAKING AND PLAN    1. Neurological  1. Fentanyl and precedex gtt - hold sedation for SBT   2. Hx of CVA with residual right sided weakness  2. Cardiovascular  1. ME: 59 - 127  2. MAP: 61 - 104  3. Levophed gtt to maintain MAP > 65  4. Hx of aortobifemoral graft and CABGx3  5. Total aortobifemoral occulusion. S/p  BL common fem art cut down. Thrombectomy of the aortobifemoral bypass. Thromboendarterectomy of the common femoral profundofemoral and SFA bilaterally with bovine pericardial patch angioplasty 2022.   6. Continue heparin gtt  7. Elevated troponins: Continue to downtrend  8. Echo EF 15-23% with akinetic apex.   1. Cardiology following   2. Cards recommends cath when more stable, will need nephrology assistance in clearance. 3. ASA 81 mg daily   3. Heme:  1. Hb: 7.8 (7.9)  2. EBR: 234 (206)  3. Heparin drip   4. Respiratory  1. PRVC: 550/18/8/30%  2. AB.425/37.3/138.8/24.5  3. CXR: stable   4. Wean to extubate - SBT this morning   5. Gastrointestinal  1. NPO; trickle feeds - hold feeds pending extubation   2.  GI Prophylaxis pepcid  6. FEN/Renal  1. UOP: 0.2 cc/kg/hr over past 24 hours   2. Na/K+ : 423 (554) / 9.3 (6.1)  3. BUN/Cr: 34 (37) / 3.27 (0.58)  1. CAROLYN secondary to rhabdo. Normal Cr on arrival  4. Nephrology consulted and initiated CRRT on 7/10 for acute renal failure  5. Rhabdomyolysis. Enzymes down trending.   6. IVF: D5 0.45% NS @ 75 cc/hr   7. ID  1. Tmax: 38.1 (100.6)  2. WBC: 15.8 (16.3)  3. UTI positive on 7/10. Rocephin q24 x7days  4. Ancef q8 24hrs. Ended    5. Vanc and zosyn given in ED  8. Endo  1. Monitor BG   2. Discontinue insulin drip and start HDSS   9. MSK  1. Concern for compartment syndrome in LLE  2. S/p left calf fasciotomy 22  3. Per ortho unlikely compartment syndrome in L thigh s/o  10. Dispo   1. Continue ICU care   11. Lines  1. ETT, OGT, saldana, right axillary arterial line, R IJ temporary dialysis catheter, L IJ CVC      Chief Complaint: LLE weakness    SUBJECTIVE    Leah Mahoney evaluated at bedside. No acute events overnight. Patient still requiring pressor support. Will attempt spontaneous breathing trial today. OBJECTIVE  VITALS: Temp: Temp: 97.9 °F (36.6 °C)Temp  Av.4 °F (36.9 °C)  Min: 97.7 °F (36.5 °C)  Max: 99.5 °F (27.7 °C) BP Systolic (10PTB), FMU:485 , Min:80 , FBU:200   Diastolic (72OSZ), KKJ:44, Min:48, Max:81   Pulse Pulse  Av.1  Min: 55  Max: 127 Resp Resp  Av.9  Min: 18  Max: 24 Pulse ox SpO2  Av.8 %  Min: 93 %  Max: 100 %  GENERAL: intubated, sedated, no apparent distress   NEURO: intubated, sedated   HEENT: PERRLA  LUNGS: symmetric chest rise and fall; tolerating mechanical ventilation   HEART: normal rate and regular rhythm  ABDOMEN: soft, non-tender, non-distended  EXTREMITY: LLE +3 pitting edema. BL popliteal, DP/PT pulses on doppler    I/O last 3 completed shifts: In: 2090.4 [I.V.:1286. 4; NG/GT:804]  Out: 5682 [Urine:623]    Drain/tube output: In: 3893.5 [I.V.:2459.5;  Blood:430; NG/GT:1004]  Out: 7500 [Urine:761]    LAB:  CBC: Recent Labs     07/12/22  1935 07/13/22  0043 07/13/22  0511   WBC 17.6* 15.8* 16.3*   HGB 6.8* 7.7* 7.9*   HCT 20.8* 23.4* 24.1*   MCV 98.1 96.3 96.0   PLT See Reflexed IPF Result See Reflexed IPF Result See Reflexed IPF Result     BMP:   Recent Labs     07/12/22  1647 07/12/22  2329 07/13/22  0525    133* 136   K 4.2 4.4 3.9    101 101   CO2 25 23 23   BUN 37* 34* 34*   CREATININE 2.36* 2.15* 1.94*   GLUCOSE 129* 181* 159*     COAGS:   Recent Labs     07/11/22  1748 07/11/22 2001 07/12/22 1647 07/12/22 2329 07/13/22  0508   APTT 21.1   < > 70.1* 61.9* 60.0*   INR 0.9  --   --   --   --     < > = values in this interval not displayed. RADIOLOGY:  XR CHEST (SINGLE VIEW FRONTAL)    Result Date: 7/11/2022  Right IJ CVC catheter tip overlies the superior cavoatrial junction. Left IJ CVC catheter tip overlies the mid/distal SVC. No pneumothorax is seen. Mild left basilar atelectasis. CT HEAD WO CONTRAST    Result Date: 7/8/2022  No acute intracranial abnormality. 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 Mild diffuse cerebral atrophy and minimal chronic white matter ischemic change. Findings suggestive of acute on chronic right maxillary sinusitis. Correlate clinically. US RENAL COMPLETE    Result Date: 7/11/2022  Unremarkable kidneys. Hepatic steatosis. XR CHEST PORTABLE    Result Date: 7/12/2022  No acute cardiopulmonary process. Support tubes as described above. XR CHEST PORTABLE    Result Date: 7/11/2022  Mild bibasilar opacities compatible with atelectasis. Aspiration and pneumonia are not excluded. XR CHEST PORTABLE    Result Date: 7/11/2022  Stable chest.     XR CHEST PORTABLE    Result Date: 7/11/2022  1. The deeper temporary dialysis catheter on the previous exam is been removed, with a new temporary dialysis catheter seen overlying the proximal superior vena cava. 2. Other tubes and lines as above.  3. Patchy infiltrates in the lungs bilaterally which may represent edema or pneumonia. XR CHEST PORTABLE    Result Date: 7/11/2022  1. The right jugular catheter appears in a stable position with the tip at the cavoatrial junction. 2. Endotracheal tube, nasogastric tube, and left jugular line are also redemonstrated. 3.  Some hazy opacities in the lungs which could be subtle edema or inflammatory. XR CHEST PORTABLE    Result Date: 7/10/2022  1. Tubes and right IJ line as detailed above. 2. Improvement in aeration of both lung since the prior study with residual airspace disease at the left upper and left lower lung zones as well as right parahilar region. No new focal airspace disease. 3. Prior median sternotomy and CABG. XR CHEST PORTABLE    Result Date: 7/9/2022  1. Tubes and right IJ approach central venous catheter as detailed above. 2. Bilateral airspace disease, likely multifocal pneumonia, left greater than right as detailed above. Follow-up is recommended to document resolution. XR CHEST PORTABLE    Result Date: 7/8/2022  Scattered pulmonary opacities consistent with multifocal airspace disease. Poor tubes and lines as above. XR CHEST PORTABLE    Result Date: 7/8/2022  Scattered pulmonary infiltrates concerning for pneumonia. CTA CHEST ABDOMEN AORTA RUNOFF RECON    Result Date: 7/8/2022  Occluded aortobifemoral graft. Prior infrarenal dissecting hematoma, as reported; occluded native infrarenal aorta. Reconstituted flow in moderate-severely diseased iliofemoral arteries. Patent right common femoral below-knee graft. Likely occluded left popliteal artery; no below-knee contrast enhancement demonstrated on this study (timing versus absent flow). Multiple additional vascular findings, as above. Interval worsening emphysema and mild interstitial disease as compared to 2012.   Interval bullous changes upper lung zones, multicystic/larger on right with mild peripheral enhancement/small posterior ovoid fluid collection suggesting some degree of inflammation or infection; no large fluid-fluid level or solid elements suggesting significant infection/fungal disease. No consolidation, PTX or sizable pleural effusion. Hiatus hernia and esophageal mural prominence. Correlate for esophagitis. Edema/soft tissue swelling below knee on the right. Mild soft tissue swelling left distal calf/foot. Some muscular atrophy right lower extremity. Additional unchanged or incidental findings, as detailed in the body of the report above. Findings were discussed with Dr. Renetta Winter at 9:08 am on 7/8/2022.          Ann-Marie Zheng DO  7/13/22, 6:50 AM     Hillary Allan DO PGY - 3   Electronically signed by Vikram Easley DO on 7/13/2022 at 1:20 PM

## 2022-07-13 NOTE — PROGRESS NOTES
Nephrology Progress Note      SUBJECTIVE       Pt was seen and examined. No acute issues overnite. CRRT continues without any major hitch. Currently removing -50 to -70 mL an hour of fluid which he seems to be tolerating. This morning's blood pressures were more stable, we have come down on the Levophed. Urine output unfortunately continues to be poor. He is also still receiving low-dose vasopressin. 68-year-old gentleman with a history of peripheral vascular disease, CABG, chronic hypertension and previous CVA presented with bilateral lower extremity numbness and pain rather sudden and ended up having CTA of the aorta showing occluded previous aortobifem graft. He underwent vascular surgery on 2022 where and he had thrombectomy of the aortobifem bypass, endarterectomy of the common femoral and profundofemoral and SFA bilaterally    Cardiac echo showing ejection fraction less than 20%. Dexter indwelling. OBJECTIVE      CURRENT TEMPERATURE:  Temp: 97.9 °F (36.6 °C)  MAXIMUM TEMPERATURE OVER 24HRS:  Temp (24hrs), Av.4 °F (36.9 °C), Min:97.7 °F (36.5 °C), Max:99.5 °F (37.5 °C)    CURRENT RESPIRATORY RATE:  Resp: 18  CURRENT PULSE:  Heart Rate: 53  CURRENT BLOOD PRESSURE:  BP: (!) 120/54  24HR BLOOD PRESSURE RANGE:  Systolic (22WQO), KKB:907 , Min:80 , TBE:868   ; Diastolic (74XSK), XEN:41, Min:48, Max:81    24HR INTAKE/OUTPUT:      Intake/Output Summary (Last 24 hours) at 2022 4069  Last data filed at 2022 0700  Gross per 24 hour   Intake 2306.63 ml   Output 5590 ml   Net -3283.37 ml     WEIGHT :  Patient Vitals for the past 96 hrs (Last 3 readings):   Weight   22 0505 233 lb 11 oz (106 kg)   22 0600 232 lb 2.3 oz (105.3 kg)   07/10/22 0226 230 lb 9.6 oz (104.6 kg)     PHYSICAL EXAM      GENERAL APPEARANCE: Patient is intubated, currently on pressors. SKIN: warm and dry, no rash or erythema  EYES: conjunctivae pale and sclera anicteric  ENT: ETT in place  NECK:   + JVD.  No carotid bruits and no carotid lymphadenopathy . PULMONARY: lungs are clear to auscultation. No Wheezing, no ronchi . CADRDIOVASCULAR: S1 and S2 normal NO S3 and NO S4 . No rubs , no murmur. ABDOMEN: soft , bowel sounds present, no organomegaly, no ascites.      EXTREMITIES: Fasciotomy dressing left lower extremity, bilateral edema    CURRENT MEDICATIONS      dexmedetomidine (PRECEDEX) 400 mcg in sodium chloride 0.9 % 100 mL infusion, Continuous  methocarbamol (ROBAXIN) tablet 750 mg, Q6H  vasopressin 20 Units in dextrose 5 % 100 mL infusion, Continuous  0.9 % sodium chloride infusion, PRN  famotidine (PEPCID) 20 mg in sodium chloride (PF) 10 mL injection, Daily  alteplase (CATHFLO) injection 1 mg, Once  heparin (porcine) injection 1,300 Units, PRN  heparin (porcine) injection 1,400 Units, PRN  0.9 % sodium chloride infusion, Continuous  aspirin chewable tablet 81 mg, Daily  midodrine (PROAMATINE) tablet 5 mg, TID WC  prismaSATE BK 0/3.5 5,000 mL with potassium chloride 10 mEq solution, Continuous  cefTRIAXone (ROCEPHIN) 1,000 mg in sterile water 10 mL IV syringe, Q24H  insulin regular (HUMULIN R;NOVOLIN R) 100 Units in sodium chloride 0.9 % 100 mL infusion, Continuous  heparin (porcine) injection 9,260 Units, PRN  heparin (porcine) injection 4,630 Units, PRN  sodium chloride flush 0.9 % injection 5-40 mL, 2 times per day  sodium chloride flush 0.9 % injection 5-40 mL, PRN  0.9 % sodium chloride infusion, PRN  polyethylene glycol (GLYCOLAX) packet 17 g, Daily  lactated ringers infusion, Continuous  ondansetron (ZOFRAN) injection 4 mg, Q6H PRN  chlorhexidine (PERIDEX) 0.12 % solution 15 mL, BID  propofol injection, Continuous  fentaNYL 50 mcg/mL 50 mL infusion, Continuous  heparin (porcine) injection 4,000 Units, PRN  heparin (porcine) injection 2,000 Units, PRN  heparin 25,000 units in dextrose 5 % 250 mL infusion (rate based), Continuous  norepinephrine (LEVOPHED) 16 mg in sodium chloride 0.9 % 250 mL infusion, Continuous  glucose chewable tablet 16 g, PRN  dextrose bolus 10% 125 mL, PRN   Or  dextrose bolus 10% 250 mL, PRN  glucagon (rDNA) injection 1 mg, PRN  dextrose 5 % solution, PRN          LABS      CBC:   Recent Labs     07/11/22  0627 07/11/22  0627 07/11/22  1241 07/11/22  1241 07/11/22 2001 07/12/22  0212 07/12/22 1935 07/13/22  0043 07/13/22  0511   WBC 19.8*   < > 14.6*   < > 11.6*   < > 17.6* 15.8* 16.3*   RBC 2.70*   < > 2.52*   < > 2.26*   < > 2.12* 2.43* 2.51*   HGB 8.7*   < > 8.1*   < > 7.3*   < > 6.8* 7.7* 7.9*   HCT 26.9*   < > 25.1*   < > 21.9*   < > 20.8* 23.4* 24.1*   MCV 99.6   < > 99.6   < > 96.9   < > 98.1 96.3 96.0   MCH 32.2   < > 32.1   < > 32.3   < > 32.1 31.7 31.5   MCHC 32.3   < > 32.3   < > 33.3   < > 32.7 32.9 32.8   RDW 13.8   < > 13.7   < > 13.7   < > 13.7 14.6* 15.1*      < > 132*   < > 114*   < > See Reflexed IPF Result See Reflexed IPF Result See Reflexed IPF Result   MPV 13.0  --  13.0  --  12.7  --   --   --   --     < > = values in this interval not displayed.       BMP:   Recent Labs     07/12/22  1647 07/12/22  2329 07/13/22  0525    133* 136   K 4.2 4.4 3.9    101 101   CO2 25 23 23   BUN 37* 34* 34*   CREATININE 2.36* 2.15* 1.94*   GLUCOSE 129* 181* 159*   CALCIUM 8.7 8.9 9.0      PHOSPHORUS:    Recent Labs     07/12/22 1935 07/13/22  0043 07/13/22  0525   PHOS 3.1 4.0 3.5     MAGNESIUM:   Recent Labs     07/12/22  1935 07/13/22 0043 07/13/22  0525   MG 2.1 2.1 2.1       URINE CREATININE:    Lab Results   Component Value Date/Time    LABCREA 141.6 07/10/2022 12:28 PM     URINE EOSINOPHILS:   Lab Results   Component Value Date/Time    UREO NONE SEEN 07/10/2022 12:28 PM     URINALYSIS:  U/A:   Lab Results   Component Value Date/Time    NITRU POSITIVE 07/10/2022 12:28 PM    COLORU BROWN 07/10/2022 12:28 PM    PHUR 6.5 07/10/2022 12:28 PM    WBCUA 10 TO 20 07/10/2022 12:28 PM    RBCUA 20 TO 50 07/10/2022 12:28 PM    MUCUS 1+ 06/30/2012 09:40 PM    TRICHOMONAS NOT REPORTED 06/30/2012 09:40 PM    YEAST NOT REPORTED 06/30/2012 09:40 PM    BACTERIA MODERATE 07/10/2022 12:28 PM    SPECGRAV 1.025 07/10/2022 12:28 PM    LEUKOCYTESUR TRACE 07/10/2022 12:28 PM    UROBILINOGEN Normal 07/10/2022 12:28 PM    BILIRUBINUR NEGATIVE  Verified by ictotest. 07/10/2022 12:28 PM    BILIRUBINUR NEGATIVE 06/06/2012 05:45 AM    GLUCOSEU NEGATIVE 07/10/2022 12:28 PM    GLUCOSEU NEGATIVE 06/06/2012 05:45 AM    KETUA NEGATIVE 07/10/2022 12:28 PM    AMORPHOUS 3+ 07/10/2022 12:28 PM           ASSESSMENT      1. Acute Kidney Injury: ATN with contrast nephropathy, pigment nephropathy, shock requiring pressor support and now postop.  Baseline creatinine seems to be around 1-1.2 mg/dl. 2. Hyperkalemia-likely due to underlying renal dysfunction, improved with CRRT  3. cardiomyopathy with EF 15-25%, cardiology on board-will likely need cardiac catheterization later in the hospitalization. 4. Rhabdomyolysis  5. Shock/hypotension-  requiring vasopressor support, we are able to wean down pressors somewhat   6. Total aortobifemoral occulusion s/p Bilateral common femoral arterial access via open cutdown, thrombectomy of the aortobifemoral bypass graft thromboendarterectomy of the common femoral profundofemoral and SFA bilaterally with bovine pericardial patch angioplasty on 7/8/2021  7. Left lower extremity compartment syndrome s/p fasciotomy, with dressing in place with drainage ongoing requiring consistent dressing changes  8. Acute respiratory failure, ventilator dependent    PLAN      1. Continue CVVHD for now. We are maintaining negative fluid balance of -52-60 mL an hour. Gradually coming down on vasopressors as well. We will continue to gradually remove fluid as hemodynamics will permit. 2. ?  Nutrition  3. Follow up labs ordered. 4. Following along       Please do not hesitate to call with questions.     This note is created with the assistance of a speech-recognition program. While intending to generate a document that actually reflects the content of the visit, no guarantees can be provided that every mistake has been identified and corrected by editing    Electronically signed by Marie Pollard MD on 7/13/2022 at 8:22 AM

## 2022-07-13 NOTE — PROGRESS NOTES
07/13/22 1045   Vent Information   Ventilator Discontinue Yes       Order obtained for extubation. SpO2 of 100% on 30% FiO2. Patient extubated and placed on 4 liters/min via nasal cannula. Post extubation SpO2 is 100% with HR  69 bpm and RR 28 breaths/min. Patient had strong cough that was non-productive. Extubation Well tolerated by patient. .   Breath Sounds: clear    MAKENNA FULTON RCP   10:51 AM

## 2022-07-13 NOTE — PROGRESS NOTES
[facility]   Progress Note                   Date:   7/13/2022  Patient name: Jaron Pineda  Date of admission:  7/8/2022  4:56 AM  MRN:   [id number]  YOB: 1960  PCP: Stephany Calabrese (Inactive)    Reason for Admission:      Subjective:     Patient was seen and evaluated at bedside, remains intubated and sedated, on pressor support and heparin gtt. Labs and imaging reviewed  He is currently on ASA, midodrine. Medications:   Scheduled Meds:   insulin lispro  0-18 Units SubCUTAneous Q4H    methocarbamol  750 mg Oral Q6H    famotidine (PEPCID) injection  20 mg IntraVENous Daily    alteplase  1 mg IntraCATHeter Once    aspirin  81 mg Oral Daily    midodrine  5 mg Oral TID WC    cefTRIAXone (ROCEPHIN) IV  1,000 mg IntraVENous Q24H    polyethylene glycol  17 g Oral Daily    chlorhexidine  15 mL Mouth/Throat BID       Continuous Infusions:   dextrose 5 % and 0.45 % NaCl 75 mL/hr (07/13/22 1044)    [Held by provider] dexmedetomidine 0.4 mcg/kg/hr (07/13/22 0846)    vasopressin (Septic Shock) infusion 0.04 Units/min (07/13/22 1028)    sodium chloride 100 mL/hr at 07/13/22 1028    CRRT dialysis builder 1,500 mL/hr (07/13/22 0533)    propofol Stopped (07/12/22 0815)    [Held by provider] fentaNYL 50 mcg/mL Stopped (07/13/22 0834)    heparin (PORCINE) Infusion 14 Units/kg/hr (07/13/22 1028)    norepinephrine 10 mcg/min (07/12/22 1918)    dextrose         CBC:   Recent Labs     07/13/22  0043 07/13/22  0511 07/13/22  0759   WBC 15.8* 16.3* 15.8*   HGB 7.7* 7.9* 7.8*   PLT See Reflexed IPF Result See Reflexed IPF Result See Reflexed IPF Result     BMP:    Recent Labs     07/12/22  1647 07/12/22  2329 07/13/22  0525    133* 136   K 4.2 4.4 3.9    101 101   CO2 25 23 23   BUN 37* 34* 34*   CREATININE 2.36* 2.15* 1.94*   GLUCOSE 129* 181* 159*     Hepatic:   No results for input(s): AST, ALT, ALB, BILITOT, ALKPHOS in the last 72 hours.   Troponin: No results for input(s): TROPONINI in the last 72 hours. BNP: No results for input(s): BNP in the last 72 hours. Lipids: No results for input(s): CHOL, HDL in the last 72 hours. Invalid input(s): LDLCALCU  INR:   Recent Labs     07/11/22  1748   INR 0.9       Objective:   Vitals: /62   Pulse 65   Temp 97.9 °F (36.6 °C) (Bladder)   Resp 18   Ht 5' 10.5\" (1.791 m) Comment: no inital recorded height; 9/13/12 office visit  Wt 233 lb 11 oz (106 kg)   SpO2 100%   BMI 33.06 kg/m²       HEENT: Intubated and sedated   Head: Normocephalic, no lesions, without obvious abnormality  Neck: no JVD  Lungs: clear to auscultation bilaterally, no basilar rales, no wheezing   Heart: regular rate and rhythm, S1, S2 normal, no murmur, click, rub or gallop  Abdomen: soft, non-tender; bowel sounds normal  Extremities: No LE edema      ECHO   Summary  Poor sound transmission. Global left ventricular systolic function is severely reduced. Calculated  ejection fraction 23% by Andrade's method. Visually estimated EF 15%. Akinetic apex. No significant valvular regurgitation or stenosis seen. Assessment / Acute Cardiac Problems:   Assessment      1. 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 Patient is currently intubated and sedated. 2. EKG  showed normal sinus rhythm with deep T wave inversion in anterior lead. 3. Currently on heparin drip for anticoagulation. Started on ASA 81 mg daily, continue the same. 4. 2D echocardiography showed EF 15 % with akinetic apex. 5. Plan for left heart catheterization when other co morbidities resolve. Will need nephrology clearance for cardia cath. 6. Will try to obtain records from [place] regarding prior CABG in 2008. Discussed with patient and nursing.      Neetu Shahid Liz Horvath MD  Fellow, [address] [facility], [place]      Attending note,   Seen and examined agree with above.  Extubated.  Off pressors.  Echocardiogram showed severely reduced ejection fraction.  On IV heparin for non-STEMI. [facility] does have previous history of CABG.   Acute kidney injury with possible CVVHD.  Rhabdomyolysis.  S/p thrombectomy for occluded lower extremity graft.  Rhabdomyolysis.    The patient will need coronary angiography when comorbidities resolve and cleared by nephrology and also cleared by surgery for DAPT.

## 2022-07-13 NOTE — PLAN OF CARE
Problem: Discharge Planning  Goal: Discharge to home or other facility with appropriate resources  7/13/2022 1112 by Zay Berry RN  Outcome: Progressing  7/12/2022 2345 by Siddhartha Cr RN  Outcome: Progressing     Problem: Pain  Goal: Verbalizes/displays adequate comfort level or baseline comfort level  7/13/2022 1112 by Zay Berry RN  Outcome: Progressing  Flowsheets (Taken 7/13/2022 0400 by Siddhartha Cr RN)  Verbalizes/displays adequate comfort level or baseline comfort level: Assess pain using appropriate pain scale  7/12/2022 2345 by Siddhartha Cr RN  Outcome: Progressing     Problem: Skin/Tissue Integrity  Goal: Absence of new skin breakdown  Description: 1. Monitor for areas of redness and/or skin breakdown  2. Assess vascular access sites hourly  3. Every 4-6 hours minimum:  Change oxygen saturation probe site  4. Every 4-6 hours:  If on nasal continuous positive airway pressure, respiratory therapy assess nares and determine need for appliance change or resting period.   7/13/2022 1112 by Zay Berry RN  Outcome: Progressing  7/12/2022 2345 by Siddhartha Cr RN  Outcome: Progressing     Problem: Safety - Adult  Goal: Free from fall injury  7/13/2022 1112 by Zay Berry RN  Outcome: Progressing  7/12/2022 2345 by Siddhartha Cr RN  Outcome: Progressing  Flowsheets (Taken 7/12/2022 2340)  Free From Fall Injury:   Yong Kaur family/caregiver on patient safety   Based on caregiver fall risk screen, instruct family/caregiver to ask for assistance with transferring infant if caregiver noted to have fall risk factors     Problem: ABCDS Injury Assessment  Goal: Absence of physical injury  7/13/2022 1112 by Zay Berry RN  Outcome: Progressing  7/12/2022 2345 by Siddhartha Cr RN  Outcome: Progressing  Flowsheets (Taken 7/12/2022 2340)  Absence of Physical Injury: Implement safety measures based on patient assessment     Problem: Respiratory - Adult  Goal: Achieves optimal ventilation and oxygenation  7/13/2022 1112 by Valdez Goodson RN  Outcome: Progressing  7/12/2022 2345 by Amrit Bruno RN  Outcome: Progressing     Problem: Confusion  Goal: Confusion, delirium, dementia, or psychosis is improved or at baseline  Description: INTERVENTIONS:  1. Assess for possible contributors to thought disturbance, including medications, impaired vision or hearing, underlying metabolic abnormalities, dehydration, psychiatric diagnoses, and notify attending LIP  2. [place] high risk fall precautions, as indicated  3. Provide frequent short contacts to provide reality reorientation, refocusing and direction  4. Decrease environmental stimuli, including noise as appropriate  5. Monitor and intervene to maintain adequate nutrition, hydration, elimination, sleep and activity  6. If unable to ensure safety without constant attention obtain sitter and review sitter guidelines with assigned personnel  7.  Initiate Psychosocial CNS and Spiritual Care consult, as indicated  7/13/2022 1112 by Valdez Goodson RN  Outcome: Progressing  7/12/2022 2345 by Amrit Bruno RN  Outcome: Progressing

## 2022-07-13 NOTE — CARE COORDINATION
Transitional Planning    Mary from [facility] called. She relates that if patient will need HD at discharge they cannot provide it in house. They can only provide HD in house to patients who are end stage renal disease. They would be able to provide transportation by w/c ambulance to a HD facility for treatments. 1120 per am report, Intubated and sedated, weaning trials. CRRT open fasciotomy to LLE, on levophed and vasopressing, insulin and heparin.  Needs cardiac cath with clearance from Nephrology first.

## 2022-07-13 NOTE — DISCHARGE INSTR - COC
Continuity of Care Form    Patient Name: Jaron Pineda   :  1960  MRN:  [id number]    Admit date:  2022  Discharge date:  22    Code Status Order: Full Code   Advance Directives:     Admitting Physician:  No admitting provider for patient encounter. PCP: Stephany Calabrese (Inactive)    Discharging Nurse: Marlene [facility] Unit/Room#:   Discharging Unit Phone Number: [phone]    Emergency Contact:   Extended Emergency Contact Information  Primary Emergency Contact: Joan Cushing  Address: [address] [place] [address] Phone: [phone]  Relation: Niece/Nephew  Secondary Emergency Contact: Herbert Severin [address] Phone: [phone]  Relation: Brother/Sister    Past Surgical History:  Past Surgical History:   Procedure Laterality Date    ABDOMINAL ADHESION SURGERY  2011-[facility]    Reexploration of abdominal wound and reclosure of the abdominal wound with fascial sutures and retention sutures as well    AORTO-FEMORAL BYPASS GRAFT  2011-[facility]    ABF bypass of 16x8 PTFE graft. This is an end-to-side anastomosis proximally    AXILLARY-FEMORAL BYPASS GRAFT Bilateral 2022    AORTA BIFEMORAL GRAFT ENDARTERECTOMY, BILATERAL COMMON FEMORAL EMBOLECTOMY, BILATERAL LOWER EXTREMEITY ARTERECTOMY, AORTAGRAPHY, RIGHT LIMB OF BYPASS GRAFT STENT, BILATERAL FEMORAL BOVINE PATCHES performed by Vannesa Brown MD at [place] [id number], ARTERY  11/23/10 [address]    1. Placement of 5 sheath in the left femoral artery with duplex guidance. 2. Left Iliac arteriogram. 3. Attempted ballon angioplasty left iliac artery occlusion.     CAROTID ENDARTERECTOMY  08    Left carotid endarterectomy using shunt dacron patch aplasty    CORONARY ARTERY BYPASS GRAFT      CABG x3     FEMORAL-TIBIAL BYPASS GRAFT  07  [facility]     Right femoral to posterior tibial artery bypass graft with in situ saphenous vein graft    IR FEMORAL POPLITEAL BYPASS GRAFT  01/24/208 Dr Althea Kruger    1. Exploration of right fem-pop bypass graft. 2.Thrombectomy of right fem-post-tib saph vein graft.  3. Replacement of femoral to popliteal bypass graft from midthigh to near the anastomosis by reversed greater saph vein harvested from left leg    PTCA      PTCA with stent x6    TONSILLECTOMY AND ADENOIDECTOMY         Immunization History:   Immunization History   Administered Date(s) Administered    Influenza Virus Vaccine 12/06/2011       Active Problems:  Patient Active Problem List   Diagnosis Code    Seasonal allergies J30.2    Anxiety F41.9    Carotid artery occlusion I65.29    CAD (coronary artery disease) I25.10    Hypertension I10    Neuropathy G62.9    PAD (peripheral artery disease) ([facility]) I73.9    HTN (hypertension) I10    Arthritis associated with another disorder M19.90    Hernia, hiatal K44.9    Hyperlipidemia E78.5    CAD (coronary artery disease) I25.10    Critical ischemia of lower extremity ([facility]) I70.229    CAROLYN (acute kidney injury) ([facility] N17.9    Dependence on renal dialysis ([facility] Z99.2    Failing vascular bypass graft T82.599A    Arterial hypotension I95.9    Non-traumatic rhabdomyolysis M62.82    Cardiomyopathy ([facility] I42.9    Decreased urine output R34    Acute respiratory failure with hypercapnia ([facility]) J96.02    Lactic acidosis E87.2    Left leg numbness R20.0    Cardiomyopathy, ischemic I25.5    Anemia D64.9    H/O: CVA (cerebrovascular accident) Z86.73       Isolation/Infection:   Isolation            No Isolation          Patient Infection Status       Infection Onset Added Last Indicated Last Indicated By Review Planned Expiration Resolved Resolved By    None active    Resolved    COVID-19 (Rule Out) 07/08/22 07/08/22 07/08/22 COVID-19, [place] (Ordered)   07/09/22 Rule-Out Test Resulted            Nurse Assessment:  Last Vital Signs: /66   Pulse 91   Temp 97.9 °F (36.6 °C) (Oral) Resp 16   Ht 5' 10.5\" (1.791 m) Comment: no inital recorded height; 9/13/12 office visit  Wt 210 lb 15.7 oz (95.7 kg)   SpO2 94%   BMI 29.84 kg/m²     Last documented pain score (0-10 scale): Pain Level: 6  Last Weight:   Wt Readings from Last 1 Encounters:   07/20/22 210 lb 15.7 oz (95.7 kg)     Mental Status:  oriented, alert, coherent, logical, thought processes intact, and able to concentrate and follow conversation    IV Access:  - None    Nursing Mobility/ADLs:  Walking   Dependent  Transfer  Dependent  Bathing  Assisted  Dressing  Assisted  Toileting  Assisted  Feeding  Assisted  Med Admin  Assisted  Med Delivery   whole    Wound Care Documentation and Therapy:  Negative Pressure Wound Therapy Leg Left; Lower (Active)   Wound Type Surgical 07/21/22 0800   Dressing Type Black Foam 07/21/22 0800   Cycle Continuous 07/20/22 2057   Target Pressure (mmHg) 125 07/21/22 0800   Canister changed?  Yes 07/20/22 2057   Dressing Status Clean, dry & intact 07/20/22 2057   Dressing Changed Changed/New 07/18/22 0830   Drainage Amount Moderate 07/20/22 0819   Drainage Description Serosanguinous 07/21/22 0800   Output (ml) 500 ml 07/20/22 2057   Wound Assessment Other (Comment) 07/20/22 2057   Lillie-wound Assessment Fragile 07/20/22 0819   Number of days:        Wound 07/11/22 Coccyx (Active)   Wound Image   07/14/22 1531   Wound Etiology Deep tissue/Injury 07/20/22 2230   Dressing Status Other (Comment) 07/21/22 0800   Wound Cleansed Soap and water 07/20/22 2230   Dressing/Treatment Triad hydro/zinc oxide-based hydrophilic paste 73/83/12 0110   Dressing Change Due 07/15/22 07/14/22 1531   Wound Length (cm) 8.5 cm 07/14/22 1531   Wound Width (cm) 11 cm 07/14/22 1531   Wound Depth (cm) 0.1 cm 07/14/22 1531   Wound Surface Area (cm^2) 93.5 cm^2 07/14/22 1531   Wound Volume (cm^3) 9.35 cm^3 07/14/22 1531   Wound Assessment Pink/red;Purple/maroon;Subcutaneous 07/20/22 2230   Drainage Amount Scant 07/21/22 0800   Drainage Left;Medial (Active)   Wound Image   07/11/22 0215   Dressing Status Intact 07/21/22 0800   Dressing Change Due 07/16/22 07/16/22 1627   Incision Cleansed Not Cleansed 07/20/22 0819   Dressing/Treatment Negative pressure wound therapy 07/21/22 0800   Closure Other (Comment) 07/20/22 2033   Margins Approximated 07/20/22 2033   Incision Assessment Other (Comment) 07/20/22 2033   Drainage Amount Small 07/18/22 0800   Drainage Description Serosanguinous 07/20/22 2033   Odor None 07/20/22 2033   Lillie-incision Assessment Fragile 07/20/22 0819   Number of days:         Elimination:  Continence: Bowel: Yes  Bladder: Yes  Urinary Catheter: None   Colostomy/Ileostomy/Ileal Conduit: No       Date of Last BM: 07/19/22    Intake/Output Summary (Last 24 hours) at 7/21/2022 1346  Last data filed at 7/21/2022 1135  Gross per 24 hour   Intake --   Output 1625 ml   Net -1625 ml     I/O last 3 completed shifts: In: 850 [P.O.:500; Blood:350]  Out: 2675 [Urine:1775; Drains:900]    Safety Concerns: At Risk for Falls    Impairments/Disabilities:      Contractures - RUE    Nutrition Therapy:  Current Nutrition Therapy:   - Oral Diet:  General    Routes of Feeding: Oral  Liquids: Thin Liquids  Daily Fluid Restriction: no  Last Modified Barium Swallow with Video (Video Swallowing Test): not done    Treatments at the Time of Hospital Discharge:   Respiratory Treatments: N/A  Oxygen Therapy:  is not on home oxygen therapy.   Ventilator:    - No ventilator support    Rehab Therapies: Physical Therapy and Occupational Therapy  Weight Bearing Status/Restrictions: No weight bearing restrictions  Other Medical Equipment (for information only, NOT a DME order):  wheelchair and walker  Other Treatments: N/A    Patient's personal belongings (please select all that are sent with patient):  None    RN SIGNATURE:  Electronically signed by Shira Price RN on 7/21/22 at 11:04 AM EDT    CASE MANAGEMENT/SOCIAL WORK SECTION    Inpatient Status Date: 07/8/2022    Readmission Risk Assessment Score:  Readmission Risk              Risk of Unplanned Readmission:  [id number]           Discharging to Facility/ Agency   Name: [facility]  Address: [place] [address] [address] [place] [address]  Phone: [phone]  Fax:    Dialysis Facility (if applicable)   Name:  Address:  Dialysis Schedule:  Phone:  Fax:    / signature: Electronically signed by Dayana Layton RN on 7/21/22 at 9:38 AM EDT    PHYSICIAN SECTION    Prognosis: Good    Condition at Discharge: Stable    Rehab Potential (if transferring to Rehab): Good    Recommended Labs or Other Treatments After Discharge: PT/OT eval and treat, skilled nursing assessment, wound vac change M/W/F to left lower leg fasciotomy sites, 1 piece black foam at each site, 125mmHg continuous; can place dry dressing over groin incisions as needed    Physician Certification:   I certify the above information and transfer of Milana Lowe  is necessary for the continuing treatment of the diagnosis listed and that he requires [facility] for less 30 days.      Update Admission H&P: No change in H&P    PHYSICIAN SIGNATURE:  Electronically signed by Abram Storm RN on 7/21/2022 at 1:46 PM    Electronically signed by TIERRA Zuluaga CNP on 7/13/22 at 4:43 PM EDT

## 2022-07-13 NOTE — PROGRESS NOTES
Division of Vascular Surgery             Progress Note      Name: Lois George  MRN: [id number]         Overnight Events:     Nothing acute overnight. Subjective:     Patient seen and examined at the bedside. Continues to be intubated on sedation, continues to require some pressor support, continues on CRRT, urine output 0.2 cc/kg/h over the last 24 hours    Physical Exam:     Vitals:  /62   Pulse 65   Temp 97.9 °F (36.6 °C) (Bladder)   Resp 18   Ht 5' 10.5\" (1.791 m) Comment: no inital recorded height; 9/13/12 office visit  Wt 233 lb 11 oz (106 kg)   SpO2 100%   BMI 33.06 kg/m²     General appearance - intubated and sedated  Mental status - sedated, intermittently responsive to voice and pain  Head - normocephalic and atraumatic  Chest - intubated on ventilator PVRC, FiO2 30%, PEEP 8,    Heart - normal rate, regular rhythm on monitor  Abdomen - soft, non-tender, non-distended  Neurological - sedated on propofol and fentanyl  Extremities -  edema to RLE, unable to illicit any motor function from either lower extremity although this may be due to pt's mental status/sedation, LLE fasciotomy incision  Skin - BL groin incisions clean and dry, no signs of infection  Vascular Exam - doppler signals; strong left DP, weak left PT, weak right PT       Imaging:   XR CHEST (SINGLE VIEW FRONTAL)    Result Date: 7/10/2022  Right IJ CVC catheter tip overlies the superior cavoatrial junction. Left IJ CVC catheter tip overlies the mid/distal SVC. No pneumothorax is seen. Mild left basilar atelectasis. XR CHEST PORTABLE    Result Date: 7/11/2022  Stable chest.     XR CHEST PORTABLE    Result Date: 7/11/2022  1. The deeper temporary dialysis catheter on the previous exam is been removed, with a new temporary dialysis catheter seen overlying the proximal superior vena cava. 2. Other tubes and lines as above. 3. Patchy infiltrates in the lungs bilaterally which may represent edema or pneumonia.

## 2022-07-14 ENCOUNTER — APPOINTMENT (OUTPATIENT)
Dept: GENERAL RADIOLOGY | Age: 62
DRG: 181 | End: 2022-07-14
Payer: COMMERCIAL

## 2022-07-14 ENCOUNTER — APPOINTMENT (OUTPATIENT)
Dept: CT IMAGING | Age: 62
DRG: 181 | End: 2022-07-14
Payer: COMMERCIAL

## 2022-07-14 LAB
ALBUMIN SERPL-MCNC: 2.6 G/DL (ref 3.5–5.2)
ALBUMIN/GLOBULIN RATIO: 1 (ref 1–2.5)
ALP BLD-CCNC: 125 U/L (ref 40–129)
ALT SERPL-CCNC: 23 U/L (ref 5–41)
ANION GAP SERPL CALCULATED.3IONS-SCNC: 10 MMOL/L (ref 9–17)
ANION GAP SERPL CALCULATED.3IONS-SCNC: 11 MMOL/L (ref 9–17)
ANION GAP SERPL CALCULATED.3IONS-SCNC: 13 MMOL/L (ref 9–17)
ANION GAP SERPL CALCULATED.3IONS-SCNC: 14 MMOL/L (ref 9–17)
ANION GAP SERPL CALCULATED.3IONS-SCNC: 15 MMOL/L (ref 9–17)
ANION GAP SERPL CALCULATED.3IONS-SCNC: 16 MMOL/L (ref 9–17)
ANTICARDIOLIPIN IGA ANTIBODY: 1.5 APL (ref 0–14)
ANTICARDIOLIPIN IGG ANTIBODY: 0.7 GPL (ref 0–10)
AST SERPL-CCNC: 341 U/L
AT-III ACTIVITY: 46 % (ref 83–122)
BILIRUB SERPL-MCNC: 0.55 MG/DL (ref 0.3–1.2)
BILIRUBIN DIRECT: 0.24 MG/DL
BILIRUBIN, INDIRECT: 0.31 MG/DL (ref 0–1)
BUN BLDV-MCNC: 30 MG/DL (ref 8–23)
BUN BLDV-MCNC: 30 MG/DL (ref 8–23)
BUN BLDV-MCNC: 31 MG/DL (ref 8–23)
BUN BLDV-MCNC: 33 MG/DL (ref 8–23)
CALCIUM IONIZED: 1.15 MMOL/L (ref 1.13–1.33)
CALCIUM IONIZED: 1.16 MMOL/L (ref 1.13–1.33)
CALCIUM IONIZED: 1.17 MMOL/L (ref 1.13–1.33)
CALCIUM IONIZED: 1.17 MMOL/L (ref 1.13–1.33)
CALCIUM IONIZED: 1.18 MMOL/L (ref 1.13–1.33)
CALCIUM SERPL-MCNC: 8.7 MG/DL (ref 8.6–10.4)
CALCIUM SERPL-MCNC: 8.8 MG/DL (ref 8.6–10.4)
CALCIUM SERPL-MCNC: 8.9 MG/DL (ref 8.6–10.4)
CALCIUM SERPL-MCNC: 8.9 MG/DL (ref 8.6–10.4)
CALCIUM SERPL-MCNC: 9 MG/DL (ref 8.6–10.4)
CALCIUM SERPL-MCNC: 9.3 MG/DL (ref 8.6–10.4)
CARDIOLIPIN AB IGM: 1.1 MPL (ref 0–10)
CHLORIDE BLD-SCNC: 100 MMOL/L (ref 98–107)
CHLORIDE BLD-SCNC: 101 MMOL/L (ref 98–107)
CHLORIDE BLD-SCNC: 102 MMOL/L (ref 98–107)
CHLORIDE BLD-SCNC: 102 MMOL/L (ref 98–107)
CHLORIDE BLD-SCNC: 105 MMOL/L (ref 98–107)
CHLORIDE BLD-SCNC: 98 MMOL/L (ref 98–107)
CO2: 20 MMOL/L (ref 20–31)
CO2: 20 MMOL/L (ref 20–31)
CO2: 21 MMOL/L (ref 20–31)
CO2: 22 MMOL/L (ref 20–31)
CREAT SERPL-MCNC: 1.62 MG/DL (ref 0.7–1.2)
CREAT SERPL-MCNC: 1.64 MG/DL (ref 0.7–1.2)
CREAT SERPL-MCNC: 1.73 MG/DL (ref 0.7–1.2)
CREAT SERPL-MCNC: 1.75 MG/DL (ref 0.7–1.2)
CREAT SERPL-MCNC: 1.8 MG/DL (ref 0.7–1.2)
CREAT SERPL-MCNC: 1.81 MG/DL (ref 0.7–1.2)
DILUTE RUSSELL VIPER VENOM TIME: NORMAL
EKG ATRIAL RATE: 101 BPM
EKG P AXIS: -12 DEGREES
EKG P-R INTERVAL: 126 MS
EKG Q-T INTERVAL: 360 MS
EKG QRS DURATION: 90 MS
EKG QTC CALCULATION (BAZETT): 473 MS
EKG R AXIS: -15 DEGREES
EKG T AXIS: 14 DEGREES
EKG VENTRICULAR RATE: 104 BPM
GFR AFRICAN AMERICAN: 46 ML/MIN
GFR AFRICAN AMERICAN: 47 ML/MIN
GFR AFRICAN AMERICAN: 48 ML/MIN
GFR AFRICAN AMERICAN: 49 ML/MIN
GFR AFRICAN AMERICAN: 52 ML/MIN
GFR AFRICAN AMERICAN: 53 ML/MIN
GFR NON-AFRICAN AMERICAN: 38 ML/MIN
GFR NON-AFRICAN AMERICAN: 39 ML/MIN
GFR NON-AFRICAN AMERICAN: 40 ML/MIN
GFR NON-AFRICAN AMERICAN: 40 ML/MIN
GFR NON-AFRICAN AMERICAN: 43 ML/MIN
GFR NON-AFRICAN AMERICAN: 44 ML/MIN
GFR SERPL CREATININE-BSD FRML MDRD: ABNORMAL ML/MIN/{1.73_M2}
GLUCOSE BLD-MCNC: 121 MG/DL (ref 70–99)
GLUCOSE BLD-MCNC: 121 MG/DL (ref 70–99)
GLUCOSE BLD-MCNC: 121 MG/DL (ref 75–110)
GLUCOSE BLD-MCNC: 123 MG/DL (ref 75–110)
GLUCOSE BLD-MCNC: 126 MG/DL (ref 70–99)
GLUCOSE BLD-MCNC: 132 MG/DL (ref 75–110)
GLUCOSE BLD-MCNC: 135 MG/DL (ref 75–110)
GLUCOSE BLD-MCNC: 137 MG/DL (ref 70–99)
GLUCOSE BLD-MCNC: 138 MG/DL (ref 75–110)
GLUCOSE BLD-MCNC: 139 MG/DL (ref 70–99)
GLUCOSE BLD-MCNC: 146 MG/DL (ref 70–99)
HCT VFR BLD CALC: 24.6 % (ref 40.7–50.3)
HEMOGLOBIN: 7.9 G/DL (ref 13–17)
INR BLD: 0.9
LUPUS ANTICOAG: NORMAL
MAGNESIUM: 1.8 MG/DL (ref 1.6–2.6)
MAGNESIUM: 1.8 MG/DL (ref 1.6–2.6)
MAGNESIUM: 1.9 MG/DL (ref 1.6–2.6)
MAGNESIUM: 2.1 MG/DL (ref 1.6–2.6)
MAGNESIUM: 2.1 MG/DL (ref 1.6–2.6)
MAGNESIUM: 2.2 MG/DL (ref 1.6–2.6)
MCH RBC QN AUTO: 30.5 PG (ref 25.2–33.5)
MCHC RBC AUTO-ENTMCNC: 32.1 G/DL (ref 28.4–34.8)
MCV RBC AUTO: 95 FL (ref 82.6–102.9)
NRBC AUTOMATED: 1.5 PER 100 WBC
PARTIAL THROMBOPLASTIN TIME: 21.1 SEC (ref 20.5–30.5)
PARTIAL THROMBOPLASTIN TIME: 55.2 SEC (ref 20.5–30.5)
PDW BLD-RTO: 15 % (ref 11.8–14.4)
PHOSPHORUS: 3.1 MG/DL (ref 2.5–4.5)
PHOSPHORUS: 3.3 MG/DL (ref 2.5–4.5)
PHOSPHORUS: 3.4 MG/DL (ref 2.5–4.5)
PHOSPHORUS: 3.4 MG/DL (ref 2.5–4.5)
PHOSPHORUS: 3.6 MG/DL (ref 2.5–4.5)
PHOSPHORUS: 3.7 MG/DL (ref 2.5–4.5)
PLATELET # BLD: 153 K/UL (ref 138–453)
PMV BLD AUTO: 12.7 FL (ref 8.1–13.5)
POTASSIUM SERPL-SCNC: 3.6 MMOL/L (ref 3.7–5.3)
POTASSIUM SERPL-SCNC: 3.6 MMOL/L (ref 3.7–5.3)
POTASSIUM SERPL-SCNC: 3.7 MMOL/L (ref 3.7–5.3)
POTASSIUM SERPL-SCNC: 3.8 MMOL/L (ref 3.7–5.3)
POTASSIUM SERPL-SCNC: 3.9 MMOL/L (ref 3.7–5.3)
POTASSIUM SERPL-SCNC: 4.1 MMOL/L (ref 3.7–5.3)
PROTEIN S ANTIGEN, FREE: 101 % (ref 74–147)
PROTHROMBIN TIME: 9.5 SEC (ref 9.1–12.3)
RBC # BLD: 2.59 M/UL (ref 4.21–5.77)
SODIUM BLD-SCNC: 134 MMOL/L (ref 135–144)
SODIUM BLD-SCNC: 135 MMOL/L (ref 135–144)
SODIUM BLD-SCNC: 135 MMOL/L (ref 135–144)
SODIUM BLD-SCNC: 140 MMOL/L (ref 135–144)
TOTAL PROTEIN: 5.3 G/DL (ref 6.4–8.3)
TROPONIN, HIGH SENSITIVITY: 89 NG/L (ref 0–22)
WBC # BLD: 19.3 K/UL (ref 3.5–11.3)

## 2022-07-14 PROCEDURE — 93010 ELECTROCARDIOGRAM REPORT: CPT | Performed by: INTERNAL MEDICINE

## 2022-07-14 PROCEDURE — 6360000002 HC RX W HCPCS: Performed by: STUDENT IN AN ORGANIZED HEALTH CARE EDUCATION/TRAINING PROGRAM

## 2022-07-14 PROCEDURE — 6360000002 HC RX W HCPCS: Performed by: INTERNAL MEDICINE

## 2022-07-14 PROCEDURE — 85730 THROMBOPLASTIN TIME PARTIAL: CPT

## 2022-07-14 PROCEDURE — 93005 ELECTROCARDIOGRAM TRACING: CPT | Performed by: STUDENT IN AN ORGANIZED HEALTH CARE EDUCATION/TRAINING PROGRAM

## 2022-07-14 PROCEDURE — 6360000002 HC RX W HCPCS: Performed by: NURSE PRACTITIONER

## 2022-07-14 PROCEDURE — 99213 OFFICE O/P EST LOW 20 MIN: CPT

## 2022-07-14 PROCEDURE — 2580000003 HC RX 258: Performed by: INTERNAL MEDICINE

## 2022-07-14 PROCEDURE — 6370000000 HC RX 637 (ALT 250 FOR IP): Performed by: STUDENT IN AN ORGANIZED HEALTH CARE EDUCATION/TRAINING PROGRAM

## 2022-07-14 PROCEDURE — 71045 X-RAY EXAM CHEST 1 VIEW: CPT

## 2022-07-14 PROCEDURE — 74176 CT ABD & PELVIS W/O CONTRAST: CPT

## 2022-07-14 PROCEDURE — 6370000000 HC RX 637 (ALT 250 FOR IP): Performed by: HEALTH CARE PROVIDER

## 2022-07-14 PROCEDURE — 2500000003 HC RX 250 WO HCPCS: Performed by: NURSE PRACTITIONER

## 2022-07-14 PROCEDURE — 82330 ASSAY OF CALCIUM: CPT

## 2022-07-14 PROCEDURE — 85027 COMPLETE CBC AUTOMATED: CPT

## 2022-07-14 PROCEDURE — 6370000000 HC RX 637 (ALT 250 FOR IP): Performed by: NURSE PRACTITIONER

## 2022-07-14 PROCEDURE — 93005 ELECTROCARDIOGRAM TRACING: CPT | Performed by: SURGERY

## 2022-07-14 PROCEDURE — 84484 ASSAY OF TROPONIN QUANT: CPT

## 2022-07-14 PROCEDURE — 83735 ASSAY OF MAGNESIUM: CPT

## 2022-07-14 PROCEDURE — 80076 HEPATIC FUNCTION PANEL: CPT

## 2022-07-14 PROCEDURE — 2000000000 HC ICU R&B

## 2022-07-14 PROCEDURE — 90945 DIALYSIS ONE EVALUATION: CPT | Performed by: INTERNAL MEDICINE

## 2022-07-14 PROCEDURE — 82947 ASSAY GLUCOSE BLOOD QUANT: CPT

## 2022-07-14 PROCEDURE — 80048 BASIC METABOLIC PNL TOTAL CA: CPT

## 2022-07-14 PROCEDURE — 84100 ASSAY OF PHOSPHORUS: CPT

## 2022-07-14 RX ORDER — QUETIAPINE FUMARATE 25 MG/1
50 TABLET, FILM COATED ORAL NIGHTLY
Status: DISCONTINUED | OUTPATIENT
Start: 2022-07-15 | End: 2022-07-14

## 2022-07-14 RX ORDER — CHOLECALCIFEROL (VITAMIN D3) 125 MCG
5 CAPSULE ORAL NIGHTLY
Status: DISCONTINUED | OUTPATIENT
Start: 2022-07-14 | End: 2022-07-21 | Stop reason: HOSPADM

## 2022-07-14 RX ORDER — METHOCARBAMOL 750 MG/1
750 TABLET, FILM COATED ORAL 3 TIMES DAILY
Status: DISCONTINUED | OUTPATIENT
Start: 2022-07-14 | End: 2022-07-14

## 2022-07-14 RX ORDER — TAMSULOSIN HYDROCHLORIDE 0.4 MG/1
0.4 CAPSULE ORAL DAILY
Status: DISCONTINUED | OUTPATIENT
Start: 2022-07-14 | End: 2022-07-21 | Stop reason: HOSPADM

## 2022-07-14 RX ORDER — METOPROLOL SUCCINATE 25 MG/1
12.5 TABLET, EXTENDED RELEASE ORAL 2 TIMES DAILY
Status: DISCONTINUED | OUTPATIENT
Start: 2022-07-14 | End: 2022-07-15

## 2022-07-14 RX ORDER — QUETIAPINE FUMARATE 25 MG/1
50 TABLET, FILM COATED ORAL 2 TIMES DAILY
Status: DISCONTINUED | OUTPATIENT
Start: 2022-07-14 | End: 2022-07-14

## 2022-07-14 RX ORDER — INSULIN LISPRO 100 [IU]/ML
0-18 INJECTION, SOLUTION INTRAVENOUS; SUBCUTANEOUS
Status: DISCONTINUED | OUTPATIENT
Start: 2022-07-14 | End: 2022-07-21 | Stop reason: HOSPADM

## 2022-07-14 RX ORDER — QUETIAPINE FUMARATE 25 MG/1
50 TABLET, FILM COATED ORAL NIGHTLY
Status: DISCONTINUED | OUTPATIENT
Start: 2022-07-14 | End: 2022-07-21 | Stop reason: HOSPADM

## 2022-07-14 RX ORDER — POTASSIUM CHLORIDE 29.8 MG/ML
40 INJECTION INTRAVENOUS ONCE
Status: COMPLETED | OUTPATIENT
Start: 2022-07-14 | End: 2022-07-14

## 2022-07-14 RX ORDER — MAGNESIUM SULFATE IN WATER 40 MG/ML
2000 INJECTION, SOLUTION INTRAVENOUS ONCE
Status: COMPLETED | OUTPATIENT
Start: 2022-07-14 | End: 2022-07-14

## 2022-07-14 RX ORDER — ATORVASTATIN CALCIUM 10 MG/1
10 TABLET, FILM COATED ORAL DAILY
Refills: 2 | Status: DISCONTINUED | OUTPATIENT
Start: 2022-07-14 | End: 2022-07-21 | Stop reason: HOSPADM

## 2022-07-14 RX ADMIN — MIDODRINE HYDROCHLORIDE 5 MG: 5 TABLET ORAL at 08:28

## 2022-07-14 RX ADMIN — QUETIAPINE FUMARATE 50 MG: 25 TABLET ORAL at 12:23

## 2022-07-14 RX ADMIN — Medication 1500 ML/HR: at 13:42

## 2022-07-14 RX ADMIN — FAMOTIDINE 20 MG: 20 TABLET, FILM COATED ORAL at 20:25

## 2022-07-14 RX ADMIN — METHOCARBAMOL TABLETS 750 MG: 750 TABLET, COATED ORAL at 08:28

## 2022-07-14 RX ADMIN — HEPARIN SODIUM 14 UNITS/KG/HR: 10000 INJECTION, SOLUTION INTRAVENOUS at 05:09

## 2022-07-14 RX ADMIN — ONDANSETRON 4 MG: 2 INJECTION INTRAMUSCULAR; INTRAVENOUS at 01:09

## 2022-07-14 RX ADMIN — METOPROLOL SUCCINATE 12.5 MG: 25 TABLET, FILM COATED, EXTENDED RELEASE ORAL at 09:23

## 2022-07-14 RX ADMIN — HALOPERIDOL LACTATE 2.5 MG: 5 INJECTION, SOLUTION INTRAMUSCULAR at 01:58

## 2022-07-14 RX ADMIN — CEFTRIAXONE SODIUM 1000 MG: 1 INJECTION, POWDER, FOR SOLUTION INTRAMUSCULAR; INTRAVENOUS at 20:25

## 2022-07-14 RX ADMIN — Medication 5 MG: at 20:25

## 2022-07-14 RX ADMIN — MIDODRINE HYDROCHLORIDE 5 MG: 5 TABLET ORAL at 17:24

## 2022-07-14 RX ADMIN — Medication 1500 ML/HR: at 13:39

## 2022-07-14 RX ADMIN — TAMSULOSIN HYDROCHLORIDE 0.4 MG: 0.4 CAPSULE ORAL at 09:23

## 2022-07-14 RX ADMIN — Medication 1500 ML/HR: at 01:57

## 2022-07-14 RX ADMIN — FAMOTIDINE 20 MG: 20 TABLET, FILM COATED ORAL at 08:28

## 2022-07-14 RX ADMIN — MAGNESIUM SULFATE 2000 MG: 2 INJECTION INTRAVENOUS at 08:45

## 2022-07-14 RX ADMIN — Medication 1500 ML/HR: at 01:56

## 2022-07-14 RX ADMIN — ASPIRIN 81 MG: 81 TABLET, CHEWABLE ORAL at 08:28

## 2022-07-14 RX ADMIN — Medication 1500 ML/HR: at 23:00

## 2022-07-14 RX ADMIN — Medication 1500 ML/HR: at 23:02

## 2022-07-14 RX ADMIN — HEPARIN SODIUM 14 UNITS/KG/HR: 10000 INJECTION, SOLUTION INTRAVENOUS at 18:28

## 2022-07-14 RX ADMIN — SODIUM CHLORIDE: 9 INJECTION, SOLUTION INTRAVENOUS at 21:05

## 2022-07-14 RX ADMIN — POTASSIUM CHLORIDE 40 MEQ: 400 INJECTION, SOLUTION INTRAVENOUS at 18:38

## 2022-07-14 RX ADMIN — Medication 1500 ML/HR: at 23:01

## 2022-07-14 RX ADMIN — MIDODRINE HYDROCHLORIDE 5 MG: 5 TABLET ORAL at 12:24

## 2022-07-14 RX ADMIN — Medication 1500 ML/HR: at 13:41

## 2022-07-14 RX ADMIN — POTASSIUM CHLORIDE 20 MEQ: 400 INJECTION, SOLUTION INTRAVENOUS at 17:31

## 2022-07-14 RX ADMIN — ATORVASTATIN CALCIUM 10 MG: 10 TABLET, FILM COATED ORAL at 14:55

## 2022-07-14 RX ADMIN — QUETIAPINE FUMARATE 50 MG: 25 TABLET ORAL at 20:49

## 2022-07-14 RX ADMIN — POLYETHYLENE GLYCOL 3350 17 G: 17 POWDER, FOR SOLUTION ORAL at 08:28

## 2022-07-14 ASSESSMENT — PAIN SCALES - WONG BAKER
WONGBAKER_NUMERICALRESPONSE: 8

## 2022-07-14 NOTE — PROGRESS NOTES
Division of Vascular Surgery             Progress Note      Name: Sushil Melendez  MRN: [id number]         Overnight Events:     Nothing acute overnight. Subjective:     Patient seen and examined at the bedside. Extubated. Though is confused and is only intermittently following commands. States that he does have some discomfort in his legs. Physical Exam:     Vitals:  BP (!) 148/72   Pulse (!) 107   Temp 99.7 °F (37.6 °C) (Core)   Resp 22   Ht 5' 10.5\" (1.791 m) Comment: no inital recorded height; 9/13/12 office visit  Wt 231 lb 7.7 oz (105 kg)   SpO2 95%   BMI 32.74 kg/m²     General appearance - Alert, disoriented  Mental status - following some commands  Head - normocephalic and atraumatic  Chest - no respiratory distress, no accessory muscle use   Heart - tachycardia, irregular rhythm  Abdomen - soft, non-tender, non-distended  Neurological - continues to have decreased sedation in lower extremities  Extremities -  edema to RLE, able to elicit some motor function from lower extremities not following commands really, , LLE fasciotomy incision  Skin - BL groin incisions clean and dry, no signs of infection  Vascular Exam - palpable left DP, doppler signals; weak left PT, weak right PT       Imaging:   XR CHEST (SINGLE VIEW FRONTAL)    Result Date: 7/10/2022  Right IJ CVC catheter tip overlies the superior cavoatrial junction. Left IJ CVC catheter tip overlies the mid/distal SVC. No pneumothorax is seen. Mild left basilar atelectasis. XR CHEST PORTABLE    Result Date: 7/11/2022  Stable chest.     XR CHEST PORTABLE    Result Date: 7/11/2022  1. The deeper temporary dialysis catheter on the previous exam is been removed, with a new temporary dialysis catheter seen overlying the proximal superior vena cava. 2. Other tubes and lines as above. 3. Patchy infiltrates in the lungs bilaterally which may represent edema or pneumonia. XR CHEST PORTABLE    Result Date: 7/11/2022  1.   The right

## 2022-07-14 NOTE — PLAN OF CARE
Problem: Discharge Planning  Goal: Discharge to home or other facility with appropriate resources  7/13/2022 2300 by Carla Zavala RN  Outcome: Progressing  7/13/2022 1112 by Meka Serrano RN  Outcome: Progressing     Problem: Pain  Goal: Verbalizes/displays adequate comfort level or baseline comfort level  7/13/2022 2300 by Carla Zavala RN  Outcome: Progressing  Flowsheets (Taken 7/13/2022 2000)  Verbalizes/displays adequate comfort level or baseline comfort level:   Encourage patient to monitor pain and request assistance   Assess pain using appropriate pain scale   Administer analgesics based on type and severity of pain and evaluate response   Implement non-pharmacological measures as appropriate and evaluate response   Consider cultural and social influences on pain and pain management  7/13/2022 1112 by Meka Serrano RN  Outcome: Progressing  Flowsheets (Taken 7/13/2022 0400 by Carla Zavala RN)  Verbalizes/displays adequate comfort level or baseline comfort level: Assess pain using appropriate pain scale     Problem: Skin/Tissue Integrity  Goal: Absence of new skin breakdown  Description: 1. Monitor for areas of redness and/or skin breakdown  2. Assess vascular access sites hourly  3. Every 4-6 hours minimum:  Change oxygen saturation probe site  4. Every 4-6 hours:  If on nasal continuous positive airway pressure, respiratory therapy assess nares and determine need for appliance change or resting period.   7/13/2022 2300 by Carla Zavala RN  Outcome: Progressing  7/13/2022 1112 by Meka Serrano RN  Outcome: Progressing     Problem: Safety - Adult  Goal: Free from fall injury  7/13/2022 2300 by Carla Zavala RN  Outcome: Progressing  7/13/2022 1112 by Meka Serrano RN  Outcome: Progressing     Problem: ABCDS Injury Assessment  Goal: Absence of physical injury  7/13/2022 2300 by Carla Zavala RN  Outcome: Progressing  Flowsheets (Taken 7/13/2022 2258)  Absence of Physical Injury: Implement safety measures based on patient assessment  7/13/2022 1112 by Azalia Krishna RN  Outcome: Progressing     Problem: Safety - Medical Restraint  Goal: Remains free of injury from restraints (Restraint for Interference with Medical Device)  Description: INTERVENTIONS:  1. Determine that other, less restrictive measures have been tried or would not be effective before applying the restraint  2. Evaluate the patient's condition at the time of restraint application  3. Inform patient/family regarding the reason for restraint  4.  Q2H: Monitor safety, psychosocial status, comfort, nutrition and hydration  7/13/2022 1112 by Azalia Krishna RN  Outcome: Completed  Flowsheets  Taken 7/13/2022 0400 by Mtat Contreras RN  Remains free of injury from restraints (restraint for interference with medical device): Determine that other, less restrictive measures have been tried or would not be effective before applying the restraint  Taken 7/13/2022 0200 by Matt Contreras RN  Remains free of injury from restraints (restraint for interference with medical device): Determine that other, less restrictive measures have been tried or would not be effective before applying the restraint  Taken 7/13/2022 0000 by Matt Contreras RN  Remains free of injury from restraints (restraint for interference with medical device):   Determine that other, less restrictive measures have been tried or would not be effective before applying the restraint   Evaluate the patient's condition at the time of restraint application   Inform patient/family regarding the reason for restraint   Every 2 hours: Monitor safety, psychosocial status, comfort, nutrition and hydration     Problem: Respiratory - Adult  Goal: Achieves optimal ventilation and oxygenation  7/13/2022 2300 by Matt Contreras RN  Outcome: Progressing  7/13/2022 1112 by Azalia Krishna RN  Outcome: Progressing

## 2022-07-14 NOTE — PROGRESS NOTES
PROGRESS NOTE          PATIENT NAME: Sushil Melendez  MEDICAL RECORD NO. [id number]  DATE: 7/14/2022  SURGEON: Warden Siddharth MD  PRIMARY CARE PHYSICIAN: Chalo Butler (Inactive)    HD: # 6    ASSESSMENT    Patient Active Problem List   Diagnosis    Seasonal allergies    Anxiety    Carotid artery occlusion    CAD (coronary artery disease)    Hypertension    Neuropathy    [facility]    HTN (hypertension)    Arthritis associated with another disorder    Hernia, hiatal    Hyperlipidemia    CAD (coronary artery disease)    Critical ischemia of lower extremity ([facility]    CAROLYN (acute kidney injury) ([facility]    Hyperkalemia    Encounter for continuous venovenous hemodiafiltration (CVVHD) ([facility])    Failing vascular bypass graft    Arterial hypotension    Non-traumatic rhabdomyolysis    Cardiomyopathy ([facility]    Decreased urine output       MEDICAL DECISION MAKING AND PLAN    1. Neurological  1. MMPT   2. Patient still confused despite no sedation  3. Hx of CVA with residual right sided weakness  2. Cardiovascular  1. HR: 52 - 115  2. MAP>65  - 171  3. Midodrine 5mg TID  4. Hx of aortobifemoral graft and CABGx3  5. Total aortobifemoral occulusion. S/p BL common fem art cut down. Thrombectomy of the aortobifemoral bypass. Thromboendarterectomy of the common femoral profundofemoral and SFA bilaterally with bovine pericardial patch angioplasty 7/8/2022.   6. Elevated troponins: Continue to downtrend  7. Echo EF 15-23% with akinetic apex.   1. Cardiology following   2. Cards recommends cath when more stable, will need nephrology assistance in clearance. 3. ASA 81 mg daily   3. Heme:  1. Hb: 7.9 (7.8)  2. LQI: 296 (168)  3. Heparin gtt  4. Respiratory  1. 2L NC Spo2>92%  2. CXR: stable   3. Extubated 7/13  5. Gastrointestinal  1. Clear liquid diet  2. GI Prophylaxis pepcid  6. FEN/Renal  1. UOP: 0.13 cc/kg/hr over past 24 hours   2. Na/K+ : 133 (285) / 4.1 (3.9)  3.  BUN/Cr: 33 (32) / 5.59 (1.84)  1. CAROLYN secondary to rhabdo. Normal Cr on arrival  4. Nephrology consulted and initiated CRRT on 7/10 for acute renal failure  5. Rhabdomyolysis. Enzymes down trending.   6. Continue CVVHD per nephro  7. IVF: D5 0.45% NS @ 75 cc/hr   7. ID  1. Tmax: 37.9  2. JUR: 01.9 (15.9)  3. UTI positive on 7/10. Rocephin q24 x7days  8. Endo  1.   2. HDSS  9. MSK  1. Concern for compartment syndrome in LLE  2. S/p left calf fasciotomy 22  10. Dispo   1. Continue ICU care   11. Lines  1. saldana, right axillary arterial line, R IJ temporary dialysis catheter, L IJ CVC     Chief Complaint: LLE weakness    SUBJECTIVE    Costella Danie [place]  Evaluated at bedside. Patient appears confused he is only able to tell me his name. He is not answering questions appropriately. Patient did require 2.5mg haldol at around 0100    OBJECTIVE  VITALS: Temp: Temp: 99.9 °F (37.7 °C)Temp  Av.1 °F (37.3 °C)  Min: 97.9 °F (36.6 °C)  Max: 100.2 °F (18.4 °C) BP Systolic (09RUJ), KX , Min:115 , OCX:221   Diastolic (05IPE), SHY:53, Min:49, Max:111   Pulse Pulse  Av.4  Min: 52  Max: 115 Resp Resp  Av.7  Min: 16  Max: 22 Pulse ox SpO2  Av.4 %  Min: 85 %  Max: 100 %  GENERAL: AOx1 appears confused  NEURO: No facial droop, moving all extremities   HEENT: PERRLA  LUNGS: symmetric chest rise and fall  HEART: normal rate and regular rhythm  ABDOMEN: soft, non-tender, non-distended  EXTREMITY: LLE +3 pitting edema. BL popliteal, DP/PT pulses on doppler}    I/O last 3 completed shifts: In: 3243.6 [I.V.:2563.6; Blood:430; NG/GT:250]  Out: 8361 [Urine:588; Emesis/NG output:30]    Drain/tube output: In: 4752.4 [I.V.:3911.9;  Blood:430; NG/GT:250]  Out: 9181 [Urine:493]    LAB:  CBC:   Recent Labs     22  0511 22  0759 22  0328   WBC 16.3* 15.8* 19.3*   HGB 7.9* 7.8* 7.9*   HCT 24.1* 23.7* 24.6*   MCV 96.0 94.4 95.0   PLT See Reflexed IPF Result See Reflexed IPF Result 153     BMP:   Recent Labs 07/13/22  1906 07/13/22  2350 07/14/22  0327    134* 140   K 3.9 3.6* 4.1    98 105   CO2 21 20 20   BUN 32* 31* 33*   CREATININE 1.85* 1.73* 1.81*   GLUCOSE 123* 146* 126*     COAGS:   Recent Labs     07/11/22  1748 07/11/22 2001 07/12/22  2329 07/13/22  0508 07/14/22  0328   APTT 21.1   < > 61.9* 60.0* 55.2*   INR 0.9  --   --   --   --     < > = values in this interval not displayed. RADIOLOGY:  XR CHEST (SINGLE VIEW FRONTAL)    Result Date: 7/11/2022  Right IJ CVC catheter tip overlies the superior cavoatrial junction. Left IJ CVC catheter tip overlies the mid/distal SVC. No pneumothorax is seen. Mild left basilar atelectasis. CT HEAD WO CONTRAST    Result Date: 7/8/2022  No acute intracranial abnormality. 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 Mild diffuse cerebral atrophy and minimal chronic white matter ischemic change. Findings suggestive of acute on chronic right maxillary sinusitis. Correlate clinically. US RENAL COMPLETE    Result Date: 7/11/2022  Unremarkable kidneys. Hepatic steatosis. XR CHEST PORTABLE    Result Date: 7/13/2022  No acute cardiopulmonary process. Support tubes as described above. XR CHEST PORTABLE    Result Date: 7/12/2022  No acute cardiopulmonary process. Support tubes as described above. XR CHEST PORTABLE    Result Date: 7/11/2022  Mild bibasilar opacities compatible with atelectasis. Aspiration and pneumonia are not excluded. XR CHEST PORTABLE    Result Date: 7/11/2022  Stable chest.     XR CHEST PORTABLE    Result Date: 7/11/2022  1. The deeper temporary dialysis catheter on the previous exam is been removed, with a new temporary dialysis catheter seen overlying the proximal superior vena cava. 2. Other tubes and lines as above. 3. Patchy infiltrates in the lungs bilaterally which may represent edema or pneumonia. XR CHEST PORTABLE    Result Date: 7/11/2022  1.   The right jugular catheter appears in a stable position with the tip at the cavoatrial junction. 2. Endotracheal tube, nasogastric tube, and left jugular line are also redemonstrated. 3.  Some hazy opacities in the lungs which could be subtle edema or inflammatory. XR CHEST PORTABLE    Result Date: 7/10/2022  1. Tubes and right IJ line as detailed above. 2. Improvement in aeration of both lung since the prior study with residual airspace disease at the left upper and left lower lung zones as well as right parahilar region. No new focal airspace disease. 3. Prior median sternotomy and CABG. XR CHEST PORTABLE    Result Date: 7/9/2022  1. Tubes and right IJ approach central venous catheter as detailed above. 2. Bilateral airspace disease, likely multifocal pneumonia, left greater than right as detailed above. Follow-up is recommended to document resolution. XR CHEST PORTABLE    Result Date: 7/8/2022  Scattered pulmonary opacities consistent with multifocal airspace disease. Poor tubes and lines as above. XR CHEST PORTABLE    Result Date: 7/8/2022  Scattered pulmonary infiltrates concerning for pneumonia. CTA CHEST ABDOMEN AORTA RUNOFF RECON    Result Date: 7/8/2022  Occluded aortobifemoral graft. Prior infrarenal dissecting hematoma, as reported; occluded native infrarenal aorta. Reconstituted flow in moderate-severely diseased iliofemoral arteries. Patent right common femoral below-knee graft. Likely occluded left popliteal artery; no below-knee contrast enhancement demonstrated on this study (timing versus absent flow). Multiple additional vascular findings, as above. Interval worsening emphysema and mild interstitial disease as compared to 2012.   Interval bullous changes upper lung zones, multicystic/larger on right with mild peripheral enhancement/small posterior ovoid fluid collection suggesting some degree of inflammation or infection; no large fluid-fluid level or solid elements suggesting significant infection/fungal disease. No consolidation, PTX or sizable pleural effusion. Hiatus hernia and esophageal mural prominence. Correlate for esophagitis. Edema/soft tissue swelling below knee on the right. Mild soft tissue swelling left distal calf/foot. Some muscular atrophy right lower extremity. Additional unchanged or incidental findings, as detailed in the body of the report above. Findings were discussed with Dr. Hugo Hamman at 9:08 am on 7/8/2022.          Luis Felipe White DO  7/14/22, 5:50 AM

## 2022-07-14 NOTE — PROGRESS NOTES
[facility] Nurse  Consult Note       NAME:  Jose M Lorenzo  MEDICAL RECORD NUMBER:  [id number]  AGE: 64 y.o. GENDER: male  : 1960  TODAY'S DATE:  2022    Subjective:     Reason for WOCN Evaluation and Assessment: \"sacral ulcer\"      Jose M Lorenzo is a 64 y.o. male referred by:   [x] Physician  [] Nursing  [] Other:     Wound Identification:  Wound Type: pressure  Contributing Factors: chronic pressure, decreased mobility, shear force, arterial insufficiency, decreased tissue oxygenation, anticoagulation therapy and incontinence of stool        Deep tissue injuries present as purple or maroon localized areas of discolored, intact skin or blood filled blisters due to damage of the capillaries at the bone muscle interface resulting in ischemia of the soft tissue due pressure and/or shear. The area may be preceded by tissue that is painful, firm, mushy, boggy, or warmer or cooler than adjacent tissue. Evolution may include a thin blister over a dark wound bed. The wound may further evolve and become covered by thin eschar. Evolution may be rapid exposing additional layers of tissue even with optimal treatment. These areas may \"suddenly\" appear as the visible skin color change can occur days after the initial insult and reperfusion. Careful skin assessment of bony prominences is critical to the early identification of these injuries.      Objective:      BP (!) 113/59   Pulse (!) 108   Temp 99.1 °F (37.3 °C) (Core)   Resp 17   Ht 5' 10.5\" (1.791 m) Comment: no inital recorded height; 12 office visit  Wt 231 lb 7.7 oz (105 kg)   SpO2 98%   BMI 32.74 kg/m²   Uday Risk Score: Uday Scale Score: 13    LABS    CBC:   Lab Results   Component Value Date/Time    WBC 19.3 2022 03:28 AM    RBC 2.59 2022 03:28 AM    RBC 4.69 2012 07:29 PM    HGB 7.9 2022 03:28 AM     CMP:  Albumin:    Lab Results   Component Value Date/Time    LABALBU 2.6 2022 06:31 AM    LABALBU 3.6 05/24/2012 05:11 AM    LABALBU 4.4 07/25/2011 09:50 AM     PT/INR:    Lab Results   Component Value Date/Time    PROTIME DUPLICATE ORDER 79/70/3915 07:00 PM    PROTIME 11.9 06/03/2012 01:19 AM    PROTIME 11.40 07/25/2011 68:06 AM    INR DUPLICATE ORDER 31/49/0547 07:00 PM     HgBA1c:    Lab Results   Component Value Date/Time    LABA1C 7.5 07/11/2022 06:27 AM    LABA1C 6.0 02/12/2011 02:10 AM     PTT: No components found for: LABPTT      Assessment:       Measurements:       07/14/22 1531   Wound 07/11/22 Coccyx   Date First Assessed/Time First Assessed: 07/11/22 2000   Primary Wound Type: Pressure Injury  Location: Coccyx   Wound Image    Wound Etiology Deep tissue/Injury   Dressing Status New dressing applied   Wound Cleansed Soap and water   Dressing/Treatment Calmoseptine/zinc oxide with menthol   Dressing Change Due 07/15/22   Wound Length (cm) 8.5 cm   Wound Width (cm) 11 cm   Wound Depth (cm) 0.1 cm   Wound Surface Area (cm^2) 93.5 cm^2   Wound Volume (cm^3) 9.35 cm^3   Wound Assessment Pink/red;Purple/maroon;Dusky;Ruptured blister   Drainage Amount Small   Drainage Description Serosanguinous   Odor None   Lillie-wound Assessment Intact; Blanchable erythema         Response to treatment:  Well tolerated by patient. Plan:     Plan of Care: Turn every 2 hours. Side to side only. Avoid supine. Use the wedges to offload the sacrum  Float heels off of bed with pillows under calves    Use lift sling to reposition patient to minimize potential for shear injury. Routine incontinence care with incontinence barrier cloths  Apply zinc oxide or Calmazine cream BID and prn incontinence care to the sacral injury.    Moisture wicking under pads      Specialty Bed Required : Yes: Rossy Isoflex Surface in use  [] Low Air Loss   [x] Pressure Redistribution  [] Fluid Immersion  [] Bariatric  [] Total Pressure Relief  [] Other:     Discharge Plan:  TBD    Patient/Caregiver Teaching:    []

## 2022-07-14 NOTE — PROGRESS NOTES
Physical Therapy        Physical Therapy Cancel Note      DATE: 2022    NAME: Jose M Lorenzo  MRN: [id number]   : 1960      Patient not seen this date for Physical Therapy due to:    Hemodialysis: Pt on continuous dialysis, will pursue for mobility readiness.        Electronically signed by Forrest Polanco PT on 2022 at 1:34 PM

## 2022-07-14 NOTE — FLOWSHEET NOTE
Dr. Sena Peña and Maranda Strong spoke to patient's sister, Lyndon Baumgarten regarding the heart cath for tomorrow. Dr. Sean Peña spoke with Lyndon Baumgarten explaining the benefits and risks with the kidneys on having the cath procedure. Bhargavi understood and writer was a witness to her agreeing to follow with the procedure tomorrow.

## 2022-07-14 NOTE — PROGRESS NOTES
Reached out to Vascular Surgery, Trauma surgery and Nephrology regarding clearance for cardiac cath tomorrow and DAPT thereafter as the patient has been having episodes of NSVT. He has severely reduced EF with EKG changes and elevated troponin.     - Okay to proceed per Nephrology with the possible risk of needing permanent dialysis - patient and family agreeable. - However, Surgery teams concerned as the patient still has a groin hematoma, is still on dialysis and has soft BP. However, they will discuss and get back to us regarding clearance and appropriate timing. We will continue to follow .      Marilee Forrester MD  Fellow, [id number] [facility]

## 2022-07-14 NOTE — PROGRESS NOTES
Renal Progress Note    Patient :  Chet Pope; 64 y.o. MRN# [id number]  Location:  [id number]  Attending:  Precious Sanchez MD  Admit Date:  7/8/2022   Hospital Day: 6    Subjective:     Patient was seen and examined on CVVHD. Tolerating the procedure well. No new issues reported overnight. BMP results from today reviewed. Electrolytes stable with potassium 3.7 and bicarb 21. Phosphorus 3.4, magnesium 1.8. Currently on 2K potassium bath, on 1.5 L/hr and 200 BFR. Currently getting UF of -25 cc/hr. Urine output documented as about 370 cc in the last 24 hours. Now off pressors. On 1/2 NS with D5W at 75 cc/hr per primary. Cardiology is recommending cardiac catheterization tomorrow for possible PCI. CT abdomen pelvis without contrast done today 7/14/2022 which showed postprocedural findings in the left groin with superficial hematoma, as described. Subcutaneous edema in the left groin and left thigh is also present. No retroperitoneal intrapelvic hematoma. Outpatient Medications:     Medications Prior to Admission: oxyCODONE (OXYCONTIN) 40 MG CR tablet, Take 40 mg by mouth every 12 hours. oxyCODONE-acetaminophen (PERCOCET)  MG per tablet, Take 1 tablet by mouth 3 times daily as needed. breakthrought pain   lisinopril (PRINIVIL;ZESTRIL) 10 MG tablet, Take 0.5 tablets by mouth daily. dipyridamole-aspirin (AGGRENOX)  MG per SR capsule, Take 1 capsule by mouth daily. simvastatin (ZOCOR) 40 MG tablet, Take 1 tablet by mouth nightly. ALPRAZolam (XANAX) 1 MG tablet, Take 1 tablet by mouth 2 times daily as needed. varenicline (CHANTIX STARTING MONTH TYLOR) 0.5 MG X 11 & 1 MG X 42 tablet, Take by mouth.  isosorbide mononitrate (IMDUR) 30 MG CR tablet, Take 30 mg by mouth daily. metoprolol (LOPRESSOR) 25 MG tablet, Take 1 tablet by mouth 2 times daily. Takes 1/2 tab 2 times a day  mirtazapine (REMERON) 30 MG tablet, Take 1 tablet by mouth nightly.   tamsulosin (FLOMAX) 0.4 MG capsule, Take 1 capsule by mouth daily. TRUETEST TEST strip, TEST 2 TIMES DAILY  metformin (GLUCOPHAGE) 500 MG tablet, TAKE 1 TABLET BY MOUTH 2 TIMES DAILY (WITH MEALS) FOR 30 DAYS. aspirin  MG EC tablet, Take 1 tablet by mouth daily. Current Medications:     Scheduled Meds:    atorvastatin  10 mg Oral Daily    tamsulosin  0.4 mg Oral Daily    insulin lispro  0-18 Units SubCUTAneous 4x Daily AC & HS    metoprolol succinate  12.5 mg Oral BID    melatonin  5 mg Oral Nightly    [START ON 7/15/2022] QUEtiapine  50 mg Oral Nightly    famotidine  20 mg Oral BID    alteplase  1 mg IntraCATHeter Once    aspirin  81 mg Oral Daily    midodrine  5 mg Oral TID WC    cefTRIAXone (ROCEPHIN) IV  1,000 mg IntraVENous Q24H    polyethylene glycol  17 g Oral Daily     Continuous Infusions:    dextrose 5 % and 0.45 % NaCl 75 mL/hr at 22 1300    [Held by provider] dexmedetomidine Stopped (22 1044)    sodium chloride 100 mL/hr at 22 1300    CRRT dialysis builder 1,500 mL/hr (22 1342)    heparin (PORCINE) Infusion Stopped (22 0933)     PRN Meds:  heparin (porcine), heparin (porcine), heparin (porcine), heparin (porcine), sodium chloride flush, ondansetron, heparin (porcine), heparin (porcine), glucose, glucagon (rDNA)    Input/Output:       I/O last 3 completed shifts: In: 5299.8 [I.V.:4459.2; Blood:430; NG/GT:250; IV Piggyback:160.5]  Out: 8219 [Urine:548; Emesis/NG output:30].       Patient Vitals for the past 96 hrs (Last 3 readings):   Weight   22 0346 231 lb 7.7 oz (105 kg)   22 0505 233 lb 11 oz (106 kg)   22 0600 232 lb 2.3 oz (105.3 kg)       Vital Signs:   Temperature:  Temp: 99.1 °F (37.3 °C)  TMax:   Temp (24hrs), Av.5 °F (37.5 °C), Min:97.9 °F (36.6 °C), Max:100.2 °F (37.9 °C)    Respirations:  Resp: 20  Pulse:   Heart Rate: (!) 105  BP:    BP: 120/69  BP Range: Systolic (05KKY), SXR:455 , Min:120 , AQS:880       Diastolic (53RMB), NYI:30, Min:49, Max:111      Physical Examination:     General:  AAO x 2 at least to person and place, no accessory muscle use. HEENT: Atraumatic, normocephalic, no throat congestion, moist mucosa. Eyes:   Pupils equal, round and reactive to light, EOMI. Neck:   Supple  Chest:   Bilateral vesicular breath sounds, no rales or wheezes. Cardiac:  S1 S2 RR, no murmurs, gallops or rubs. Abdomen: Soft, non-tender, no masses or organomegaly, BS audible. :   No suprapubic or flank tenderness. Neuro:  AAO x 2 at least to person and place, NAD. SKIN:  No rashes, good skin turgor. Extremities:  +ve edema b/l and post op dressing in place left leg. Labs:       Recent Labs     07/11/22 2001 07/12/22 0212 07/13/22  0511 07/13/22 0759 07/14/22  0328   WBC 11.6*   < > 16.3* 15.8* 19.3*   RBC 2.26*   < > 2.51* 2.51* 2.59*   HGB 7.3*   < > 7.9* 7.8* 7.9*   HCT 21.9*   < > 24.1* 23.7* 24.6*   MCV 96.9   < > 96.0 94.4 95.0   MCH 32.3   < > 31.5 31.1 30.5   MCHC 33.3   < > 32.8 32.9 32.1   RDW 13.7   < > 15.1* 15.2* 15.0*   *   < > See Reflexed IPF Result See Reflexed IPF Result 153   MPV 12.7  --   --   --  12.7    < > = values in this interval not displayed.       BMP:   Recent Labs     07/13/22 2350 07/14/22 0327 07/14/22  0631   * 140 135   K 3.6* 4.1 3.7   CL 98 105 101   CO2 20 20 21   BUN 31* 33* 31*   CREATININE 1.73* 1.81* 1.80*   GLUCOSE 146* 126* 137*   CALCIUM 9.3 9.0 8.9      Phosphorus:     Recent Labs     07/13/22 2350 07/14/22 0327 07/14/22  0631   PHOS 3.1 3.3 3.4     Magnesium:    Recent Labs     07/13/22  2350 07/14/22  0327 07/14/22  0631   MG 1.8 1.9 1.8     Albumin:    Recent Labs     07/14/22  0631   LABALBU 2.6*     SPEP:  Lab Results   Component Value Date/Time    PROT 5.3 07/14/2022 06:31 AM    PROT 7.1 07/25/2011 09:50 AM     Urinalysis/Chemistries:      Lab Results   Component Value Date/Time    NITRU POSITIVE 07/10/2022 12:28 PM    COLORU BROWN 07/10/2022 12:28 PM    PHUR 6.5 07/10/2022 12:28 PM    WBCUA 10 TO 20 07/10/2022 12:28 PM    RBCUA 20 TO 50 07/10/2022 12:28 PM    MUCUS 1+ 06/30/2012 09:40 PM    TRICHOMONAS NOT REPORTED 06/30/2012 09:40 PM    YEAST NOT REPORTED 06/30/2012 09:40 PM    BACTERIA MODERATE 07/10/2022 12:28 PM    SPECGRAV 1.025 07/10/2022 12:28 PM    LEUKOCYTESUR TRACE 07/10/2022 12:28 PM    UROBILINOGEN Normal 07/10/2022 12:28 PM    BILIRUBINUR NEGATIVE  Verified by ictotest. 07/10/2022 12:28 PM    BILIRUBINUR NEGATIVE 06/06/2012 05:45 AM    GLUCOSEU NEGATIVE 07/10/2022 12:28 PM    GLUCOSEU NEGATIVE 06/06/2012 05:45 AM    KETUA NEGATIVE 07/10/2022 12:28 PM    AMORPHOUS 3+ 07/10/2022 12:28 PM      Urine Creatinine:     Lab Results   Component Value Date/Time    LABCREA 141.6 07/10/2022 12:28 PM     Radiology:     Reviewed. Assessment:       Assessment:     1. Acute Kidney Injury: ATN with contrast nephropathy, circulatory shock, and hypotension requiring pressor support and now postop. Baseline creatinine seems to be around 1-1.2 mg/dl. Not currently CBC dependent since 7/10/2022. 2. Hyperkalemia-likely due to underlying renal dysfunction. 3. 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 4. Rhabdomyolysis  5. Hypotension-requiring pressor support. 6. Total aortobifemoral occulusion s/p Bilateral common femoral arterial access via open cutdown, thrombectomy of the aortobifemoral bypass graft thromboendarterectomy of the common femoral profundofemoral and SFA bilaterally with bovine pericardial patch angioplasty on 7/8/2021  7. Left lower extremity compartment syndrome s/p fasciotomy. 8. Decreasing urine output. Plan:   1. Patient was seen and examined on CVVHD at bedside. Orders were confirmed with patient's nurse. 2. Continue current CVVHD orders as mentioned above in the note. 3. Monitor strict I's and O's and renal function. 4. Will stop D5W with half-normal saline. Encourage better oral intake.   5. Patient currently is alert and awake but nurses report that he goes in and out of his alertness, but I was able to talk to him and explain contrast related renal risks of possibly making his dialysis permanent. Then later I also called the patient's Sister Jeronimo Fitch and explained her the same and they both are in agreement with getting the procedure done if required. Should be okay to get cardiac cath from nephrology standpoint with the caveat that patient is already on dialysis and contrast dye exposure can make it permanent. But this was explained to the patient and his sister in detail. Patient's nurse was also available during the phone call and second witnessed the conversation. 6. Tomorrow post cath if patient continues to be hemodynamically stable, will switch him to IHD and stop CVVHD. 7. BMP in AM.  8. Will follow. Nutrition   Please ensure that patient is on a renal diet/TF. Avoid nephrotoxic drugs/contrast exposure. Jeevan Alvarado MD  [facility]     This note is created with the assistance of a speech-recognition program. While intending to generate a document that actually reflects the content of the visit, no guarantees can be provided that every mistake has been identified and corrected by editing.

## 2022-07-14 NOTE — CARE COORDINATION
TRansitional Planning    Patient is extubated. Still needs heart cath. Plan is for [facility]. Will need PT/OT evaluations. Will need pre cert. [address] [place] [address] [phone] for f/u on referral. They are following and can accept patient. Will need PT/OT notes and pre cert from insurance.

## 2022-07-14 NOTE — PROGRESS NOTES
[facility]   Progress Note                   Date:   7/14/2022  Patient name: Carleen Rick  Date of admission:  7/8/2022  4:56 AM  MRN:   [id number]  YOB: 1960  PCP: Nafisa Hutchinson (Inactive)    Reason for Admission:      Subjective:     Patient was seen and evaluated at bedside, was extubated yesterday and is off pressor support now, continues to be on heparin GTT. He is on somnolent. /60 2 mmHg, /min. He is currently on 2 L oxygen through nasal cannula. Labs and imaging reviewed, creatinine 1.81, electrolytes WNL, U0 350 cc in last 24 hours, being continued on CVVHD per nephrology for now. Hb 7.9, WBC 19.3, platelets 669. 2D echo revealed severely reduced EF of 15% with akinetic apex.     Medications:   Scheduled Meds:   insulin lispro  0-18 Units SubCUTAneous Q4H    famotidine  20 mg Oral BID    methocarbamol  750 mg Oral Q6H    alteplase  1 mg IntraCATHeter Once    aspirin  81 mg Oral Daily    midodrine  5 mg Oral TID WC    cefTRIAXone (ROCEPHIN) IV  1,000 mg IntraVENous Q24H    polyethylene glycol  17 g Oral Daily       Continuous Infusions:   dextrose 5 % and 0.45 % NaCl 75 mL/hr at 07/13/22 2245    [Held by provider] dexmedetomidine 0.4 mcg/kg/hr (07/13/22 0846)    vasopressin (Septic Shock) infusion 0.04 Units/min (07/13/22 1028)    sodium chloride 100 mL/hr at 07/13/22 1028    CRRT dialysis builder 1,500 mL/hr (07/14/22 0157)    propofol Stopped (07/12/22 0815)    [Held by provider] fentaNYL 50 mcg/mL Stopped (07/13/22 0834)    heparin (PORCINE) Infusion 14 Units/kg/hr (07/14/22 0509)    norepinephrine 10 mcg/min (07/12/22 1918)    dextrose         CBC:   Recent Labs     07/13/22  0511 07/13/22  0759 07/14/22  0328   WBC 16.3* 15.8* 19.3*   HGB 7.9* 7.8* 7.9*   PLT See Reflexed IPF Result See Reflexed IPF Result 153     BMP:    Recent Labs     07/13/22  1906 07/13/22  2350 07/14/22  0327    134* 140   K 3.9 3.6* 4.1    98 105   CO2 21 20 20   BUN 32* 31* 33*   CREATININE 1.85* 1.73* 1.81*   GLUCOSE 123* 146* 126*     Hepatic:   No results for input(s): AST, ALT, ALB, BILITOT, ALKPHOS in the last 72 hours. Troponin: No results for input(s): TROPONINI in the last 72 hours. BNP: No results for input(s): BNP in the last 72 hours. Lipids: No results for input(s): CHOL, HDL in the last 72 hours. Invalid input(s): LDLCALCU  INR:   Recent Labs     07/11/22  1748   INR 0.9       Objective:   Vitals: BP (!) 148/72   Pulse (!) 107   Temp 99.9 °F (37.7 °C) (Core)   Resp 20   Ht 5' 10.5\" (1.791 m) Comment: no inital recorded height; 9/13/12 office visit  Wt 231 lb 7.7 oz (105 kg)   SpO2 95%   BMI 32.74 kg/m²       HEENT: Somnolent  Head: Normocephalic, no lesions, without obvious abnormality  Neck: no JVD  Lungs: clear to auscultation bilaterally, no basilar rales, no wheezing   Heart: regular rate and rhythm, S1, S2 normal, no murmur, click, rub or gallop  Abdomen: soft, non-tender; bowel sounds normal  Extremities: No LE edema      ECHO   Summary  Poor sound transmission. Global left ventricular systolic function is severely reduced. Calculated  ejection fraction 23% by Andrade's method. Visually estimated EF 15%. Akinetic apex. No significant valvular regurgitation or stenosis seen. Assessment / Acute Cardiac Problems:   Assessment      1. Complete occlusion of B/L aortofemoral graft s/p thrombectomy  2. History of coronary artery disease s/p CABG x3 in 2008  3. NSTEMI type I/acute coronary syndrome   4. Severe cardiomyopathy with EF 15%, likely ischemic-pending cardiac cath  5. CAROLYN -dialysis dependent  6. Rhabdomyolysis   7. HTN   8. HLD   9. DM      RECOMMENDATIONS:  1. Continue heparin GTT for now and aspirin 81 mg daily. 2. 2D echocardiography showed EF 15 % with akinetic apex, plan for left heart catheterization when other co morbidities resolve and cleared by nephrology and vascular (for DAPT )  3.  Will try to obtain records from [place] regarding prior CABG in 2008. 4. Start low-dose Toprol-XL 12.5 mg twice daily. 5. Replace electrolytes to keep K> 4 and Mg >2. Discussed with patient and nursing. Erica Cruz MD  Fellow, [id number] [facility]      Attending note,   Attending note,   Seen and examined agree with above.  Extubated.  Off pressors.  Echocardiogram showed severely reduced ejection fraction.  On IV heparin for non-STEMI. Brittany Fair does have previous history of CABG.   Acute kidney injury with possible CVVHD.  Rhabdomyolysis.  S/p thrombectomy for occluded lower extremity graft.  Rhabdomyolysis.   We will plan for coronary angiography and possible PCI tomorrow if he is cleared by other services.

## 2022-07-15 ENCOUNTER — APPOINTMENT (OUTPATIENT)
Dept: GENERAL RADIOLOGY | Age: 62
DRG: 181 | End: 2022-07-15
Payer: COMMERCIAL

## 2022-07-15 LAB
ANION GAP SERPL CALCULATED.3IONS-SCNC: 11 MMOL/L (ref 9–17)
ANION GAP SERPL CALCULATED.3IONS-SCNC: 13 MMOL/L (ref 9–17)
ANION GAP SERPL CALCULATED.3IONS-SCNC: 14 MMOL/L (ref 9–17)
BUN BLDV-MCNC: 31 MG/DL (ref 8–23)
CALCIUM IONIZED: 1.14 MMOL/L (ref 1.13–1.33)
CALCIUM IONIZED: 1.18 MMOL/L (ref 1.13–1.33)
CALCIUM IONIZED: 1.19 MMOL/L (ref 1.13–1.33)
CALCIUM SERPL-MCNC: 8.7 MG/DL (ref 8.6–10.4)
CALCIUM SERPL-MCNC: 8.8 MG/DL (ref 8.6–10.4)
CALCIUM SERPL-MCNC: 8.8 MG/DL (ref 8.6–10.4)
CHLORIDE BLD-SCNC: 100 MMOL/L (ref 98–107)
CHLORIDE BLD-SCNC: 102 MMOL/L (ref 98–107)
CHLORIDE BLD-SCNC: 102 MMOL/L (ref 98–107)
CO2: 20 MMOL/L (ref 20–31)
CO2: 20 MMOL/L (ref 20–31)
CO2: 21 MMOL/L (ref 20–31)
CREAT SERPL-MCNC: 1.46 MG/DL (ref 0.7–1.2)
CREAT SERPL-MCNC: 1.51 MG/DL (ref 0.7–1.2)
CREAT SERPL-MCNC: 1.53 MG/DL (ref 0.7–1.2)
EKG ATRIAL RATE: 107 BPM
EKG P AXIS: 7 DEGREES
EKG P-R INTERVAL: 130 MS
EKG Q-T INTERVAL: 446 MS
EKG QRS DURATION: 76 MS
EKG QTC CALCULATION (BAZETT): 595 MS
EKG R AXIS: -13 DEGREES
EKG T AXIS: 6 DEGREES
EKG VENTRICULAR RATE: 107 BPM
FACTOR V MUTATION: NEGATIVE
GFR AFRICAN AMERICAN: 56 ML/MIN
GFR AFRICAN AMERICAN: 57 ML/MIN
GFR AFRICAN AMERICAN: 59 ML/MIN
GFR NON-AFRICAN AMERICAN: 47 ML/MIN
GFR NON-AFRICAN AMERICAN: 47 ML/MIN
GFR NON-AFRICAN AMERICAN: 49 ML/MIN
GFR SERPL CREATININE-BSD FRML MDRD: ABNORMAL ML/MIN/{1.73_M2}
GLUCOSE BLD-MCNC: 109 MG/DL (ref 75–110)
GLUCOSE BLD-MCNC: 125 MG/DL (ref 70–99)
GLUCOSE BLD-MCNC: 135 MG/DL (ref 70–99)
GLUCOSE BLD-MCNC: 136 MG/DL (ref 70–99)
GLUCOSE BLD-MCNC: 161 MG/DL (ref 75–110)
GLUCOSE BLD-MCNC: 162 MG/DL (ref 75–110)
HCT VFR BLD CALC: 23.3 % (ref 40.7–50.3)
HEMOGLOBIN: 7.6 G/DL (ref 13–17)
MAGNESIUM: 1.9 MG/DL (ref 1.6–2.6)
MAGNESIUM: 1.9 MG/DL (ref 1.6–2.6)
MAGNESIUM: 2 MG/DL (ref 1.6–2.6)
MCH RBC QN AUTO: 31.7 PG (ref 25.2–33.5)
MCHC RBC AUTO-ENTMCNC: 32.6 G/DL (ref 28.4–34.8)
MCV RBC AUTO: 97.1 FL (ref 82.6–102.9)
NRBC AUTOMATED: 1.1 PER 100 WBC
PARTIAL THROMBOPLASTIN TIME: 44.9 SEC (ref 20.5–30.5)
PARTIAL THROMBOPLASTIN TIME: 66.6 SEC (ref 20.5–30.5)
PARTIAL THROMBOPLASTIN TIME: 72.3 SEC (ref 20.5–30.5)
PDW BLD-RTO: 15.5 % (ref 11.8–14.4)
PHOSPHORUS: 3.2 MG/DL (ref 2.5–4.5)
PHOSPHORUS: 3.6 MG/DL (ref 2.5–4.5)
PHOSPHORUS: 4 MG/DL (ref 2.5–4.5)
PLATELET # BLD: ABNORMAL K/UL (ref 138–453)
PLATELET, FLUORESCENCE: 208 K/UL (ref 138–453)
PLATELET, IMMATURE FRACTION: 14 % (ref 1.1–10.3)
POTASSIUM SERPL-SCNC: 3.7 MMOL/L (ref 3.7–5.3)
POTASSIUM SERPL-SCNC: 3.8 MMOL/L (ref 3.7–5.3)
POTASSIUM SERPL-SCNC: 4 MMOL/L (ref 3.7–5.3)
PROTEIN C ANTIGEN: 80 % (ref 63–153)
PROTHROMBIN G20210A MUTATION: NEGATIVE
PT PCR SPECIMEN: NORMAL
RBC # BLD: 2.4 M/UL (ref 4.21–5.77)
SODIUM BLD-SCNC: 134 MMOL/L (ref 135–144)
SODIUM BLD-SCNC: 134 MMOL/L (ref 135–144)
SODIUM BLD-SCNC: 135 MMOL/L (ref 135–144)
SPECIMEN: NORMAL
WBC # BLD: 16 K/UL (ref 3.5–11.3)

## 2022-07-15 PROCEDURE — 2500000003 HC RX 250 WO HCPCS: Performed by: NURSE PRACTITIONER

## 2022-07-15 PROCEDURE — 82947 ASSAY GLUCOSE BLOOD QUANT: CPT

## 2022-07-15 PROCEDURE — 85055 RETICULATED PLATELET ASSAY: CPT

## 2022-07-15 PROCEDURE — 85730 THROMBOPLASTIN TIME PARTIAL: CPT

## 2022-07-15 PROCEDURE — 80048 BASIC METABOLIC PNL TOTAL CA: CPT

## 2022-07-15 PROCEDURE — 6370000000 HC RX 637 (ALT 250 FOR IP): Performed by: STUDENT IN AN ORGANIZED HEALTH CARE EDUCATION/TRAINING PROGRAM

## 2022-07-15 PROCEDURE — 6370000000 HC RX 637 (ALT 250 FOR IP): Performed by: HEALTH CARE PROVIDER

## 2022-07-15 PROCEDURE — 83735 ASSAY OF MAGNESIUM: CPT

## 2022-07-15 PROCEDURE — 71045 X-RAY EXAM CHEST 1 VIEW: CPT

## 2022-07-15 PROCEDURE — 6360000002 HC RX W HCPCS: Performed by: INTERNAL MEDICINE

## 2022-07-15 PROCEDURE — 2580000003 HC RX 258: Performed by: INTERNAL MEDICINE

## 2022-07-15 PROCEDURE — 6360000002 HC RX W HCPCS: Performed by: STUDENT IN AN ORGANIZED HEALTH CARE EDUCATION/TRAINING PROGRAM

## 2022-07-15 PROCEDURE — 84100 ASSAY OF PHOSPHORUS: CPT

## 2022-07-15 PROCEDURE — 85027 COMPLETE CBC AUTOMATED: CPT

## 2022-07-15 PROCEDURE — 6370000000 HC RX 637 (ALT 250 FOR IP): Performed by: NURSE PRACTITIONER

## 2022-07-15 PROCEDURE — 6360000002 HC RX W HCPCS: Performed by: HEALTH CARE PROVIDER

## 2022-07-15 PROCEDURE — 6360000002 HC RX W HCPCS: Performed by: NURSE PRACTITIONER

## 2022-07-15 PROCEDURE — 2000000000 HC ICU R&B

## 2022-07-15 PROCEDURE — 82330 ASSAY OF CALCIUM: CPT

## 2022-07-15 PROCEDURE — 90945 DIALYSIS ONE EVALUATION: CPT | Performed by: INTERNAL MEDICINE

## 2022-07-15 RX ORDER — MAGNESIUM SULFATE 1 G/100ML
1000 INJECTION INTRAVENOUS ONCE
Status: COMPLETED | OUTPATIENT
Start: 2022-07-15 | End: 2022-07-15

## 2022-07-15 RX ORDER — METOPROLOL SUCCINATE 25 MG/1
25 TABLET, EXTENDED RELEASE ORAL 2 TIMES DAILY
Status: DISCONTINUED | OUTPATIENT
Start: 2022-07-15 | End: 2022-07-15

## 2022-07-15 RX ORDER — METOPROLOL SUCCINATE 25 MG/1
12.5 TABLET, EXTENDED RELEASE ORAL 2 TIMES DAILY
Status: DISCONTINUED | OUTPATIENT
Start: 2022-07-15 | End: 2022-07-21 | Stop reason: HOSPADM

## 2022-07-15 RX ORDER — OXYCODONE HYDROCHLORIDE 5 MG/1
5 TABLET ORAL EVERY 6 HOURS PRN
Status: DISCONTINUED | OUTPATIENT
Start: 2022-07-15 | End: 2022-07-21 | Stop reason: HOSPADM

## 2022-07-15 RX ORDER — OXYCODONE HYDROCHLORIDE 5 MG/1
5 TABLET ORAL ONCE
Status: COMPLETED | OUTPATIENT
Start: 2022-07-15 | End: 2022-07-15

## 2022-07-15 RX ORDER — FAMOTIDINE 20 MG/1
20 TABLET, FILM COATED ORAL DAILY
Status: DISCONTINUED | OUTPATIENT
Start: 2022-07-16 | End: 2022-07-21 | Stop reason: HOSPADM

## 2022-07-15 RX ADMIN — QUETIAPINE FUMARATE 50 MG: 25 TABLET ORAL at 21:04

## 2022-07-15 RX ADMIN — INSULIN LISPRO 3 UNITS: 100 INJECTION, SOLUTION INTRAVENOUS; SUBCUTANEOUS at 20:59

## 2022-07-15 RX ADMIN — OXYCODONE 5 MG: 5 TABLET ORAL at 08:04

## 2022-07-15 RX ADMIN — METOPROLOL SUCCINATE 12.5 MG: 25 TABLET, FILM COATED, EXTENDED RELEASE ORAL at 21:05

## 2022-07-15 RX ADMIN — ATORVASTATIN CALCIUM 10 MG: 10 TABLET, FILM COATED ORAL at 08:04

## 2022-07-15 RX ADMIN — OXYCODONE 5 MG: 5 TABLET ORAL at 00:34

## 2022-07-15 RX ADMIN — MIDODRINE HYDROCHLORIDE 5 MG: 5 TABLET ORAL at 16:28

## 2022-07-15 RX ADMIN — Medication 1500 ML/HR: at 08:29

## 2022-07-15 RX ADMIN — ASPIRIN 81 MG: 81 TABLET, CHEWABLE ORAL at 08:04

## 2022-07-15 RX ADMIN — FAMOTIDINE 20 MG: 20 TABLET, FILM COATED ORAL at 08:05

## 2022-07-15 RX ADMIN — TAMSULOSIN HYDROCHLORIDE 0.4 MG: 0.4 CAPSULE ORAL at 08:04

## 2022-07-15 RX ADMIN — OXYCODONE 5 MG: 5 TABLET ORAL at 16:27

## 2022-07-15 RX ADMIN — OXYCODONE 5 MG: 5 TABLET ORAL at 10:31

## 2022-07-15 RX ADMIN — HEPARIN SODIUM 1400 UNITS: 1000 INJECTION INTRAVENOUS; SUBCUTANEOUS at 18:25

## 2022-07-15 RX ADMIN — MIDODRINE HYDROCHLORIDE 5 MG: 5 TABLET ORAL at 08:04

## 2022-07-15 RX ADMIN — Medication 5 MG: at 21:05

## 2022-07-15 RX ADMIN — HEPARIN SODIUM 1300 UNITS: 1000 INJECTION INTRAVENOUS; SUBCUTANEOUS at 18:07

## 2022-07-15 RX ADMIN — METOPROLOL SUCCINATE 12.5 MG: 25 TABLET, FILM COATED, EXTENDED RELEASE ORAL at 08:04

## 2022-07-15 RX ADMIN — HEPARIN SODIUM 2000 UNITS: 1000 INJECTION INTRAVENOUS; SUBCUTANEOUS at 06:40

## 2022-07-15 RX ADMIN — Medication 1500 ML/HR: at 08:31

## 2022-07-15 RX ADMIN — CEFTRIAXONE SODIUM 1000 MG: 1 INJECTION, POWDER, FOR SOLUTION INTRAMUSCULAR; INTRAVENOUS at 20:19

## 2022-07-15 RX ADMIN — HEPARIN SODIUM 16 UNITS/KG/HR: 10000 INJECTION, SOLUTION INTRAVENOUS at 10:40

## 2022-07-15 RX ADMIN — Medication 1500 ML/HR: at 08:30

## 2022-07-15 RX ADMIN — POLYETHYLENE GLYCOL 3350 17 G: 17 POWDER, FOR SOLUTION ORAL at 08:04

## 2022-07-15 RX ADMIN — MIDODRINE HYDROCHLORIDE 5 MG: 5 TABLET ORAL at 12:29

## 2022-07-15 RX ADMIN — MAGNESIUM SULFATE HEPTAHYDRATE 1000 MG: 1 INJECTION, SOLUTION INTRAVENOUS at 14:00

## 2022-07-15 RX ADMIN — SODIUM CHLORIDE: 9 INJECTION, SOLUTION INTRAVENOUS at 06:05

## 2022-07-15 ASSESSMENT — PAIN SCALES - GENERAL
PAINLEVEL_OUTOF10: 8
PAINLEVEL_OUTOF10: 10

## 2022-07-15 NOTE — PROGRESS NOTES
Renal Progress Note    Patient :  Prnice Mallory; 64 y.o. MRN# [id number]  Location:  [id number]/1790-21  Attending:  Isabelle Leal MD  Admit Date:  7/8/2022   Hospital Day: 7    Subjective:     Patient was seen and examined on CVVHD. Tolerating the procedure well. No issues reported overnight. Most recent blood work showed sodium 134, potassium 4.0, chloride 100, bicarb 20, BUN 31, creatinine 1.51 mg/dl, calcium 8.8. Mg 1.9. From CVVHD standpoint he is on 2K potassium bath, on 1.5 L/hr and 200 BFR. Currently getting UF of  about -ve 50-60 cc/hr. He is on prefilter NS at 100 cc/hr. Urine output documented as about 386  cc in the last 24 hours. Now off pressors. Cardiology is recommending cardiac catheterization for possible PCI but waiting vascular surgery clearance. Patient had fasciotomy done and had hematoma in the left groin. CT abdomen pelvis without contrast done today 7/14/2022 which showed postprocedural findings in the left groin with superficial hematoma, as described. Subcutaneous edema in the left groin and left thigh is also present. No retroperitoneal intrapelvic hematoma. Outpatient Medications:     Medications Prior to Admission: oxyCODONE (OXYCONTIN) 40 MG CR tablet, Take 40 mg by mouth every 12 hours. oxyCODONE-acetaminophen (PERCOCET)  MG per tablet, Take 1 tablet by mouth 3 times daily as needed. breakthrought pain   lisinopril (PRINIVIL;ZESTRIL) 10 MG tablet, Take 0.5 tablets by mouth daily. dipyridamole-aspirin (AGGRENOX)  MG per SR capsule, Take 1 capsule by mouth daily. simvastatin (ZOCOR) 40 MG tablet, Take 1 tablet by mouth nightly. ALPRAZolam (XANAX) 1 MG tablet, Take 1 tablet by mouth 2 times daily as needed. varenicline (CHANTIX STARTING MONTH PAK) 0.5 MG X 11 & 1 MG X 42 tablet, Take by mouth.  isosorbide mononitrate (IMDUR) 30 MG CR tablet, Take 30 mg by mouth daily. metoprolol (LOPRESSOR) 25 MG tablet, Take 1 tablet by mouth 2 times daily. Takes 1/2 tab 2 times a day  mirtazapine (REMERON) 30 MG tablet, Take 1 tablet by mouth nightly. tamsulosin (FLOMAX) 0.4 MG capsule, Take 1 capsule by mouth daily. TRUETEST TEST strip, TEST 2 TIMES DAILY  metformin (GLUCOPHAGE) 500 MG tablet, TAKE 1 TABLET BY MOUTH 2 TIMES DAILY (WITH MEALS) FOR 30 DAYS. aspirin  MG EC tablet, Take 1 tablet by mouth daily. Current Medications:     Scheduled Meds:    metoprolol succinate  12.5 mg Oral BID    [START ON 2022] famotidine  20 mg Oral Daily    magnesium sulfate  1,000 mg IntraVENous Once    atorvastatin  10 mg Oral Daily    tamsulosin  0.4 mg Oral Daily    insulin lispro  0-18 Units SubCUTAneous 4x Daily AC & HS    melatonin  5 mg Oral Nightly    QUEtiapine  50 mg Oral Nightly    aspirin  81 mg Oral Daily    midodrine  5 mg Oral TID WC    cefTRIAXone (ROCEPHIN) IV  1,000 mg IntraVENous Q24H    polyethylene glycol  17 g Oral Daily     Continuous Infusions:    [Held by provider] dexmedetomidine Stopped (22 1044)    sodium chloride 100 mL/hr at 07/15/22 1300    CRRT dialysis builder 1,500 mL/hr (07/15/22 0831)    heparin (PORCINE) Infusion 16 Units/kg/hr (07/15/22 1300)     PRN Meds:  oxyCODONE, heparin (porcine), heparin (porcine), heparin (porcine), heparin (porcine), sodium chloride flush, ondansetron, heparin (porcine), heparin (porcine), glucose, glucagon (rDNA)    Input/Output:       I/O last 3 completed shifts: In: 5582.7 [P.O.:250; I.V.:5026.2; IV Piggyback:306.5]  Out: 6794 [Urine:656].       Patient Vitals for the past 96 hrs (Last 3 readings):   Weight   07/15/22 0500 224 lb 13.9 oz (102 kg)   22 0346 231 lb 7.7 oz (105 kg)   22 0505 233 lb 11 oz (106 kg)       Vital Signs:   Temperature:  Temp: 97.9 °F (36.6 °C)  TMax:   Temp (24hrs), Av.5 °F (36.9 °C), Min:97.7 °F (36.5 °C), Max:99.1 °F (37.3 °C)    Respirations:  Resp: 14  Pulse:   Heart Rate: 96  BP:    BP: 112/60  BP Range: Systolic (72GTW), WWC:028 , Min:97 , Max:137 Diastolic (11UFY), NDT:28, Min:50, Max:74      Physical Examination:     General:  AAO x 2 at least to person and place, no accessory muscle use. HEENT: Atraumatic, normocephalic, no throat congestion, moist mucosa. Eyes:   Pupils equal, round and reactive to light, EOMI. Neck:   Supple  Chest:   Bilateral vesicular breath sounds, no rales or wheezes. Cardiac:  S1 S2 RR, no murmurs, gallops or rubs. Abdomen: Soft, non-tender, no masses or organomegaly, BS audible. :   No suprapubic or flank tenderness. Neuro:   AAO x 2 at least to person and place, NAD. SKIN:  No rashes, good skin turgor. Extremities:  +ve edema b/l and post op dressing in place left leg.      Labs:       Recent Labs     07/13/22  0759 07/14/22  0328 07/15/22  0604   WBC 15.8* 19.3* 16.0*   RBC 2.51* 2.59* 2.40*   HGB 7.8* 7.9* 7.6*   HCT 23.7* 24.6* 23.3*   MCV 94.4 95.0 97.1   MCH 31.1 30.5 31.7   MCHC 32.9 32.1 32.6   RDW 15.2* 15.0* 15.5*   PLT See Reflexed IPF Result 153 See Reflexed IPF Result   MPV  --  12.7  --       BMP:   Recent Labs     07/14/22  1838 07/15/22  0127 07/15/22  0603    134* 134*   K 3.9 3.8 4.0    102 100   CO2 21 21 20   BUN 30* 31* 31*   CREATININE 1.62* 1.53* 1.51*   GLUCOSE 121* 125* 135*   CALCIUM 8.9 8.7 8.8      Phosphorus:     Recent Labs     07/14/22  1838 07/15/22  0127 07/15/22  0603   PHOS 3.4 3.2 3.6     Magnesium:    Recent Labs     07/14/22  1838 07/15/22  0127 07/15/22  0603   MG 2.1 2.0 1.9     Albumin:    Recent Labs     07/14/22  0631   LABALBU 2.6*     SPEP:  Lab Results   Component Value Date/Time    PROT 5.3 07/14/2022 06:31 AM    PROT 7.1 07/25/2011 09:50 AM     Urinalysis/Chemistries:      Lab Results   Component Value Date/Time    NITRU POSITIVE 07/10/2022 12:28 PM    COLORU BROWN 07/10/2022 12:28 PM    PHUR 6.5 07/10/2022 12:28 PM    WBCUA 10 TO 20 07/10/2022 12:28 PM    RBCUA 20 TO 50 07/10/2022 12:28 PM    MUCUS 1+ 06/30/2012 09:40 PM    TRICHOMONAS NOT REPORTED 06/30/2012 09:40 PM    YEAST NOT REPORTED 06/30/2012 09:40 PM    BACTERIA MODERATE 07/10/2022 12:28 PM    SPECGRAV 1.025 07/10/2022 12:28 PM    LEUKOCYTESUR TRACE 07/10/2022 12:28 PM    UROBILINOGEN Normal 07/10/2022 12:28 PM    BILIRUBINUR NEGATIVE  Verified by ictotest. 07/10/2022 12:28 PM    BILIRUBINUR NEGATIVE 06/06/2012 05:45 AM    GLUCOSEU NEGATIVE 07/10/2022 12:28 PM    GLUCOSEU NEGATIVE 06/06/2012 05:45 AM    KETUA NEGATIVE 07/10/2022 12:28 PM    AMORPHOUS 3+ 07/10/2022 12:28 PM      Urine Creatinine:     Lab Results   Component Value Date/Time    LABCREA 141.6 07/10/2022 12:28 PM     Radiology:     Reviewed. Assessment:       Assessment:     1. Acute Kidney Injury: ATN with contrast nephropathy, circulatory shock, and hypotension requiring pressor support and now postop. Baseline creatinine seems to be around 1-1.2 mg/dl. Not currently CVVDH dependent since 7/10/2022. 2. Hyperkalemia-likely due to underlying renal dysfunction. 3. 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 4. Rhabdomyolysis  5. Hypotension-requiring pressor support. 6. Total aortobifemoral occulusion s/p Bilateral common femoral arterial access via open cutdown, thrombectomy of the aortobifemoral bypass graft thromboendarterectomy of the common femoral profundofemoral and SFA bilaterally with bovine pericardial patch angioplasty on 7/8/2021  7. Left lower extremity compartment syndrome s/p fasciotomy. 8. Decreasing urine output. 9. Hypomagnesemia. Plan:   Patient was seen and examined on CVVHD at bedside. Orders were confirmed with patient's nurse. Continue current CVVHD orders as mentioned above in the note. Mg replacements ordered. Monitor strict I's and O's and renal function. Cardiology is recommending cardiac catheterization for possible PCI but waiting vascular surgery clearance. Patient had fasciotomy done and had hematoma in the left groin.   I did speak with patient and his sister about cardiac cath and contrast related renal issues and they were okay with getting the procedure done as required. Will stop CVVHD today after bags finish and transition to IHD from tomorrow. If continues to require dialysis, will need tunneled catheter placement next week. BMP in AM.  Will follow. Nutrition   Please ensure that patient is on a renal diet/TF. Avoid nephrotoxic drugs/contrast exposure. Jeevan Grubbs MD  [facility]     This note is created with the assistance of a speech-recognition program. While intending to generate a document that actually reflects the content of the visit, no guarantees can be provided that every mistake has been identified and corrected by editing.

## 2022-07-15 NOTE — PROGRESS NOTES
[facility]   Progress Note                   Date:   7/15/2022  Patient name: Maria Luisa Shahid  Date of admission:  7/8/2022  4:56 AM  MRN:   [id number]  YOB: 1960  PCP: Joey Subramanian (Inactive)    Reason for Admission:      Subjective:     Patient was seen and evaluated at bedside, remains hemodynamically stable but continues to have occasional PVCs, awaiting cardiac cath after Vascular and trauma surgery clearance. He continues to get HD, with plan to switch to IHD today. Labs and imaging reviewed. 2D echo revealed severely reduced EF of 15% with akinetic apex. Medications:   Scheduled Meds:   atorvastatin  10 mg Oral Daily    tamsulosin  0.4 mg Oral Daily    insulin lispro  0-18 Units SubCUTAneous 4x Daily AC & HS    metoprolol succinate  12.5 mg Oral BID    melatonin  5 mg Oral Nightly    QUEtiapine  50 mg Oral Nightly    famotidine  20 mg Oral BID    alteplase  1 mg IntraCATHeter Once    aspirin  81 mg Oral Daily    midodrine  5 mg Oral TID WC    cefTRIAXone (ROCEPHIN) IV  1,000 mg IntraVENous Q24H    polyethylene glycol  17 g Oral Daily       Continuous Infusions:   [Held by provider] dexmedetomidine Stopped (07/13/22 1044)    sodium chloride 100 mL/hr at 07/15/22 0605    CRRT dialysis builder 1,500 mL/hr (07/14/22 2302)    heparin (PORCINE) Infusion 16 Units/kg/hr (07/15/22 0640)       CBC:   Recent Labs     07/13/22  0759 07/14/22  0328 07/15/22  0604   WBC 15.8* 19.3* 16.0*   HGB 7.8* 7.9* 7.6*   PLT See Reflexed IPF Result 153 See Reflexed IPF Result       BMP:    Recent Labs     07/14/22  1838 07/15/22  0127 07/15/22  0603    134* 134*   K 3.9 3.8 4.0    102 100   CO2 21 21 20   BUN 30* 31* 31*   CREATININE 1.62* 1.53* 1.51*   GLUCOSE 121* 125* 135*       Hepatic:   Recent Labs     07/14/22  0631   *   ALT 23   BILITOT 0.55   ALKPHOS 125     Troponin: No results for input(s): TROPONINI in the last 72 hours.   BNP: No results for input(s): BNP in the last 72 hours. Lipids: No results for input(s): CHOL, HDL in the last 72 hours. Invalid input(s): LDLCALCU  INR:   No results for input(s): INR in the last 72 hours. Objective:   Vitals: /70   Pulse 95   Temp 97.7 °F (36.5 °C) (Bladder)   Resp 18   Ht 5' 10.5\" (1.791 m) Comment: no inital recorded height; 9/13/12 office visit  Wt 224 lb 13.9 oz (102 kg)   SpO2 100%   BMI 31.81 kg/m²       HEENT: Somnolent  Head: Normocephalic, no lesions, without obvious abnormality  Neck: no JVD  Lungs: clear to auscultation bilaterally, no basilar rales, no wheezing   Heart: regular rate and rhythm, S1, S2 normal, no murmur, click, rub or gallop  Abdomen: soft, non-tender; bowel sounds normal  Extremities: No LE edema      ECHO   Summary  Poor sound transmission. Global left ventricular systolic function is severely reduced. Calculated  ejection fraction 23% by Andrade's method. Visually estimated EF 15%. Akinetic apex. No significant valvular regurgitation or stenosis seen. Assessment / Acute Cardiac Problems:   Assessment      Complete occlusion of B/L aortofemoral graft s/p thrombectomy  History of coronary artery disease s/p CABG x3 in 2008  NSTEMI type I/acute coronary syndrome   Severe cardiomyopathy with EF 15%, likely ischemic-pending cardiac cath  CAROLYN -dialysis dependent  Rhabdomyolysis   HTN   HLD   DM      RECOMMENDATIONS:  Continue heparin GTT for now and aspirin 81 mg daily. 2D echocardiography showed EF 15 % with akinetic apex, plan for left heart catheterization when other co morbidities resolve and cleared by nephrology and vascular (for DAPT ). Will try to obtain records from [address] [facility] regarding prior CABG in 2008. Increase dose of Toprol-XL to 25 mg twice daily. Replace electrolytes to keep K> 4 and Mg >2. Discussed with patient and nursing.      Britta Leslie MD  Fellow, [address] [place] [address]      Attending Cardiologist Addendum: I have reviewed and performed the history, physical, subjective, objective, assessment, and plan with the student/resident/fellow/APN and agree with the note. I performed the history and physical personally. I have made changes to the note above as needed. Severely reduced EF  NSTEMI type 1  CAROLYN- now on HD  PVD with complete occlusion of grafts s/p thrombectomy- now with hematoma  - cardiac cath as soon as okay with vascular (currently higher risk due to persistent hematoma), will plan for Monday so long as remains hemodynamically stable    Thank you for allowing me to participate in the care of this patient, please do not hesitate to call if you have any questions. Felizardo Jeans, DO, [facility] [place], [address], [address], [place] [address] [facility]  [facility]  [phone]

## 2022-07-15 NOTE — PLAN OF CARE
Problem: Discharge Planning  Goal: Discharge to home or other facility with appropriate resources  Outcome: Progressing     Problem: Pain  Goal: Verbalizes/displays adequate comfort level or baseline comfort level  Outcome: Progressing     Problem: Skin/Tissue Integrity  Goal: Absence of new skin breakdown  Description: 1. Monitor for areas of redness and/or skin breakdown  2. Assess vascular access sites hourly  3. Every 4-6 hours minimum:  Change oxygen saturation probe site  4. Every 4-6 hours:  If on nasal continuous positive airway pressure, respiratory therapy assess nares and determine need for appliance change or resting period. Outcome: Progressing     Problem: Safety - Adult  Goal: Free from fall injury  Outcome: Progressing     Problem: ABCDS Injury Assessment  Goal: Absence of physical injury  Outcome: Progressing     Problem: Respiratory - Adult  Goal: Achieves optimal ventilation and oxygenation  Outcome: Progressing     Problem: Confusion  Goal: Confusion, delirium, dementia, or psychosis is improved or at baseline  Description: INTERVENTIONS:  1. Assess for possible contributors to thought disturbance, including medications, impaired vision or hearing, underlying metabolic abnormalities, dehydration, psychiatric diagnoses, and notify attending LIP  2. [place] high risk fall precautions, as indicated  3. Provide frequent short contacts to provide reality reorientation, refocusing and direction  4. Decrease environmental stimuli, including noise as appropriate  5. Monitor and intervene to maintain adequate nutrition, hydration, elimination, sleep and activity  6. If unable to ensure safety without constant attention obtain sitter and review sitter guidelines with assigned personnel  7.  Initiate Psychosocial CNS and Spiritual Care consult, as indicated  Outcome: Progressing

## 2022-07-15 NOTE — PROGRESS NOTES
Division of Vascular Surgery             Progress Note      Name: Brittany Hyde  MRN: [id number]         Overnight Events:     Nothing acute overnight. Subjective:     Patient seen and examined at the bedside. He is feeling better today than he was yesterday. Continues to have some pain in both of his legs. Denies nausea or emesis. Tolerating some p.o. intake. Move left lower extremity    Physical Exam:     Vitals:  /70   Pulse 95   Temp 97.7 °F (36.5 °C) (Bladder)   Resp 18   Ht 5' 10.5\" (1.791 m) Comment: no inital recorded height; 9/13/12 office visit  Wt 224 lb 13.9 oz (102 kg)   SpO2 100%   BMI 31.81 kg/m²     General appearance - Alert, disoriented  Mental status - following some commands  Head - normocephalic and atraumatic  Chest - no respiratory distress, no accessory muscle use   Heart - tachycardia, irregular rhythm  Abdomen - soft, non-tender, non-distended  Neurological - continues to have decreased sedation in lower extremities  Extremities -  edema to RLE, function improving, LLE fasciotomy incision, left groin full, however no surrounding erythema to suggest infection  Skin - BL groin incisions clean and dry, no signs of infection  Vascular Exam - palpable left DP, doppler signals; weak left PT, weak right PT       Imaging:   XR CHEST (SINGLE VIEW FRONTAL)    Result Date: 7/10/2022  Right IJ CVC catheter tip overlies the superior cavoatrial junction. Left IJ CVC catheter tip overlies the mid/distal SVC. No pneumothorax is seen. Mild left basilar atelectasis. XR CHEST PORTABLE    Result Date: 7/11/2022  Stable chest.     XR CHEST PORTABLE    Result Date: 7/11/2022  1. The deeper temporary dialysis catheter on the previous exam is been removed, with a new temporary dialysis catheter seen overlying the proximal superior vena cava. 2. Other tubes and lines as above. 3. Patchy infiltrates in the lungs bilaterally which may represent edema or pneumonia.      XR CHEST PORTABLE    Result Date: 7/11/2022  1. The right jugular catheter appears in a stable position with the tip at the cavoatrial junction. 2. Endotracheal tube, nasogastric tube, and left jugular line are also redemonstrated. 3.  Some hazy opacities in the lungs which could be subtle edema or inflammatory. XR CHEST PORTABLE    Result Date: 7/10/2022  1. Tubes and right IJ line as detailed above. 2. Improvement in aeration of both lung since the prior study with residual airspace disease at the left upper and left lower lung zones as well as right parahilar region. No new focal airspace disease. 3. Prior median sternotomy and CABG.        Assessment:     65 y/o male critically ill s/p BL femoral artery cutdown with thrombectomy of aortobifemoral graft, LLE fasciotomy, bilateral lower extremity angiography      Plan:     Appreciate critical care management from surgical critical care  Cont neurovascular checks  Continue heparin gtt, will plan to switch to full dose Eliquis once we have more definitive plan for dialysis access  Appreciate nephrology recommendations  Would like to know if they believe patient will require long-term dialysis  If patient would require long-term dialysis would plan on placing tunneled catheter sometime early next week  Appreciate cardiology recommendations  Consider surgery would request we hold off on cardiac catheterization during this admission  Hypercoagulable work up negative to this point  Fasciotomy dressing changes BID with wet curlex over fasciotomy incisions  CT scan reviewed, hematoma in the left groin appreciated, will hold off on surgical intervention at this time continue to monitor for signs of infection  Would appreciate internal medicine being involved with patient as we believe they should take over as primary once the patient leaves the surgical ICU    Romana Salk, DO PGY 4  General Surgery Resident  07/15/22 8:21 AM        [address] [facility] [place]  O: [phone]

## 2022-07-15 NOTE — PROGRESS NOTES
Comprehensive Nutrition Assessment    Type and Reason for Visit:  Reassess    Nutrition Recommendations/Plan:   Continue current diet. Continue oral nutrition supplements-send with meals. Encourage PO intake as tolerated. Will continue to monitor tolerance to diet and adequacy of intake. Malnutrition Assessment:  Malnutrition Status:  Insufficient data     Nutrition Assessment:    Chart reviewed. Pt was extubated on 7/13/22. Pt remains on dialysis. Renal TF discontinued with extubation. Currently on a dysphagia soft and bite sized diet with oral nutrition supplements. Breakfast tray at bedside- only ate couple bites of meal. Meds/labs reviewed. Nutrition Related Findings:    Meds/labs reviewed Wound Type: Surgical Incision (L leg)       Current Nutrition Intake & Therapies:    Average Meal Intake: 1-25%  Average Supplements Intake: Unable to assess  ADULT TUBE FEEDING; Nasogastric; Renal Formula; Continuous; 25; No; 30; Q 4 hours  ADULT DIET; Dysphagia - Soft and Bite Sized  ADULT ORAL NUTRITION SUPPLEMENT; AM Snack, PM Snack, HS Snack; Standard High Calorie/High Protein Oral Supplement    Anthropometric Measures:  Height: 5' 10.5\" (179.1 cm) (no inital recorded height; 9/13/12 office visit)  Ideal Body Weight (IBW): 169 lbs (77 kg)    Admission Body Weight: 255 lb (115.7 kg)  Current Body Weight: 224 lb 13.9 oz (102 kg), 150.9 % IBW. Weight Source: Bed Scale  Current BMI (kg/m2): 31.8                          BMI Categories: Obese Class 1 (BMI 30.0-34. 9)    Estimated Daily Nutrient Needs:  Energy Requirements Based On: Formula  Weight Used for Energy Requirements: Current  Energy (kcal/day): 2200 kcal/day  Weight Used for Protein Requirements: Ideal  Protein (g/day): 115 g pro/day  Method Used for Fluid Requirements: Other (Comment)  Fluid (ml/day): per MD    Nutrition Diagnosis:   Inadequate oral intake related to recent extubation, recent diet advancement as evidenced by intake 0-25% at present Nutrition Interventions:   Food and/or Nutrient Delivery: Continue Current Diet, Modify Oral Nutrition Supplement  Nutrition Education/Counseling: No recommendation at this time  Coordination of Nutrition Care: Continue to monitor while inpatient       Goals:  Previous Goal Met: Progressing toward Goal(s)  Goals: PO intake 50% or greater, by next RD assessment       Nutrition Monitoring and Evaluation:   Behavioral-Environmental Outcomes: None Identified  Food/Nutrient Intake Outcomes: Diet Advancement/Tolerance, Food and Nutrient Intake, Supplement Intake  Physical Signs/Symptoms Outcomes: Biochemical Data, Nutrition Focused Physical Findings, Skin, Weight, Fluid Status or Edema    Discharge Planning:     Too soon to determine     [address], LD  Contact: [phone]

## 2022-07-15 NOTE — OP NOTE
Operative Note      Patient: Violet Becker  YOB: 1960  MRN: [id number]    Date of Procedure: 7/8/2022    I assisted Dr. Jhonny Mccann as second surgeon during this case due to emergent nature and complexity. Specifically I performed the redo exposure of the left groin, exposing and controlling the femoral vessels and the distal limb of the aortobifemoral bypass graft. The left external iliac artery was ligated due to injury from dissection. I assisted with bilateral lower extremity thrombectomies and performing patch angioplasty and arteriograms. Please see Dr. Alexus Christianson operative note for full details.     Electronically signed by Kacy Calvillo MD on 7/15/2022 at 4:22 PM

## 2022-07-15 NOTE — PLAN OF CARE
Problem: Discharge Planning  Goal: Discharge to home or other facility with appropriate resources  7/15/2022 0910 by Raquel Giordano RN  Outcome: Not Progressing  7/15/2022 0328 by Priscilla Mayorga RN  Outcome: Progressing     Problem: Pain  Goal: Verbalizes/displays adequate comfort level or baseline comfort level  7/15/2022 0910 by Raquel Giordano RN  Outcome: Not Progressing  7/15/2022 0328 by Priscilla Mayorga RN  Outcome: Progressing     Problem: Skin/Tissue Integrity  Goal: Absence of new skin breakdown  Description: 1. Monitor for areas of redness and/or skin breakdown  2. Assess vascular access sites hourly  3. Every 4-6 hours minimum:  Change oxygen saturation probe site  4. Every 4-6 hours:  If on nasal continuous positive airway pressure, respiratory therapy assess nares and determine need for appliance change or resting period. 7/15/2022 0910 by Raquel Giordano RN  Outcome: Not Progressing  7/15/2022 0328 by Priscilla Mayorga RN  Outcome: Progressing     Problem: Safety - Adult  Goal: Free from fall injury  7/15/2022 0910 by Raquel Giordano RN  Outcome: Not Progressing  7/15/2022 0328 by Priscilla Mayorga RN  Outcome: Progressing     Problem: ABCDS Injury Assessment  Goal: Absence of physical injury  7/15/2022 0910 by Raquel Giordano RN  Outcome: Not Progressing  7/15/2022 0328 by Priscilla Mayorga RN  Outcome: Progressing     Problem: Respiratory - Adult  Goal: Achieves optimal ventilation and oxygenation  7/15/2022 0910 by Raquel Giordano RN  Outcome: Not Progressing  7/15/2022 0328 by Priscilla Mayorga RN  Outcome: Progressing     Problem: Confusion  Goal: Confusion, delirium, dementia, or psychosis is improved or at baseline  Description: INTERVENTIONS:  1. Assess for possible contributors to thought disturbance, including medications, impaired vision or hearing, underlying metabolic abnormalities, dehydration, psychiatric diagnoses, and notify attending LIP  2.  [place] high risk fall precautions, as indicated  3. Provide frequent short contacts to provide reality reorientation, refocusing and direction  4. Decrease environmental stimuli, including noise as appropriate  5. Monitor and intervene to maintain adequate nutrition, hydration, elimination, sleep and activity  6. If unable to ensure safety without constant attention obtain sitter and review sitter guidelines with assigned personnel  7.  Initiate Psychosocial CNS and Spiritual Care consult, as indicated  7/15/2022 0910 by Flory Lomax RN  Outcome: Not Progressing  7/15/2022 0328 by Colt Paige RN  Outcome: Progressing

## 2022-07-15 NOTE — PROGRESS NOTES
[facility]  Occupational Therapy Not Seen Note    DATE: 7/15/2022    NAME: Abena Sarabia  MRN: [id number]   : 1960      Patient not seen this date for Occupational Therapy due to:    Hemodialysis: Patient on continuous dialysis    Next Scheduled Treatment: Ck     Electronically signed by Rashawn Greco S/OT on 7/15/2022 at 10:35 AM

## 2022-07-15 NOTE — PROGRESS NOTES
PROGRESS NOTE          PATIENT NAME: Joi Gautam  MEDICAL RECORD NO. [id number]  DATE: 7/15/2022  SURGEON: Monica Saez MD  PRIMARY CARE PHYSICIAN: Philipp Jacob (Inactive)    HD: # 7    ASSESSMENT    Patient Active Problem List   Diagnosis    Seasonal allergies    Anxiety    Carotid artery occlusion    CAD (coronary artery disease)    Hypertension    Neuropathy    PAD (peripheral artery disease) ([facility])    HTN (hypertension)    Arthritis associated with another disorder    Hernia, hiatal    Hyperlipidemia    CAD (coronary artery disease)    Critical ischemia of lower extremity ([facility])    CAROLYN (acute kidney injury) ([facility] [address]    Hyperkalemia    Encounter for continuous venovenous hemodiafiltration (CVVHD) ([facility])    Failing vascular bypass graft    Arterial hypotension    Non-traumatic rhabdomyolysis    Cardiomyopathy ([facility] [address]    Decreased urine output       MEDICAL DECISION MAKING AND PLAN    Neurological  Seroquel 50 mg HS   Roxicodone 5 mg q6H PRN   Hx of CVA with residual right sided weakness  Cardiovascular  Continuous telemetry   MAP: >65   Midodrine 5mg TID  Hx of aortobifemoral graft and CABGx3  Total aortobifemoral occulusion. S/p BL common fem art cut down. Thrombectomy of the aortobifemoral bypass. Thromboendarterectomy of the common femoral profundofemoral and SFA bilaterally with bovine pericardial patch angioplasty 7/8/2022. CT of the abdomen/pelvis 7/14/2022  Left groin hematoma 7.7 x 2.3 x 7.3 cm   Vascular following   Elevated troponins: Continue to downtrend  Echo EF 15-23% with akinetic apex.    Cardiology following   Cards recommends cath when more stable  ASA 81 mg daily, lipitor 10 mg daily, Toprol XL 12.5 mg BID   Heme:  Hb: 7.6 (7.9)  Plt: 208 (145)  Heparin gtt   Respiratory  2L NC Spo2>92%  CXR: stable   Extubated 7/13  Gastrointestinal  Soft/easy to chew diet with supplements   Pepcid   FEN/Renal  UOP: 0.2 cc/kg/hr over past 24 hours   Na/K+ : 135 (134) / 3.7 (4.0)  BUN/Cr: 31 (31) / 1.46 (1.51)  CAROLYN secondary to rhabdo. Normal Cr on arrival  Nephrology consulted and initiated CRRT on 7/10 for acute renal failure. Follow up recommendations. Planning to transition to HD tomorrow. ID  Afebrile   WBC: 16.0 (19.3)  UTI positive on 7/10. Rocephin q24 x7days  Endo  BG: < 180  HDSS  MSK  Concern for compartment syndrome in LLE  S/p left calf fasciotomy 22. BID dressing changes. Dispo   Continue ICU care   Lines  saldana, right axillary arterial line, R Fabricio, L IJ     Chief Complaint: LLE weakness    SUBJECTIVE    Pleasant Cleora was seen and examined at bedside. No acute events overnight. Hemodynamically stable, and afebrile. Awake and following commands, intermittent confusion. OBJECTIVE  VITALS: Temp: Temp: 97.9 °F (36.6 °C)Temp  Av.5 °F (36.9 °C)  Min: 97.7 °F (36.5 °C)  Max: 99.1 °F (92.1 °C) BP Systolic (35RQS), PRR:413 , Min:97 , QJR:118   Diastolic (06QWE), VQY:43, Min:50, Max:74   Pulse Pulse  Av  Min: 89  Max: 111 Resp Resp  Av.6  Min: 13  Max: 21 Pulse ox SpO2  Av.8 %  Min: 91 %  Max: 100 %    GENERAL: awake, alert, no apparent distress   NEURO: no facial droop, moving all extremities   HEENT: NCAT   LUNGS: no acute respiratory distress or accessory muscle use   HEART: regular rate   ABDOMEN: soft, non-tender, non-distended  EXTREMITY: palpable left DP, doppler signals; weak left PT, weak right PT    I/O last 3 completed shifts: In: 5582.7 [P.O.:250; I.V.:5026.2; IV Piggyback:306.5]  Out: 6794 [Urine:656]    Drain/tube output: In: 4237.6 [P.O.:250;  I.V.:3841.6]  Out: 0778 [Urine:516]    LAB:  CBC:   Recent Labs     22  0759 22  0328 07/15/22  0604   WBC 15.8* 19.3* 16.0*   HGB 7.8* 7.9* 7.6*   HCT 23.7* 24.6* 23.3*   MCV 94.4 95.0 97.1   PLT See Reflexed IPF Result 153 See Reflexed IPF Result     BMP:   Recent Labs     07/15/22  0127 07/15/22  0603 07/15/22  1301   * 134* 135   K 3.8 4.0 3.7    100 102   CO2 21 20 20   BUN 31* 31* 31* CREATININE 1.53* 1.51* 1.46*   GLUCOSE 125* 135* 136*     COAGS:   Recent Labs     07/14/22  0328 07/14/22  0631 07/15/22  0604 07/15/22  1302   APTT 55.2*  --  44.9* 72.3*   PROT  --  5.3*  --   --        RADIOLOGY:  CT ABDOMEN PELVIS WO CONTRAST Additional Contrast? None    Result Date: 7/14/2022  EXAMINATION: CT OF THE ABDOMEN AND PELVIS WITHOUT CONTRAST 7/14/2022 9:55 am TECHNIQUE: CT of the abdomen and pelvis was performed without the administration of intravenous contrast. Multiplanar reformatted images are provided for review. Automated exposure control, iterative reconstruction, and/or weight based adjustment of the mA/kV was utilized to reduce the radiation dose to as low as reasonably achievable. COMPARISON: 07/08/2022 HISTORY: ORDERING SYSTEM PROVIDED HISTORY: evaluate bypass and bilateral groin cut down sites TECHNOLOGIST PROVIDED HISTORY: evaluate bypass and bilateral groin cut down sites FINDINGS: Lower Chest: Linear opacities in the lung bases compatible with subsegmental atelectasis. Organs: The gallbladder is surgically absent. The liver, pancreas, spleen, kidneys, and adrenals reveal no acute findings. Punctate bilateral renal calculi. GI/Bowel: There is no bowel dilatation or wall thickening identified. Pelvis: The bladder is decompressed with a Dexter catheter. Peritoneum/Retroperitoneum: No free air, ascites or retroperitoneal hematoma. Sequela of aortic bypass graft placement again noted. Bones/Soft Tissues: Postoperative findings in the left groin noted with heterogeneous hematoma that measures 7.7 x 2.3 x 7.3 cm. Small amount of gas present in this area is consistent with recent intervention. The subcutaneous edema in the left groin and left thigh is also present. A scar is present in the right groin without acute findings in this area. No acute osseous abnormality identified. 1.  Postprocedural findings in the left groin with superficial hematoma, as described.   Subcutaneous edema in the left groin and left thighs also present. 2.  No retroperitoneal or intrapelvic hematoma. XR CHEST PORTABLE    Result Date: 7/15/2022  EXAMINATION: ONE XRAY VIEW OF THE CHEST 7/15/2022 5:43 am COMPARISON: Chest radiograph performed 07/14/2022. HISTORY: ORDERING SYSTEM PROVIDED HISTORY: intubated TECHNOLOGIST PROVIDED HISTORY: intubated FINDINGS: There is no acute consolidation or effusion. There is no pneumothorax. The mediastinal structures are unremarkable. The upper abdomen unremarkable. The extrathoracic soft tissues are unremarkable. There is a right internal jugular line and left internal jugular line in stable position. No acute cardiopulmonary process. Stable internal jugular central lines. XR CHEST PORTABLE    Result Date: 7/14/2022  EXAMINATION: ONE XRAY VIEW OF THE CHEST 7/14/2022 5:40 am COMPARISON: 07/13/2022, 07/12/2022 HISTORY: ORDERING SYSTEM PROVIDED HISTORY: intubated TECHNOLOGIST PROVIDED HISTORY: intubated Reason for Exam: Supine port. s/p intubation FINDINGS: The endotracheal and enteric tubes have been removed. The central lines remain in place. The cardiac and mediastinal contours appear unchanged. No new airspace disease, pneumothorax or effusion identified. Interval removal of endotracheal and enteric tubes.   No new findings otherwise identified in the interval.            Maida Shetty DO  7/15/22, 2:57 PM

## 2022-07-16 ENCOUNTER — APPOINTMENT (OUTPATIENT)
Dept: GENERAL RADIOLOGY | Age: 62
DRG: 181 | End: 2022-07-16
Payer: COMMERCIAL

## 2022-07-16 LAB
ANION GAP SERPL CALCULATED.3IONS-SCNC: 15 MMOL/L (ref 9–17)
BUN BLDV-MCNC: 41 MG/DL (ref 8–23)
CALCIUM IONIZED: 1.14 MMOL/L (ref 1.13–1.33)
CALCIUM SERPL-MCNC: 8.3 MG/DL (ref 8.6–10.4)
CHLORIDE BLD-SCNC: 101 MMOL/L (ref 98–107)
CO2: 20 MMOL/L (ref 20–31)
CREAT SERPL-MCNC: 1.75 MG/DL (ref 0.7–1.2)
GFR AFRICAN AMERICAN: 48 ML/MIN
GFR NON-AFRICAN AMERICAN: 40 ML/MIN
GFR SERPL CREATININE-BSD FRML MDRD: ABNORMAL ML/MIN/{1.73_M2}
GLUCOSE BLD-MCNC: 119 MG/DL (ref 75–110)
GLUCOSE BLD-MCNC: 125 MG/DL (ref 75–110)
GLUCOSE BLD-MCNC: 141 MG/DL (ref 70–99)
GLUCOSE BLD-MCNC: 147 MG/DL (ref 75–110)
GLUCOSE BLD-MCNC: 158 MG/DL (ref 75–110)
GLUCOSE BLD-MCNC: 162 MG/DL (ref 75–110)
HCT VFR BLD CALC: 20 % (ref 40.7–50.3)
HCT VFR BLD CALC: 20.2 % (ref 40.7–50.3)
HEMOGLOBIN: 6.5 G/DL (ref 13–17)
HEMOGLOBIN: 6.5 G/DL (ref 13–17)
MAGNESIUM: 2.1 MG/DL (ref 1.6–2.6)
MCH RBC QN AUTO: 31.7 PG (ref 25.2–33.5)
MCHC RBC AUTO-ENTMCNC: 32.2 G/DL (ref 28.4–34.8)
MCV RBC AUTO: 98.5 FL (ref 82.6–102.9)
NRBC AUTOMATED: 1.3 PER 100 WBC
PARTIAL THROMBOPLASTIN TIME: 49.6 SEC (ref 20.5–30.5)
PARTIAL THROMBOPLASTIN TIME: 58.6 SEC (ref 20.5–30.5)
PARTIAL THROMBOPLASTIN TIME: 72.9 SEC (ref 20.5–30.5)
PDW BLD-RTO: 15.9 % (ref 11.8–14.4)
PHOSPHORUS: 4.7 MG/DL (ref 2.5–4.5)
PLATELET # BLD: ABNORMAL K/UL (ref 138–453)
PLATELET, FLUORESCENCE: 231 K/UL (ref 138–453)
PLATELET, IMMATURE FRACTION: 13.4 % (ref 1.1–10.3)
POTASSIUM SERPL-SCNC: 3.6 MMOL/L (ref 3.7–5.3)
RBC # BLD: 2.05 M/UL (ref 4.21–5.77)
SODIUM BLD-SCNC: 136 MMOL/L (ref 135–144)
WBC # BLD: 13.5 K/UL (ref 3.5–11.3)

## 2022-07-16 PROCEDURE — 85055 RETICULATED PLATELET ASSAY: CPT

## 2022-07-16 PROCEDURE — 71045 X-RAY EXAM CHEST 1 VIEW: CPT

## 2022-07-16 PROCEDURE — 83735 ASSAY OF MAGNESIUM: CPT

## 2022-07-16 PROCEDURE — 85730 THROMBOPLASTIN TIME PARTIAL: CPT

## 2022-07-16 PROCEDURE — 2500000003 HC RX 250 WO HCPCS: Performed by: NURSE PRACTITIONER

## 2022-07-16 PROCEDURE — 6360000002 HC RX W HCPCS: Performed by: HEALTH CARE PROVIDER

## 2022-07-16 PROCEDURE — 6370000000 HC RX 637 (ALT 250 FOR IP): Performed by: NURSE PRACTITIONER

## 2022-07-16 PROCEDURE — 84100 ASSAY OF PHOSPHORUS: CPT

## 2022-07-16 PROCEDURE — 99232 SBSQ HOSP IP/OBS MODERATE 35: CPT | Performed by: INTERNAL MEDICINE

## 2022-07-16 PROCEDURE — 6370000000 HC RX 637 (ALT 250 FOR IP): Performed by: STUDENT IN AN ORGANIZED HEALTH CARE EDUCATION/TRAINING PROGRAM

## 2022-07-16 PROCEDURE — 86920 COMPATIBILITY TEST SPIN: CPT

## 2022-07-16 PROCEDURE — 85027 COMPLETE CBC AUTOMATED: CPT

## 2022-07-16 PROCEDURE — 36430 TRANSFUSION BLD/BLD COMPNT: CPT

## 2022-07-16 PROCEDURE — 86850 RBC ANTIBODY SCREEN: CPT

## 2022-07-16 PROCEDURE — 90935 HEMODIALYSIS ONE EVALUATION: CPT

## 2022-07-16 PROCEDURE — 80048 BASIC METABOLIC PNL TOTAL CA: CPT

## 2022-07-16 PROCEDURE — 85014 HEMATOCRIT: CPT

## 2022-07-16 PROCEDURE — 6370000000 HC RX 637 (ALT 250 FOR IP): Performed by: HEALTH CARE PROVIDER

## 2022-07-16 PROCEDURE — 94761 N-INVAS EAR/PLS OXIMETRY MLT: CPT

## 2022-07-16 PROCEDURE — 86901 BLOOD TYPING SEROLOGIC RH(D): CPT

## 2022-07-16 PROCEDURE — 85018 HEMOGLOBIN: CPT

## 2022-07-16 PROCEDURE — 6360000002 HC RX W HCPCS: Performed by: STUDENT IN AN ORGANIZED HEALTH CARE EDUCATION/TRAINING PROGRAM

## 2022-07-16 PROCEDURE — 37799 UNLISTED PX VASCULAR SURGERY: CPT

## 2022-07-16 PROCEDURE — P9016 RBC LEUKOCYTES REDUCED: HCPCS

## 2022-07-16 PROCEDURE — 2000000000 HC ICU R&B

## 2022-07-16 PROCEDURE — 6360000002 HC RX W HCPCS: Performed by: NURSE PRACTITIONER

## 2022-07-16 PROCEDURE — 36415 COLL VENOUS BLD VENIPUNCTURE: CPT

## 2022-07-16 PROCEDURE — 82330 ASSAY OF CALCIUM: CPT

## 2022-07-16 PROCEDURE — 2580000003 HC RX 258: Performed by: STUDENT IN AN ORGANIZED HEALTH CARE EDUCATION/TRAINING PROGRAM

## 2022-07-16 PROCEDURE — 86900 BLOOD TYPING SEROLOGIC ABO: CPT

## 2022-07-16 RX ORDER — SODIUM CHLORIDE 9 MG/ML
INJECTION, SOLUTION INTRAVENOUS PRN
Status: DISCONTINUED | OUTPATIENT
Start: 2022-07-16 | End: 2022-07-21 | Stop reason: HOSPADM

## 2022-07-16 RX ADMIN — TAMSULOSIN HYDROCHLORIDE 0.4 MG: 0.4 CAPSULE ORAL at 08:32

## 2022-07-16 RX ADMIN — MIDODRINE HYDROCHLORIDE 5 MG: 5 TABLET ORAL at 08:32

## 2022-07-16 RX ADMIN — POLYETHYLENE GLYCOL 3350 17 G: 17 POWDER, FOR SOLUTION ORAL at 08:33

## 2022-07-16 RX ADMIN — CEFTRIAXONE SODIUM 1000 MG: 1 INJECTION, POWDER, FOR SOLUTION INTRAMUSCULAR; INTRAVENOUS at 20:24

## 2022-07-16 RX ADMIN — HEPARIN SODIUM 16 UNITS/KG/HR: 10000 INJECTION, SOLUTION INTRAVENOUS at 16:38

## 2022-07-16 RX ADMIN — QUETIAPINE FUMARATE 50 MG: 25 TABLET ORAL at 21:36

## 2022-07-16 RX ADMIN — INSULIN LISPRO 3 UNITS: 100 INJECTION, SOLUTION INTRAVENOUS; SUBCUTANEOUS at 17:37

## 2022-07-16 RX ADMIN — Medication 5 MG: at 21:37

## 2022-07-16 RX ADMIN — POTASSIUM BICARBONATE 40 MEQ: 782 TABLET, EFFERVESCENT ORAL at 06:27

## 2022-07-16 RX ADMIN — ASPIRIN 81 MG: 81 TABLET, CHEWABLE ORAL at 08:33

## 2022-07-16 RX ADMIN — MIDODRINE HYDROCHLORIDE 5 MG: 5 TABLET ORAL at 17:33

## 2022-07-16 RX ADMIN — FAMOTIDINE 20 MG: 20 TABLET, FILM COATED ORAL at 08:33

## 2022-07-16 RX ADMIN — INSULIN LISPRO 3 UNITS: 100 INJECTION, SOLUTION INTRAVENOUS; SUBCUTANEOUS at 11:37

## 2022-07-16 RX ADMIN — SODIUM CHLORIDE, PRESERVATIVE FREE 10 ML: 5 INJECTION INTRAVENOUS at 21:37

## 2022-07-16 RX ADMIN — HEPARIN SODIUM 1300 UNITS: 1000 INJECTION INTRAVENOUS; SUBCUTANEOUS at 12:40

## 2022-07-16 RX ADMIN — INSULIN LISPRO 3 UNITS: 100 INJECTION, SOLUTION INTRAVENOUS; SUBCUTANEOUS at 09:08

## 2022-07-16 RX ADMIN — OXYCODONE 5 MG: 5 TABLET ORAL at 20:24

## 2022-07-16 RX ADMIN — ATORVASTATIN CALCIUM 10 MG: 10 TABLET, FILM COATED ORAL at 08:32

## 2022-07-16 RX ADMIN — OXYCODONE 5 MG: 5 TABLET ORAL at 08:37

## 2022-07-16 RX ADMIN — METOPROLOL SUCCINATE 12.5 MG: 25 TABLET, FILM COATED, EXTENDED RELEASE ORAL at 08:31

## 2022-07-16 RX ADMIN — MIDODRINE HYDROCHLORIDE 5 MG: 5 TABLET ORAL at 12:39

## 2022-07-16 RX ADMIN — OXYCODONE 5 MG: 5 TABLET ORAL at 14:21

## 2022-07-16 RX ADMIN — HEPARIN SODIUM 1400 UNITS: 1000 INJECTION INTRAVENOUS; SUBCUTANEOUS at 12:40

## 2022-07-16 RX ADMIN — HEPARIN SODIUM 16 UNITS/KG/HR: 10000 INJECTION, SOLUTION INTRAVENOUS at 01:41

## 2022-07-16 ASSESSMENT — PAIN SCALES - GENERAL
PAINLEVEL_OUTOF10: 7
PAINLEVEL_OUTOF10: 10
PAINLEVEL_OUTOF10: 0
PAINLEVEL_OUTOF10: 7
PAINLEVEL_OUTOF10: 10
PAINLEVEL_OUTOF10: 0

## 2022-07-16 ASSESSMENT — PAIN - FUNCTIONAL ASSESSMENT: PAIN_FUNCTIONAL_ASSESSMENT: ACTIVITIES ARE NOT PREVENTED

## 2022-07-16 ASSESSMENT — PAIN DESCRIPTION - DESCRIPTORS
DESCRIPTORS: THROBBING
DESCRIPTORS: THROBBING

## 2022-07-16 ASSESSMENT — PAIN DESCRIPTION - ORIENTATION: ORIENTATION: RIGHT;LEFT

## 2022-07-16 ASSESSMENT — PAIN DESCRIPTION - LOCATION
LOCATION: SCROTUM
LOCATION: SCROTUM

## 2022-07-16 NOTE — PROGRESS NOTES
Renal Progress Note    Patient :  Chet Pope; 64 y.o. MRN# [id number]  Location:  9990/2795-07  Attending:  Precious Sanchez MD  Admit Date:  7/8/2022   Hospital Day: 8    Subjective:      CVVHD d/c yesterday. Had hemodialysis today 1.2 L of fluid removed. Was somewhat hypotensive postprocedure that has improved. Responded to midodrine. Urine output 20 mL to 30 m an hour, Dexter catheter in place  No issues reported overnight. Most recent blood work showed sodium 136, potassium 3.6, chloride 101, bicarb 20, BUN 41, creatinine 1.75 mg/dl, calcium 8.3. Baseline creatinine 1.1-1.2  Urine output documented as about 370 cc in the last 24 hours. Remains off pressors. No acute complaints. Plan for cath next week. Outpatient Medications:     Medications Prior to Admission: oxyCODONE (OXYCONTIN) 40 MG CR tablet, Take 40 mg by mouth every 12 hours. oxyCODONE-acetaminophen (PERCOCET)  MG per tablet, Take 1 tablet by mouth 3 times daily as needed. breakthrought pain   lisinopril (PRINIVIL;ZESTRIL) 10 MG tablet, Take 0.5 tablets by mouth daily. dipyridamole-aspirin (AGGRENOX)  MG per SR capsule, Take 1 capsule by mouth daily. simvastatin (ZOCOR) 40 MG tablet, Take 1 tablet by mouth nightly. ALPRAZolam (XANAX) 1 MG tablet, Take 1 tablet by mouth 2 times daily as needed. varenicline (CHANTIX STARTING MONTH PAK) 0.5 MG X 11 & 1 MG X 42 tablet, Take by mouth.  isosorbide mononitrate (IMDUR) 30 MG CR tablet, Take 30 mg by mouth daily. metoprolol (LOPRESSOR) 25 MG tablet, Take 1 tablet by mouth 2 times daily. Takes 1/2 tab 2 times a day  mirtazapine (REMERON) 30 MG tablet, Take 1 tablet by mouth nightly. tamsulosin (FLOMAX) 0.4 MG capsule, Take 1 capsule by mouth daily. TRUETEST TEST strip, TEST 2 TIMES DAILY  metformin (GLUCOPHAGE) 500 MG tablet, TAKE 1 TABLET BY MOUTH 2 TIMES DAILY (WITH MEALS) FOR 30 DAYS. aspirin  MG EC tablet, Take 1 tablet by mouth daily.     Current Medications: Scheduled Meds:    metoprolol succinate  12.5 mg Oral BID    famotidine  20 mg Oral Daily    atorvastatin  10 mg Oral Daily    tamsulosin  0.4 mg Oral Daily    insulin lispro  0-18 Units SubCUTAneous 4x Daily AC & HS    melatonin  5 mg Oral Nightly    QUEtiapine  50 mg Oral Nightly    aspirin  81 mg Oral Daily    midodrine  5 mg Oral TID WC    cefTRIAXone (ROCEPHIN) IV  1,000 mg IntraVENous Q24H    polyethylene glycol  17 g Oral Daily     Continuous Infusions:    sodium chloride      [Held by provider] dexmedetomidine Stopped (22 1044)    sodium chloride 100 mL/hr at 07/15/22 1800    CRRT dialysis builder 1,500 mL/hr (07/15/22 0831)    heparin (PORCINE) Infusion 14 Units/kg/hr (22 0519)     PRN Meds:  sodium chloride, oxyCODONE, heparin (porcine), heparin (porcine), heparin (porcine), heparin (porcine), sodium chloride flush, ondansetron, heparin (porcine), heparin (porcine), glucose, glucagon (rDNA)    Input/Output:       I/O last 3 completed shifts: In: 3095.3 [P.O.:250; I.V.:2767.7; IV Piggyback:77.6]  Out: 4188 [Urine:551]. Patient Vitals for the past 96 hrs (Last 3 readings):   Weight   22 1030 220 lb 7.4 oz (100 kg)   07/15/22 0500 224 lb 13.9 oz (102 kg)   22 0346 231 lb 7.7 oz (105 kg)         Vital Signs:   Temperature:  Temp: 98.4 °F (36.9 °C)  TMax:   Temp (24hrs), Av.2 °F (36.8 °C), Min:97.9 °F (36.6 °C), Max:98.4 °F (36.9 °C)    Respirations:  Resp: 18  Pulse:   Heart Rate: 95  BP:    BP: 122/61  BP Range: Systolic (86WTH), NGB:184 , Min:102 , LZX:017       Diastolic (12WSG), HPT:97, Min:41, Max:75      Physical Examination:     General:  AAO x 2 at least to person and place, no accessory muscle use. HEENT: Atraumatic, normocephalic, no throat congestion, moist mucosa. Eyes:   Pupils equal, round and reactive to light, EOMI. Neck:   Supple  Chest:   Bilateral vesicular breath sounds, no rales or wheezes.   Cardiac:  S1 S2 RR, no murmurs, gallops or rubs.  Abdomen: Soft, non-tender, no masses or organomegaly, BS audible. :   No suprapubic or flank tenderness. Neuro:   AAO x 2 at least to person and place, NAD. SKIN:  No rashes, good skin turgor. Extremities:  +ve edema b/l and post op dressing in place left leg.      Labs:       Recent Labs     07/14/22  0328 07/15/22  0604 07/16/22  0406 07/16/22  0511   WBC 19.3* 16.0* 13.5*  --    RBC 2.59* 2.40* 2.05*  --    HGB 7.9* 7.6* 6.5* 6.5*   HCT 24.6* 23.3* 20.2* 20.0*   MCV 95.0 97.1 98.5  --    MCH 30.5 31.7 31.7  --    MCHC 32.1 32.6 32.2  --    RDW 15.0* 15.5* 15.9*  --     See Reflexed IPF Result See Reflexed IPF Result  --    MPV 12.7  --   --   --         BMP:   Recent Labs     07/15/22  0603 07/15/22  1301 07/16/22  0406   * 135 136   K 4.0 3.7 3.6*    102 101   CO2 20 20 20   BUN 31* 31* 41*   CREATININE 1.51* 1.46* 1.75*   GLUCOSE 135* 136* 141*   CALCIUM 8.8 8.8 8.3*        Phosphorus:     Recent Labs     07/15/22  0603 07/15/22  1301 07/16/22  0406   PHOS 3.6 4.0 4.7*       Magnesium:    Recent Labs     07/15/22  0603 07/15/22  1301 07/16/22  0406   MG 1.9 1.9 2.1       Albumin:    Recent Labs     07/14/22  0631   LABALBU 2.6*       SPEP:  Lab Results   Component Value Date/Time    PROT 5.3 07/14/2022 06:31 AM    PROT 7.1 07/25/2011 09:50 AM     Urinalysis/Chemistries:      Lab Results   Component Value Date/Time    NITRU POSITIVE 07/10/2022 12:28 PM    COLORU BROWN 07/10/2022 12:28 PM    PHUR 6.5 07/10/2022 12:28 PM    WBCUA 10 TO 20 07/10/2022 12:28 PM    RBCUA 20 TO 50 07/10/2022 12:28 PM    MUCUS 1+ 06/30/2012 09:40 PM    TRICHOMONAS NOT REPORTED 06/30/2012 09:40 PM    YEAST NOT REPORTED 06/30/2012 09:40 PM    BACTERIA MODERATE 07/10/2022 12:28 PM    SPECGRAV 1.025 07/10/2022 12:28 PM    LEUKOCYTESUR TRACE 07/10/2022 12:28 PM    UROBILINOGEN Normal 07/10/2022 12:28 PM    BILIRUBINUR NEGATIVE  Verified by ictotest. 07/10/2022 12:28 PM    BILIRUBINUR NEGATIVE 06/06/2012 05:45 AM    GLUCOSEU NEGATIVE 07/10/2022 12:28 PM    GLUCOSEU NEGATIVE 06/06/2012 05:45 AM    KETUA NEGATIVE 07/10/2022 12:28 PM    AMORPHOUS 3+ 07/10/2022 12:28 PM      Urine Creatinine:     Lab Results   Component Value Date/Time    LABCREA 141.6 07/10/2022 12:28 PM     Radiology:     Reviewed. Assessment:       Assessment:     1. Acute Kidney Injury: ATN with contrast nephropathy, circulatory shock, and hypotension requiring pressor support and now postop. Baseline creatinine seems to be around 1-1.2 mg/dl. Not currently CVVDH dependent since 7/10/2022. 2. Hyperkalemia- likely due to underlying renal dysfunction. Improved with HD.  3. 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 4. Rhabdomyolysis  5. Hypotension-requiring pressor support. 6. Total aortobifemoral occulusion s/p Bilateral common femoral arterial access via open cutdown, thrombectomy of the aortobifemoral bypass graft thromboendarterectomy of the common femoral profundofemoral and SFA bilaterally with bovine pericardial patch angioplasty on 7/8/2021  7. Left lower extremity compartment syndrome s/p fasciotomy. 8. Decreasing urine output. 9. Anemia - 6.5 okay for transfusion with HD. Plan:   Next hemodialysis on Monday, follow renal recovery    Monitor strict I's and O's and renal function. Cardiology is recommending cardiac catheterization for possible PCI but waiting vascular surgery clearance. Patient had fasciotomy done and had hematoma in the left groin. Dr. Ac Lizarraga did speak with patient and his sister about cardiac cath and contrast related renal issues and they were okay with getting the procedure done as required. If continues to require dialysis, will need tunneled catheter placement next week. [id number] [address] Hsu with transfusion  BMP in AM.  Will follow. Nutrition   Please ensure that patient is on a renal diet/TF. Avoid nephrotoxic drugs/contrast exposure.     Electronically signed by Richie Boothe CNP, APRN - CNP on 7/16/2022 at 11:52 AM     [facility]     This note is created with the assistance of a speech-recognition program. While intending to generate a document that actually reflects the content of the visit, no guarantees can be provided that every mistake has been identified and corrected by editing. Patient seen with nurse practitioner. Dialysis modality switched to intermittent hemodialysis. Urine output still suboptimal.  Clearances not accurate as patient was getting CVVHD. Lower extremity edema persists. Hemodynamically stable although blood pressures fluctuate. Clinically showing 1+ edema bilaterally, fasciotomy on left lower extremity evident  Impression  1. Acute kidney injury  2. chronic kidney disease stage III  3. Status post vascular occlusion and aortofemoral bypass  4. Status post fasciotomy  5. Cardiomyopathy ejection fraction 15%  Plan  1. Next dialysis Monday  2. Follow renal recovery  Can discontinue Dexter from nephrology standpoint  4. We will follow    Attending Physician Statement  I have discussed the care of Joanne Fletcher, including pertinent history and exam findings with the resident/fellow. I have reviewed the key elements of all parts of the encounter with the resident/fellow. I have seen and examined the patient with the resident/fellow. I agree with the assessment and plan and status of the problem list as documented.       .  Electronically signed by Stephania Madrigal MD on 7/16/2022 at 4:59 PM

## 2022-07-16 NOTE — PLAN OF CARE
Problem: Discharge Planning  Goal: Discharge to home or other facility with appropriate resources  Outcome: Progressing     Problem: Pain  Goal: Verbalizes/displays adequate comfort level or baseline comfort level  Outcome: Progressing     Problem: Skin/Tissue Integrity  Goal: Absence of new skin breakdown  Description: 1. Monitor for areas of redness and/or skin breakdown  2. Assess vascular access sites hourly  3. Every 4-6 hours minimum:  Change oxygen saturation probe site  4. Every 4-6 hours:  If on nasal continuous positive airway pressure, respiratory therapy assess nares and determine need for appliance change or resting period. Outcome: Progressing     Problem: Safety - Adult  Goal: Free from fall injury  Outcome: Progressing  Flowsheets (Taken 7/16/2022 0730)  Free From Fall Injury:   Instruct family/caregiver on patient safety   Based on caregiver fall risk screen, instruct family/caregiver to ask for assistance with transferring infant if caregiver noted to have fall risk factors     Problem: ABCDS Injury Assessment  Goal: Absence of physical injury  Outcome: Progressing  Flowsheets (Taken 7/16/2022 0730)  Absence of Physical Injury: Implement safety measures based on patient assessment     Problem: Respiratory - Adult  Goal: Achieves optimal ventilation and oxygenation  Outcome: Progressing     Problem: Confusion  Goal: Confusion, delirium, dementia, or psychosis is improved or at baseline  Description: INTERVENTIONS:  1. Assess for possible contributors to thought disturbance, including medications, impaired vision or hearing, underlying metabolic abnormalities, dehydration, psychiatric diagnoses, and notify attending LIP  2. [place] high risk fall precautions, as indicated  3. Provide frequent short contacts to provide reality reorientation, refocusing and direction  4. Decrease environmental stimuli, including noise as appropriate  5.  Monitor and intervene to maintain adequate nutrition,

## 2022-07-16 NOTE — PROGRESS NOTES
Dialysis Post Treatment Note  Vitals:    07/16/22 1250   BP: 123/63   Pulse: 97   Resp: 16   Temp: 98.2 °F (36.8 °C)   SpO2: 98%     Pre-Weight = 100kg  Post-weight = Weight: 218 lb 4.1 oz (99 kg)  Total Liters Processed = Blood Volume Processed (Liters): 54.3 l/min  Rinseback Volume (mL) = Rinseback Volume (ml): 300 ml  Net Removal (mL) =  1220  Patient's dry weight= Not established  Type of access used= CVC Right  Length of treatment= 150      Pt had fluctuating BP after the 1st hour of treatment. 1unit of blood was transfused intra-dialysis. 1.2 L was taken off. No distress pre, intra and post dialysis. No CVC issues as well. Report given to room nurse Rahul Perez. Left pt awake watching show on TV.

## 2022-07-16 NOTE — PROGRESS NOTES
Progress Note      Name: Joanne Fletcher  MRN: [id number]         Overnight Events:     Nothing acute overnight. Subjective:     Patient seen and examined at the bedside. Did not want to talk much today. Indicated that he is feeling some nausea this morning. No emesis. Off CRRT still having low urine output. Physical Exam:     Vitals:  BP (!) 104/54   Pulse 96   Temp 98.1 °F (36.7 °C) (Oral)   Resp 20   Ht 5' 10.5\" (1.791 m) Comment: no inital recorded height; 9/13/12 office visit  Wt 224 lb 13.9 oz (102 kg)   SpO2 96%   BMI 31.81 kg/m²     General appearance - Alert, disoriented  Mental status - following some commands  Head - normocephalic and atraumatic  Chest - no respiratory distress, no accessory muscle use   Heart - tachycardia, irregular rhythm  Abdomen - soft, non-tender, non-distended  Neurological - continues to have decreased sedation in lower extremities  Extremities -  edema to RLE, function improving, LLE fasciotomy incision, left groin full, however no surrounding erythema to suggest infection  Skin - BL groin incisions clean and dry, no signs of infection  Vascular Exam - palpable left DP, doppler signals; weak left PT, weak right PT       Imaging:   XR CHEST (SINGLE VIEW FRONTAL)    Result Date: 7/10/2022  Right IJ CVC catheter tip overlies the superior cavoatrial junction. Left IJ CVC catheter tip overlies the mid/distal SVC. No pneumothorax is seen. Mild left basilar atelectasis. XR CHEST PORTABLE    Result Date: 7/11/2022  Stable chest.     XR CHEST PORTABLE    Result Date: 7/11/2022  1. The deeper temporary dialysis catheter on the previous exam is been removed, with a new temporary dialysis catheter seen overlying the proximal superior vena cava. 2. Other tubes and lines as above. 3. Patchy infiltrates in the lungs bilaterally which may represent edema or pneumonia. XR CHEST PORTABLE    Result Date: 7/11/2022  1.   The right jugular catheter appears in a stable position with the tip at the cavoatrial junction. 2. Endotracheal tube, nasogastric tube, and left jugular line are also redemonstrated. 3.  Some hazy opacities in the lungs which could be subtle edema or inflammatory. XR CHEST PORTABLE    Result Date: 7/10/2022  1. Tubes and right IJ line as detailed above. 2. Improvement in aeration of both lung since the prior study with residual airspace disease at the left upper and left lower lung zones as well as right parahilar region. No new focal airspace disease. 3. Prior median sternotomy and CABG.        Assessment:     63 y/o male critically ill s/p BL femoral artery cutdown with thrombectomy of aortobifemoral graft, LLE fasciotomy, bilateral lower extremity angiography      Plan:     Appreciate critical care management from surgical critical care  Cont neurovascular checks  Continue heparin gtt, will plan to switch to full dose Eliquis once we have more definitive plan for dialysis access  Appreciate nephrology recommendations  Would like to know if they believe patient will require long-term dialysis  If patient would require long-term dialysis would plan on placing tunneled catheter sometime early next week  Appreciate cardiology recommendations  Consider surgery would request we hold off on cardiac catheterization during this admission  Hypercoagulable work up negative to this point  Fasciotomy dressing changes BID with wet curlex over fasciotomy incisions  Some duskyness reported  Will plan on replacing wet to dry dressing with wound VAC today  CT scan reviewed, hematoma in the left groin appreciated, will hold off on surgical intervention at this time continue to monitor for signs of infection  Would appreciate internal medicine being involved with patient as we believe they should take over as primary once the patient leaves the surgical ICU    Heraclio Topete DO PGY 4  General Surgery Resident  07/16/22 8:42 AM

## 2022-07-16 NOTE — PROGRESS NOTES
CREATININE 1.51* 1.46* 1.75*   GLUCOSE 135* 136* 141*     COAGS:   Recent Labs     07/14/22  0631 07/15/22  0604 07/15/22  1302 07/15/22  2029 07/16/22  0406   APTT  --    < > 72.3* 66.6* 72.9*   PROT 5.3*  --   --   --   --     < > = values in this interval not displayed. RADIOLOGY:  CT ABDOMEN PELVIS WO CONTRAST Additional Contrast? None    Result Date: 7/14/2022  1. Postprocedural findings in the left groin with superficial hematoma, as described. Subcutaneous edema in the left groin and left thighs also present. 2.  No retroperitoneal or intrapelvic hematoma. XR CHEST (SINGLE VIEW FRONTAL)    Result Date: 7/11/2022  Right IJ CVC catheter tip overlies the superior cavoatrial junction. Left IJ CVC catheter tip overlies the mid/distal SVC. No pneumothorax is seen. Mild left basilar atelectasis. US RENAL COMPLETE    Result Date: 7/11/2022  Unremarkable kidneys. Hepatic steatosis. XR CHEST PORTABLE    Result Date: 7/16/2022  No acute process. Stable exam.  Stable lines. XR CHEST PORTABLE    Result Date: 7/15/2022  No acute cardiopulmonary process. Stable internal jugular central lines. XR CHEST PORTABLE    Result Date: 7/14/2022  Interval removal of endotracheal and enteric tubes. No new findings otherwise identified in the interval.     XR CHEST PORTABLE    Result Date: 7/13/2022  No acute cardiopulmonary process. Support tubes as described above. XR CHEST PORTABLE    Result Date: 7/12/2022  No acute cardiopulmonary process. Support tubes as described above. XR CHEST PORTABLE    Result Date: 7/11/2022  Mild bibasilar opacities compatible with atelectasis. Aspiration and pneumonia are not excluded. XR CHEST PORTABLE    Result Date: 7/11/2022  Stable chest.     XR CHEST PORTABLE    Result Date: 7/11/2022  1. The deeper temporary dialysis catheter on the previous exam is been removed, with a new temporary dialysis catheter seen overlying the proximal superior vena cava.  2. Other tubes and lines as above. 3. Patchy infiltrates in the lungs bilaterally which may represent edema or pneumonia. XR CHEST PORTABLE    Result Date: 7/11/2022  1. The right jugular catheter appears in a stable position with the tip at the cavoatrial junction. 2. Endotracheal tube, nasogastric tube, and left jugular line are also redemonstrated. 3.  Some hazy opacities in the lungs which could be subtle edema or inflammatory. XR CHEST PORTABLE    Result Date: 7/10/2022  1. Tubes and right IJ line as detailed above. 2. Improvement in aeration of both lung since the prior study with residual airspace disease at the left upper and left lower lung zones as well as right parahilar region. No new focal airspace disease. 3. Prior median sternotomy and CABG.           Isaias Mclain DO  7/16/22, 9:55 AM

## 2022-07-16 NOTE — PROGRESS NOTES
[facility]   Progress Note                    Date:   7/16/2022  Patient name:  Carleen Rick  Date of admission:  7/8/2022  4:56 AM  MRN:   [id number]  YOB: 1960  PCP:    Nafisa Hutchinson (Inactive)    Reason for Admission:  Lactic acidosis [E87.2]  Left leg numbness [R20.0]  Acute respiratory failure with hypercapnia ([address] [J96.02]  Failing vascular bypass graft, initial encounter [T82.599A]  Critical ischemia of lower extremity ([address] [I70.229]    Subjective:      Clinical Changes / Abnormalities:    Patient seen and examined at bedside. No acute events overnight. No chest pain or shortness of breath.     Urine output in the last 24 hours:     Intake/Output Summary (Last 24 hours) at 7/16/2022 0727  Last data filed at 7/16/2022 0600  Gross per 24 hour   Intake 1378.9 ml   Output 1949 ml   Net -570.1 ml       Medications:   Scheduled Meds:   metoprolol succinate  12.5 mg Oral BID    famotidine  20 mg Oral Daily    atorvastatin  10 mg Oral Daily    tamsulosin  0.4 mg Oral Daily    insulin lispro  0-18 Units SubCUTAneous 4x Daily AC & HS    melatonin  5 mg Oral Nightly    QUEtiapine  50 mg Oral Nightly    aspirin  81 mg Oral Daily    midodrine  5 mg Oral TID WC    cefTRIAXone (ROCEPHIN) IV  1,000 mg IntraVENous Q24H    polyethylene glycol  17 g Oral Daily     Continuous Infusions:   [Held by provider] dexmedetomidine Stopped (07/13/22 1044)    sodium chloride 100 mL/hr at 07/15/22 1800    CRRT dialysis builder 1,500 mL/hr (07/15/22 0831)    heparin (PORCINE) Infusion 14 Units/kg/hr (07/16/22 0519)     CBC:   Recent Labs     07/14/22  0328 07/15/22  0604 07/16/22  0406 07/16/22  0511   WBC 19.3* 16.0* 13.5*  --    HGB 7.9* 7.6* 6.5* 6.5*    See Reflexed IPF Result See Reflexed IPF Result  --      BMP:    Recent Labs     07/15/22  0603 07/15/22  1301 07/16/22  0406   * 135 136   K 4.0 3.7 3.6*    102 101   CO2 20 20 20   BUN 31* 31* 41*   CREATININE 1.51* 1.46* 1.75*   GLUCOSE 135* 136* 141*     Hepatic:   Recent Labs     07/14/22  0631   *   ALT 23   BILITOT 0.55   ALKPHOS 125     Troponin: No results for input(s): TROPONINI in the last 72 hours. No results for input(s): TROPONINT in the last 72 hours. BNP: No results for input(s): PROBNP in the last 72 hours. No results for input(s): BNP in the last 72 hours. Lipids: No results for input(s): CHOL, HDL in the last 72 hours. Invalid input(s): LDLCALCU  INR: No results for input(s): INR in the last 72 hours. Objective:   Vitals: /66   Pulse 98   Temp 98.2 °F (36.8 °C) (Bladder)   Resp 18   Ht 5' 10.5\" (1.791 m) Comment: no inital recorded height; 9/13/12 office visit  Wt 224 lb 13.9 oz (102 kg)   SpO2 98%   BMI 31.81 kg/m²        HEENT: Somnolent  Head: Normocephalic, no lesions, without obvious abnormality  Neck: no JVD  Lungs: clear to auscultation bilaterally, no basilar rales, no wheezing  Heart: regular rate and rhythm, S1, S2 normal, no murmur, click, rub or gallop  Abdomen: soft, non-tender; bowel sounds normal  Extremities: No LE edema        ECHO  Summary  Poor sound transmission. Global left ventricular systolic function is severely reduced. Calculated  ejection fraction 23% by Andrade's method. Visually estimated EF 15%. Akinetic apex. No significant valvular regurgitation or stenosis seen. Assessment / Acute Cardiac Problems:   Complete occlusion of B/L aortofemoral graft s/p thrombectomy  History of coronary artery disease s/p CABG x3 in 2008  NSTEMI type I/acute coronary syndrome   Severe cardiomyopathy with EF 15%, likely ischemic-pending cardiac cath  CAROLYN -dialysis dependent  Rhabdomyolysis  HTN  HLD  DM     Plan of Treatment:   Continue heparin GTT for now and aspirin 81 mg daily. 2D echocardiography showed EF 15 % with akinetic apex, plan for left heart catheterization when other co morbidities resolve and cleared by nephrology and vascular (for DAPT ).    Continue Toprol-XL 25 mg twice daily. Continue asa and statin  Replace electrolytes to keep K> 4 and Mg >2. Tarsha Higginbotham MD  Fellow, [id number] [facility]        Attending Physician Statement  I have discussed the care of Anai Hoang, including pertinent history and exam findings,  with the cardiology fellow/resident. I have seen and examined the patient and the key elements of all parts of the encounter have been performed by me. I have completed at least one if not all key elements of the E/M (history, physical exam, and MDM).        Dorina Bob MD, [place], [place]

## 2022-07-16 NOTE — PROGRESS NOTES
Dialysis Time Out  To be done by RN and tech or 2 RNs  Staff Names Martha Delcid RN & Tino Headley RN [address]    [x]  Identity of the patient using 2 patient identifiers  [x]  Consent for treatment  [x]  Equipment-proper machine and dialyzer  [x]  B-Hep B status  [x]  Orders- to include bath, blood flow, dialyzer, time and fluid removal  [x]  Access-Correct site and in working order  [x]  Time for patient to ask questions.

## 2022-07-17 PROBLEM — E87.20 LACTIC ACIDOSIS: Status: ACTIVE | Noted: 2022-07-17

## 2022-07-17 PROBLEM — R20.0 LEFT LEG NUMBNESS: Status: ACTIVE | Noted: 2022-07-17

## 2022-07-17 PROBLEM — J96.02 ACUTE RESPIRATORY FAILURE WITH HYPERCAPNIA (HCC): Status: ACTIVE | Noted: 2022-07-17

## 2022-07-17 LAB
ANION GAP SERPL CALCULATED.3IONS-SCNC: 14 MMOL/L (ref 9–17)
BUN BLDV-MCNC: 38 MG/DL (ref 8–23)
CALCIUM IONIZED: 0.98 MMOL/L (ref 1.13–1.33)
CALCIUM SERPL-MCNC: 7.1 MG/DL (ref 8.6–10.4)
CHLORIDE BLD-SCNC: 102 MMOL/L (ref 98–107)
CO2: 22 MMOL/L (ref 20–31)
COMPLEMENT C3: 129 MG/DL (ref 90–180)
COMPLEMENT C4: 21 MG/DL (ref 10–40)
CREAT SERPL-MCNC: 1.81 MG/DL (ref 0.7–1.2)
GFR AFRICAN AMERICAN: 46 ML/MIN
GFR NON-AFRICAN AMERICAN: 38 ML/MIN
GFR SERPL CREATININE-BSD FRML MDRD: ABNORMAL ML/MIN/{1.73_M2}
GLUCOSE BLD-MCNC: 116 MG/DL (ref 75–110)
GLUCOSE BLD-MCNC: 130 MG/DL (ref 75–110)
GLUCOSE BLD-MCNC: 139 MG/DL (ref 75–110)
GLUCOSE BLD-MCNC: 140 MG/DL (ref 70–99)
GLUCOSE BLD-MCNC: 148 MG/DL (ref 75–110)
HCT VFR BLD CALC: 22.5 % (ref 40.7–50.3)
HEMOGLOBIN: 7.3 G/DL (ref 13–17)
MAGNESIUM: 1.6 MG/DL (ref 1.6–2.6)
MCH RBC QN AUTO: 31.5 PG (ref 25.2–33.5)
MCHC RBC AUTO-ENTMCNC: 32.4 G/DL (ref 28.4–34.8)
MCV RBC AUTO: 97 FL (ref 82.6–102.9)
NRBC AUTOMATED: 1 PER 100 WBC
PARTIAL THROMBOPLASTIN TIME: 49.2 SEC (ref 20.5–30.5)
PARTIAL THROMBOPLASTIN TIME: 57.1 SEC (ref 20.5–30.5)
PARTIAL THROMBOPLASTIN TIME: 62.2 SEC (ref 20.5–30.5)
PARTIAL THROMBOPLASTIN TIME: 82.1 SEC (ref 20.5–30.5)
PDW BLD-RTO: 16 % (ref 11.8–14.4)
PHOSPHORUS: 3.5 MG/DL (ref 2.5–4.5)
PLATELET # BLD: 248 K/UL (ref 138–453)
PMV BLD AUTO: 12.7 FL (ref 8.1–13.5)
POTASSIUM SERPL-SCNC: 3.1 MMOL/L (ref 3.7–5.3)
RBC # BLD: 2.32 M/UL (ref 4.21–5.77)
SODIUM BLD-SCNC: 138 MMOL/L (ref 135–144)
WBC # BLD: 16.3 K/UL (ref 3.5–11.3)

## 2022-07-17 PROCEDURE — 6370000000 HC RX 637 (ALT 250 FOR IP): Performed by: STUDENT IN AN ORGANIZED HEALTH CARE EDUCATION/TRAINING PROGRAM

## 2022-07-17 PROCEDURE — 82330 ASSAY OF CALCIUM: CPT

## 2022-07-17 PROCEDURE — 2500000003 HC RX 250 WO HCPCS: Performed by: NURSE PRACTITIONER

## 2022-07-17 PROCEDURE — 6360000002 HC RX W HCPCS: Performed by: STUDENT IN AN ORGANIZED HEALTH CARE EDUCATION/TRAINING PROGRAM

## 2022-07-17 PROCEDURE — 97162 PT EVAL MOD COMPLEX 30 MIN: CPT

## 2022-07-17 PROCEDURE — 6370000000 HC RX 637 (ALT 250 FOR IP): Performed by: INTERNAL MEDICINE

## 2022-07-17 PROCEDURE — 97530 THERAPEUTIC ACTIVITIES: CPT

## 2022-07-17 PROCEDURE — 85027 COMPLETE CBC AUTOMATED: CPT

## 2022-07-17 PROCEDURE — 83735 ASSAY OF MAGNESIUM: CPT

## 2022-07-17 PROCEDURE — 6360000002 HC RX W HCPCS: Performed by: NURSE PRACTITIONER

## 2022-07-17 PROCEDURE — 2060000000 HC ICU INTERMEDIATE R&B

## 2022-07-17 PROCEDURE — 80048 BASIC METABOLIC PNL TOTAL CA: CPT

## 2022-07-17 PROCEDURE — 86160 COMPLEMENT ANTIGEN: CPT

## 2022-07-17 PROCEDURE — 6370000000 HC RX 637 (ALT 250 FOR IP): Performed by: HEALTH CARE PROVIDER

## 2022-07-17 PROCEDURE — 97167 OT EVAL HIGH COMPLEX 60 MIN: CPT

## 2022-07-17 PROCEDURE — 99223 1ST HOSP IP/OBS HIGH 75: CPT | Performed by: STUDENT IN AN ORGANIZED HEALTH CARE EDUCATION/TRAINING PROGRAM

## 2022-07-17 PROCEDURE — 84100 ASSAY OF PHOSPHORUS: CPT

## 2022-07-17 PROCEDURE — 2500000003 HC RX 250 WO HCPCS: Performed by: STUDENT IN AN ORGANIZED HEALTH CARE EDUCATION/TRAINING PROGRAM

## 2022-07-17 PROCEDURE — 6370000000 HC RX 637 (ALT 250 FOR IP): Performed by: NURSE PRACTITIONER

## 2022-07-17 PROCEDURE — 85730 THROMBOPLASTIN TIME PARTIAL: CPT

## 2022-07-17 PROCEDURE — 36415 COLL VENOUS BLD VENIPUNCTURE: CPT

## 2022-07-17 PROCEDURE — 6360000002 HC RX W HCPCS: Performed by: INTERNAL MEDICINE

## 2022-07-17 PROCEDURE — 5A1D70Z PERFORMANCE OF URINARY FILTRATION, INTERMITTENT, LESS THAN 6 HOURS PER DAY: ICD-10-PCS | Performed by: INTERNAL MEDICINE

## 2022-07-17 PROCEDURE — 2580000003 HC RX 258: Performed by: STUDENT IN AN ORGANIZED HEALTH CARE EDUCATION/TRAINING PROGRAM

## 2022-07-17 PROCEDURE — 37799 UNLISTED PX VASCULAR SURGERY: CPT

## 2022-07-17 PROCEDURE — 82947 ASSAY GLUCOSE BLOOD QUANT: CPT

## 2022-07-17 PROCEDURE — 99232 SBSQ HOSP IP/OBS MODERATE 35: CPT | Performed by: INTERNAL MEDICINE

## 2022-07-17 RX ORDER — FENTANYL CITRATE 50 UG/ML
50 INJECTION, SOLUTION INTRAMUSCULAR; INTRAVENOUS
Status: DISCONTINUED | OUTPATIENT
Start: 2022-07-17 | End: 2022-07-21 | Stop reason: HOSPADM

## 2022-07-17 RX ORDER — CALCIUM GLUCONATE 20 MG/ML
2000 INJECTION, SOLUTION INTRAVENOUS ONCE
Status: COMPLETED | OUTPATIENT
Start: 2022-07-17 | End: 2022-07-17

## 2022-07-17 RX ORDER — MIDODRINE HYDROCHLORIDE 5 MG/1
10 TABLET ORAL
Status: DISCONTINUED | OUTPATIENT
Start: 2022-07-17 | End: 2022-07-19

## 2022-07-17 RX ORDER — FUROSEMIDE 10 MG/ML
40 INJECTION INTRAMUSCULAR; INTRAVENOUS ONCE
Status: COMPLETED | OUTPATIENT
Start: 2022-07-17 | End: 2022-07-17

## 2022-07-17 RX ORDER — POTASSIUM CHLORIDE 29.8 MG/ML
40 INJECTION INTRAVENOUS ONCE
Status: COMPLETED | OUTPATIENT
Start: 2022-07-17 | End: 2022-07-17

## 2022-07-17 RX ADMIN — CALCIUM GLUCONATE 2000 MG: 20 INJECTION, SOLUTION INTRAVENOUS at 07:32

## 2022-07-17 RX ADMIN — Medication 5 MG: at 20:26

## 2022-07-17 RX ADMIN — POTASSIUM BICARBONATE 20 MEQ: 782 TABLET, EFFERVESCENT ORAL at 17:18

## 2022-07-17 RX ADMIN — MIDODRINE HYDROCHLORIDE 5 MG: 5 TABLET ORAL at 07:47

## 2022-07-17 RX ADMIN — POTASSIUM BICARBONATE 20 MEQ: 782 TABLET, EFFERVESCENT ORAL at 20:26

## 2022-07-17 RX ADMIN — SODIUM CHLORIDE, PRESERVATIVE FREE 10 ML: 5 INJECTION INTRAVENOUS at 20:26

## 2022-07-17 RX ADMIN — HEPARIN SODIUM 15.99 UNITS/KG/HR: 10000 INJECTION, SOLUTION INTRAVENOUS at 20:34

## 2022-07-17 RX ADMIN — TAMSULOSIN HYDROCHLORIDE 0.4 MG: 0.4 CAPSULE ORAL at 07:47

## 2022-07-17 RX ADMIN — FUROSEMIDE 40 MG: 10 INJECTION, SOLUTION INTRAMUSCULAR; INTRAVENOUS at 17:18

## 2022-07-17 RX ADMIN — FAMOTIDINE 20 MG: 20 TABLET, FILM COATED ORAL at 07:46

## 2022-07-17 RX ADMIN — MIDODRINE HYDROCHLORIDE 10 MG: 5 TABLET ORAL at 17:17

## 2022-07-17 RX ADMIN — POTASSIUM BICARBONATE 40 MEQ: 782 TABLET, EFFERVESCENT ORAL at 09:23

## 2022-07-17 RX ADMIN — QUETIAPINE FUMARATE 50 MG: 25 TABLET ORAL at 20:26

## 2022-07-17 RX ADMIN — MIDODRINE HYDROCHLORIDE 5 MG: 5 TABLET ORAL at 11:59

## 2022-07-17 RX ADMIN — ASPIRIN 81 MG: 81 TABLET, CHEWABLE ORAL at 07:46

## 2022-07-17 RX ADMIN — METOPROLOL SUCCINATE 12.5 MG: 25 TABLET, FILM COATED, EXTENDED RELEASE ORAL at 20:26

## 2022-07-17 RX ADMIN — HEPARIN SODIUM 2000 UNITS: 1000 INJECTION INTRAVENOUS; SUBCUTANEOUS at 15:12

## 2022-07-17 RX ADMIN — ATORVASTATIN CALCIUM 10 MG: 10 TABLET, FILM COATED ORAL at 07:47

## 2022-07-17 RX ADMIN — INSULIN LISPRO 3 UNITS: 100 INJECTION, SOLUTION INTRAVENOUS; SUBCUTANEOUS at 07:57

## 2022-07-17 RX ADMIN — FENTANYL CITRATE 50 MCG: 50 INJECTION, SOLUTION INTRAMUSCULAR; INTRAVENOUS at 14:32

## 2022-07-17 RX ADMIN — HEPARIN SODIUM 16 UNITS/KG/HR: 10000 INJECTION, SOLUTION INTRAVENOUS at 05:40

## 2022-07-17 RX ADMIN — POTASSIUM CHLORIDE 40 MEQ: 29.8 INJECTION, SOLUTION INTRAVENOUS at 10:22

## 2022-07-17 RX ADMIN — POLYETHYLENE GLYCOL 3350 17 G: 17 POWDER, FOR SOLUTION ORAL at 07:57

## 2022-07-17 RX ADMIN — OXYCODONE 5 MG: 5 TABLET ORAL at 12:46

## 2022-07-17 RX ADMIN — CEFTRIAXONE SODIUM 1000 MG: 1 INJECTION, POWDER, FOR SOLUTION INTRAMUSCULAR; INTRAVENOUS at 20:26

## 2022-07-17 RX ADMIN — OXYCODONE 5 MG: 5 TABLET ORAL at 03:17

## 2022-07-17 ASSESSMENT — ENCOUNTER SYMPTOMS
PHOTOPHOBIA: 0
DIARRHEA: 0
WHEEZING: 0
COLOR CHANGE: 0
ABDOMINAL PAIN: 0
RHINORRHEA: 0
EYE DISCHARGE: 0
ABDOMINAL DISTENTION: 0
COUGH: 1

## 2022-07-17 ASSESSMENT — PAIN SCALES - GENERAL
PAINLEVEL_OUTOF10: 10
PAINLEVEL_OUTOF10: 0
PAINLEVEL_OUTOF10: 7
PAINLEVEL_OUTOF10: 8

## 2022-07-17 ASSESSMENT — PAIN DESCRIPTION - ORIENTATION
ORIENTATION: RIGHT
ORIENTATION: RIGHT

## 2022-07-17 ASSESSMENT — PAIN - FUNCTIONAL ASSESSMENT: PAIN_FUNCTIONAL_ASSESSMENT: ACTIVITIES ARE NOT PREVENTED

## 2022-07-17 ASSESSMENT — PAIN DESCRIPTION - DESCRIPTORS: DESCRIPTORS: ACHING

## 2022-07-17 ASSESSMENT — PAIN DESCRIPTION - LOCATION
LOCATION: ARM
LOCATION: ARM

## 2022-07-17 NOTE — H&P
[facility]  Office: [address], DO, Saeid Jangroe, DO, Grantshelly Dry, DO, Elmer Rocky Blood, DO, Nelli De León MD, Elisa Pressley MD, Yamilka Cano MD, Sofy Travis MD, Mary Bueno MD, George Myers MD, Faviola Rose MD, Payam Sands, DO, Jose Juan Michael MD,  Sadia Dennison, DO, Carina Flores MD, Awa Higuera MD, Verito Martinez DO, Lawanda Rojas MD, Jimy Biswas MD, Jocelyn Moreau, DO, Efrem Scott MD, Laila Valadez MD, Pedro Mike, [facility], [facility], CNP, Mariely Sethi, CNP, Bijan Livingston, CNP, Carine Wilder, CNP, Su Hernandez, CNP, Sander Hagen PA-C, [place], DNP, Starla Wasserman, CNP, Marva Valentino, CNP, Ria Sanders, CNP, Christiano Domínguez, CNS, Nicolás Verdugo, [facility], Jaja Aragon, CNP, Shannan Contreras, CNP, Lisbeth Henry, [address]    HISTORY AND PHYSICAL EXAMINATION            Date:   7/17/2022  Patient name:  Asad Lopez  Date of admission:  7/8/2022  4:56 AM  MRN:   [id number]  Account:  [de-identified]  YOB: 1960  PCP:    Yamilka Cano (Inactive)  Room:   2021/2021-01  Code Status:    Full Code    Chief Complaint:     Chief Complaint   Patient presents with    Generalized Body Aches     10/10 FULL BODY PAIN       History Obtained From:     patient, electronic medical record    History of Present Illness:     Asad Lopez is a 64 y.o. Non- / non  male who presents with Generalized Body Aches (10/10 FULL BODY PAIN)   and is admitted to the hospital for the management of Critical ischemia of lower extremity ([facility] [address] 64year old male with past medical history of anxiety, hsitory of CABG x3, CAD, HTN, Peripheral vascular disease with some surgeries performed [place] presents to the ER on 7/8/22 with body aches and shortness of breath. Found to be in Atrial fibrillation.  With critical limb ischemia, underwent thrombectomry of aortobifemoral ENDARTERECTOMY  07/19/08    Left carotid endarterectomy using shunt dacron patch aplasty    CORONARY ARTERY BYPASS GRAFT  2008    CABG x3     FEMORAL-TIBIAL BYPASS GRAFT  12/31/07  [place]     Right femoral to posterior tibial artery bypass graft with in situ saphenous vein graft    IR FEMORAL POPLITEAL BYPASS GRAFT  01/24/208 Dr Tayler Hughes    1. Exploration of right fem-pop bypass graft. 2.Thrombectomy of right fem-post-tib saph vein graft. 3. Replacement of femoral to popliteal bypass graft from midthigh to near the anastomosis by reversed greater saph vein harvested from left leg    PTCA      PTCA with stent x6    TONSILLECTOMY AND ADENOIDECTOMY          Medications Prior to Admission:     Prior to Admission medications    Medication Sig Start Date End Date Taking? Authorizing Provider   oxyCODONE (OXYCONTIN) 40 MG CR tablet Take 40 mg by mouth every 12 hours. Historical Provider, MD   oxyCODONE-acetaminophen (PERCOCET)  MG per tablet Take 1 tablet by mouth 3 times daily as needed. breakthrought pain     Historical Provider, MD   lisinopril (PRINIVIL;ZESTRIL) 10 MG tablet Take 0.5 tablets by mouth daily. 9/13/12   Leatha Lindquist MD   dipyridamole-aspirin (AGGRENOX)  MG per SR capsule Take 1 capsule by mouth daily. 9/13/12   Tomer Noriega MD   simvastatin (ZOCOR) 40 MG tablet Take 1 tablet by mouth nightly. 9/13/12   Leatha Lindquist MD   ALPRAZolam [address]) 1 MG tablet Take 1 tablet by mouth 2 times daily as needed. 9/13/12   Tomer Noriega MD   varenicline (CHANTIX STARTING MONTH PAK) 0.5 MG X 11 & 1 MG X 42 tablet Take by mouth. 9/13/12   Tomer Noriega MD   isosorbide mononitrate (IMDUR) 30 MG CR tablet Take 30 mg by mouth daily. Historical Provider, MD   metoprolol (LOPRESSOR) 25 MG tablet Take 1 tablet by mouth 2 times daily. Takes 1/2 tab 2 times a day 9/13/12   Leatha Lindquist MD   mirtazapine (REMERON) 30 MG tablet Take 1 tablet by mouth nightly.  9/13/12   Leatha Lindquist MD tamsulosin (FLOMAX) 0.4 MG capsule Take 1 capsule by mouth daily. 9/13/12   Abraham Godinez MD   TRUETEST TEST strip TEST 2 TIMES DAILY 4/30/12   Namita Wise MD   metformin (GLUCOPHAGE) 500 MG tablet TAKE 1 TABLET BY MOUTH 2 TIMES DAILY (WITH MEALS) FOR 30 DAYS. 2/21/12   Namita Wise MD   aspirin  MG EC tablet Take 1 tablet by mouth daily. 1/25/12   Namita Wise MD        Allergies:     Faviola [morphine sulfate], Methadone, and Morphine    Social History:     Tobacco:    reports that he has been smoking cigarettes. He has a 66.00 pack-year smoking history. He has never used smokeless tobacco.  Alcohol:      reports no history of alcohol use. Drug Use:  reports no history of drug use. Family History:     Family History   Problem Relation Age of Onset    Hypertension Father     Heart Disease Sister     Hypertension Sister     Cancer Mother 28        breast cancer       Review of Systems:     Positive and Negative as described in HPI. Review of Systems   Constitutional:  Positive for fatigue. Negative for fever. HENT:  Negative for congestion and rhinorrhea. Eyes:  Negative for photophobia and discharge. Respiratory:  Positive for cough. Negative for wheezing. Gastrointestinal:  Negative for abdominal distention, abdominal pain and diarrhea. Endocrine: Negative for cold intolerance and polyphagia. Genitourinary:  Negative for difficulty urinating and enuresis. Musculoskeletal:  Negative for arthralgias and joint swelling. Skin:  Negative for color change. Neurological:  Positive for weakness. Negative for dizziness, facial asymmetry and speech difficulty. Psychiatric/Behavioral:  Negative for agitation. The patient is nervous/anxious. The patient is not hyperactive.       Physical Exam:   BP 97/83   Pulse 100   Temp 99.3 °F (37.4 °C) (Bladder)   Resp 18   Ht 5' 10.5\" (1.791 m) Comment: no inital recorded height; 9/13/12 office visit  Wt 224 lb 10.4 oz (101.9 kg)   SpO2 98%   BMI 31.78 kg/m²   Temp (24hrs), Av.5 °F (36.9 °C), Min:97.5 °F (36.4 °C), Max:99.3 °F (37.4 °C)    Recent Labs     22  1631 22  2139 22  0101 22  1148   POCGLU 147* 125* 148* 116*       Intake/Output Summary (Last 24 hours) at 2022 1424  Last data filed at 2022 1400  Gross per 24 hour   Intake 811.32 ml   Output 988 ml   Net -176.68 ml       Physical Exam  Constitutional:       Appearance: He is ill-appearing. HENT:      Head: Normocephalic. Right Ear: External ear normal.      Left Ear: External ear normal.      Nose: No congestion. Mouth/Throat:      Mouth: Mucous membranes are moist.      Pharynx: No oropharyngeal exudate. Eyes:      Pupils: Pupils are equal, round, and reactive to light. Cardiovascular:      Rate and Rhythm: Regular rhythm. Tachycardia present. Heart sounds: No murmur heard. Musculoskeletal:      Cervical back: Normal range of motion. Comments: Left limb wound vac   Skin:     General: Skin is dry. Capillary Refill: Capillary refill takes less than 2 seconds. Coloration: Skin is pale. Neurological:      Mental Status: He is alert and oriented to person, place, and time.    Psychiatric:         Mood and Affect: Mood normal.         Behavior: Behavior normal.       Investigations:      Laboratory Testing:  Recent Results (from the past 24 hour(s))   POC Glucose Fingerstick    Collection Time: 22  4:31 PM   Result Value Ref Range    POC Glucose 147 (H) 75 - 110 mg/dL   APTT    Collection Time: 22  5:46 PM   Result Value Ref Range    PTT 58.6 (H) 20.5 - 30.5 sec   POC Glucose Fingerstick    Collection Time: 22  9:39 PM   Result Value Ref Range    POC Glucose 125 (H) 75 - 110 mg/dL   APTT    Collection Time: 22 12:28 AM   Result Value Ref Range    PTT 62.2 (H) 20.5 - 30.5 sec   POC Glucose Fingerstick    Collection Time: 22  1:01 AM   Result Value Ref Range    POC Glucose 148 (H) 75 - 110 mg/dL   CBC    Collection Time: 07/17/22  6:12 AM   Result Value Ref Range    WBC 16.3 (H) 3.5 - 11.3 k/uL    RBC 2.32 (L) 4.21 - 5.77 m/uL    Hemoglobin 7.3 (L) 13.0 - 17.0 g/dL    Hematocrit 22.5 (L) 40.7 - 50.3 %    MCV 97.0 82.6 - 102.9 fL    MCH 31.5 25.2 - 33.5 pg    MCHC 32.4 28.4 - 34.8 g/dL    RDW 16.0 (H) 11.8 - 14.4 %    Platelets 761 878 - 313 k/uL    MPV 12.7 8.1 - 13.5 fL    NRBC Automated 1.0 (H) 0.0 per 100 WBC   Basic Metabolic Panel    Collection Time: 07/17/22  6:12 AM   Result Value Ref Range    Glucose 140 (H) 70 - 99 mg/dL    BUN 38 (H) 8 - 23 mg/dL    CREATININE 1.81 (H) 0.70 - 1.20 mg/dL    Calcium 7.1 (L) 8.6 - 10.4 mg/dL    Sodium 138 135 - 144 mmol/L    Potassium 3.1 (L) 3.7 - 5.3 mmol/L    Chloride 102 98 - 107 mmol/L    CO2 22 20 - 31 mmol/L    Anion Gap 14 9 - 17 mmol/L    GFR Non-African American 38 (L) >60 mL/min    GFR  46 (L) >60 mL/min    GFR Comment         Calcium, Ionized    Collection Time: 07/17/22  6:12 AM   Result Value Ref Range    Calcium, Ion 0.98 (L) 1.13 - 1.33 mmol/L   Magnesium    Collection Time: 07/17/22  6:12 AM   Result Value Ref Range    Magnesium 1.6 1.6 - 2.6 mg/dL   Phosphorus    Collection Time: 07/17/22  6:12 AM   Result Value Ref Range    Phosphorus 3.5 2.5 - 4.5 mg/dL   APTT    Collection Time: 07/17/22  6:12 AM   Result Value Ref Range    PTT 82.1 (H) 20.5 - 30.5 sec   POC Glucose Fingerstick    Collection Time: 07/17/22 11:48 AM   Result Value Ref Range    POC Glucose 116 (H) 75 - 110 mg/dL   APTT    Collection Time: 07/17/22  1:01 PM   Result Value Ref Range    PTT 49.2 (H) 20.5 - 30.5 sec       Imaging/Diagnostics:  CT ABDOMEN PELVIS WO CONTRAST Additional Contrast? None    Result Date: 7/14/2022  1. Postprocedural findings in the left groin with superficial hematoma, as described. Subcutaneous edema in the left groin and left thighs also present. 2.  No retroperitoneal or intrapelvic hematoma.      XR CHEST (SINGLE VIEW FRONTAL)    Result Date: 7/11/2022  Right IJ CVC catheter tip overlies the superior cavoatrial junction. Left IJ CVC catheter tip overlies the mid/distal SVC. No pneumothorax is seen. Mild left basilar atelectasis. US RENAL COMPLETE    Result Date: 7/11/2022  Unremarkable kidneys. Hepatic steatosis. XR CHEST PORTABLE    Result Date: 7/16/2022  No acute process. Stable exam.  Stable lines. XR CHEST PORTABLE    Result Date: 7/15/2022  No acute cardiopulmonary process. Stable internal jugular central lines. XR CHEST PORTABLE    Result Date: 7/14/2022  Interval removal of endotracheal and enteric tubes. No new findings otherwise identified in the interval.     XR CHEST PORTABLE    Result Date: 7/13/2022  No acute cardiopulmonary process. Support tubes as described above. XR CHEST PORTABLE    Result Date: 7/12/2022  No acute cardiopulmonary process. Support tubes as described above. XR CHEST PORTABLE    Result Date: 7/11/2022  Mild bibasilar opacities compatible with atelectasis. Aspiration and pneumonia are not excluded. XR CHEST PORTABLE    Result Date: 7/11/2022  Stable chest.     XR CHEST PORTABLE    Result Date: 7/11/2022  1. The deeper temporary dialysis catheter on the previous exam is been removed, with a new temporary dialysis catheter seen overlying the proximal superior vena cava. 2. Other tubes and lines as above. 3. Patchy infiltrates in the lungs bilaterally which may represent edema or pneumonia. XR CHEST PORTABLE    Result Date: 7/11/2022  1. The right jugular catheter appears in a stable position with the tip at the cavoatrial junction. 2. Endotracheal tube, nasogastric tube, and left jugular line are also redemonstrated. 3.  Some hazy opacities in the lungs which could be subtle edema or inflammatory.        Assessment :      Hospital Problems             Last Modified POA    * (Principal) Critical ischemia of lower extremity ([address] 7/8/2022 Yes    CAROLYN (acute kidney injury) ([facility] [address] 7/10/2022 Yes    Hyperkalemia 7/10/2022 Yes    Encounter for continuous venovenous hemodiafiltration (CVVHD) ([facility] [address] 7/10/2022 Yes    Failing vascular bypass graft 7/10/2022 Yes    Arterial hypotension 7/10/2022 Yes    Non-traumatic rhabdomyolysis 7/10/2022 Yes    Cardiomyopathy ([facility] [address] 7/10/2022 Yes    Decreased urine output 7/10/2022 Yes       Plan:     Patient status inpatient in the Progressive Unit/Step down    S/P CL fem arter cutdown with thrombectomy, LLE fasciotomy. Appreciate vascular surgery recommendation. Heparin drip, wound vac, Plan to change to Eliquis pending evalaution of further procerdures (dialysis cathter) [place] codone for pain, fentanyl for break through pain  CKD stage III with oliguria and hyperkalemia requiring CVVHD. Appreciate nephrology recommendations, reassesss for dialysis, 1 dose lasix today, monitor output  Afib, CAD, history of CABG. Still unable to get records, will try again. Torpol XL, lipitor ASA, DOAC, plan for outpatient cardiac cathterization when ok with vascular surgery  UTI. Rocephin 7 days completed  Hypotension, midodrine  BPH flomax  Insomnia, seroquel  PT/OT    Consultations:   IP CONSULT TO CRITICAL CARE  IP CONSULT TO VASCULAR SURGERY  IP CONSULT TO ORTHOPEDIC SURGERY  IP CONSULT TO CARDIOLOGY  IP CONSULT TO NEPHROLOGY   Patient is admitted as inpatient status because of co-morbidities listed above, severity of signs and symptoms as outlined, requirement for current medical therapies and most importantly because of direct risk to patient if care not provided in a hospital setting. Expected length of stay > 48 hours.     Jose Juan Michael MD  7/17/2022  2:24 PM    Copy sent to Dr. Yamilka Cano (Inactive)

## 2022-07-17 NOTE — PROGRESS NOTES
Renal Progress Note    Patient :  Apollo Galdamez; 64 y.o. MRN# [id number]  Location:  8078/2289-47  Attending:  Mari Hernandez MD  Admit Date:  7/8/2022   Hospital Day: 9    Subjective:      CVVHD d/c Friday. Started intermittent hemodialysis yesterday 1.2 L of fluid removed. Was somewhat hypotensive postprocedure that has improved. Responded to midodrine. Susan Yvrose. Dexter 30-35 cc's urine per hour today. No issues reported overnight. Sitting up in chair legs elevated, got a unit of blood yesterday. On Regular dysphagia diet, does not have his dentures here. Remains off pressors. No acute complaints. Vascular not recommending he go for cath. Cannot obtain peripheral IV access due to surgeries on both groins. K 3.1 Got 40mEq, creatinine 1.8. Ca low also replaced today. Remains on heparin gtt. Outpatient Medications:     Medications Prior to Admission: oxyCODONE (OXYCONTIN) 40 MG CR tablet, Take 40 mg by mouth every 12 hours. oxyCODONE-acetaminophen (PERCOCET)  MG per tablet, Take 1 tablet by mouth 3 times daily as needed. breakthrought pain   lisinopril (PRINIVIL;ZESTRIL) 10 MG tablet, Take 0.5 tablets by mouth daily. dipyridamole-aspirin (AGGRENOX)  MG per SR capsule, Take 1 capsule by mouth daily. simvastatin (ZOCOR) 40 MG tablet, Take 1 tablet by mouth nightly. ALPRAZolam (XANAX) 1 MG tablet, Take 1 tablet by mouth 2 times daily as needed. varenicline (CHANTIX STARTING MONTH PAK) 0.5 MG X 11 & 1 MG X 42 tablet, Take by mouth.  isosorbide mononitrate (IMDUR) 30 MG CR tablet, Take 30 mg by mouth daily. metoprolol (LOPRESSOR) 25 MG tablet, Take 1 tablet by mouth 2 times daily. Takes 1/2 tab 2 times a day  mirtazapine (REMERON) 30 MG tablet, Take 1 tablet by mouth nightly. tamsulosin (FLOMAX) 0.4 MG capsule, Take 1 capsule by mouth daily.   TRUETEST TEST strip, TEST 2 TIMES DAILY  metformin (GLUCOPHAGE) 500 MG tablet, TAKE 1 TABLET BY MOUTH 2 TIMES DAILY (WITH MEALS) wheezes. Cardiac:  S1 S2 RR, no murmurs, gallops or rubs. Abdomen: Soft, non-tender, no masses or organomegaly, BS audible. :   No suprapubic or flank tenderness. Neuro:   AAO x 2 at least to person and place, some confusion at times   SKIN:  No rashes, good skin turgor. Extremities:  +ve edema b/l and post op dressing in place left leg. Labs:       Recent Labs     07/15/22  0604 07/16/22  0406 07/16/22  0511 07/17/22  0612   WBC 16.0* 13.5*  --  16.3*   RBC 2.40* 2.05*  --  2.32*   HGB 7.6* 6.5* 6.5* 7.3*   HCT 23.3* 20.2* 20.0* 22.5*   MCV 97.1 98.5  --  97.0   MCH 31.7 31.7  --  31.5   MCHC 32.6 32.2  --  32.4   RDW 15.5* 15.9*  --  16.0*   PLT See Reflexed IPF Result See Reflexed IPF Result  --  248   MPV  --   --   --  12.7      BMP:   Recent Labs     07/15/22  1301 07/16/22  0406 07/17/22  0612    136 138   K 3.7 3.6* 3.1*    101 102   CO2 20 20 22   BUN 31* 41* 38*   CREATININE 1.46* 1.75* 1.81*   GLUCOSE 136* 141* 140*   CALCIUM 8.8 8.3* 7.1*      Phosphorus:     Recent Labs     07/15/22  1301 07/16/22  0406 07/17/22  0612   PHOS 4.0 4.7* 3.5     Magnesium:    Recent Labs     07/15/22  1301 07/16/22  0406 07/17/22  0612   MG 1.9 2.1 1.6     Albumin:    No results for input(s): LABALBU in the last 72 hours.   SPEP:  Lab Results   Component Value Date/Time    PROT 5.3 07/14/2022 06:31 AM    PROT 7.1 07/25/2011 09:50 AM     Urinalysis/Chemistries:      Lab Results   Component Value Date/Time    NITRU POSITIVE 07/10/2022 12:28 PM    COLORU BROWN 07/10/2022 12:28 PM    PHUR 6.5 07/10/2022 12:28 PM    WBCUA 10 TO 20 07/10/2022 12:28 PM    RBCUA 20 TO 50 07/10/2022 12:28 PM    MUCUS 1+ 06/30/2012 09:40 PM    TRICHOMONAS NOT REPORTED 06/30/2012 09:40 PM    YEAST NOT REPORTED 06/30/2012 09:40 PM    BACTERIA MODERATE 07/10/2022 12:28 PM    SPECGRAV 1.025 07/10/2022 12:28 PM    LEUKOCYTESUR TRACE 07/10/2022 12:28 PM    UROBILINOGEN Normal 07/10/2022 12:28 PM    BILIRUBINUR NEGATIVE  Verified by ictotest. 07/10/2022 12:28 PM    BILIRUBINUR NEGATIVE 06/06/2012 05:45 AM    GLUCOSEU NEGATIVE 07/10/2022 12:28 PM    GLUCOSEU NEGATIVE 06/06/2012 05:45 AM    KETUA NEGATIVE 07/10/2022 12:28 PM    AMORPHOUS 3+ 07/10/2022 12:28 PM      Urine Creatinine:     Lab Results   Component Value Date/Time    LABCREA 141.6 07/10/2022 12:28 PM     Radiology:     Reviewed. Assessment:       Assessment:     1. Acute Kidney Injury: ATN with contrast nephropathy, circulatory shock, and hypotension requiring pressor support and now postop. Baseline creatinine seems to be around 1-1.2 mg/dl. Now on intermittent dialysis from yesterday. 2. Hyperkalemia- likely due to underlying renal dysfunction. Improved with HD.  3. 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 4. Rhabdomyolysis  5. Hypotension-requiring pressor support. 6. Total aortobifemoral occulusion s/p Bilateral common femoral arterial access via open cutdown, thrombectomy of the aortobifemoral bypass graft thromboendarterectomy of the common femoral profundofemoral and SFA bilaterally with bovine pericardial patch angioplasty on 7/8/2021  7. Left lower extremity compartment syndrome s/p fasciotomy. 8. U/o about 30-35 cc/hr. 9. Anemia - 6.5 okay for transfusion with HD. Plan:   Reassess need for dialysis tomorrow monitor strict I's and O's and renal function. Cardiology is recommending cardiac catheterization for possible PCI but vascular surgery is requesting cardiology hold on cardiac cath during this admission. Patient had fasciotomy done and had hematoma in the left groin. If continues to require dialysis, will need tunneled catheter placement next week. Sister makes decisions for him. BMP in AM.  Isacc can come out from nephrology standpoint  Will follow. Will discuss with Dr. Cheng Driscoll. Nutrition   Please ensure that patient is on a renal diet/TF. Avoid nephrotoxic drugs/contrast exposure.     Electronically signed by MATTHIAS Guerin on 7/17/2022 at 12:41 PM     [facility]     Patient seen with nurse practitioner. Dialysis modality switched to intermittent hemodialysis. Urine output still suboptimal, Dexter in place. Clearances not accurate as patient was getting CVVHD up until Friday evening. .  Lower extremity edema persists. Hemodynamically stable although blood pressures fluctuate. Clinically showing 1+ edema bilaterally, fasciotomy on left lower extremity evident  Impression  1. Acute kidney injury Baseline creatinine 1.0-1.1, peaked up to 4.2, initiation of dialysis this admission. Was on CVVHD initially then switched to intermittent hemodialysis starting 7/16/2022  2. chronic kidney disease stage III baseline 1.1-1.2  3. Status post vascular occlusion and aortofemoral bypass  4. Status post fasciotomy  5. Cardiomyopathy ejection fraction 15%  Plan  1. Reassess need for dialysis tomorrow  2. Follow renal recovery  3. Can discontinue Dexter from nephrology standpoint  4. We will follow renal recovery  5. Lasix 40 mg IV x1 dose    Attending Physician Statement  I have discussed the care of Sterling Fernández, including pertinent history and exam findings with the resident/fellow. I have reviewed the key elements of all parts of the encounter with the resident/fellow. I have seen and examined the patient with the resident/fellow. I agree with the assessment and plan and status of the problem list as documented.       .  Electronically signed by Akil Simon MD on 7/17/2022 at 3:31 PM

## 2022-07-17 NOTE — PROGRESS NOTES
[facility]   Progress Note                    Date:   7/17/2022  Patient name:  Abena Sarabia  Date of admission:  7/8/2022  4:56 AM  MRN:   [id number]  YOB: 1960  PCP:    Arash Hernandez (Inactive)    Reason for Admission:  Lactic acidosis [E87.2]  Left leg numbness [R20.0]  Acute respiratory failure with hypercapnia ([address] [J96.02]  Failing vascular bypass graft, initial encounter [T82.599A]  Critical ischemia of lower extremity ([address] [I70.229]    Subjective:      Clinical Changes / Abnormalities:    Patient seen and examined at bedside. No acute events overnight. No chest pain or shortness of breath.     Urine output in the last 24 hours:     Intake/Output Summary (Last 24 hours) at 7/17/2022 0155  Last data filed at 7/17/2022 0000  Gross per 24 hour   Intake 1675.32 ml   Output 2253 ml   Net -577.68 ml     I/O since admission: +4.5 liters    Medications:   Scheduled Meds:   metoprolol succinate  12.5 mg Oral BID    famotidine  20 mg Oral Daily    atorvastatin  10 mg Oral Daily    tamsulosin  0.4 mg Oral Daily    insulin lispro  0-18 Units SubCUTAneous 4x Daily AC & HS    melatonin  5 mg Oral Nightly    QUEtiapine  50 mg Oral Nightly    aspirin  81 mg Oral Daily    midodrine  5 mg Oral TID WC    cefTRIAXone (ROCEPHIN) IV  1,000 mg IntraVENous Q24H    polyethylene glycol  17 g Oral Daily     Continuous Infusions:   sodium chloride 10 mL/hr (07/16/22 1848)    [Held by provider] dexmedetomidine Stopped (07/13/22 1044)    sodium chloride Stopped (07/15/22 1812)    CRRT dialysis builder 1,500 mL/hr (07/15/22 0831)    heparin (PORCINE) Infusion 16 Units/kg/hr (07/16/22 1846)     CBC:   Recent Labs     07/14/22  0328 07/15/22  0604 07/16/22  0406 07/16/22  0511   WBC 19.3* 16.0* 13.5*  --    HGB 7.9* 7.6* 6.5* 6.5*    See Reflexed IPF Result See Reflexed IPF Result  --      BMP:    Recent Labs     07/15/22  0603 07/15/22  1301 07/16/22  0406   * 135 136   K 4.0 3.7 3.6*  102 101   CO2 20 20 20   BUN 31* 31* 41*   CREATININE 1.51* 1.46* 1.75*   GLUCOSE 135* 136* 141*     Hepatic:   Recent Labs     07/14/22  0631   *   ALT 23   BILITOT 0.55   ALKPHOS 125     Troponin: No results for input(s): TROPONINI in the last 72 hours. No results for input(s): TROPONINT in the last 72 hours. BNP: No results for input(s): PROBNP in the last 72 hours. No results for input(s): BNP in the last 72 hours. Lipids: No results for input(s): CHOL, HDL in the last 72 hours. Invalid input(s): LDLCALCU  INR: No results for input(s): INR in the last 72 hours. Objective:   Vitals: BP (!) 95/49 Comment: no intervention keep MAP > 60  Pulse 92   Temp 98.4 °F (36.9 °C) (Oral)   Resp 13   Ht 5' 10.5\" (1.791 m) Comment: no inital recorded height; 9/13/12 office visit  Wt 218 lb 4.1 oz (99 kg)   SpO2 96%   BMI 30.87 kg/m²    Last Recorded Weight:  [unfilled]    HEENT: Somnolent  Head: Normocephalic, no lesions, without obvious abnormality  Neck: no JVD  Lungs: clear to auscultation bilaterally, no basilar rales, no wheezing  Heart: regular rate and rhythm, S1, S2 normal, no murmur, click, rub or gallop  Abdomen: soft, non-tender; bowel sounds normal  Extremities: No LE edema        ECHO  Summary  Poor sound transmission. Global left ventricular systolic function is severely reduced. Calculated  ejection fraction 23% by Andrade's method. Visually estimated EF 15%. Akinetic apex. No significant valvular regurgitation or stenosis seen. 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 Confirm with vascular prior to discontinuing.   2D echocardiography showed EF 15 % with akinetic apex, plan for left heart catheterization when other co morbidities resolve and cleared by nephrology and vascular (for DAPT ). Per Vascular surgery documentation recommendation is to hold off on coronary angiography for this admission. Plan on coronary angiography in 4-6 weeks as outpatient. Continue Toprol-XL 25 mg twice daily. Continue asa and statin  Replace electrolytes to keep K> 4 and Mg >2. Ada Urias MD  Fellow, [id number] [facility]        Attending Physician Statement  I have discussed the care of Cintia Dhaliwal, including pertinent history and exam findings,  with the cardiology fellow/resident. I have seen and examined the patient and the key elements of all parts of the encounter have been performed by me. I have completed at least one if not all key elements of the E/M (history, physical exam, and MDM). I agree with the assessment, plan and orders as documented by the resident with additional recommendations as below:     Vascular surgery input noted. Patient has no active chest pain or dyspnea. No significant volume overload on exam. Hemodynamically stable. Ok to d/c heparin. Continue asa, statin and BB. Not on ACEI/ARB/ARNi due to CAROLYN and borderline hypotension. Will plan coronary angiogram in 4-6 weeks once left groin hematoma resolves and renal function recovered. Will sign off, kindly call with questions.       Berenice Gan MD, [facility]

## 2022-07-17 NOTE — PROGRESS NOTES
Tolerance: Patient Tolerated treatment well;Patient limited by pain  Activity Tolerance Comments: pt limited by increased dizziness        Plan   Plan  Times per Week: 3-5x  Current Treatment Recommendations: Strengthening, ROM, Balance training, Functional mobility training, Endurance training, Gait training, Cognitive reorientation, Self-Care / ADL     Restrictions  Restrictions/Precautions  Restrictions/Precautions: Fall Risk, General Precautions  Required Braces or Orthoses?: No  Position Activity Restriction  Other position/activity restrictions: left  LE wound vac, art line right UE, urinary cathater, WBAT LLE (per vascular, Kar Lua, DO), up in chair, up w/ assist    Subjective   General  Patient assessed for rehabilitation services?: Yes  Family / Caregiver Present: No  Diagnosis: Lactic acidosis, renal failure, LLE weakness, UTI, LLE fasciatomy on 7/8  Subjective  Subjective: Pt reported pain 7/10 in LLE at start of session     Social/Functional History  Social/Functional History  Lives With: Daughter  Type of Home: House  Home Layout: Two level  Home Access: Stairs to enter without rails  Entrance Stairs - Number of Steps: 3  Entrance Stairs - Rails: None  Bathroom Shower/Tub: Walk-in shower  Bathroom Toilet: Handicap height  Bathroom Equipment: Shower chair  Bathroom Accessibility: Accessible  Home Equipment: Cane, quad (pt reports using quad cane at home)  [place] Help From:  (daughter does not work)  ADL Assistance: Independent  Homemaking Assistance: Independent  Homemaking Responsibilities: Yes  Ambulation Assistance: Independent  Transfer Assistance: Independent  Active : No  Patient's  Info: family members  Mode of Transportation: Car  Occupation: On disability  Type of Occupation:   Leisure & Hobbies: play on computer,  video games  Additional Comments: Pt report prior to hospitalization he was independent with self care and mobilty.  Pt reports daughter does not work, but unable to assist him. Objective   SpO2: 100 %  O2 Device: None (Room air)  Vision: reading glasses  H[facility]             Safety Devices  Type of Devices: Call light within reach; Heels elevated for pressure relief;Patient at risk for falls; Left in bed;Nurse notified  Restraints  Restraints Initially in Place: No  Bed Mobility Training  Bed Mobility Training: Yes  Supine to Sit: Maximum assistance;Assist X2  Sit to Supine: Maximum assistance;Assist X2  Scooting: Maximum assistance;Assist X2  Balance  Sitting: Impaired (Pt sat EOB unsupported at Max A ~ 30 seconds with reports of dizziness.)  Transfer Training  Transfer Training: No     AROM: Generally decreased, functional (L hand, elbow, shoulder AROM WFL. R shoulder AROM 0-30 degrees, R elbow 0-90 dgrees, R digits 3-5 0-30 degrees d/t baseline CVA. )  PROM: Generally decreased, functional (RUE PROM impaired d/t baseline CVA, tightness)  Strength: Generally decreased, functional (L hand, elbow, shoulder 5/5 strength. R hand 3+/5 d/t. R elbow and shoulder strength not tested d/t decreased ROM)  Coordination: Within functional limits  Tone: Normal  Sensation: Intact  ADL  Feeding: Minimal assistance;Setup; Increased time to complete  Grooming: Minimal assistance;Setup; Increased time to complete  UE Bathing: Minimal assistance;Setup; Increased time to complete  LE Bathing: Maximum assistance; Increased time to complete;Setup  UE Dressing: Minimal assistance;Setup; Increased time to complete  LE Dressing: Maximum assistance;Setup; Increased time to complete  Toileting: Maximum assistance  Additional Comments: Pt began session in bed. Pt required max A to put on R/L socks in bed. Pt required min A and set up when eating breakfast, limited by deficits, able to bring ensure drink to mouth w/ LUE, required assistance when opening packages of food. Pt ended session in bed.                  Cognition  Overall Cognitive Status: Exceptions  Following Commands: Follows one step commands consistently  Insights: Fully aware of deficits  Initiation: Requires cues for some  Sequencing: Requires cues for some  Cognition Comment: Pt presented with confusion, difficulty w/ word finding. Pt telling stories from past  Orientation  Overall Orientation Status: Impaired  Orientation Level: Oriented to person;Oriented to place;Oriented to situation;Disoriented to time                  Education Given To: Patient  Education Provided: Role of Therapy;Plan of Care;ADL Adaptive Strategies;Transfer Training;Precautions  Education Provided Comments: Pt educated on incentive spirometer  Education Method: Demonstration;Verbal  Barriers to Learning: None  Education Outcome: Verbalized understanding;Demonstrated understanding  LUE AROM (degrees)  LUE AROM : WFL (L elbow and shoulder AROM WFL)  Left Hand AROM (degrees)  Left Hand AROM: WFL  RUE AROM (degrees)  RUE AROM : Exceptions (R elbow extension AROM 0-90 degrees, R shoulder flexion AROM 0-30 degrees d/t CVA.)  Right Hand AROM (degrees)  Right Hand AROM: Exceptions (R digit 3-5 0-30 degrees d/t CVA. )                                     AM-PAC Score        AM-[facility] Inpatient Daily Activity Raw Score: 15 (07/17/22 1007)  AM-PAC Inpatient ADL T-Scale Score : 34.69 (07/17/22 1007)  ADL Inpatient CMS 0-100% Score: 56.46 (07/17/22 1007)  ADL Inpatient CMS G-Code Modifier : CK (07/17/22 1007)    Goals  Short Term Goals  Time Frame for Short term goals: pt will by discharge  Short Term Goal 1: demo UB bathing/dressing with SBA, set up. Short Term Goal 2: demo LB bathing/dressing with min A, AE PRN. Short Term Goal 3: demo safe bed mob with mod A. Short Term Goal 4: demo safe dynamic sitting balance with CGA for 5+ mins. Short Term Goal 5: identify/adhere to 2 non-pharmalogical pain-relieving techniques with 1 vc.        Therapy Time   Individual Concurrent Group Co-treatment   Time In 0833         Time Out 0914         Minutes 41         Timed Code Treatment Minutes: 10 Minutes co-eval with PT       Tish Sequeira S/OT

## 2022-07-17 NOTE — PLAN OF CARE
Problem: Discharge Planning  Goal: Discharge to home or other facility with appropriate resources  Outcome: Progressing     Problem: Pain  Goal: Verbalizes/displays adequate comfort level or baseline comfort level  Outcome: Progressing     Problem: Skin/Tissue Integrity  Goal: Absence of new skin breakdown  Description: 1. Monitor for areas of redness and/or skin breakdown  2. Assess vascular access sites hourly  3. Every 4-6 hours minimum:  Change oxygen saturation probe site  4. Every 4-6 hours:  If on nasal continuous positive airway pressure, respiratory therapy assess nares and determine need for appliance change or resting period. Outcome: Progressing     Problem: Safety - Adult  Goal: Free from fall injury  Outcome: Progressing     Problem: ABCDS Injury Assessment  Goal: Absence of physical injury  Outcome: Progressing     Problem: Respiratory - Adult  Goal: Achieves optimal ventilation and oxygenation  Outcome: Progressing     Problem: Confusion  Goal: Confusion, delirium, dementia, or psychosis is improved or at baseline  Description: INTERVENTIONS:  1. Assess for possible contributors to thought disturbance, including medications, impaired vision or hearing, underlying metabolic abnormalities, dehydration, psychiatric diagnoses, and notify attending LIP  2. [place] high risk fall precautions, as indicated  3. Provide frequent short contacts to provide reality reorientation, refocusing and direction  4. Decrease environmental stimuli, including noise as appropriate  5. Monitor and intervene to maintain adequate nutrition, hydration, elimination, sleep and activity  6. If unable to ensure safety without constant attention obtain sitter and review sitter guidelines with assigned personnel  7.  Initiate Psychosocial CNS and Spiritual Care consult, as indicated  Outcome: Progressing  Flowsheets (Taken 7/16/2022 2010)  Effect of thought disturbance (confusion, delirium, dementia, or psychosis) are managed with adequate functional status:   Assess for contributors to thought disturbance, including medications, impaired vision or hearing, underlying metabolic abnormalities, dehydration, psychiatric diagnoses, notify [place] high risk fall precautions, as indicated   Provide frequent short contacts to provide reality reorientation, refocusing and direction   Decrease environmental stimuli, including noise as appropriate   Monitor and intervene to maintain adequate nutrition, hydration, elimination, sleep and activity

## 2022-07-17 NOTE — PLAN OF CARE
Problem: Discharge Planning  Goal: Discharge to home or other facility with appropriate resources  7/17/2022 1304 by Estevan Farooq RN  Outcome: Not Progressing  7/17/2022 0624 by To Constantino RN  Outcome: Progressing     Problem: Pain  Goal: Verbalizes/displays adequate comfort level or baseline comfort level  7/17/2022 1304 by Estevan Farooq RN  Outcome: Not Progressing  7/17/2022 0624 by To Constantino RN  Outcome: Progressing     Problem: Skin/Tissue Integrity  Goal: Absence of new skin breakdown  Description: 1. Monitor for areas of redness and/or skin breakdown  2. Assess vascular access sites hourly  3. Every 4-6 hours minimum:  Change oxygen saturation probe site  4. Every 4-6 hours:  If on nasal continuous positive airway pressure, respiratory therapy assess nares and determine need for appliance change or resting period. 7/17/2022 1304 by Estevan Farooq RN  Outcome: Not Progressing  7/17/2022 0624 by To Constantino RN  Outcome: Progressing     Problem: Safety - Adult  Goal: Free from fall injury  7/17/2022 1304 by Estevan Farooq RN  Outcome: Not Progressing  7/17/2022 0624 by To Constantino RN  Outcome: Progressing     Problem: ABCDS Injury Assessment  Goal: Absence of physical injury  7/17/2022 1304 by Estevan Farooq RN  Outcome: Not Progressing  7/17/2022 0624 by To Constantino RN  Outcome: Progressing     Problem: Respiratory - Adult  Goal: Achieves optimal ventilation and oxygenation  7/17/2022 1304 by Estevan Farooq RN  Outcome: Not Progressing  7/17/2022 0624 by To Constantino RN  Outcome: Progressing     Problem: Confusion  Goal: Confusion, delirium, dementia, or psychosis is improved or at baseline  Description: INTERVENTIONS:  1. Assess for possible contributors to thought disturbance, including medications, impaired vision or hearing, underlying metabolic abnormalities, dehydration, psychiatric diagnoses, and notify attending LIP  2.  [place] high risk fall precautions, as

## 2022-07-17 NOTE — PROGRESS NOTES
1.81 (1.75)  CAROLYN secondary to rhabdo. Normal Cr on arrival  Nephrology consulted and initiated CRRT on 7/10 for acute renal failure. Follow up recommendations. Transitioned to dialysis per nephro  ID  Tmax 37.2 Afebrile   WBC: 16.3 (13.5)  UTI positive on 7/10. Rocephin q24 x7days  Endo  BG: < 180  HDSS - 6units 24 hours  MSK  Concern for compartment syndrome in LLE  S/p left calf fasciotomy 22. BID dressing changes. Wound vac LLE per vasc   Dispo   Tx to stepdown  Lines  saldana, right axillary arterial line, R Fabricio, L IJ        Chief Complaint: LLE weakness    SUBJECTIVE    Dewitte Nacho Maru evaluated at bedside. No acute events overnight. Patient still c/o BL LE pain      OBJECTIVE  VITALS: Temp: Temp: 98.4 °F (36.9 °C)Temp  Av.2 °F (36.8 °C)  Min: 97.5 °F (36.4 °C)  Max: 98.4 °F (90.3 °C) BP Systolic (11HYL), MARTINEZ:865 , Min:82 , JEH:966   Diastolic (03ESR), GDD:85, Min:41, Max:74   Pulse Pulse  Av.1  Min: 84  Max: 103 Resp Resp  Av.4  Min: 10  Max: 25 Pulse ox SpO2  Av.4 %  Min: 81 %  Max: 100 %  GENERAL: no distress  NEURO: No facial droop KENT. Baseline RUE/RLE weakness  HEENT: PERRLA  : normal  LUNGS: symmetric chest wall rise  HEART: normal rate and regular rhythm  ABDOMEN: soft, non-tender and non-distended  EXTREMITY: palpable left DP, doppler signals; weak left PT, weak right PT    I/O last 3 completed shifts: In: 3038.9 [I.V.:2088.9; Blood:350]  Out: 5255 [QLLCY:789]    Drain/tube output:   In: 1805.4 [I.V.:855.4; Blood:350]  Out: 4500 [PZEDD:746]    LAB:  CBC:   Recent Labs     07/15/22  0604 22  0406 22  0511   WBC 16.0* 13.5*  --    HGB 7.6* 6.5* 6.5*   HCT 23.3* 20.2* 20.0*   MCV 97.1 98.5  --    PLT See Reflexed IPF Result See Reflexed IPF Result  --      BMP:   Recent Labs     07/15/22  0603 07/15/22  1301 22  0406   * 135 136   K 4.0 3.7 3.6*    102 101   CO2 20 20 20   BUN 31* 31* 41*   CREATININE 1.51* 1.46* 1.75*   GLUCOSE 135* 136* 141*     COAGS:   Recent Labs     07/16/22  1127 07/16/22  1746 07/17/22  0028   APTT 49.6* 58.6* 62.2*       RADIOLOGY:  CT ABDOMEN PELVIS WO CONTRAST Additional Contrast? None    Result Date: 7/14/2022  1. Postprocedural findings in the left groin with superficial hematoma, as described. Subcutaneous edema in the left groin and left thighs also present. 2.  No retroperitoneal or intrapelvic hematoma. XR CHEST (SINGLE VIEW FRONTAL)    Result Date: 7/11/2022  Right IJ CVC catheter tip overlies the superior cavoatrial junction. Left IJ CVC catheter tip overlies the mid/distal SVC. No pneumothorax is seen. Mild left basilar atelectasis. US RENAL COMPLETE    Result Date: 7/11/2022  Unremarkable kidneys. Hepatic steatosis. XR CHEST PORTABLE    Result Date: 7/16/2022  No acute process. Stable exam.  Stable lines. XR CHEST PORTABLE    Result Date: 7/15/2022  No acute cardiopulmonary process. Stable internal jugular central lines. XR CHEST PORTABLE    Result Date: 7/14/2022  Interval removal of endotracheal and enteric tubes. No new findings otherwise identified in the interval.     XR CHEST PORTABLE    Result Date: 7/13/2022  No acute cardiopulmonary process. Support tubes as described above. XR CHEST PORTABLE    Result Date: 7/12/2022  No acute cardiopulmonary process. Support tubes as described above. XR CHEST PORTABLE    Result Date: 7/11/2022  Mild bibasilar opacities compatible with atelectasis. Aspiration and pneumonia are not excluded. XR CHEST PORTABLE    Result Date: 7/11/2022  Stable chest.     XR CHEST PORTABLE    Result Date: 7/11/2022  1. The deeper temporary dialysis catheter on the previous exam is been removed, with a new temporary dialysis catheter seen overlying the proximal superior vena cava. 2. Other tubes and lines as above. 3. Patchy infiltrates in the lungs bilaterally which may represent edema or pneumonia. XR CHEST PORTABLE    Result Date: 7/11/2022  1. The right jugular catheter appears in a stable position with the tip at the cavoatrial junction. 2. Endotracheal tube, nasogastric tube, and left jugular line are also redemonstrated. 3.  Some hazy opacities in the lungs which could be subtle edema or inflammatory.           Krystal Loredo DO  7/17/22, 6:42 AM

## 2022-07-17 NOTE — PROGRESS NOTES
Physical Therapy  Facility/Department: [facility]  Physical Therapy Initial Assessment    Name: Valeria Olivera  : 1960  MRN: [id number]  Date of Service: 2022  Chief Complaint   Patient presents with    Generalized Body Aches     10/10 FULL BODY PAIN    63 y/o male critically ill s/p BL femoral artery cutdown with thrombectomy of aortobifemoral graft, LLE fasciotomy, bilateral lower extremity angiography 22     Discharge Recommendations:  Patient would benefit from continued therapy after discharge   PT Equipment Recommendations  Equipment Needed: No      Patient Diagnosis(es): The primary encounter diagnosis was Failing vascular bypass graft, initial encounter. Diagnoses of Left leg numbness, Acute respiratory failure with hypercapnia ([address] and Lactic acidosis were also pertinent to this visit. Past Medical History:  has a past medical history of Anxiety, Arthritis associated with another disorder, CAD (coronary artery disease), Carotid artery occlusion, Family history of kidney stone, HTN (hypertension), Hyperlipidemia, Hypertension, Nephrolithiasis, Neuropathy, PAD (peripheral artery disease) ([address] Peripheral vascular disease ([address] Personal history of MRSA (methicillin resistant Staphylococcus aureus), Seasonal allergies, and Vasculopathy. Past Surgical History:  has a past surgical history that includes Tonsillectomy and adenoidectomy; Percutaneous Transluminal Coronary Angio; Carotid endarterectomy (08); IR Femoral Popliteal Bypass Graft ( Dr Nimo Baig); Femoral-tibial Bypass Graft (07  Kiki Mohr ); Balloon angioplasty, artery (11/23/10 Allie Muñoz); Aorto-femoral Bypass Graft (2011-[facility]); Abdominal adhesion surgery (2011-[facility]); Coronary artery bypass graft (); and Axillary-femoral Bypass Graft (Bilateral, 2022).     Assessment   Body Structures, Functions, Activity Limitations Requiring Skilled Therapeutic Intervention: Decreased and in agreement with PT intervention, Co tx with OT  General Comment  Comments: Pt pelasent and ccooperative with good participation and pt effort  Subjective  Subjective: Pt presents with general weakness, decreased sitting balance and pain         Social/Functional History  Social/Functional History  Lives With: Daughter  Type of Home: House  Home Layout: Two level  Home Access: Stairs to enter without rails  Entrance Stairs - Number of Steps: 3  Entrance Stairs - Rails: None  Bathroom Shower/Tub: Walk-in shower  Bathroom Toilet: Handicap height  Bathroom Equipment: Shower chair  Bathroom Accessibility: Accessible  Home Equipment: Cane, quad  Has the patient had two or more falls in the past year or any fall with injury in the past year?: No  Receives Help From: Family  ADL Assistance: [address] [place] [address] Avenue: Independent  Homemaking Responsibilities: Yes  Ambulation Assistance: Independent  Transfer Assistance: Independent  Active : Yes  Patient's  Info: family members  Mode of Transportation: Car  Occupation: On disability  Type of Occupation:   Leisure & Hobbies: play on computer,  video games  Additional Comments: Pt report prior to hospitalization he was independent with self care and mobilty using quad cane due to previous CGA with right side weakness. Pt reports daughter does not work, but unable to assist him. Vision/Hearing  Vision  Vision: Impaired  Vision Exceptions: Wears glasses for reading  Tracking: Able to track stimulus in all quads w/o difficulty  Hearing  Hearing: Within functional limits    Cognition   Orientation  Overall Orientation Status: Impaired  Orientation Level: Oriented to person;Oriented to place;Oriented to situation;Disoriented to time  Cognition  Overall Cognitive Status: Exceptions  Following Commands:  Follows one step commands consistently  Attention Span: Appears intact  Memory: Decreased recall of biographical Information  Safety Judgement: Decreased awareness of need for assistance  Problem Solving: Assistance required to identify errors made  Insights: Fully aware of deficits  Initiation: Requires cues for some  Sequencing: Requires cues for some  Cognition Comment: Pt presented with confusion, difficulty w/ word finding.  Pt telling stories from past with questionable accuracy     Objective   Heart Rate: 100  Heart Rate Source: Monitor  BP: 97/83  BP Location: Left upper arm  BP Method: Automatic  Patient Position: Semi fowlers  MAP (Calculated): 87.67  Resp: 18  SpO2: 98 %  O2 Device: None (Room air)     Observation/Palpation  Posture: Fair  Observation: Pt demonstrated decreased trunk control in sitting with fall risk  Edema: Left LE edema with wound vac in place  Gross Assessment  AROM: Generally decreased, functional  PROM: Generally decreased, functional  Strength: Generally decreased, functional  Coordination: Generally decreased, functional  Tone: Abnormal  Sensation: Impaired     PROM RLE (degrees)  RLE PROM: WNL  AROM RLE (degrees)  RLE AROM: Exceptions  RLE General AROM: Pt demosntrates general weakenss with previous rigth side weakness due to CVA  R SLR: unable to complete 2/2 weakness  PROM LLE (degrees)  LLE PROM: WNL  AROM LLE (degrees)  LLE AROM : Exceptions  LLE General AROM: limited mobiltiy due to weakness  AROM RUE (degrees)  RUE AROM : WFL  AROM LUE (degrees)  LUE AROM : WFL  Strength RLE  Strength RLE: Exception  Strength LLE  Strength LLE: Exception  Strength RUE  Strength RUE: WFL  Strength LUE  Strength LUE: WFL  Strength Other  Other: Pt demonstrates general upper and lower extremity weakness limiting functional mobilty and ability        Bed Mobility Training  Bed Mobility Training: Yes  Supine to Sit: Maximum assistance;Assist X2  Sit to Supine: Maximum assistance;Assist X2  Scooting: Maximum assistance;Assist X2  Balance  Sitting: Impaired (Pt sat EOB unsupported at Max A ~ 30 seconds with reports of dizziness.)  Transfer Training  Transfer Training: No  Bed mobility  Bridging: Maximum assistance;2 Person assistance  Rolling to Left: Maximum assistance;2 Person assistance  Rolling to Right: Maximum assistance;2 Person assistance  Supine to Sit: Maximum assistance;2 Person assistance  Sit to Supine: Maximum assistance;2 Person assistance  Bed Mobility Comments: support needed to complate mobilty in sitting  Transfers  Sit to Stand: Unable to assess  Stand to sit: Unable to assess  Bed to Chair: Unable to assess  Stand Pivot Transfers: Unable to assess  Squat Pivot Transfers: Unable to assess  Lateral Transfers: Unable to assess  Car Transfer: Unable to assess  Comment: Pt tolerated sitting at EOB x 10 minutes with max assist for support in sitting pt with poor trunck control. Ambulation  WB Status: WBAT  Ambulation  Quality of Gait: Pt dangled at bed side with ait deferred  More Ambulation?: No  Stairs/Curb  Stairs?: No     Balance  Posture: Fair  Sitting - Static: Fair  Sitting - Dynamic: Fair  Comments: Pt unable to stand for balance assessment due to profound weakness  Exercise Treatment: EOB activity x ~ 10 min with focus on stability needed to progress  mobiilty .         OutComes Score                                                  AM-PAC Score     AM-PAC Inpatient Mobility without Stair Climbing Raw Score : 8 (07/17/22 1417)  AM-PAC Inpatient without Stair Climbing T-Scale Score : 30.65 (07/17/22 1417)  Mobility Inpatient CMS 0-100% Score: 80.91 (07/17/22 1417)  Mobility Inpatient without Stair CMS G-Code Modifier : CM (07/17/22 1417)       Goals  Short Term Goals  Time Frame for Short term goals: 7 visits  Short term goal 1: Pt to completed bed movilty with min assist  Short term goal 2: Pt to scoot left/ rigth at EOB with WB activity as tolerated needed to prepare for standing  Short term goal 3: Pt to tolerate 30 minutes ther ex and activity  to improve functional abiltiy and tolerance  Short term goal 4: Pt to complete standing transfer from bed <> WC with Mod I  Long Term Goals  Time Frame for Long term goals : 14 visist  Long term goal 1: Pt to ambulate up to 150 ft with rolling walker with CGA  Patient Goals   Patient goals : to get stronger       Education  Patient Education  Education Given To: Patient  Education Provided: Role of Therapy;Plan of Care;Precautions;Transfer Training; Fall Prevention Strategies  Education Provided Comments: safety, ROM exercises and need for assist  Education Method: Demonstration  Barriers to Learning: Cognition  Education Outcome: Continued education needed;Verbalized understanding      Therapy Time   Individual Concurrent Group Co-treatment   Time In [id number]         Time Out 0913         Minutes 39         Timed Code Treatment Minutes: 23 Minutes (Co tx with OT)       Jj Moran, PT

## 2022-07-17 NOTE — PROGRESS NOTES
Progress Note      Name: Ashley Sethi  MRN: [id number]         Overnight Events:     Nothing acute overnight. Subjective:     Patient seen and examined at the bedside. More talkative this morning, states he thinks he is doing better. Patient is tolerating some p.o. intake, denies nausea or emesis, having bowel function. Patient urine output still low at 0.2 cc/kg/h over the last 24 hours. Now having intermittent dialysis. Physical Exam:     Vitals:  BP (!) 101/46   Pulse 95   Temp 99 °F (37.2 °C) (Bladder)   Resp 18   Ht 5' 10.5\" (1.791 m) Comment: no inital recorded height; 9/13/12 office visit  Wt 224 lb 10.4 oz (101.9 kg)   SpO2 99%   BMI 31.78 kg/m²     General appearance - Alert, disoriented  Mental status - following some commands  Head - normocephalic and atraumatic  Chest - no respiratory distress, no accessory muscle use   Heart - tachycardia, irregular rhythm  Abdomen - soft, non-tender, non-distended  Neurological - continues to have decreased sedation in lower extremities  Extremities -  edema to RLE, function improving, LLE fasciotomy incision with wound VAC in place with good suction, left groin full, however no surrounding erythema to suggest infection  Skin - BL groin incisions clean and dry, no signs of infection  Vascular Exam - palpable left DP, doppler signals; weak left PT, weak right PT       Imaging:   XR CHEST (SINGLE VIEW FRONTAL)    Result Date: 7/10/2022  Right IJ CVC catheter tip overlies the superior cavoatrial junction. Left IJ CVC catheter tip overlies the mid/distal SVC. No pneumothorax is seen. Mild left basilar atelectasis. XR CHEST PORTABLE    Result Date: 7/11/2022  Stable chest.     XR CHEST PORTABLE    Result Date: 7/11/2022  1. The deeper temporary dialysis catheter on the previous exam is been removed, with a new temporary dialysis catheter seen overlying the proximal superior vena cava. 2. Other tubes and lines as above.  3. Patchy infiltrates in the lungs bilaterally which may represent edema or pneumonia. XR CHEST PORTABLE    Result Date: 7/11/2022  1. The right jugular catheter appears in a stable position with the tip at the cavoatrial junction. 2. Endotracheal tube, nasogastric tube, and left jugular line are also redemonstrated. 3.  Some hazy opacities in the lungs which could be subtle edema or inflammatory. XR CHEST PORTABLE    Result Date: 7/10/2022  1. Tubes and right IJ line as detailed above. 2. Improvement in aeration of both lung since the prior study with residual airspace disease at the left upper and left lower lung zones as well as right parahilar region. No new focal airspace disease. 3. Prior median sternotomy and CABG.        Assessment:     65 y/o male critically ill s/p BL femoral artery cutdown with thrombectomy of aortobifemoral graft, LLE fasciotomy, bilateral lower extremity angiography      Plan:     Appreciate critical care management from surgical critical care  Cont neurovascular checks  Continue heparin gtt, will plan to switch to full dose Eliquis once we have more definitive plan for dialysis access  Appreciate nephrology recommendations  Would like to know if they believe patient will require long-term dialysis  If patient would require long-term dialysis would plan on placing tunneled catheter sometime early next week  Appreciate cardiology recommendations  Vascular surgery would request we hold off on cardiac catheterization during this admission  Hypercoagulable work up negative to this point  Wound VAC in place, change 3 times weekly  CT scan reviewed, hematoma in the left groin appreciated, will hold off on surgical intervention at this time continue to monitor for signs of infection  Would appreciate internal medicine being involved with patient as we believe they should take over as primary once the patient leaves the surgical ICU    Camilo Luna DO PGY 4  General Surgery Resident  07/17/22 11:22 AM

## 2022-07-18 ENCOUNTER — APPOINTMENT (OUTPATIENT)
Dept: GENERAL RADIOLOGY | Age: 62
DRG: 181 | End: 2022-07-18
Payer: COMMERCIAL

## 2022-07-18 PROBLEM — I25.5 CARDIOMYOPATHY, ISCHEMIC: Status: ACTIVE | Noted: 2022-07-18

## 2022-07-18 LAB
-: NORMAL
ANION GAP SERPL CALCULATED.3IONS-SCNC: 14 MMOL/L (ref 9–17)
BILIRUBIN URINE: NEGATIVE
BUN BLDV-MCNC: 50 MG/DL (ref 8–23)
CALCIUM IONIZED: 0.99 MMOL/L (ref 1.13–1.33)
CALCIUM SERPL-MCNC: 7.9 MG/DL (ref 8.6–10.4)
CASTS UA: NORMAL /LPF (ref 0–8)
CHLORIDE BLD-SCNC: 98 MMOL/L (ref 98–107)
CO2: 21 MMOL/L (ref 20–31)
COLOR: YELLOW
COMPLEMENT C3: 130 MG/DL (ref 90–180)
COMPLEMENT C3: 136 MG/DL (ref 90–180)
CREAT SERPL-MCNC: 2.27 MG/DL (ref 0.7–1.2)
EPITHELIAL CELLS UA: NORMAL /HPF (ref 0–5)
GFR AFRICAN AMERICAN: 36 ML/MIN
GFR NON-AFRICAN AMERICAN: 29 ML/MIN
GFR SERPL CREATININE-BSD FRML MDRD: ABNORMAL ML/MIN/{1.73_M2}
GLUCOSE BLD-MCNC: 124 MG/DL (ref 75–110)
GLUCOSE BLD-MCNC: 142 MG/DL (ref 75–110)
GLUCOSE BLD-MCNC: 166 MG/DL (ref 75–110)
GLUCOSE BLD-MCNC: 182 MG/DL (ref 70–99)
GLUCOSE BLD-MCNC: 99 MG/DL (ref 75–110)
GLUCOSE URINE: NEGATIVE
HBV SURFACE AB TITR SER: 4.05 MIU/ML
HCT VFR BLD CALC: 21.3 % (ref 40.7–50.3)
HCT VFR BLD CALC: 24.6 % (ref 40.7–50.3)
HEMOGLOBIN: 6.9 G/DL (ref 13–17)
HEMOGLOBIN: 8.2 G/DL (ref 13–17)
HEPATITIS B SURFACE ANTIGEN: NONREACTIVE
HEPATITIS C ANTIBODY: NONREACTIVE
KETONES, URINE: NEGATIVE
LEUKOCYTE ESTERASE, URINE: ABNORMAL
MAGNESIUM: 1.8 MG/DL (ref 1.6–2.6)
MCH RBC QN AUTO: 32.1 PG (ref 25.2–33.5)
MCHC RBC AUTO-ENTMCNC: 32.4 G/DL (ref 28.4–34.8)
MCV RBC AUTO: 99.1 FL (ref 82.6–102.9)
MYOGLOBIN: 1116 NG/ML (ref 28–72)
NITRITE, URINE: NEGATIVE
NRBC AUTOMATED: 0.4 PER 100 WBC
PARTIAL THROMBOPLASTIN TIME: 53.9 SEC (ref 20.5–30.5)
PDW BLD-RTO: 16.4 % (ref 11.8–14.4)
PH UA: 5.5 (ref 5–8)
PHOSPHORUS: 4.2 MG/DL (ref 2.5–4.5)
PLATELET # BLD: 301 K/UL (ref 138–453)
PMV BLD AUTO: 12.6 FL (ref 8.1–13.5)
POTASSIUM SERPL-SCNC: 3.6 MMOL/L (ref 3.7–5.3)
PROCALCITONIN: 0.82 NG/ML
PROTEIN UA: ABNORMAL
RBC # BLD: 2.15 M/UL (ref 4.21–5.77)
RBC UA: NORMAL /HPF (ref 0–4)
SEDIMENTATION RATE, ERYTHROCYTE: 37 MM/HR (ref 0–20)
SODIUM BLD-SCNC: 133 MMOL/L (ref 135–144)
SPECIFIC GRAVITY UA: 1.02 (ref 1–1.03)
TOTAL CK: 3687 U/L (ref 39–308)
TURBIDITY: ABNORMAL
URINE HGB: ABNORMAL
UROBILINOGEN, URINE: NORMAL
WBC # BLD: 18 K/UL (ref 3.5–11.3)
WBC UA: NORMAL /HPF (ref 0–5)

## 2022-07-18 PROCEDURE — 6370000000 HC RX 637 (ALT 250 FOR IP): Performed by: STUDENT IN AN ORGANIZED HEALTH CARE EDUCATION/TRAINING PROGRAM

## 2022-07-18 PROCEDURE — 97530 THERAPEUTIC ACTIVITIES: CPT

## 2022-07-18 PROCEDURE — 87086 URINE CULTURE/COLONY COUNT: CPT

## 2022-07-18 PROCEDURE — 99233 SBSQ HOSP IP/OBS HIGH 50: CPT | Performed by: INTERNAL MEDICINE

## 2022-07-18 PROCEDURE — P9016 RBC LEUKOCYTES REDUCED: HCPCS

## 2022-07-18 PROCEDURE — 6360000002 HC RX W HCPCS: Performed by: STUDENT IN AN ORGANIZED HEALTH CARE EDUCATION/TRAINING PROGRAM

## 2022-07-18 PROCEDURE — 87340 HEPATITIS B SURFACE AG IA: CPT

## 2022-07-18 PROCEDURE — 86900 BLOOD TYPING SEROLOGIC ABO: CPT

## 2022-07-18 PROCEDURE — 6370000000 HC RX 637 (ALT 250 FOR IP): Performed by: NURSE PRACTITIONER

## 2022-07-18 PROCEDURE — 85014 HEMATOCRIT: CPT

## 2022-07-18 PROCEDURE — 83735 ASSAY OF MAGNESIUM: CPT

## 2022-07-18 PROCEDURE — 99232 SBSQ HOSP IP/OBS MODERATE 35: CPT | Performed by: INTERNAL MEDICINE

## 2022-07-18 PROCEDURE — 86803 HEPATITIS C AB TEST: CPT

## 2022-07-18 PROCEDURE — 86317 IMMUNOASSAY INFECTIOUS AGENT: CPT

## 2022-07-18 PROCEDURE — 99254 IP/OBS CNSLTJ NEW/EST MOD 60: CPT | Performed by: INTERNAL MEDICINE

## 2022-07-18 PROCEDURE — 85018 HEMOGLOBIN: CPT

## 2022-07-18 PROCEDURE — 85027 COMPLETE CBC AUTOMATED: CPT

## 2022-07-18 PROCEDURE — 80048 BASIC METABOLIC PNL TOTAL CA: CPT

## 2022-07-18 PROCEDURE — 6370000000 HC RX 637 (ALT 250 FOR IP): Performed by: INTERNAL MEDICINE

## 2022-07-18 PROCEDURE — 84100 ASSAY OF PHOSPHORUS: CPT

## 2022-07-18 PROCEDURE — 86704 HEP B CORE ANTIBODY TOTAL: CPT

## 2022-07-18 PROCEDURE — 71045 X-RAY EXAM CHEST 1 VIEW: CPT

## 2022-07-18 PROCEDURE — 85652 RBC SED RATE AUTOMATED: CPT

## 2022-07-18 PROCEDURE — 81001 URINALYSIS AUTO W/SCOPE: CPT

## 2022-07-18 PROCEDURE — 85730 THROMBOPLASTIN TIME PARTIAL: CPT

## 2022-07-18 PROCEDURE — 6370000000 HC RX 637 (ALT 250 FOR IP): Performed by: HEALTH CARE PROVIDER

## 2022-07-18 PROCEDURE — 84145 PROCALCITONIN (PCT): CPT

## 2022-07-18 PROCEDURE — 83874 ASSAY OF MYOGLOBIN: CPT

## 2022-07-18 PROCEDURE — 97110 THERAPEUTIC EXERCISES: CPT

## 2022-07-18 PROCEDURE — 36430 TRANSFUSION BLD/BLD COMPNT: CPT

## 2022-07-18 PROCEDURE — 86160 COMPLEMENT ANTIGEN: CPT

## 2022-07-18 PROCEDURE — 82330 ASSAY OF CALCIUM: CPT

## 2022-07-18 PROCEDURE — 82550 ASSAY OF CK (CPK): CPT

## 2022-07-18 PROCEDURE — 2060000000 HC ICU INTERMEDIATE R&B

## 2022-07-18 PROCEDURE — 82947 ASSAY GLUCOSE BLOOD QUANT: CPT

## 2022-07-18 PROCEDURE — 36415 COLL VENOUS BLD VENIPUNCTURE: CPT

## 2022-07-18 RX ORDER — TORSEMIDE 20 MG/1
20 TABLET ORAL DAILY
Status: DISCONTINUED | OUTPATIENT
Start: 2022-07-18 | End: 2022-07-21 | Stop reason: HOSPADM

## 2022-07-18 RX ORDER — SENNA AND DOCUSATE SODIUM 50; 8.6 MG/1; MG/1
2 TABLET, FILM COATED ORAL DAILY PRN
Status: DISCONTINUED | OUTPATIENT
Start: 2022-07-18 | End: 2022-07-20

## 2022-07-18 RX ORDER — SODIUM CHLORIDE 9 MG/ML
INJECTION, SOLUTION INTRAVENOUS PRN
Status: DISCONTINUED | OUTPATIENT
Start: 2022-07-18 | End: 2022-07-21 | Stop reason: HOSPADM

## 2022-07-18 RX ADMIN — APIXABAN 5 MG: 5 TABLET, FILM COATED ORAL at 22:16

## 2022-07-18 RX ADMIN — TAMSULOSIN HYDROCHLORIDE 0.4 MG: 0.4 CAPSULE ORAL at 07:56

## 2022-07-18 RX ADMIN — ASPIRIN 81 MG: 81 TABLET, CHEWABLE ORAL at 07:57

## 2022-07-18 RX ADMIN — FENTANYL CITRATE 50 MCG: 50 INJECTION, SOLUTION INTRAMUSCULAR; INTRAVENOUS at 09:28

## 2022-07-18 RX ADMIN — Medication 5 MG: at 22:16

## 2022-07-18 RX ADMIN — OXYCODONE 5 MG: 5 TABLET ORAL at 17:27

## 2022-07-18 RX ADMIN — MIDODRINE HYDROCHLORIDE 10 MG: 5 TABLET ORAL at 17:27

## 2022-07-18 RX ADMIN — FAMOTIDINE 20 MG: 20 TABLET, FILM COATED ORAL at 07:57

## 2022-07-18 RX ADMIN — POTASSIUM BICARBONATE 20 MEQ: 782 TABLET, EFFERVESCENT ORAL at 22:17

## 2022-07-18 RX ADMIN — MIDODRINE HYDROCHLORIDE 10 MG: 5 TABLET ORAL at 11:13

## 2022-07-18 RX ADMIN — INSULIN LISPRO 3 UNITS: 100 INJECTION, SOLUTION INTRAVENOUS; SUBCUTANEOUS at 17:37

## 2022-07-18 RX ADMIN — OXYCODONE 5 MG: 5 TABLET ORAL at 04:53

## 2022-07-18 RX ADMIN — METOPROLOL SUCCINATE 12.5 MG: 25 TABLET, FILM COATED, EXTENDED RELEASE ORAL at 07:56

## 2022-07-18 RX ADMIN — TORSEMIDE 20 MG: 20 TABLET ORAL at 12:49

## 2022-07-18 RX ADMIN — APIXABAN 5 MG: 5 TABLET, FILM COATED ORAL at 09:28

## 2022-07-18 RX ADMIN — ATORVASTATIN CALCIUM 10 MG: 10 TABLET, FILM COATED ORAL at 07:57

## 2022-07-18 RX ADMIN — METOPROLOL SUCCINATE 12.5 MG: 25 TABLET, FILM COATED, EXTENDED RELEASE ORAL at 22:16

## 2022-07-18 RX ADMIN — OXYCODONE 5 MG: 5 TABLET ORAL at 11:10

## 2022-07-18 RX ADMIN — MIDODRINE HYDROCHLORIDE 10 MG: 5 TABLET ORAL at 07:57

## 2022-07-18 RX ADMIN — POLYETHYLENE GLYCOL 3350 17 G: 17 POWDER, FOR SOLUTION ORAL at 08:08

## 2022-07-18 RX ADMIN — DOCUSATE SODIUM 50 MG AND SENNOSIDES 8.6 MG 2 TABLET: 8.6; 5 TABLET, FILM COATED ORAL at 09:28

## 2022-07-18 RX ADMIN — POTASSIUM BICARBONATE 20 MEQ: 782 TABLET, EFFERVESCENT ORAL at 07:57

## 2022-07-18 RX ADMIN — POTASSIUM BICARBONATE 20 MEQ: 782 TABLET, EFFERVESCENT ORAL at 13:49

## 2022-07-18 RX ADMIN — INSULIN LISPRO 3 UNITS: 100 INJECTION, SOLUTION INTRAVENOUS; SUBCUTANEOUS at 07:57

## 2022-07-18 RX ADMIN — QUETIAPINE FUMARATE 50 MG: 25 TABLET ORAL at 22:17

## 2022-07-18 ASSESSMENT — PAIN DESCRIPTION - DESCRIPTORS
DESCRIPTORS: SHARP;SHOOTING;THROBBING
DESCRIPTORS: CRUSHING;DISCOMFORT
DESCRIPTORS: DISCOMFORT;CRUSHING
DESCRIPTORS: ACHING

## 2022-07-18 ASSESSMENT — PAIN SCALES - GENERAL
PAINLEVEL_OUTOF10: 4
PAINLEVEL_OUTOF10: 10
PAINLEVEL_OUTOF10: 8
PAINLEVEL_OUTOF10: 8
PAINLEVEL_OUTOF10: 4
PAINLEVEL_OUTOF10: 10

## 2022-07-18 ASSESSMENT — PAIN DESCRIPTION - PAIN TYPE
TYPE: ACUTE PAIN;SURGICAL PAIN
TYPE: ACUTE PAIN
TYPE: SURGICAL PAIN
TYPE: ACUTE PAIN

## 2022-07-18 ASSESSMENT — PAIN DESCRIPTION - ORIENTATION
ORIENTATION: LEFT
ORIENTATION: LOWER;LEFT;RIGHT
ORIENTATION: LEFT
ORIENTATION: LEFT

## 2022-07-18 ASSESSMENT — PAIN - FUNCTIONAL ASSESSMENT
PAIN_FUNCTIONAL_ASSESSMENT: PREVENTS OR INTERFERES WITH ALL ACTIVE AND SOME PASSIVE ACTIVITIES
PAIN_FUNCTIONAL_ASSESSMENT: PREVENTS OR INTERFERES SOME ACTIVE ACTIVITIES AND ADLS

## 2022-07-18 ASSESSMENT — PAIN DESCRIPTION - LOCATION
LOCATION: LEG

## 2022-07-18 ASSESSMENT — PAIN DESCRIPTION - FREQUENCY
FREQUENCY: CONTINUOUS

## 2022-07-18 ASSESSMENT — PAIN DESCRIPTION - ONSET
ONSET: PROGRESSIVE
ONSET: ON-GOING

## 2022-07-18 NOTE — CARE COORDINATION
Notified by Frandy Thomas that [facility] cannot dialyze pt in house d/t no diagnosis of ESRD. From [employer], [facility] is in network. Met with pt to discuss above. He is agreeable to go to [facility]. Referral sent    [phone] spoke to Khushboo Gonzalez at [facility]. He sent referral for review    1300 notified by Khushboo Gonzalez that they are able to accept. notified by Dr Ortiz that Dr Mejia Pennington would like to reevaluate creatinine tomorrow.  May be able to start precert tomorrow

## 2022-07-18 NOTE — CARE COORDINATION
SW following for HD needs. Patient switched to HD. No treatment today, urine output picking up. Referral to [facility]. OZ spoke with CM. If patient will need to continue HD, [facility] cannot provide on-site dialysis. Per Pankaj Gibbs at [facility] will not cover HD stretcher transport if needed. OZ notified CM Crystal of information. If HD needed may need other in-house HD SNF.

## 2022-07-18 NOTE — CONSULTS
left-hand side and he right SFA was also occluded. Dr. Linda Salazar, with Vascular Surgery, completed an emergent aorta bifemoral graft endarterectomy and bilateral common femoral embolectomy, bilateral lower extremity arterial ectomy, aortography, right limb bypass graft stent and bilateral femoral bovine patches    A fasciotomy of the left lower leg was also performed per vascular surgery and intraoperatively orthopedic surgery was consulted for possible left thigh compartment syndrome. Rossy needle assessment was performed and found that the thigh compartment was normal and did not require any acute intervention. An echocardiogram was completed and was shown the patient had ejection fraction of 15%. He had also been initiated on a heparin drip for elevated troponin and atrial fibrillation. Nephrology was subsequently consulted for oliguria and CAROLYN. The patient received CVVHD from 7/10 to 7/15/2022 and was intubated from 7/8 to 7/12/2022 while intermittently requiring vasopressors from 7/7 to 7/13/2022. It appears that the patient has had leukocytosis since admission. All cultures have remained negative for bacterial growth. He was on rocephin from 7/11/22 through 7/17/22 for UTI. I was unable to find a positive urine culture. Infectious disease has been consulted for continued leukocytosis    CURRENT EVALUATION 7/18/2022  /66   Pulse 94   Temp 98.6 °F (37 °C) (Oral)   Resp 16   Ht 5' 10.5\" (1.791 m) Comment: no inital recorded height; 9/13/12 office visit  Wt 224 lb 10.4 oz (101.9 kg)   SpO2 96%   BMI 31.78 kg/m²     Afebrile  Vital signs stable on room air    Patient was alert and oriented upon evaluation. He denied any acute issues other than some pain in his left leg where the wound VAC was in place at the site of the fasciotomy. He did ask for water.     Hemoglobin is 6.9    No acute issues noted at this time  Discussed with RN    Labs, X rays reviewed: 7/18/2022    BUN:50  Cr:2.27    WBC:19.3-->16.0-->13.5-->16.3-->18.0  Hb:6.9  Plat: 301    CRP:pending  Sed rate: Pending    Cultures:  Urine:    Blood:    Sputum :    Wound:    MRSA Nares:      Imaging:      Discussed with patient, RN, family. I have personally reviewed the past medical history, past surgical history, medications, social history, and family history, and I have updated the database accordingly. Past Medical History:     Past Medical History:   Diagnosis Date    Anxiety     Arthritis associated with another disorder     CAD (coronary artery disease)     with history of multiple MI    Carotid artery occlusion     Family history of kidney stone     HTN (hypertension)     Hyperkalemia     Hyperlipidemia     Hypertension     Nephrolithiasis     multiple times    Neuropathy     PAD (peripheral artery disease) (HCC)     Peripheral vascular disease (HCC)     Personal history of MRSA (methicillin resistant Staphylococcus aureus)     Seasonal allergies     Vasculopathy        Past Surgical  History:     Past Surgical History:   Procedure Laterality Date    ABDOMINAL ADHESION SURGERY  2/11/2011-[facility]    Reexploration of abdominal wound and reclosure of the abdominal wound with fascial sutures and retention sutures as well    AORTO-FEMORAL BYPASS GRAFT  2/11/2011-[facility]    ABF bypass of 16x8 PTFE graft. This is an end-to-side anastomosis proximally    AXILLARY-FEMORAL BYPASS GRAFT Bilateral 7/8/2022    AORTA BIFEMORAL GRAFT ENDARTERECTOMY, BILATERAL COMMON FEMORAL EMBOLECTOMY, BILATERAL LOWER EXTREMEITY ARTERECTOMY, AORTAGRAPHY, RIGHT LIMB OF BYPASS GRAFT STENT, BILATERAL FEMORAL BOVINE PATCHES performed by Norma Molina MD at [place] [address], ARTERY  11/23/10 [address]    1. Placement of 5 sheath in the left femoral artery with duplex guidance. 2. Left Iliac arteriogram. 3. Attempted ballon angioplasty left iliac artery occlusion.     CAROTID ENDARTERECTOMY  07/19/08    Left carotid endarterectomy using shunt dacron patch aplasty    CORONARY ARTERY BYPASS GRAFT      CABG x3     FEMORAL-TIBIAL BYPASS GRAFT  07  Spike Simon     Right femoral to posterior tibial artery bypass graft with in situ saphenous vein graft    IR FEMORAL POPLITEAL BYPASS GRAFT   Dr [place]    1. Exploration of right fem-pop bypass graft. 2.Thrombectomy of right fem-post-tib saph vein graft.  3. Replacement of femoral to popliteal bypass graft from midthigh to near the anastomosis by reversed greater saph vein harvested from left leg    PTCA      PTCA with stent x6    TONSILLECTOMY AND ADENOIDECTOMY         Medications:      OLANZapine (ZyPREXA) in sterile water IntraMUSCular  5 mg IntraMUSCular Once    apixaban  5 mg Oral BID    torsemide  20 mg Oral Daily    midodrine  10 mg Oral TID WC    potassium bicarb-citric acid  20 mEq Oral TID    metoprolol succinate  12.5 mg Oral BID    famotidine  20 mg Oral Daily    atorvastatin  10 mg Oral Daily    tamsulosin  0.4 mg Oral Daily    insulin lispro  0-18 Units SubCUTAneous 4x Daily AC & HS    melatonin  5 mg Oral Nightly    QUEtiapine  50 mg Oral Nightly    aspirin  81 mg Oral Daily    polyethylene glycol  17 g Oral Daily       Social History:     Social History     Socioeconomic History    Marital status:      Spouse name: Not on file    Number of children: 1    Years of education: 14    Highest education level: Not on file   Occupational History    Occupation: disabled      Comment: SSI    Tobacco Use    Smoking status: Every Day     Packs/day: 2.00     Years: 33.00     Pack years: 66.00     Types: Cigarettes     Last attempt to quit: 3/10/2011     Years since quittin.3    Smokeless tobacco: Never    Tobacco comments:     quite 2011    Substance and Sexual Activity    Alcohol use: No     Alcohol/week: 0.0 standard drinks    Drug use: No    Sexual activity: Yes     Partners: Female     Comment: with has trouble   Other Topics Concern Not on file   Social History Narrative    Not on file     Social Determinants of Health     Financial Resource Strain: Not on file   Food Insecurity: Not on file   Transportation Needs: Not on file   Physical Activity: Not on file   Stress: Not on file   Social Connections: Not on file   Intimate Partner Violence: Not on file   Housing Stability: Not on file       Family History:     Family History   Problem Relation Age of Onset    Hypertension Father     Heart Disease Sister     Hypertension Sister     Cancer Mother 28        breast cancer        Allergies:   Faviola [morphine sulfate], Methadone, and Morphine     Review of Systems:   Constitutional: No fevers or chills. No systemic complaints  Head: No headaches  Eyes: No double vision or blurry vision. No conjunctival inflammation. ENT: No sore throat or runny nose. . No hearing loss, tinnitus or vertigo. Cardiovascular: No chest pain or palpitations. No shortness of breath. No DELATORRE  Lung: No shortness of breath or cough. No sputum production  Abdomen: No nausea, vomiting, diarrhea, or abdominal pain. Keri Red No cramps. Genitourinary: No increased urinary frequency, or dysuria. No hematuria. No suprapubic or CVA pain  Musculoskeletal: LLE pain  Hematologic: No bleeding or bruising. Neurologic: No headache, weakness, numbness, or tingling. Integument: No rash, no ulcers. Psychiatric: No depression. Endocrine: No polyuria, no polydipsia, no polyphagia.     Physical Examination :   Patient Vitals for the past 8 hrs:   BP Temp Temp src Pulse Resp SpO2   07/18/22 1545 105/66 98.6 °F (37 °C) Oral 94 16 --   07/18/22 1500 (!) 142/56 98.9 °F (37.2 °C) -- 85 17 96 %   07/18/22 1445 120/63 98.6 °F (37 °C) -- 82 22 94 %   07/18/22 1430 (!) 120/35 98.6 °F (37 °C) -- 99 18 96 %   07/18/22 1415 123/67 98.8 °F (37.1 °C) -- 86 21 95 %   07/18/22 1400 (!) 113/54 98.5 °F (36.9 °C) -- 88 19 97 %   07/18/22 1355 135/65 98.8 °F (37.1 °C) Oral 88 20 97 %   07/18/22 1350 126/62 98 °F (36.7 °C) Oral 94 18 --   07/18/22 1345 102/70 98.6 °F (37 °C) Oral 97 17 97 %   07/18/22 1340 (!) 103/54 98.9 °F (37.2 °C) Oral 92 18 96 %   07/18/22 1339 113/73 98.9 °F (37.2 °C) Oral 91 18 97 %   07/18/22 1335 113/73 98.9 °F (37.2 °C) -- 95 20 97 %   07/18/22 1330 -- -- -- 85 -- 95 %   07/18/22 1050 (!) 104/54 98.2 °F (36.8 °C) Oral 88 18 96 %   07/18/22 0830 -- -- -- 86 -- --     General Appearance: Awake, alert, and in no apparent distress  Head:  Normocephalic, no trauma  Eyes: Pupils equal, round, reactive to light and accommodation; extraocular movements intact; sclera anicteric; conjunctivae pink. No embolic phenomena. ENT: Oropharynx clear, without erythema, exudate, or thrush. No tenderness of sinuses. Mouth/throat: mucosa pink and moist. No lesions. Dentition in good repair. Neck:Supple, without lymphadenopathy. Thyroid normal, No bruits. Pulmonary/Chest: Clear to auscultation, without wheezes, rales, or rhonchi. No dullness to percussion. Cardiovascular: Regular rate and rhythm without murmurs, rubs, or gallops. Abdomen: Soft, non tender. Bowel sounds normal. No organomegaly  All four Extremities: No cyanosis, clubbing, edema, or effusions. Fasciotomy site LLE with wound vac in place  Neurologic: No gross sensory or motor deficits. Skin: Warm and dry. Pale  Fasciotomy site LLE with wound vac in place    Medical Decision Making -Laboratory:   I have independently reviewed/ordered the following labs:    CBC with Differential:   Recent Labs     07/17/22  0612 07/18/22  0616   WBC 16.3* 18.0*   HGB 7.3* 6.9*   HCT 22.5* 21.3*    301     BMP:   Recent Labs     07/17/22  0612 07/18/22  0320    133*   K 3.1* 3.6*    98   CO2 22 21   BUN 38* 50*   CREATININE 1.81* 2.27*   MG 1.6 1.8     Hepatic Function Panel: No results for input(s): PROT, LABALBU, BILIDIR, IBILI, BILITOT, ALKPHOS, ALT, AST in the last 72 hours. No results for input(s): RPR in the last 72 hours.   No results for input(s): HIV in the last 72 hours. No results for input(s): BC in the last 72 hours. Lab Results   Component Value Date/Time    MUCUS 1+ 06/30/2012 09:40 PM    PH 7.37 02/12/2011 03:46 AM    RBC 2.15 07/18/2022 06:16 AM    RBC 4.69 06/06/2012 07:29 PM    TRICHOMONAS NOT REPORTED 06/30/2012 09:40 PM    WBC 18.0 07/18/2022 06:16 AM    YEAST NOT REPORTED 06/30/2012 09:40 PM    TURBIDITY Cloudy 07/18/2022 10:32 AM     Lab Results   Component Value Date/Time    CREATININE 2.27 07/18/2022 03:20 AM    CREATININE 1.2 07/25/2011 09:50 AM    GLUCOSE 182 07/18/2022 03:20 AM    GLUCOSE 128 06/06/2012 07:29 PM       Medical Decision Making-Imaging:     Narrative   EXAMINATION:   ONE XRAY VIEW OF THE CHEST       7/18/2022 10:00 am       COMPARISON:   Chest x-ray dated 16 July 2022       HISTORY:   ORDERING SYSTEM PROVIDED HISTORY: Increasing WBC   TECHNOLOGIST PROVIDED HISTORY:   Increasing WBC       FINDINGS:   Linear opacity in the right mid lung field consistent with atelectasis. No   consolidation. No pneumothorax or pleural effusion. Normal   cardiomediastinal silhouette. Right-sided central venous catheter with the   tip in the SVC. Impression   No acute cardiopulmonary disease       Medical Decision [id number]:       Medical Decision Making-Other:     Note:  Labs, medications, radiologic studies were reviewed with personal review of films  Large amounts of data were reviewed  Discussed with nursing Staff, Discharge planner  Infection Control and Prevention measures reviewed  All prior entries were reviewed  Administer medications as ordered  Prognosis: [address]  Discharge planning reviewed      Thank you for allowing us to participate in the care of this patient. Please call with questions. Katie Ochoa, APRN - CNP      ATTESTATION:    I have discussed the case, including pertinent history and exam findings with the APRN.  I have evaluated the  History, physical findings and pictures of the patient and the key elements of the encounter have been performed by me. I have reviewed the laboratory data, other diagnostic studies and discussed them with the APRN. I have updated the medical record where necessary. I agree with the assessment, plan and orders as documented by the APRN.     Pamela Huertas MD.    Pager: [phone] - Office: [phone]

## 2022-07-18 NOTE — PROGRESS NOTES
[facility]  Office: [address], DO, Caren Burroughs, DO, Sarai Romo, DO, Mcia Ortiz, DO, Wilson Tidwell MD, Dickson Metz MD, Arabella Dubois MD, Rosalie Tijerina MD, Fabian Scott MD, Matthew Rivera MD, Namita Savage MD, Melanie Byers, DO, Lorie Ruby MD,  Vinh Partida, DO, Christianne Lowry MD, Stanislav Hayes MD, Dianelys Fernández, DO, Joshua Walker MD, Brunilda Bender MD, Caroline Ramos, DO, Niles Alegria MD, Donta Guerin MD, Stephania Mccollum, [facility], [facility], CNP, Corinne Dennis, CNP, Roderick Reyes, CNP, Lluvia Kothari, [facility], Soy Worthy, [facility], MEGHAN ShafferC, Yvrose Frederick, DNP, Harris Ramirez, CNP, Leann Marie, CNP, Faustino Powell, CNP, Aurelio Amos, CNS, Mike Delarosa, [facility], Margareth Farfan, CNP, Charity Villalpando, CNP, [facility], [address] [place] [address]    Progress Note    7/18/2022    12:40 PM    Name:   Charly Chavarria  MRN:     [id number]     Acct:      [de-identified]   Room:   2021/2021-01  IP Day:  8  Admit Date:  7/8/2022  4:56 AM    PCP:   Arabella Dubois (Inactive)  Code Status:  Full Code    Subjective:     C/C:   Chief Complaint   Patient presents with    Generalized Body Aches     10/10 FULL BODY PAIN     Interval History Status: not changed. Stuttering speech, patient says this is normal for him post cva    States he has to stool now    Denies cp/sob/n/v      Brief History:     Per my partner:  Charly Chavarria is a 64 y.o. Non- / non  male who presents with Generalized Body Aches (10/10 FULL BODY PAIN)   and is admitted to the hospital for the management of Critical ischemia of lower extremity ([facility] [address] 64year old male with past medical history of anxiety, hsitory of CABG x3, CAD, HTN, Peripheral vascular disease with some surgeries performed [place] presents to the ER on 7/8/22 with body aches and shortness of breath. Found to be in Atrial fibrillation.  With critical limb ischemia, underwent thrombectomry of aortobifemoral bypass graft, thromboendarterectomy of common femoral profundofemoral and sfa biltaeraly with bovine patch on 7/8/22 with Vascular surgery. Patient had troponin elevation and cardiology was consulted. Patient was already on a heparin drip per vascular surgery. Found to have an EF 15% by echo, nephrology following due to oliguria. Started on CVVHD 7/10/22-7/15/22. Patient was intubated from 7/8/22-7/12/22. Intermittently requiring presors from 7/7/22-7/13/22. \"    Review of Systems:     Constitutional:  negative for chills, fevers, sweats  Respiratory:  negative for cough, dyspnea on exertion, shortness of breath, wheezing  Cardiovascular:  negative for chest pain, chest pressure/discomfort, palpitations  Gastrointestinal:  negative for abdominal pain, constipation, diarrhea, nausea, vomiting  Neurological:  negative for dizziness, headache    Medications: Allergies:     Allergies   Allergen Reactions    Faviola [Morphine Sulfate] Itching and Rash    Methadone Rash    Morphine Nausea And Vomiting      Sever \"headache\"       Current Meds:   Scheduled Meds:    OLANZapine (ZyPREXA) in sterile water IntraMUSCular  5 mg IntraMUSCular Once    apixaban  5 mg Oral BID    torsemide  20 mg Oral Daily    midodrine  10 mg Oral TID WC    potassium bicarb-citric acid  20 mEq Oral TID    metoprolol succinate  12.5 mg Oral BID    famotidine  20 mg Oral Daily    atorvastatin  10 mg Oral Daily    tamsulosin  0.4 mg Oral Daily    insulin lispro  0-18 Units SubCUTAneous 4x Daily AC & HS    melatonin  5 mg Oral Nightly    QUEtiapine  50 mg Oral Nightly    aspirin  81 mg Oral Daily    polyethylene glycol  17 g Oral Daily     Continuous Infusions:    sodium chloride      sodium chloride 10 mL/hr (07/16/22 3978)     PRN Meds: sennosides-docusate sodium, sodium chloride, fentanNYL, sodium chloride, oxyCODONE, heparin (porcine), heparin (porcine), sodium chloride flush, ondansetron, glucose, glucagon (rDNA)    Data:     Past Medical History:   has a past medical history of Anxiety, Arthritis associated with another disorder, CAD (coronary artery disease), Carotid artery occlusion, Family history of kidney stone, HTN (hypertension), Hyperkalemia, Hyperlipidemia, Hypertension, Nephrolithiasis, Neuropathy, PAD (peripheral artery disease) ([facility] [address] Peripheral vascular disease ([facility] [address] Personal history of MRSA (methicillin resistant Staphylococcus aureus), Seasonal allergies, and Vasculopathy. Social History:   reports that he has been smoking cigarettes. He has a 66.00 pack-year smoking history. He has never used smokeless tobacco. He reports that he does not drink alcohol and does not use drugs. Family History:   Family History   Problem Relation Age of Onset    Hypertension Father     Heart Disease Sister     Hypertension Sister     Cancer Mother 28        breast cancer       Vitals:  BP (!) 104/54   Pulse 88   Temp 98.2 °F (36.8 °C) (Oral)   Resp 18   Ht 5' 10.5\" (1.791 m) Comment: no inital recorded height; 12 office visit  Wt 224 lb 10.4 oz (101.9 kg)   SpO2 96%   BMI 31.78 kg/m²   Temp (24hrs), Av.2 °F (36.8 °C), Min:97.5 °F (36.4 °C), Max:98.5 °F (36.9 °C)    Recent Labs     22  1715 22  0647 22  1051   POCGLU 130* 139* 166* 124*       I/O (24Hr):     Intake/Output Summary (Last 24 hours) at 2022 1240  Last data filed at 2022 0830  Gross per 24 hour   Intake 1160 ml   Output 1915 ml   Net -755 ml       Labs:  Hematology:  Recent Labs     22  0406 22  0511 22  0612 22  0616   WBC 13.5*  --  16.3* 18.0*   RBC 2.05*  --  2.32* 2.15*   HGB 6.5* 6.5* 7.3* 6.9*   HCT 20.2* 20.0* 22.5* 21.3*   MCV 98.5  --  97.0 99.1   MCH 31.7  --  31.5 32.1   MCHC 32.2  --  32.4 32.4   RDW 15.9*  --  16.0* 16.4*   PLT See Reflexed IPF Result  --  248 301   MPV  --   --  12.7 12.6     Chemistry:  Recent Labs 07/16/22  0406 07/17/22  0612 07/18/22  0320 07/18/22  0953    138 133*  --    K 3.6* 3.1* 3.6*  --     102 98  --    CO2 20 22 21  --    GLUCOSE 141* 140* 182*  --    BUN 41* 38* 50*  --    CREATININE 1.75* 1.81* 2.27*  --    MG 2.1 1.6 1.8  --    ANIONGAP 15 14 14  --    LABGLOM 40* 38* 29*  --    GFRAA 48* 46* 36*  --    CALCIUM 8.3* 7.1* 7.9*  --    CAION 1.14 0.98*  --  0.99*   PHOS 4.7* 3.5 4.2  --      Recent Labs     07/17/22  0101 07/17/22  1148 07/17/22  1715 07/17/22 2003 07/18/22  0647 07/18/22  1051   POCGLU 148* 116* 130* 139* 166* 124*     ABG:  Lab Results   Component Value Date/Time    POCPH 7.401 07/13/2022 01:01 AM    PHART 7.30 04/22/2012 02:57 PM    PH 7.37 02/12/2011 03:46 AM    POCPCO2 38.6 07/13/2022 01:01 AM    DZQ3CRN 42 04/22/2012 02:57 PM    PCO2 43 02/12/2011 03:46 AM    POCPO2 142.5 07/13/2022 01:01 AM    PO2ART 66 04/22/2012 02:57 PM    PO2 73 02/12/2011 03:46 AM    POCHCO3 24.0 07/13/2022 01:01 AM    MBM3YCB 20.6 04/22/2012 02:57 PM    HCO3 24.9 02/12/2011 03:46 AM    NBEA 1 07/13/2022 01:01 AM    PBEA 0 07/12/2022 01:27 PM    ZJA2PUQ 22 04/22/2012 02:57 PM    NWAL1ASG 99 07/13/2022 01:01 AM    P3QXZRLQ 90 04/22/2012 02:57 PM    O2SAT 15.8 02/12/2011 03:46 AM    FIO2 30.0 07/13/2022 05:18 AM     Lab Results   Component Value Date/Time    SPECIAL LAC 10ML 07/08/2022 06:47 AM     Lab Results   Component Value Date/Time    CULTURE NO GROWTH 5 DAYS 07/08/2022 06:47 AM       Radiology:  CT ABDOMEN PELVIS WO CONTRAST Additional Contrast? None    Result Date: 7/14/2022  1. Postprocedural findings in the left groin with superficial hematoma, as described. Subcutaneous edema in the left groin and left thighs also present. 2.  No retroperitoneal or intrapelvic hematoma. XR CHEST PORTABLE    Result Date: 7/18/2022  No acute cardiopulmonary disease     XR CHEST PORTABLE    Result Date: 7/16/2022  No acute process. Stable exam.  Stable lines.      XR CHEST PORTABLE    Result Date: 7/15/2022  No acute cardiopulmonary process. Stable internal jugular central lines. XR CHEST PORTABLE    Result Date: 7/14/2022  Interval removal of endotracheal and enteric tubes. No new findings otherwise identified in the interval.     XR CHEST PORTABLE    Result Date: 7/13/2022  No acute cardiopulmonary process. Support tubes as described above. XR CHEST PORTABLE    Result Date: 7/12/2022  No acute cardiopulmonary process. Support tubes as described above. XR CHEST PORTABLE    Result Date: 7/11/2022  Mild bibasilar opacities compatible with atelectasis. Aspiration and pneumonia are not excluded.        Physical Examination:        General appearance:  alert, cooperative and no distress  Mental Status:  oriented to person, place and normal affect  Lungs:  clear to auscultation bilaterally, normal effort  Heart:  regular rate and rhythm, no murmur  Abdomen:  soft, nontender, nondistended, normal bowel sounds, no masses, hepatomegaly, splenomegaly  Extremities:  no redness, tenderness in the calves      Assessment:        Hospital Problems             Last Modified POA    * (Principal) Critical ischemia of lower extremity ([address] 7/8/2022 Yes    PAD (peripheral artery disease) ([address] 7/18/2022 Yes    CAROLYN (acute kidney injury) ([address] 7/18/2022 Yes    Dependence on renal dialysis ([address] 7/18/2022 Yes    Failing vascular bypass graft 7/10/2022 Yes    Arterial hypotension 7/10/2022 Yes    Non-traumatic rhabdomyolysis 7/10/2022 Yes    Cardiomyopathy ([address] 7/10/2022 Yes    Decreased urine output 7/10/2022 Yes    Acute respiratory failure with hypercapnia (HCC) 7/17/2022 Yes    Lactic acidosis 7/17/2022 Yes    Left leg numbness 7/17/2022 Yes    Cardiomyopathy, ischemic 7/18/2022 Yes    CAD (coronary artery disease) 7/18/2022 Yes    Overview Signed 3/10/2011 11:35 AM by TIERRA Rutledge     with history of multiple MI            Plan:        D/w Dr Kiera Johnson, hope to have a better idea by tomorrow if he is going to need ongoing dialysis or not. If cr stable tomorrow, will have CM start precert for snf  In meantime, will check procal to help identify if bacterial infection or not but vascular doesn't feel wound infected and the elevated wbc is probably just reactive and less likely to be caused by infection.   Received Ancef 7/8-9 then started rocephin for uti 7/10-17 (no culture)  Transfuse 1u prbc today for hgb<7  Smoking cessation  Wound care per vascular  Heparin drip switched to eliquis  Monitor bp: on toprol for cardiac reasons and on proamitine for low pressure  Will need heart cath in future  Low dose lipitor started several days ago; though he would benefit from high dose statin, will not increase now as he is still recovering from rhabdo from limb ischemia-will add on ck and myoglobin today since not checked since 7/12 and back on statin    Andrae GRANT Blood, DO  7/18/2022  12:40 PM

## 2022-07-18 NOTE — PLAN OF CARE
Problem: Discharge Planning  Goal: Discharge to home or other facility with appropriate resources  7/18/2022 1832 by Emmett Leahy RN  Outcome: Progressing Towards Goal  7/18/2022 0527 by Maryjane Kerr RN  Outcome: Progressing Towards Goal     Problem: Pain  Goal: Verbalizes/displays adequate comfort level or baseline comfort level  7/18/2022 1832 by Emmett Leahy RN  Outcome: Progressing Towards Goal  7/18/2022 0527 by Maryjane Kerr RN  Outcome: Progressing Towards Goal     Problem: Skin/Tissue Integrity  Goal: Absence of new skin breakdown  Description: 1. Monitor for areas of redness and/or skin breakdown  2. Assess vascular access sites hourly  3. Every 4-6 hours minimum:  Change oxygen saturation probe site  4. Every 4-6 hours:  If on nasal continuous positive airway pressure, respiratory therapy assess nares and determine need for appliance change or resting period. 7/18/2022 1832 by Emmett Leahy RN  Outcome: Progressing Towards Goal  7/18/2022 0527 by Maryjane Kerr RN  Outcome: Progressing Towards Goal     Problem: Safety - Adult  Goal: Free from fall injury  7/18/2022 1832 by Emmett Leahy RN  Outcome: Progressing Towards Goal  7/18/2022 0527 by Maryjane Kerr RN  Outcome: Progressing Towards Goal     Problem: ABCDS Injury Assessment  Goal: Absence of physical injury  7/18/2022 1832 by Emmett Leahy RN  Outcome: Progressing Towards Goal  7/18/2022 0527 by Maryjane Kerr RN  Outcome: Progressing Towards Goal     Problem: Respiratory - Adult  Goal: Achieves optimal ventilation and oxygenation  7/18/2022 1832 by Emmett Leahy RN  Outcome: Progressing Towards Goal  7/18/2022 0527 by Maryjane Kerr RN  Outcome: Progressing Towards Goal     Problem: Confusion  Goal: Confusion, delirium, dementia, or psychosis is improved or at baseline  Description: INTERVENTIONS:  1.  Assess for possible contributors to thought disturbance, including medications, impaired vision or hearing, underlying metabolic abnormalities, dehydration, psychiatric diagnoses, and notify attending LIP  2. [place] high risk fall precautions, as indicated  3. Provide frequent short contacts to provide reality reorientation, refocusing and direction  4. Decrease environmental stimuli, including noise as appropriate  5. Monitor and intervene to maintain adequate nutrition, hydration, elimination, sleep and activity  6. If unable to ensure safety without constant attention obtain sitter and review sitter guidelines with assigned personnel  7.  Initiate Psychosocial CNS and Spiritual Care consult, as indicated  7/18/2022 1832 by Ana Baez RN  Outcome: Progressing Towards Goal  7/18/2022 0527 by Micah Contreras RN  Outcome: Progressing Towards Goal

## 2022-07-18 NOTE — PROGRESS NOTES
Physical Therapy  Facility/Department: [facility] 2  Daily Treatment Note     Name: Violet Becker  : 1960  MRN: [id number]  Date of Service: 2022    Discharge Recommendations:  Patient would benefit from continued therapy after discharge   PT Equipment Recommendations  Equipment Needed: No      Patient Diagnosis(es): The primary encounter diagnosis was Failing vascular bypass graft, initial encounter. Diagnoses of Left leg numbness, Acute respiratory failure with hypercapnia ([address] and Lactic acidosis were also pertinent to this visit. Past Medical History:  has a past medical history of Anxiety, Arthritis associated with another disorder, CAD (coronary artery disease), Carotid artery occlusion, Family history of kidney stone, HTN (hypertension), Hyperkalemia, Hyperlipidemia, Hypertension, Nephrolithiasis, Neuropathy, PAD (peripheral artery disease) ([address] Peripheral vascular disease ([address] Personal history of MRSA (methicillin resistant Staphylococcus aureus), Seasonal allergies, and Vasculopathy. Past Surgical History:  has a past surgical history that includes Tonsillectomy and adenoidectomy; Percutaneous Transluminal Coronary Angio; Carotid endarterectomy (08); IR Femoral Popliteal Bypass Graft ( Dr Molina Escobar); Femoral-tibial Bypass Graft (07  Fernando Valero ); Balloon angioplasty, artery (11/23/10 Allie Muoñz); Aorto-femoral Bypass Graft ([facility]); Abdominal adhesion surgery (2011-[facility]); Coronary artery bypass graft (); and Axillary-femoral Bypass Graft (Bilateral, 2022). Assessment   Body Structures, Functions, Activity Limitations Requiring Skilled Therapeutic Intervention: Decreased functional mobility ; Decreased ADL status; Decreased ROM; Decreased cognition;Decreased tolerance to work activity; Decreased strength;Decreased endurance;Decreased balance  Assessment: Pt required tactile and verbal cues for task initiation.  Pt requires maxA x2 for bed mobility along with increased time and effort to complete. Pt was provided with verbal cues for sequencing with good return. Pt sat EOB for 10 mins with Bryn to maintain upright posture. Pt presents with significant posterior lean upon sitting upright and demos fair sitting balance. Pt fatigues easily requiring frequent rest breaks. Pt is limited by decreased BLE strength, decreased ROM, and decreased functional mobility. Pt would benefit from continued PT following discharge to address deficits. Specific Instructions for Next Treatment: progress activity, encourage out of bed. Therapy Prognosis: Guarded  Decision Making: Medium Complexity  Barriers to Learning: cognitition  Requires PT Follow-Up: Yes  Activity Tolerance  Activity Tolerance: Patient limited by fatigue;Patient limited by pain; Patient limited by endurance; Patient tolerated treatment well  Activity Tolerance Comments: Pt easily fatigued     Plan   Plan  Plan: 5-7 times per week  Specific Instructions for Next Treatment: progress activity, encourage out of bed. Current Treatment Recommendations: Strengthening, Balance training, Functional mobility training, Transfer training, Therapeutic activities  Safety Devices  Type of Devices: Call light within reach, Heels elevated for pressure relief, Patient at risk for falls, Left in bed, Nurse notified  Restraints  Restraints Initially in Place: No     Restrictions  Restrictions/Precautions  Restrictions/Precautions: Fall Risk, General Precautions  Required Braces or Orthoses?: No  Position Activity Restriction  Other position/activity restrictions: left  LE wound vac, art line right UE, urinary cathater, WBAT LLE (per vascular, Leopold Kinder, DO via perfect serve), up in chair, up w/ assist     Subjective   General  Chart Reviewed: Yes  Response To Previous Treatment: Patient with no complaints from previous session.   Family / Caregiver Present: No  Follows Commands: Within Functional Limits  General Comment  Comments: Pt and RN agreeable to PT. Subjective  Subjective: Pt presents with general weakness, decreased sitting balance and pain 8/10 in LLE. Cognition   Orientation  Overall Orientation Status: Impaired  Orientation Level: Oriented to person;Oriented to place;Oriented to situation;Disoriented to time  Cognition  Overall Cognitive Status: Exceptions  Following Commands: Follows one step commands consistently  Memory: Decreased recall of biographical Information  Safety Judgement: Decreased awareness of need for assistance  Problem Solving: Assistance required to identify errors made  Insights: Fully aware of deficits  Initiation: Requires cues for some  Sequencing: Requires cues for some     Objective   Bed mobility  Bridging: Maximum assistance;2 Person assistance  Rolling to Left: Maximum assistance;2 Person assistance  Rolling to Right: Maximum assistance;2 Person assistance  Supine to Sit: Maximum assistance;2 Person assistance  Sit to Supine: Maximum assistance;2 Person assistance  Bed Mobility Comments: support needed to complate mobilty in sitting  Transfers  Sit to Stand: Unable to assess  Stand to sit: Unable to assess        Balance  Posture: Fair  Sitting - Static: Fair  Sitting - Dynamic: Fair  Comments: Pt unable to stand for balance assessment due to profound weakness  A/AROM Exercises: Ankle pumps x10 BLE, Heel slides x10 BLE, Hip abduction x10 BLE, SLR x10 BLE. (AAROM) Gastroc stretch x2 30 seconds BLE.                  AM-PAC Score  AM-PAC Inpatient Mobility Raw Score : 10 (07/18/22 1548)  AM-PAC Inpatient T-Scale Score : 32.29 (07/18/22 1548)  Mobility Inpatient CMS 0-100% Score: 76.75 (07/18/22 1548)  Mobility Inpatient CMS G-Code Modifier : CL (07/18/22 1548)          Goals  Short Term Goals  Time Frame for Short term goals: 7 visits  Short term goal 1: Pt to completed bed movilty with min assist  Short term goal 2: Pt to scoot left/ rigth at EOB with WB activity as tolerated needed to prepare for standing  Short term goal 3: Pt to tolerate 30 minutes ther ex and activity  to improve functional abiltiy and tolerance  Short term goal 4: Pt to complete standing transfer from bed <> WC with Mod I  Long Term Goals  Time Frame for Long term goals : 14 visist  Long term goal 1: Pt to ambulate up to 150 ft with rolling walker with CGA  Patient Goals   Patient goals : to get stronger         Therapy Time   Individual Concurrent Group Co-treatment   Time In 1502         Time Out 1540         Minutes 38         Timed Code Treatment Minutes: Michelle 98, PTA

## 2022-07-18 NOTE — PROGRESS NOTES
Pt. Sister Mason Gallagher called for update on pt. All questions answered and update provided at this time.

## 2022-07-18 NOTE — PROGRESS NOTES
Division of Vascular Surgery         Progress Note      Name: Milana Lowe  MRN: [id number]         Overnight Events:     Nothing acute overnight. Subjective:     Patient seen and examined at the bedside. States he is worried about not being further along in his rehabilitation at this point. Pain is controlled. Tolerating diet without nausea or emesis. Urine output has significantly increased. Physical Exam:     Vitals:  BP (!) 106/49   Pulse 90   Temp 98.2 °F (36.8 °C) (Oral)   Resp 18   Ht 5' 10.5\" (1.791 m) Comment: no inital recorded height; 9/13/12 office visit  Wt 224 lb 10.4 oz (101.9 kg)   SpO2 95%   BMI 31.78 kg/m²     General appearance - Alert, disoriented  Mental status - following some commands  Head - normocephalic and atraumatic  Chest - no respiratory distress, no accessory muscle use   Heart - tachycardia, irregular rhythm  Abdomen - soft, non-tender, non-distended  Neurological - continues to have decreased sedation in lower extremities  Extremities -  edema to RLE, function improving, LLE fasciotomy incision with wound VAC in place with good suction, left groin full, however no surrounding erythema to suggest infection  Skin - BL groin incisions clean and dry, no signs of infection  Vascular Exam - palpable left DP, doppler signals; weak left PT, weak right PT       Imaging:   XR CHEST (SINGLE VIEW FRONTAL)    Result Date: 7/10/2022  Right IJ CVC catheter tip overlies the superior cavoatrial junction. Left IJ CVC catheter tip overlies the mid/distal SVC. No pneumothorax is seen. Mild left basilar atelectasis. XR CHEST PORTABLE    Result Date: 7/11/2022  Stable chest.     XR CHEST PORTABLE    Result Date: 7/11/2022  1. The deeper temporary dialysis catheter on the previous exam is been removed, with a new temporary dialysis catheter seen overlying the proximal superior vena cava. 2. Other tubes and lines as above.  3. Patchy infiltrates in the lungs bilaterally which may represent edema or pneumonia. XR CHEST PORTABLE    Result Date: 7/11/2022  1. The right jugular catheter appears in a stable position with the tip at the cavoatrial junction. 2. Endotracheal tube, nasogastric tube, and left jugular line are also redemonstrated. 3.  Some hazy opacities in the lungs which could be subtle edema or inflammatory. XR CHEST PORTABLE    Result Date: 7/10/2022  1. Tubes and right IJ line as detailed above. 2. Improvement in aeration of both lung since the prior study with residual airspace disease at the left upper and left lower lung zones as well as right parahilar region. No new focal airspace disease. 3. Prior median sternotomy and CABG.        Assessment:     63 y/o male critically ill s/p BL femoral artery cutdown with thrombectomy of aortobifemoral graft, LLE fasciotomy, bilateral lower extremity angiography      Plan:     Appreciate medical management per primary team  Cont neurovascular checks  Continue heparin gtt, will plan to switch to Eliquis today  Appreciate nephrology recommendations  Would like to know if they believe patient will require long-term dialysis  If patient would require long-term dialysis would plan on placing tunneled catheter sometime early this week  Appreciate cardiology recommendations  Vascular surgery would request we hold off on cardiac catheterization during this admission  Wound VAC in place, change 3 times weekly  Will change today    Iliana Tavarez DO PGY 4  General Surgery Resident  07/18/22 7:20 AM

## 2022-07-18 NOTE — CONSENT
Informed Consent for Blood Component Transfusion Note    I have discussed with the patient the rationale for blood component transfusion; its benefits in treating or preventing fatigue, organ damage, or death; and its risk which includes mild transfusion reactions, rare risk of blood borne infection, or more serious but rare reactions. I have discussed the alternatives to transfusion, including the risk and consequences of not receiving transfusion. The patient had an opportunity to ask questions and had agreed to proceed with transfusion of blood components.     Electronically signed by Mariangel Ortiz DO on 7/18/22 at 4:37 PM EDT

## 2022-07-18 NOTE — PROGRESS NOTES
mL injection, Once  sennosides-docusate sodium (SENOKOT-S) 8.6-50 MG tablet 2 tablet, Daily PRN  apixaban (ELIQUIS) tablet 5 mg, BID  fentaNYL (SUBLIMAZE) injection 50 mcg, Q1H PRN  midodrine (PROAMATINE) tablet 10 mg, TID WC  potassium bicarb-citric acid (EFFER-K) effervescent tablet 20 mEq, TID  0.9 % sodium chloride infusion, PRN  oxyCODONE (ROXICODONE) immediate release tablet 5 mg, Q6H PRN  metoprolol succinate (TOPROL XL) extended release tablet 12.5 mg, BID  famotidine (PEPCID) tablet 20 mg, Daily  atorvastatin (LIPITOR) tablet 10 mg, Daily  tamsulosin (FLOMAX) capsule 0.4 mg, Daily  insulin lispro (HUMALOG) injection vial 0-18 Units, 4x Daily AC & HS  melatonin tablet 5 mg, Nightly  QUEtiapine (SEROQUEL) tablet 50 mg, Nightly  heparin (porcine) injection 1,300 Units, PRN  heparin (porcine) injection 1,400 Units, PRN  aspirin chewable tablet 81 mg, Daily  sodium chloride flush 0.9 % injection 5-40 mL, PRN  polyethylene glycol (GLYCOLAX) packet 17 g, Daily  ondansetron (ZOFRAN) injection 4 mg, Q6H PRN  glucose chewable tablet 16 g, PRN  glucagon (rDNA) injection 1 mg, PRN          LABS      CBC:   Recent Labs     07/16/22  0406 07/16/22  0511 07/17/22  0612 07/18/22  0616   WBC 13.5*  --  16.3* 18.0*   RBC 2.05*  --  2.32* 2.15*   HGB 6.5* 6.5* 7.3* 6.9*   HCT 20.2* 20.0* 22.5* 21.3*   MCV 98.5  --  97.0 99.1   MCH 31.7  --  31.5 32.1   MCHC 32.2  --  32.4 32.4   RDW 15.9*  --  16.0* 16.4*   PLT See Reflexed IPF Result  --  248 301   MPV  --   --  12.7 12.6      BMP:   Recent Labs     07/16/22  0406 07/17/22  0612 07/18/22  0320    138 133*   K 3.6* 3.1* 3.6*    102 98   CO2 20 22 21   BUN 41* 38* 50*   CREATININE 1.75* 1.81* 2.27*   GLUCOSE 141* 140* 182*   CALCIUM 8.3* 7.1* 7.9*        PHOSPHORUS:    Recent Labs     07/16/22  0406 07/17/22  0612 07/18/22  0320   PHOS 4.7* 3.5 4.2     MAGNESIUM:   Recent Labs     07/16/22  0406 07/17/22  0612 07/18/22  0320   MG 2.1 1.6 1.8     SPEP:   Lab Results Component Value Date/Time    PROT 5.3 07/14/2022 06:31 AM    PROT 7.1 07/25/2011 09:50 AM     C3:   Lab Results   Component Value Date    C3 129 07/17/2022     C4:   Lab Results   Component Value Date    C4 21 07/17/2022     URINE CREATININE:    Lab Results   Component Value Date/Time    LABCREA 141.6 07/10/2022 12:28 PM     URINE EOSINOPHILS:   Lab Results   Component Value Date/Time    UREO NONE SEEN 07/10/2022 12:28 PM     URINE PROTEIN:  No results found for: TPU  URINALYSIS:  U/A:   Lab Results   Component Value Date/Time    NITRU POSITIVE 07/10/2022 12:28 PM    COLORU BROWN 07/10/2022 12:28 PM    PHUR 6.5 07/10/2022 12:28 PM    WBCUA 10 TO 20 07/10/2022 12:28 PM    RBCUA 20 TO 50 07/10/2022 12:28 PM    MUCUS 1+ 06/30/2012 09:40 PM    TRICHOMONAS NOT REPORTED 06/30/2012 09:40 PM    YEAST NOT REPORTED 06/30/2012 09:40 PM    BACTERIA MODERATE 07/10/2022 12:28 PM    SPECGRAV 1.025 07/10/2022 12:28 PM    LEUKOCYTESUR TRACE 07/10/2022 12:28 PM    UROBILINOGEN Normal 07/10/2022 12:28 PM    BILIRUBINUR NEGATIVE  Verified by ictotest. 07/10/2022 12:28 PM    BILIRUBINUR NEGATIVE 06/06/2012 05:45 AM    GLUCOSEU NEGATIVE 07/10/2022 12:28 PM    GLUCOSEU NEGATIVE 06/06/2012 05:45 AM    KETUA NEGATIVE 07/10/2022 12:28 PM    AMORPHOUS 3+ 07/10/2022 12:28 PM           ASSESSMENT      1. Acute Kidney Injury: ATN with contrast nephropathy, circulatory shock, and hypotension requiring pressor support and now postop. Baseline creatinine seems to be around 1-1.2 mg/dl. Now on intermittent dialysis from yesterday. 2. Hyperkalemia- likely due to underlying renal dysfunction. Improved with HD.  3. 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 4. Rhabdomyolysis  5. Hypotension-requiring pressor support.   6. Total aortobifemoral occulusion s/p Bilateral common femoral arterial access via open cutdown, thrombectomy of the aortobifemoral bypass graft thromboendarterectomy of the common femoral profundofemoral and SFA bilaterally with bovine pericardial patch angioplasty on 7/8/2021  7. Left lower extremity compartment syndrome s/p fasciotomy. 8. U/o gradually improved9. Anemia - 6.9 okay for transfusion from renal standpoint      PLAN      1. Holding hemodialysis today   2. Okay to transfuse 1 unit of blood from renal standpoint  3. Follow up labs ordered. 4.  We will start patient on 20 mg of torsemide daily      Please do not hesitate to call with questions.     This note is created with the assistance of a speech-recognition program. While intending to generate a document that actually reflects the content of the visit, no guarantees can be provided that every mistake has been identified and corrected by editing    Electronically signed by Marie Pollard MD on 7/18/2022 at 9:20 AM

## 2022-07-19 PROBLEM — D64.9 ANEMIA: Status: ACTIVE | Noted: 2022-07-19

## 2022-07-19 PROBLEM — Z86.73 H/O: CVA (CEREBROVASCULAR ACCIDENT): Status: ACTIVE | Noted: 2022-07-19

## 2022-07-19 LAB
ANION GAP SERPL CALCULATED.3IONS-SCNC: 13 MMOL/L (ref 9–17)
BUN BLDV-MCNC: 59 MG/DL (ref 8–23)
C-REACTIVE PROTEIN: 127.2 MG/L (ref 0–5)
CALCIUM IONIZED: 1.05 MMOL/L (ref 1.13–1.33)
CALCIUM SERPL-MCNC: 8.1 MG/DL (ref 8.6–10.4)
CHLORIDE BLD-SCNC: 97 MMOL/L (ref 98–107)
CO2: 24 MMOL/L (ref 20–31)
COMPLEMENT C3: 130 MG/DL (ref 90–180)
COMPLEMENT C3: 139 MG/DL (ref 90–180)
COMPLEMENT C3: 140 MG/DL (ref 90–180)
COMPLEMENT C3: 141 MG/DL (ref 90–180)
CREAT SERPL-MCNC: 2.3 MG/DL (ref 0.7–1.2)
CULTURE: NO GROWTH
GFR AFRICAN AMERICAN: 35 ML/MIN
GFR NON-AFRICAN AMERICAN: 29 ML/MIN
GFR SERPL CREATININE-BSD FRML MDRD: ABNORMAL ML/MIN/{1.73_M2}
GLUCOSE BLD-MCNC: 121 MG/DL (ref 75–110)
GLUCOSE BLD-MCNC: 128 MG/DL (ref 75–110)
GLUCOSE BLD-MCNC: 130 MG/DL (ref 70–99)
GLUCOSE BLD-MCNC: 156 MG/DL (ref 75–110)
GLUCOSE BLD-MCNC: 185 MG/DL (ref 75–110)
GLUCOSE BLD-MCNC: 231 MG/DL (ref 75–110)
HCT VFR BLD CALC: 23 % (ref 40.7–50.3)
HEMOGLOBIN: 7.8 G/DL (ref 13–17)
HEPATITIS B CORE TOTAL ANTIBODY: NONREACTIVE
MAGNESIUM: 1.7 MG/DL (ref 1.6–2.6)
MCH RBC QN AUTO: 33.1 PG (ref 25.2–33.5)
MCHC RBC AUTO-ENTMCNC: 33.9 G/DL (ref 28.4–34.8)
MCV RBC AUTO: 97.5 FL (ref 82.6–102.9)
NRBC AUTOMATED: 0.1 PER 100 WBC
PDW BLD-RTO: 17.1 % (ref 11.8–14.4)
PHOSPHORUS: 4.7 MG/DL (ref 2.5–4.5)
PLATELET # BLD: 464 K/UL (ref 138–453)
PMV BLD AUTO: 12.2 FL (ref 8.1–13.5)
POTASSIUM SERPL-SCNC: 4.1 MMOL/L (ref 3.7–5.3)
RBC # BLD: 2.36 M/UL (ref 4.21–5.77)
SODIUM BLD-SCNC: 134 MMOL/L (ref 135–144)
SPECIMEN DESCRIPTION: NORMAL
WBC # BLD: 13.4 K/UL (ref 3.5–11.3)

## 2022-07-19 PROCEDURE — 6370000000 HC RX 637 (ALT 250 FOR IP): Performed by: STUDENT IN AN ORGANIZED HEALTH CARE EDUCATION/TRAINING PROGRAM

## 2022-07-19 PROCEDURE — 36430 TRANSFUSION BLD/BLD COMPNT: CPT

## 2022-07-19 PROCEDURE — 86160 COMPLEMENT ANTIGEN: CPT

## 2022-07-19 PROCEDURE — 97535 SELF CARE MNGMENT TRAINING: CPT

## 2022-07-19 PROCEDURE — 99232 SBSQ HOSP IP/OBS MODERATE 35: CPT | Performed by: INTERNAL MEDICINE

## 2022-07-19 PROCEDURE — 2060000000 HC ICU INTERMEDIATE R&B

## 2022-07-19 PROCEDURE — 97110 THERAPEUTIC EXERCISES: CPT

## 2022-07-19 PROCEDURE — 6370000000 HC RX 637 (ALT 250 FOR IP): Performed by: INTERNAL MEDICINE

## 2022-07-19 PROCEDURE — 82947 ASSAY GLUCOSE BLOOD QUANT: CPT

## 2022-07-19 PROCEDURE — 83735 ASSAY OF MAGNESIUM: CPT

## 2022-07-19 PROCEDURE — 99232 SBSQ HOSP IP/OBS MODERATE 35: CPT | Performed by: FAMILY MEDICINE

## 2022-07-19 PROCEDURE — 86140 C-REACTIVE PROTEIN: CPT

## 2022-07-19 PROCEDURE — 82330 ASSAY OF CALCIUM: CPT

## 2022-07-19 PROCEDURE — 97530 THERAPEUTIC ACTIVITIES: CPT

## 2022-07-19 PROCEDURE — P9016 RBC LEUKOCYTES REDUCED: HCPCS

## 2022-07-19 PROCEDURE — 36415 COLL VENOUS BLD VENIPUNCTURE: CPT

## 2022-07-19 PROCEDURE — 6370000000 HC RX 637 (ALT 250 FOR IP): Performed by: NURSE PRACTITIONER

## 2022-07-19 PROCEDURE — 80048 BASIC METABOLIC PNL TOTAL CA: CPT

## 2022-07-19 PROCEDURE — 86900 BLOOD TYPING SEROLOGIC ABO: CPT

## 2022-07-19 PROCEDURE — 84100 ASSAY OF PHOSPHORUS: CPT

## 2022-07-19 PROCEDURE — 85027 COMPLETE CBC AUTOMATED: CPT

## 2022-07-19 PROCEDURE — 6370000000 HC RX 637 (ALT 250 FOR IP): Performed by: HEALTH CARE PROVIDER

## 2022-07-19 PROCEDURE — 6370000000 HC RX 637 (ALT 250 FOR IP): Performed by: CLINICAL NURSE SPECIALIST

## 2022-07-19 RX ORDER — QUETIAPINE FUMARATE 25 MG/1
50 TABLET, FILM COATED ORAL ONCE
Status: COMPLETED | OUTPATIENT
Start: 2022-07-19 | End: 2022-07-19

## 2022-07-19 RX ORDER — SODIUM CHLORIDE 9 MG/ML
INJECTION, SOLUTION INTRAVENOUS PRN
Status: DISCONTINUED | OUTPATIENT
Start: 2022-07-19 | End: 2022-07-21 | Stop reason: HOSPADM

## 2022-07-19 RX ADMIN — Medication 5 MG: at 22:39

## 2022-07-19 RX ADMIN — OXYCODONE 5 MG: 5 TABLET ORAL at 18:35

## 2022-07-19 RX ADMIN — MIDODRINE HYDROCHLORIDE 10 MG: 5 TABLET ORAL at 08:05

## 2022-07-19 RX ADMIN — APIXABAN 5 MG: 5 TABLET, FILM COATED ORAL at 08:05

## 2022-07-19 RX ADMIN — TORSEMIDE 20 MG: 20 TABLET ORAL at 08:05

## 2022-07-19 RX ADMIN — METOPROLOL SUCCINATE 12.5 MG: 25 TABLET, FILM COATED, EXTENDED RELEASE ORAL at 22:12

## 2022-07-19 RX ADMIN — OXYCODONE 5 MG: 5 TABLET ORAL at 12:31

## 2022-07-19 RX ADMIN — INSULIN LISPRO 3 UNITS: 100 INJECTION, SOLUTION INTRAVENOUS; SUBCUTANEOUS at 22:40

## 2022-07-19 RX ADMIN — ASPIRIN 81 MG: 81 TABLET, CHEWABLE ORAL at 08:05

## 2022-07-19 RX ADMIN — INSULIN LISPRO 3 UNITS: 100 INJECTION, SOLUTION INTRAVENOUS; SUBCUTANEOUS at 11:45

## 2022-07-19 RX ADMIN — FAMOTIDINE 20 MG: 20 TABLET, FILM COATED ORAL at 08:05

## 2022-07-19 RX ADMIN — TAMSULOSIN HYDROCHLORIDE 0.4 MG: 0.4 CAPSULE ORAL at 08:05

## 2022-07-19 RX ADMIN — POTASSIUM BICARBONATE 20 MEQ: 782 TABLET, EFFERVESCENT ORAL at 08:05

## 2022-07-19 RX ADMIN — APIXABAN 5 MG: 5 TABLET, FILM COATED ORAL at 22:12

## 2022-07-19 RX ADMIN — QUETIAPINE FUMARATE 50 MG: 25 TABLET ORAL at 22:39

## 2022-07-19 RX ADMIN — ATORVASTATIN CALCIUM 10 MG: 10 TABLET, FILM COATED ORAL at 08:05

## 2022-07-19 RX ADMIN — OXYCODONE 5 MG: 5 TABLET ORAL at 03:13

## 2022-07-19 RX ADMIN — METOPROLOL SUCCINATE 12.5 MG: 25 TABLET, FILM COATED, EXTENDED RELEASE ORAL at 08:05

## 2022-07-19 RX ADMIN — MIDODRINE HYDROCHLORIDE 10 MG: 5 TABLET ORAL at 12:10

## 2022-07-19 RX ADMIN — QUETIAPINE FUMARATE 50 MG: 25 TABLET ORAL at 03:13

## 2022-07-19 ASSESSMENT — ENCOUNTER SYMPTOMS
BLOOD IN STOOL: 0
SHORTNESS OF BREATH: 0
DIARRHEA: 0
COUGH: 0
CONSTIPATION: 0
NAUSEA: 0
CHEST TIGHTNESS: 0
VOMITING: 0
ABDOMINAL PAIN: 0
WHEEZING: 0

## 2022-07-19 ASSESSMENT — PAIN DESCRIPTION - ORIENTATION
ORIENTATION: LEFT

## 2022-07-19 ASSESSMENT — PAIN SCALES - GENERAL
PAINLEVEL_OUTOF10: 7
PAINLEVEL_OUTOF10: 7
PAINLEVEL_OUTOF10: 8
PAINLEVEL_OUTOF10: 7

## 2022-07-19 ASSESSMENT — PAIN DESCRIPTION - DESCRIPTORS
DESCRIPTORS: DISCOMFORT
DESCRIPTORS: STABBING;THROBBING

## 2022-07-19 ASSESSMENT — PAIN DESCRIPTION - LOCATION
LOCATION: LEG
LOCATION: GENERALIZED

## 2022-07-19 NOTE — PROGRESS NOTES
Physical Therapy  Facility/Department: [facility] CAR 2  Daily Treatment Note     Name: Asad Lopez  : 1960  MRN: [id number]  Date of Service: 2022    Discharge Recommendations:  Patient would benefit from continued therapy after discharge   PT Equipment Recommendations  Equipment Needed: No      Patient Diagnosis(es): The primary encounter diagnosis was Failing vascular bypass graft, initial encounter. Diagnoses of Left leg numbness, Acute respiratory failure with hypercapnia ([address] and Lactic acidosis were also pertinent to this visit. Past Medical History:  has a past medical history of Anxiety, Arthritis associated with another disorder, CAD (coronary artery disease), Carotid artery occlusion, Family history of kidney stone, HTN (hypertension), Hyperkalemia, Hyperlipidemia, Hypertension, Nephrolithiasis, Neuropathy, PAD (peripheral artery disease) ([address] Peripheral vascular disease ([address] Personal history of MRSA (methicillin resistant Staphylococcus aureus), Seasonal allergies, and Vasculopathy. Past Surgical History:  has a past surgical history that includes Tonsillectomy and adenoidectomy; Percutaneous Transluminal Coronary Angio; Carotid endarterectomy (08); IR Femoral Popliteal Bypass Graft ( Dr Rosibel Baird); Femoral-tibial Bypass Graft (07  Verito Martinez ); Balloon angioplasty, artery (11/23/10 Allie Muñoz); Aorto-femoral Bypass Graft ([facility]); Abdominal adhesion surgery (2011-[facility]); Coronary artery bypass graft (); and Axillary-femoral Bypass Graft (Bilateral, 2022). Assessment   Body Structures, Functions, Activity Limitations Requiring Skilled Therapeutic Intervention: Decreased functional mobility ; Decreased ADL status; Decreased ROM; Decreased cognition;Decreased tolerance to work activity; Decreased strength;Decreased endurance;Decreased balance  Assessment: Pt requires maxA x2 for bed mobility along with increased time and effort to complete. Pt able to sit EOB for 15 mins with CGA while performing dynamic activities with no LOB noted. Pt attempted STS transfer with maxA x2 using a RW but was unable to complete due to significant posterior lean. Pt performed STS transfer with maxA x2 using the SS and was able to complete 75% ROM. Pt demos a poor standing tolerance due to decreased BLE strength. Pt fatigues easily and requires frequent rest breaks t/o session. Pt is limited by decreased ROM, decreased balance, and decreased functional mobility. Pt is currently unsafe to return to prior living arrangements due to high fall risk and would benefit from continued PT following discharge to address functional deficits. Specific Instructions for Next Treatment: progress activity, encourage out of bed. Therapy Prognosis: Guarded  Decision Making: Medium Complexity  Barriers to Learning: cognitition  Requires PT Follow-Up: Yes  Activity Tolerance  Activity Tolerance: Patient limited by fatigue;Patient limited by pain; Patient limited by endurance; Patient tolerated treatment well  Activity Tolerance Comments: Pt easily fatigued     Plan   Plan  Plan: 5-7 times per week  Specific Instructions for Next Treatment: progress activity, encourage out of bed.   Current Treatment Recommendations: Strengthening, Balance training, Functional mobility training, Transfer training, Therapeutic activities  Safety Devices  Type of Devices: Call light within reach, Heels elevated for pressure relief, Patient at risk for falls, Left in bed, Nurse notified  Restraints  Restraints Initially in Place: No     Restrictions  Restrictions/Precautions  Restrictions/Precautions: Fall Risk, General Precautions  Required Braces or Orthoses?: No  Position Activity Restriction  Other position/activity restrictions: left  LE wound vac, art line right UE, urinary cathater, WBAT LLE (per vascular, Atul Otero, DO via perfect serve), up in chair, up w/ assist     Subjective General  Chart Reviewed: Yes  Response To Previous Treatment: Patient with no complaints from previous session. Family / Caregiver Present: No  Follows Commands: Within Functional Limits  Other (Comment): Pt awake alert and in agreement with PT intervention, Co tx with OT  General Comment  Comments: Pt and RN agreeable to PT. Pt returned to supine in bed post PT. Subjective  Subjective: Pt presents with general weakness, decreased sitting balance and pain 8/10 in LLE. Cognition   Orientation  Overall Orientation Status: Impaired  Orientation Level: Oriented to person;Oriented to place;Oriented to situation;Disoriented to time  Cognition  Overall Cognitive Status: Exceptions  Following Commands: Follows one step commands consistently  Attention Span: Appears intact  Memory: Decreased recall of biographical Information  Safety Judgement: Decreased awareness of need for assistance  Problem Solving: Assistance required to identify errors made  Insights: Fully aware of deficits  Initiation: Requires cues for some  Sequencing: Requires cues for some     Objective   Bed mobility  Supine to Sit: Maximum assistance;2 Person assistance  Sit to Supine: Maximum assistance;2 Person assistance  Scooting: Maximal assistance;2 Person assistance  Transfers  Sit to Stand: Maximum Assistance;2 Person Assistance  Stand to sit: Maximum Assistance;2 Person Assistance        Balance  Posture: Fair  Sitting - Static: Fair  Sitting - Dynamic: Fair  Standing - Static: Poor  Standing - Dynamic: Poor  Comments: Standing [id number] assessed with RW, pt unable to compete full ROM. 2nd attempt with SS , poor standing tolerance. A/AROM Exercises: Ankle pumps x10 BLE, , Hip abduction x10 BLE, SLR x10 BLE. (AAROM) Gastroc stretch x2 30 seconds BLE.  LAQs x10 LLE                              AM-PAC Score  AM-PAC Inpatient Mobility Raw Score : 10 (07/19/22 1141)  AM-PAC Inpatient T-Scale Score : 32.29 (07/19/22 1141)  Mobility Inpatient CMS 0-100% Score: 76.75 (07/19/22 1141)  Mobility Inpatient CMS G-Code Modifier : CL (07/19/22 1141)          Goals  Short Term Goals  Time Frame for Short term goals: 7 visits  Short term goal 1: Pt to completed bed movilty with min assist  Short term goal 2: Pt to scoot left/ rigth at EOB with WB activity as tolerated needed to prepare for standing  Short term goal 3: Pt to tolerate 30 minutes ther ex and activity  to improve functional abiltiy and tolerance  Short term goal 4: Pt to complete standing transfer from bed <> WC with Mod I  Long Term Goals  Time Frame for Long term goals : 14 visist  Long term goal 1: Pt to ambulate up to 150 ft with rolling walker with CGA  Patient Goals   Patient goals : to get stronger           Therapy Time   Individual Concurrent Group Co-treatment   Time In 1010     1010   Time Out 1050     1033   Minutes 40     23   Timed Code Treatment Minutes: Blu 17, PTA

## 2022-07-19 NOTE — PROGRESS NOTES
nearly occlusive on the left-hand side and he right SFA was also occluded. Dr. June Lund, with Vascular Surgery, completed an emergent aorta bifemoral graft endarterectomy and bilateral common femoral embolectomy, bilateral lower extremity arterial ectomy, aortography, right limb bypass graft stent and bilateral femoral bovine patches    A fasciotomy of the left lower leg was also performed per vascular surgery and intraoperatively orthopedic surgery was consulted for possible left thigh compartment syndrome. [place] needle assessment was performed and found that the thigh compartment was normal and did not require any acute intervention. An echocardiogram was completed and was shown the patient had ejection fraction of 15%. He had also been initiated on a heparin drip for elevated troponin and atrial fibrillation. Nephrology was subsequently consulted for oliguria and CAROLYN. The patient received CVVHD from 7/10 to 7/15/2022 and was intubated from 7/8 to 7/12/2022 while intermittently requiring vasopressors from 7/7 to 7/13/2022. It appears that the patient has had leukocytosis since admission. All cultures have remained negative for bacterial growth. He was on rocephin from 7/11/22 through 7/17/22 for UTI. I was unable to find a positive urine culture. Infectious disease has been consulted for continued leukocytosis    CURRENT EVALUATION 7/19/2022    Blood pressure 115/63, pulse 91, temperature 98.6 °F (37 °C), temperature source Oral, resp. rate 14, height 5' 10.5\" (1.791 m), weight 217 lb 6 oz (98.6 kg), SpO2 98 %. Afebrile  Vital signs stable on room air    Patient was alert and oriented upon evaluation. He denied any acute issues other than some pain in his sacral ulcer. Pt has a stage 2 sacrococcygeal ulcer with some serous discharge. Continue using protective barrier creams. No signs of infection/inflammation seen at fasciotomy site with wound vac. Leokocytosis is trending down. Continue monitoring off antibiotics. No acute issues noted at this time  Discussed with RN    Labs, X rays reviewed: 2022    BUN:50  Cr:2.27    WBC: 16.3-->18.0-->13.4  Hb:6.9-->8.2-->7.8  Plat: 301-->464    CRP: 127  Sed rate: 37    Cultures:  Urine:  22: No growth  Blood:  22: No growth  Sputum :    Wound:    MRSA Nares:      Imagin22        Discussed with patient, RN, IM. I have personally reviewed the past medical history, past surgical history, medications, social history, and family history, and I have updated the database accordingly. Past Medical History:     Past Medical History:   Diagnosis Date    Anxiety     Arthritis associated with another disorder     CAD (coronary artery disease)     with history of multiple MI    Carotid artery occlusion     Family history of kidney stone     HTN (hypertension)     Hyperkalemia     Hyperlipidemia     Hypertension     Nephrolithiasis     multiple times    Neuropathy     PAD (peripheral artery disease) (HCC)     Peripheral vascular disease (HCC)     Personal history of MRSA (methicillin resistant Staphylococcus aureus)     Seasonal allergies     Vasculopathy        Past Surgical  History:     Past Surgical History:   Procedure Laterality Date    ABDOMINAL ADHESION SURGERY  2011-[facility]    Reexploration of abdominal wound and reclosure of the abdominal wound with fascial sutures and retention sutures as well    AORTO-FEMORAL BYPASS GRAFT  2011-[facility]    ABF bypass of 16x8 PTFE graft. This is an end-to-side anastomosis proximally    AXILLARY-FEMORAL BYPASS GRAFT Bilateral 2022    AORTA BIFEMORAL GRAFT ENDARTERECTOMY, BILATERAL COMMON FEMORAL EMBOLECTOMY, BILATERAL LOWER EXTREMEITY ARTERECTOMY, AORTAGRAPHY, RIGHT LIMB OF BYPASS GRAFT STENT, BILATERAL FEMORAL BOVINE PATCHES performed by Smitha Ortiz MD at [place] [id number], ARTERY  11/23/10 [address]    1.  Placement of 5 sheath in the left femoral artery with duplex guidance. 2. Left Iliac arteriogram. 3. Attempted ballon angioplasty left iliac artery occlusion. CAROTID ENDARTERECTOMY  08    Left carotid endarterectomy using shunt dacron patch aplasty    CORONARY ARTERY BYPASS GRAFT      CABG x3     FEMORAL-TIBIAL BYPASS GRAFT  07  [facility]     Right femoral to posterior tibial artery bypass graft with in situ saphenous vein graft    IR FEMORAL POPLITEAL BYPASS GRAFT   Dr Atul Pabon    1. Exploration of right fem-pop bypass graft. 2.Thrombectomy of right fem-post-tib saph vein graft. 3. Replacement of femoral to popliteal bypass graft from midthigh to near the anastomosis by reversed greater saph vein harvested from left leg    PTCA      PTCA with stent x6    TONSILLECTOMY AND ADENOIDECTOMY         Medications:      OLANZapine (ZyPREXA) in sterile water IntraMUSCular  5 mg IntraMUSCular Once    apixaban  5 mg Oral BID    torsemide  20 mg Oral Daily    midodrine  10 mg Oral TID WC    metoprolol succinate  12.5 mg Oral BID    famotidine  20 mg Oral Daily    atorvastatin  10 mg Oral Daily    tamsulosin  0.4 mg Oral Daily    insulin lispro  0-18 Units SubCUTAneous 4x Daily AC & HS    melatonin  5 mg Oral Nightly    QUEtiapine  50 mg Oral Nightly    aspirin  81 mg Oral Daily    polyethylene glycol  17 g Oral Daily       Social History:     Social History     Socioeconomic History    Marital status:      Spouse name: Not on file    Number of children: 1    Years of education: 14    Highest education level: Not on file   Occupational History    Occupation: disabled      Comment: [employer]SI    Tobacco Use    Smoking status: Every Day     Packs/day: 2.00     Years: 33.00     Pack years: 66.00     Types: Cigarettes     Last attempt to quit: 3/10/2011     Years since quittin.3    Smokeless tobacco: Never    Tobacco comments:     quite 2011    Substance and Sexual Activity    Alcohol use:  No Alcohol/week: 0.0 standard drinks    Drug use: No    Sexual activity: Yes     Partners: Female     Comment: with has trouble   Other Topics Concern    Not on file   Social History Narrative    Not on file     Social Determinants of Health     Financial Resource Strain: Not on file   Food Insecurity: Not on file   Transportation Needs: Not on file   Physical Activity: Not on file   Stress: Not on file   Social Connections: Not on file   Intimate Partner Violence: Not on file   Housing Stability: Not on file       Family History:     Family History   Problem Relation Age of Onset    Hypertension Father     Heart Disease Sister     Hypertension Sister     Cancer Mother 28        breast cancer        Allergies:   Faviola [morphine sulfate], Methadone, and Morphine     Review of Systems:   Constitutional: No fevers or chills. No systemic complaints  Head: No headaches  Eyes: No double vision or blurry vision. No conjunctival inflammation. ENT: No sore throat or runny nose. . No hearing loss, tinnitus or vertigo. Cardiovascular: No chest pain or palpitations. No shortness of breath. No DELATORRE  Lung: No shortness of breath or cough. No sputum production  Abdomen: No nausea, vomiting, diarrhea, or abdominal pain. Vanessa Raddle No cramps. Genitourinary: No increased urinary frequency, or dysuria. No hematuria. No suprapubic or CVA pain  Musculoskeletal: LLE pain  Hematologic: No bleeding or bruising. Neurologic: No headache, weakness, numbness, or tingling. Integument: No rash, no ulcers. Psychiatric: No depression. Endocrine: No polyuria, no polydipsia, no polyphagia.     Physical Examination :   Patient Vitals for the past 8 hrs:   BP Temp Temp src Pulse Resp SpO2 Weight   07/19/22 0701 115/63 -- -- 91 -- -- --   07/19/22 0640 (!) 160/62 98.6 °F (37 °C) Oral 95 14 -- --   07/19/22 0343 -- -- -- -- 14 -- --   07/19/22 0315 -- -- -- -- -- -- 217 lb 6 oz (98.6 kg)   07/19/22 0310 111/66 98.9 °F (37.2 °C) Oral 91 18 98 % --   07/19/22 0155 -- -- -- 89 -- -- --     General Appearance: Awake, alert, and in no apparent distress  Head:  Normocephalic, no trauma  Eyes: Pupils equal, round, reactive to light and accommodation; extraocular movements intact; sclera anicteric; conjunctivae pink. No embolic phenomena. ENT: Oropharynx clear, without erythema, exudate, or thrush. No tenderness of sinuses. Mouth/throat: mucosa pink and moist. No lesions. Dentition in good repair. Neck:Supple, without lymphadenopathy. Thyroid normal, No bruits. Pulmonary/Chest: Clear to auscultation, without wheezes, rales, or rhonchi. No dullness to percussion. Cardiovascular: Regular rate and rhythm without murmurs, rubs, or gallops. Abdomen: Soft, non tender. Bowel sounds normal. No organomegaly  All four Extremities: No cyanosis, clubbing, edema, or effusions. Fasciotomy site LLE with wound vac in place  Neurologic: No gross sensory or motor deficits. Skin: Warm and dry. Pale  Fasciotomy site LLE with wound vac in place    Medical Decision Making -Laboratory:   I have independently reviewed/ordered the following labs:    CBC with Differential:   Recent Labs     07/18/22  0616 07/18/22  1600 07/19/22  0729   WBC 18.0*  --  13.4*   HGB 6.9* 8.2* 7.8*   HCT 21.3* 24.6* 23.0*     --  464*     BMP:   Recent Labs     07/18/22  0320 07/19/22  0651   * 134*   K 3.6* 4.1   CL 98 97*   CO2 21 24   BUN 50* 59*   CREATININE 2.27* 2.30*   MG 1.8 1.7     Hepatic Function Panel: No results for input(s): PROT, LABALBU, BILIDIR, IBILI, BILITOT, ALKPHOS, ALT, AST in the last 72 hours. No results for input(s): RPR in the last 72 hours. No results for input(s): HIV in the last 72 hours. No results for input(s): BC in the last 72 hours.   Lab Results   Component Value Date/Time    MUCUS 1+ 06/30/2012 09:40 PM    PH 7.37 02/12/2011 03:46 AM    RBC 2.36 07/19/2022 07:29 AM    RBC 4.69 06/06/2012 07:29 PM    TRICHOMONAS NOT REPORTED 06/30/2012 09:40 PM    WBC 13.4 [phone] - Office: [phone]

## 2022-07-19 NOTE — CARE COORDINATION
Spoke to Dr Prema Flores. She requested precert to be started.  Notified Ronen Marquez at [employer]

## 2022-07-19 NOTE — PROGRESS NOTES
Occupational Therapy  Facility/Department: [facility] CAR 2  Occupational Therapy Daily Treatment Note    Name: Milana Lowe  : 1960  MRN: [id number]  Date of Service: 2022    Discharge Recommendations:  Further therapy recommended at discharge. The patient should be able to tolerate at least 3 hours of therapy per day over 5 days or 15 hours over 7 days. This patient may benefit from a Physical Medicine and Rehab consult. Assessment   Performance deficits / Impairments: Decreased functional mobility ; Decreased ADL status; Decreased strength;Decreased cognition;Decreased endurance;Decreased balance;Decreased high-level IADLs;Decreased safe awareness;Decreased ROM; Decreased fine motor control  Prognosis: Fair  REQUIRES OT FOLLOW-UP: Yes  Activity Tolerance  Activity Tolerance: Patient Tolerated treatment well;Patient limited by pain  Activity Tolerance Comments: decreased strength        Plan   Plan  Times per Week: 3-5x  Current Treatment Recommendations: Strengthening, ROM, Balance training, Functional mobility training, Endurance training, Gait training, Cognitive reorientation, Self-Care / ADL     Restrictions  Restrictions/Precautions  Restrictions/Precautions: Fall Risk, General Precautions  Required Braces or Orthoses?: No  Position Activity Restriction  Other position/activity restrictions: left  LE wound vac, urinary cathater, WBAT LLE (per vascular, Lurene Jeffrey, DO via perfect serve)    Subjective   General  Chart Reviewed: Yes  Family / Caregiver Present: No  Diagnosis: Lactic acidosis, renal failure, LLE weakness, UTI, LLE fasciatomy on   General Comment  Comments: Pt and RN agreeable to therapy this day. Pt supine in bed at start of session and retired to bed at session end with bed alarm on, call light in reach and all needs met.  Pt c/o 8/10 pain in LLE            Objective           Safety Devices  Type of Devices: Call light within reach;Gait belt;Left in bed;Patient at risk for falls; Bed alarm in place;Nurse notified  Restraints  Restraints Initially in Place: No  Balance  Sitting: Without support (SBA static/dynamic sitting at EOB tolerating approx 15 min utilizing 0-2 hand support)  Standing: With support (Max A x2 utilizing SS tolerating approx 20 sec demo BLE buckling and decreased strength)        ADL  Grooming: Modified independent   Grooming Skilled Clinical Factors: Mod I for face washing supine in bed  LE Dressing: Maximum assistance;Setup  LE Dressing Skilled Clinical Factors: Max A to doff/don B socks and Max A to doff/don R shoe and AFO  Additional Comments: Throughout session pt limited per pain, fatigue and decreased strength     Activity Tolerance  Activity Tolerance: Patient limited by fatigue;Patient limited by pain; Patient limited by endurance; Patient tolerated treatment well  Activity Tolerance Comments: Pt easily fatigued  Bed mobility  Supine to Sit: Maximum assistance;2 Person assistance  Sit to Supine: 2 Person assistance;Maximum assistance  Scooting: Maximal assistance  Bed Mobility Comments: HOB elevated, utilizing bed rails  Transfers  Sit to stand: Maximum assistance;2 Person assistance  Stand to sit: 2 Person assistance;Maximum assistance  Transfer Comments: sit><Stand attempted with RW unsuccessful unable to clear EOB with pt buttocks sliding off of bed. Max A x2 sit><stand with SS     Cognition  Overall Cognitive Status: Exceptions  Arousal/Alertness: Appropriate responses to stimuli  Following Commands: Follows multistep commands with repitition; Follows multistep commands with increased time  Attention Span: Appears intact  Memory: Decreased recall of biographical Information  Safety Judgement: Decreased awareness of need for safety;Decreased awareness of need for assistance  Problem Solving: Assistance required to correct errors made;Assistance required to identify errors made;Decreased awareness of errors  Insights: Decreased awareness of deficits  Initiation: Requires cues for some  Sequencing: Requires cues for some  Orientation  Overall Orientation Status: Within Functional Limits  Orientation Level: Oriented to person;Oriented to place;Oriented to situation;Disoriented to time                  Education Given To: Patient  Education Provided: Role of Therapy;Transfer Training;ADL Adaptive Strategies  Education Provided Comments: proper hand and foot placement; balance maintaince; safety precautions- F/P carry over  Education Method: Demonstration;Verbal  Education Outcome: Continued education needed                                                       AM-PAC Score        AM-PAC Inpatient Daily Activity Raw Score: 17 (07/19/22 1419)  AM-PAC Inpatient ADL T-Scale Score : 37.26 (07/19/22 1419)  ADL Inpatient CMS 0-100% Score: 50.11 (07/19/22 1419)  ADL Inpatient CMS G-Code Modifier : CK (07/19/22 1419)    Goals  Short Term Goals  Time Frame for Short term goals: pt will by discharge  Short Term Goal 1: demo UB bathing/dressing with SBA, set up. Short Term Goal 2: demo LB bathing/dressing with min A, AE PRN. Short Term Goal 3: demo safe bed mob with mod A. Short Term Goal 4: demo safe dynamic sitting balance with CGA for 5+ mins. Short Term Goal 5: identify/adhere to 2 non-pharmalogical pain-relieving techniques with 1 vc.        Therapy Time   Individual Concurrent Group Co-treatment   Time In 5447         Time Out 1056         Minutes 42         Timed Code Treatment Minutes: 23 Minutes (co tx with PTA)       ZULEMA Gallardo

## 2022-07-19 NOTE — PROGRESS NOTES
Nephrology Progress Note      SUBJECTIVE       Pt was seen and examined. Patient had developed acute on chronic renal injury requiring CRRT last week. CRRT was discontinued Friday and he did require intermittent hemodialysis over the weekend. Looking at his urine output, it seems to be improving. He continues on oral Demadex. Hemoglobin was 6.9 yesterday require transfusion today 7.7. Right IJ vein temporary dialysis catheter still in place. Creatinine appears to have plateaued around 2.3 range. Last dialysis 2022. No evidence of atheroembolism. Complements normal  Labs today sodium 134 potassium 4.1 creatinine 2.3 ionized calcium 1.1 magnesium 1.7. OBJECTIVE      CURRENT TEMPERATURE:  Temp: 98.6 °F (37 °C)  MAXIMUM TEMPERATURE OVER 24HRS:  Temp (24hrs), Av.6 °F (37 °C), Min:98 °F (36.7 °C), Max:98.9 °F (37.2 °C)    CURRENT RESPIRATORY RATE:  Resp: 14  CURRENT PULSE:  Heart Rate: 91  CURRENT BLOOD PRESSURE:  BP: 115/63  24HR BLOOD PRESSURE RANGE:  Systolic (63YHR), PUI:023 , Min:102 , MWV:761   ; Diastolic (33SUC), HAMZAH:04, Min:35, Max:73    24HR INTAKE/OUTPUT:    Intake/Output Summary (Last 24 hours) at 2022 1306  Last data filed at 2022 0800  Gross per 24 hour   Intake 1473.33 ml   Output 2675 ml   Net -1201.67 ml       WEIGHT :Patient Vitals for the past 96 hrs (Last 3 readings):   Weight   22 0315 217 lb 6 oz (98.6 kg)   22 0317 224 lb 10.4 oz (101.9 kg)   22 1250 218 lb 4.1 oz (99 kg)       PHYSICAL EXAM      GENERAL APPEARANCE:Awake, alert, in no acute distress  SKIN: warm and dry, no rash or erythema  EYES: conjunctivae pale and sclera anicteric  ENT: no thrush no pharyngeal congestion   NECK:   Right IJ temporary dialysis catheter and no carotid lymphadenopathy . PULMONARY: lungs are clear to auscultation. No Wheezing, no ronchi . CADRDIOVASCULAR: S1 and S2 normal NO S3 and NO S4 . No rubs , no murmur.    ABDOMEN: soft nontender, bowel sounds present, no organomegaly, no ascites. EXTREMITIES: + Left greater than right lower extremity edema.   Has wound VAC on the left lower extremity    CURRENT MEDICATIONS      OLANZapine (ZYPREXA) 5 mg in sterile water 1 mL injection, Once  sennosides-docusate sodium (SENOKOT-S) 8.6-50 MG tablet 2 tablet, Daily PRN  apixaban (ELIQUIS) tablet 5 mg, BID  torsemide (DEMADEX) tablet 20 mg, Daily  0.9 % sodium chloride infusion, PRN  fentaNYL (SUBLIMAZE) injection 50 mcg, Q1H PRN  0.9 % sodium chloride infusion, PRN  oxyCODONE (ROXICODONE) immediate release tablet 5 mg, Q6H PRN  metoprolol succinate (TOPROL XL) extended release tablet 12.5 mg, BID  famotidine (PEPCID) tablet 20 mg, Daily  atorvastatin (LIPITOR) tablet 10 mg, Daily  tamsulosin (FLOMAX) capsule 0.4 mg, Daily  insulin lispro (HUMALOG) injection vial 0-18 Units, 4x Daily AC & HS  melatonin tablet 5 mg, Nightly  QUEtiapine (SEROQUEL) tablet 50 mg, Nightly  heparin (porcine) injection 1,300 Units, PRN  heparin (porcine) injection 1,400 Units, PRN  aspirin chewable tablet 81 mg, Daily  sodium chloride flush 0.9 % injection 5-40 mL, PRN  polyethylene glycol (GLYCOLAX) packet 17 g, Daily  ondansetron (ZOFRAN) injection 4 mg, Q6H PRN  glucose chewable tablet 16 g, PRN  glucagon (rDNA) injection 1 mg, PRN        LABS      CBC:   Recent Labs     07/17/22  0612 07/18/22  0616 07/18/22  1600 07/19/22  0729   WBC 16.3* 18.0*  --  13.4*   RBC 2.32* 2.15*  --  2.36*   HGB 7.3* 6.9* 8.2* 7.8*   HCT 22.5* 21.3* 24.6* 23.0*   MCV 97.0 99.1  --  97.5   MCH 31.5 32.1  --  33.1   MCHC 32.4 32.4  --  33.9   RDW 16.0* 16.4*  --  17.1*    301  --  464*   MPV 12.7 12.6  --  12.2        BMP:   Recent Labs     07/17/22  0612 07/18/22  0320 07/19/22  0651    133* 134*   K 3.1* 3.6* 4.1    98 97*   CO2 22 21 24   BUN 38* 50* 59*   CREATININE 1.81* 2.27* 2.30*   GLUCOSE 140* 182* 130*   CALCIUM 7.1* 7.9* 8.1*          PHOSPHORUS:    Recent Labs     07/17/22  0612 07/18/22 0320 07/19/22  0651   PHOS 3.5 4.2 4.7*       MAGNESIUM:   Recent Labs     07/17/22  0612 07/18/22  0320 07/19/22  0651   MG 1.6 1.8 1.7       SPEP:   Lab Results   Component Value Date/Time    PROT 5.3 07/14/2022 06:31 AM    PROT 7.1 07/25/2011 09:50 AM     C3:   Lab Results   Component Value Date    C3 139 07/19/2022     C4:   Lab Results   Component Value Date    C4 21 07/17/2022     URINE CREATININE:    Lab Results   Component Value Date/Time    LABCREA 141.6 07/10/2022 12:28 PM     URINE EOSINOPHILS:   Lab Results   Component Value Date/Time    UREO NONE SEEN 07/10/2022 12:28 PM     URINE PROTEIN:  No results found for: TPU  URINALYSIS:  U/A:   Lab Results   Component Value Date/Time    NITRU NEGATIVE 07/18/2022 10:32 AM    COLORU Yellow 07/18/2022 10:32 AM    PHUR 5.5 07/18/2022 10:32 AM    WBCUA 20 TO 50 07/18/2022 10:32 AM    RBCUA 5 TO 10 07/18/2022 10:32 AM    MUCUS 1+ 06/30/2012 09:40 PM    TRICHOMONAS NOT REPORTED 06/30/2012 09:40 PM    YEAST NOT REPORTED 06/30/2012 09:40 PM    BACTERIA MODERATE 07/10/2022 12:28 PM    SPECGRAV 1.018 07/18/2022 10:32 AM    LEUKOCYTESUR TRACE 07/18/2022 10:32 AM    UROBILINOGEN Normal 07/18/2022 10:32 AM    BILIRUBINUR NEGATIVE 07/18/2022 10:32 AM    BILIRUBINUR NEGATIVE 06/06/2012 05:45 AM    GLUCOSEU NEGATIVE 07/18/2022 10:32 AM    GLUCOSEU NEGATIVE 06/06/2012 05:45 AM    KETUA NEGATIVE 07/18/2022 10:32 AM    AMORPHOUS 3+ 07/10/2022 12:28 PM           ASSESSMENT      1. Acute Kidney Injury: ATN with contrast nephropathy, circulatory shock, and hypotension requiring pressor support and now postop. Baseline creatinine seems to be around 1-1.2 mg/dl. Was on CVVHD initially then switched to intermittent hemodialysis received 1 treatment 7/16/2022. Thereafter dialysis on hold. Renal function appears to be stabilizing creatinine  plateauing around 2.3 urine output continues to be good   2. Hyperkalemia- likely due to underlying renal dysfunction.  Improved with improved renal function  3. 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 4. Rhabdomyolysis  5. Hypotension-requiring pressor support. 6. Total aortobifemoral occulusion s/p Bilateral common femoral arterial access via open cutdown, thrombectomy of the aortobifemoral bypass graft thromboendarterectomy of the common femoral profundofemoral and SFA bilaterally with bovine pericardial patch angioplasty on 7/8/2021  7. Left lower extremity compartment syndrome s/p fasciotomy. 8. U/o gradually improved9. Anemia - 6.9 okay for transfusion from renal standpoint      PLAN      1. Holding hemodialysis today, it appears his renal function is stabilizing, his baseline creatinine may remain little higher than before this procedure   2. Okay to transfuse 1 unit of blood from renal standpoint  3. Follow up labs ordered. 4.  Continue 20 mg of torsemide daily  5. We will discontinue right IJ vein temporary dialysis catheter  6. Would like to hold discharge for 1 day to see further stabilization of renal function  7. We will follow    Please do not hesitate to call with questions.     This note is created with the assistance of a speech-recognition program. While intending to generate a document that actually reflects the content of the visit, no guarantees can be provided that every mistake has been identified and corrected by editing    Electronically signed by Flavio Kwong MD on 7/19/2022 at 1:06 PM

## 2022-07-19 NOTE — PROGRESS NOTES
[facility]  Office: [phone]  Bob Yang, DO, Petrona Herrera, DO, Poncho Cuenca, DO, cB Raza Blood, DO, Eduardo Cohen MD, Vidal Perrin MD, Orly Anglin MD, Stacia Paul MD,  Balaji Hamilton MD, Jitendra Shahid MD, Getachew Fernández, DO, Yamileth Williamson MD,  Samantha Marks MD, Amos Barton MD, Junior Cabrrea DO, Rikki Ortega MD, Karla Camp MD, Lia Hebert MD, Ritesh Wilder, DO, Eris Rodriguez MD, Shahram Lozada MD, Elva Bone, CNP,  Adelia Alpers, CNP, Becky Guevara, CNP, Irish Prather, CNP, Lorene Casanova PA-C, Nya Wolfe, DNP, John Franklin, CNP, Ivory Eason, CNP, Crissy Mason, CNP, Janet Del Rio, CNP, Ngoiz Culver, CNS, Efrem Green, DNP, Katiuska Pérez, CNP, Rose Deluca, CNP, Mahamed Reeves, CNP, Olivia Roland, [address] [place] [address]    Progress Note    7/19/2022    9:35 AM    Name:   Kale Pickett  MRN:     [id number]     Acct:      [de-identified]   Room:   2021/2021-01  IP Day:  11  Admit Date:  7/8/2022  4:56 AM    PCP:   Orly Anglin (Inactive)  Code Status:  Full Code    Subjective:     C/C:   Chief Complaint   Patient presents with    Generalized Body Aches     10/10 FULL BODY PAIN     Interval History Status: improved. Patient seen and examined at bedside,     Brief History:     Per my partner:  Kale Pickett is a 64 y.o. Non- / non  male who presents with Generalized Body Aches (10/10 FULL BODY PAIN)   and is admitted to the hospital for the management of Critical ischemia of lower extremity ([facility] [address] 64year old male with past medical history of anxiety, hsitory of CABG x3, CAD, HTN, Peripheral vascular disease with some surgeries performed [place] presents to the ER on 7/8/22 with body aches and shortness of breath. Found to be in Atrial fibrillation.  With critical limb ischemia, underwent thrombectomry of aortobifemoral bypass graft, thromboendarterectomy of common femoral profundofemoral and sfa biltaeraly with bovine patch on 7/8/22 with Vascular surgery. Patient had troponin elevation and cardiology was consulted. Patient was already on a heparin drip per vascular surgery. Found to have an EF 15% by echo, nephrology following due to oliguria. Started on CVVHD 7/10/22-7/15/22. Patient was intubated from 7/8/22-7/12/22. Intermittently requiring presors from 7/7/22-7/13/22. \"    Review of Systems:     Review of Systems   Constitutional:  Positive for activity change, appetite change and fatigue. Negative for chills, diaphoresis and fever. HENT:  Negative for congestion. Eyes:  Negative for visual disturbance. Respiratory:  Negative for cough, chest tightness, shortness of breath and wheezing. Cardiovascular:  Positive for leg swelling. Negative for chest pain and palpitations. Gastrointestinal:  Negative for abdominal pain, blood in stool, constipation, diarrhea, nausea and vomiting. Genitourinary:  Negative for difficulty urinating. Musculoskeletal:  Positive for gait problem. Neurological:  Positive for weakness. Negative for dizziness, light-headedness, numbness and headaches. All other systems reviewed and are negative. Medications: Allergies:     Allergies   Allergen Reactions    Faviola [Morphine Sulfate] Itching and Rash    Methadone Rash    Morphine Nausea And Vomiting      Sever \"headache\"       Current Meds:   Scheduled Meds:    OLANZapine (ZyPREXA) in sterile water IntraMUSCular  5 mg IntraMUSCular Once    apixaban  5 mg Oral BID    torsemide  20 mg Oral Daily    midodrine  10 mg Oral TID WC    metoprolol succinate  12.5 mg Oral BID    famotidine  20 mg Oral Daily    atorvastatin  10 mg Oral Daily    tamsulosin  0.4 mg Oral Daily    insulin lispro  0-18 Units SubCUTAneous 4x Daily AC & HS    melatonin  5 mg Oral Nightly    QUEtiapine  50 mg Oral Nightly    aspirin  81 mg Oral Daily    polyethylene glycol --  2.36*   HGB 7.3* 6.9* 8.2*  --  7.8*   HCT 22.5* 21.3* 24.6*  --  23.0*   MCV 97.0 99.1  --   --  97.5   MCH 31.5 32.1  --   --  33.1   MCHC 32.4 32.4  --   --  33.9   RDW 16.0* 16.4*  --   --  17.1*    301  --   --  464*   MPV 12.7 12.6  --   --  12.2   SEDRATE  --   --  37*  --   --    CRP  --   --   --  127.2*  --      Chemistry:  Recent Labs     07/17/22  0612 07/18/22  0320 07/18/22  0953 07/19/22  0651    133*  --  134*   K 3.1* 3.6*  --  4.1    98  --  97*   CO2 22 21  --  24   GLUCOSE 140* 182*  --  130*   BUN 38* 50*  --  59*   CREATININE 1.81* 2.27*  --  2.30*   MG 1.6 1.8  --  1.7   ANIONGAP 14 14  --  13   LABGLOM 38* 29*  --  29*   GFRAA 46* 36*  --  35*   CALCIUM 7.1* 7.9*  --  8.1*   CAION 0.98*  --  0.99* 1.05*   PHOS 3.5 4.2  --  4.7*   CKTOTAL  --   --  3,687*  --    MYOGLOBIN  --   --  1,116*  --      Recent Labs     07/18/22  0647 07/18/22  1051 07/18/22  1550 07/18/22 2021 07/19/22  0057 07/19/22  0641   POCGLU 166* 124* 142* 99 231* 128*     ABG:  Lab Results   Component Value Date/Time    POCPH 7.401 07/13/2022 01:01 AM    PHART 7.30 04/22/2012 02:57 PM    PH 7.37 02/12/2011 03:46 AM    POCPCO2 38.6 07/13/2022 01:01 AM    YZN8TDK 42 04/22/2012 02:57 PM    PCO2 43 02/12/2011 03:46 AM    POCPO2 142.5 07/13/2022 01:01 AM    PO2ART 66 04/22/2012 02:57 PM    PO2 73 02/12/2011 03:46 AM    POCHCO3 24.0 07/13/2022 01:01 AM    OTB0NBI 20.6 04/22/2012 02:57 PM    HCO3 24.9 02/12/2011 03:46 AM    NBEA 1 07/13/2022 01:01 AM    PBEA 0 07/12/2022 01:27 PM    RAC0MCZ 22 04/22/2012 02:57 PM    CCIM4WVU 99 07/13/2022 01:01 AM    K1JHXXFP 90 04/22/2012 02:57 PM    O2SAT 15.8 02/12/2011 03:46 AM    FIO2 30.0 07/13/2022 05:18 AM     Lab Results   Component Value Date/Time    SPECIAL LAC 10ML 07/08/2022 06:47 AM     Lab Results   Component Value Date/Time    CULTURE NO GROWTH 07/18/2022 08:45 AM       Radiology:  CT ABDOMEN PELVIS WO CONTRAST Additional Contrast? None    Result Date: 7/14/2022  1. Postprocedural findings in the left groin with superficial hematoma, as described. Subcutaneous edema in the left groin and left thighs also present. 2.  No retroperitoneal or intrapelvic hematoma. XR CHEST PORTABLE    Result Date: 7/18/2022  No acute cardiopulmonary disease     XR CHEST PORTABLE    Result Date: 7/16/2022  No acute process. Stable exam.  Stable lines. XR CHEST PORTABLE    Result Date: 7/15/2022  No acute cardiopulmonary process. Stable internal jugular central lines. XR CHEST PORTABLE    Result Date: 7/14/2022  Interval removal of endotracheal and enteric tubes. No new findings otherwise identified in the interval.     XR CHEST PORTABLE    Result Date: 7/13/2022  No acute cardiopulmonary process. Support tubes as described above. Physical Examination:        Physical Exam  Vitals and nursing note reviewed. Constitutional:       General: He is not in acute distress. HENT:      Head: Normocephalic and atraumatic. Eyes:      Conjunctiva/sclera: Conjunctivae normal.      Pupils: Pupils are equal, round, and reactive to light. Neck:      Comments: R IJ temp cath noted  Cardiovascular:      Rate and Rhythm: Normal rate and regular rhythm. Heart sounds: No murmur heard. Pulmonary:      Effort: Pulmonary effort is normal. No accessory muscle usage or respiratory distress. Breath sounds: No stridor. No decreased breath sounds, wheezing, rhonchi or rales. Abdominal:      General: Bowel sounds are normal. There is no distension. Palpations: Abdomen is soft. Abdomen is not rigid. Tenderness: There is no abdominal tenderness. There is no guarding. Genitourinary:     Comments: Dexter noted  Musculoskeletal:         General: No tenderness. Right lower leg: Edema present. Left lower leg: Edema present. Comments: LLE wound vac noted    Skin:     General: Skin is warm and dry. Findings: No erythema, lesion or rash.    Neurological: Mental Status: He is alert and oriented to person, place, and time. Cranial Nerves: No cranial nerve deficit. Motor: No seizure activity. Psychiatric:         Speech: Speech normal.         Behavior: Behavior normal. Behavior is cooperative. Assessment:        Hospital Problems             Last Modified POA    * (Principal) Critical ischemia of lower extremity ([address] 7/8/2022 Yes    PAD (peripheral artery disease) ([address] 7/18/2022 Yes    CAROLYN (acute kidney injury) ([address] 7/18/2022 Yes    Dependence on renal dialysis ([address] 7/18/2022 Yes    Failing vascular bypass graft 7/10/2022 Yes    Arterial hypotension 7/10/2022 Yes    Non-traumatic rhabdomyolysis 7/10/2022 Yes    Cardiomyopathy ([address] 7/10/2022 Yes    Decreased urine output 7/10/2022 Yes    Acute respiratory failure with hypercapnia ([address] 7/17/2022 Yes    Lactic acidosis 7/17/2022 Yes    Left leg numbness 7/17/2022 Yes    Cardiomyopathy, ischemic 7/18/2022 Yes    CAD (coronary artery disease) 7/18/2022 Yes    Overview Signed 3/10/2011 11:35 AM by TIERRA Rodriguez     with history of multiple MI            Plan:        Prolonged complicated hospital stay,  I reviewed hospital course including labs, images and notes    Total aortobifemoral occulusion /L LE compartment syndrome: S/p BL femoral artery cutdown with thrombectomy of aortobifemoral graft, LLE fasciotomy, bilateral lower extremity angiography: patient currently continues with wound vac to LLE wound   Continue anticoagulation    CAROLYN/CKD: Required CVVHD and being placed on pressors initially on admission then required intermittent dialysis, last dialysis 7/16 and creatinine seem to be plateauing with likely new higher baseline, nephrology to decide if patient needs permanent dialysis or temp cath can come out.   For now continue Demadex 20 mg daily    Non-STEMI: Likely type I with a EF of 15% on echocardiogram given sepsis, CAROLYN along with vascular access and patient being currently hemodynamically stable decision was made to postpone cardiac cath till com     New diagnosis of cardiomyopathy with EF 15%: Continue current medication, will need cardiac cath once stable    Acute blood loss anemia: Patient received multiple units since admission, also received unit of packed RBCs yesterday, today hemoglobin again dropped less than 8 and given concerns for coronary stenosis and non-STEMI type I we will go ahead and order another unit today to keep hemoglobin above 8    Rhabdomyolysis: Resolved    Acute cystitis: Patient finished course of antibiotics     H/O CVA with residual weakness: continue ASA and tolerable dose of statin    Tobacco abuse: Cessation education    PT/OT    Initiate placement arrangement    Discussed with the patient nurse    Discussed with Dr. Fishman Pod nephrology    Eber Franco MD  7/19/2022  9:35 AM

## 2022-07-20 LAB
ABO/RH: NORMAL
ANION GAP SERPL CALCULATED.3IONS-SCNC: 11 MMOL/L (ref 9–17)
ANTIBODY SCREEN: NEGATIVE
ARM BAND NUMBER: NORMAL
BLD PROD TYP BPU: NORMAL
BLOOD BANK BLOOD PRODUCT EXPIRATION DATE: NORMAL
BLOOD BANK ISBT PRODUCT BLOOD TYPE: 6200
BLOOD BANK PRODUCT CODE: NORMAL
BLOOD BANK UNIT TYPE AND RH: NORMAL
BPU ID: NORMAL
BUN BLDV-MCNC: 61 MG/DL (ref 8–23)
CALCIUM IONIZED: 1.08 MMOL/L (ref 1.13–1.33)
CALCIUM SERPL-MCNC: 8.2 MG/DL (ref 8.6–10.4)
CHLORIDE BLD-SCNC: 99 MMOL/L (ref 98–107)
CO2: 24 MMOL/L (ref 20–31)
COMPLEMENT C3: 145 MG/DL (ref 90–180)
COMPLEMENT C3: 152 MG/DL (ref 90–180)
COMPLEMENT C3: 163 MG/DL (ref 90–180)
COMPLEMENT C3: 167 MG/DL (ref 90–180)
CREAT SERPL-MCNC: 2.32 MG/DL (ref 0.7–1.2)
CROSSMATCH RESULT: NORMAL
DISPENSE STATUS BLOOD BANK: NORMAL
EXPIRATION DATE: NORMAL
GFR AFRICAN AMERICAN: 35 ML/MIN
GFR NON-AFRICAN AMERICAN: 29 ML/MIN
GFR SERPL CREATININE-BSD FRML MDRD: ABNORMAL ML/MIN/{1.73_M2}
GLUCOSE BLD-MCNC: 116 MG/DL (ref 70–99)
GLUCOSE BLD-MCNC: 116 MG/DL (ref 75–110)
GLUCOSE BLD-MCNC: 140 MG/DL (ref 75–110)
GLUCOSE BLD-MCNC: 150 MG/DL (ref 75–110)
GLUCOSE BLD-MCNC: 164 MG/DL (ref 75–110)
HCT VFR BLD CALC: 28.1 % (ref 40.7–50.3)
HEMOGLOBIN: 8.8 G/DL (ref 13–17)
MAGNESIUM: 1.7 MG/DL (ref 1.6–2.6)
MCH RBC QN AUTO: 30.9 PG (ref 25.2–33.5)
MCHC RBC AUTO-ENTMCNC: 31.3 G/DL (ref 28.4–34.8)
MCV RBC AUTO: 98.6 FL (ref 82.6–102.9)
NRBC AUTOMATED: 0.2 PER 100 WBC
PDW BLD-RTO: 15.9 % (ref 11.8–14.4)
PHOSPHORUS: 4.7 MG/DL (ref 2.5–4.5)
PLATELET # BLD: 412 K/UL (ref 138–453)
PMV BLD AUTO: 11.9 FL (ref 8.1–13.5)
POTASSIUM SERPL-SCNC: 4.3 MMOL/L (ref 3.7–5.3)
RBC # BLD: 2.85 M/UL (ref 4.21–5.77)
SODIUM BLD-SCNC: 134 MMOL/L (ref 135–144)
TRANSFUSION STATUS: NORMAL
UNIT DIVISION: 0
UNIT ISSUE DATE/TIME: NORMAL
WBC # BLD: 12.6 K/UL (ref 3.5–11.3)

## 2022-07-20 PROCEDURE — 2580000003 HC RX 258: Performed by: STUDENT IN AN ORGANIZED HEALTH CARE EDUCATION/TRAINING PROGRAM

## 2022-07-20 PROCEDURE — 2060000000 HC ICU INTERMEDIATE R&B

## 2022-07-20 PROCEDURE — 82330 ASSAY OF CALCIUM: CPT

## 2022-07-20 PROCEDURE — 6360000002 HC RX W HCPCS: Performed by: STUDENT IN AN ORGANIZED HEALTH CARE EDUCATION/TRAINING PROGRAM

## 2022-07-20 PROCEDURE — 82947 ASSAY GLUCOSE BLOOD QUANT: CPT

## 2022-07-20 PROCEDURE — 97110 THERAPEUTIC EXERCISES: CPT

## 2022-07-20 PROCEDURE — 6370000000 HC RX 637 (ALT 250 FOR IP): Performed by: NURSE PRACTITIONER

## 2022-07-20 PROCEDURE — 80048 BASIC METABOLIC PNL TOTAL CA: CPT

## 2022-07-20 PROCEDURE — 99232 SBSQ HOSP IP/OBS MODERATE 35: CPT | Performed by: INTERNAL MEDICINE

## 2022-07-20 PROCEDURE — 6370000000 HC RX 637 (ALT 250 FOR IP): Performed by: INTERNAL MEDICINE

## 2022-07-20 PROCEDURE — 99211 OFF/OP EST MAY X REQ PHY/QHP: CPT

## 2022-07-20 PROCEDURE — 6370000000 HC RX 637 (ALT 250 FOR IP): Performed by: STUDENT IN AN ORGANIZED HEALTH CARE EDUCATION/TRAINING PROGRAM

## 2022-07-20 PROCEDURE — 83735 ASSAY OF MAGNESIUM: CPT

## 2022-07-20 PROCEDURE — 99232 SBSQ HOSP IP/OBS MODERATE 35: CPT | Performed by: FAMILY MEDICINE

## 2022-07-20 PROCEDURE — 86160 COMPLEMENT ANTIGEN: CPT

## 2022-07-20 PROCEDURE — 6370000000 HC RX 637 (ALT 250 FOR IP): Performed by: HEALTH CARE PROVIDER

## 2022-07-20 PROCEDURE — 85027 COMPLETE CBC AUTOMATED: CPT

## 2022-07-20 PROCEDURE — 97530 THERAPEUTIC ACTIVITIES: CPT

## 2022-07-20 PROCEDURE — 36415 COLL VENOUS BLD VENIPUNCTURE: CPT

## 2022-07-20 PROCEDURE — 84100 ASSAY OF PHOSPHORUS: CPT

## 2022-07-20 RX ORDER — TORSEMIDE 20 MG/1
20 TABLET ORAL DAILY
Qty: 30 TABLET | Refills: 3 | Status: SHIPPED | OUTPATIENT
Start: 2022-07-21

## 2022-07-20 RX ORDER — METOPROLOL SUCCINATE 25 MG/1
12.5 TABLET, EXTENDED RELEASE ORAL 2 TIMES DAILY
Qty: 30 TABLET | Refills: 3 | Status: SHIPPED | OUTPATIENT
Start: 2022-07-20

## 2022-07-20 RX ORDER — EZETIMIBE 10 MG/1
10 TABLET ORAL DAILY
COMMUNITY

## 2022-07-20 RX ORDER — SENNA AND DOCUSATE SODIUM 50; 8.6 MG/1; MG/1
2 TABLET, FILM COATED ORAL DAILY
Status: DISCONTINUED | OUTPATIENT
Start: 2022-07-21 | End: 2022-07-21 | Stop reason: HOSPADM

## 2022-07-20 RX ORDER — FENOFIBRATE 145 MG/1
145 TABLET, COATED ORAL DAILY
Status: ON HOLD | COMMUNITY
End: 2022-07-20 | Stop reason: HOSPADM

## 2022-07-20 RX ORDER — ATORVASTATIN CALCIUM 80 MG/1
80 TABLET, FILM COATED ORAL DAILY
Status: ON HOLD | COMMUNITY
End: 2022-07-20 | Stop reason: HOSPADM

## 2022-07-20 RX ORDER — BACLOFEN 10 MG/1
10 TABLET ORAL 3 TIMES DAILY
COMMUNITY

## 2022-07-20 RX ORDER — CLOPIDOGREL BISULFATE 75 MG/1
75 TABLET ORAL DAILY
COMMUNITY

## 2022-07-20 RX ORDER — CHOLECALCIFEROL (VITAMIN D3) 125 MCG
5 CAPSULE ORAL NIGHTLY
Qty: 30 TABLET | Refills: 0 | Status: SHIPPED | OUTPATIENT
Start: 2022-07-20

## 2022-07-20 RX ORDER — ATORVASTATIN CALCIUM 10 MG/1
10 TABLET, FILM COATED ORAL DAILY
Qty: 30 TABLET | Refills: 2 | Status: SHIPPED | OUTPATIENT
Start: 2022-07-21

## 2022-07-20 RX ADMIN — TORSEMIDE 20 MG: 20 TABLET ORAL at 08:16

## 2022-07-20 RX ADMIN — METOPROLOL SUCCINATE 12.5 MG: 25 TABLET, FILM COATED, EXTENDED RELEASE ORAL at 08:16

## 2022-07-20 RX ADMIN — QUETIAPINE FUMARATE 50 MG: 25 TABLET ORAL at 20:58

## 2022-07-20 RX ADMIN — FENTANYL CITRATE 50 MCG: 50 INJECTION, SOLUTION INTRAMUSCULAR; INTRAVENOUS at 21:01

## 2022-07-20 RX ADMIN — OXYCODONE 5 MG: 5 TABLET ORAL at 18:28

## 2022-07-20 RX ADMIN — INSULIN LISPRO 3 UNITS: 100 INJECTION, SOLUTION INTRAVENOUS; SUBCUTANEOUS at 08:43

## 2022-07-20 RX ADMIN — ASPIRIN 81 MG: 81 TABLET, CHEWABLE ORAL at 08:16

## 2022-07-20 RX ADMIN — APIXABAN 5 MG: 5 TABLET, FILM COATED ORAL at 08:16

## 2022-07-20 RX ADMIN — METOPROLOL SUCCINATE 12.5 MG: 25 TABLET, FILM COATED, EXTENDED RELEASE ORAL at 20:59

## 2022-07-20 RX ADMIN — POLYETHYLENE GLYCOL 3350 17 G: 17 POWDER, FOR SOLUTION ORAL at 09:00

## 2022-07-20 RX ADMIN — INSULIN LISPRO 3 UNITS: 100 INJECTION, SOLUTION INTRAVENOUS; SUBCUTANEOUS at 21:05

## 2022-07-20 RX ADMIN — SODIUM CHLORIDE, PRESERVATIVE FREE 10 ML: 5 INJECTION INTRAVENOUS at 08:16

## 2022-07-20 RX ADMIN — SODIUM CHLORIDE, PRESERVATIVE FREE 10 ML: 5 INJECTION INTRAVENOUS at 21:00

## 2022-07-20 RX ADMIN — APIXABAN 5 MG: 5 TABLET, FILM COATED ORAL at 20:59

## 2022-07-20 RX ADMIN — TAMSULOSIN HYDROCHLORIDE 0.4 MG: 0.4 CAPSULE ORAL at 08:16

## 2022-07-20 RX ADMIN — FENTANYL CITRATE 50 MCG: 50 INJECTION, SOLUTION INTRAMUSCULAR; INTRAVENOUS at 09:30

## 2022-07-20 RX ADMIN — Medication 5 MG: at 20:59

## 2022-07-20 RX ADMIN — OXYCODONE 5 MG: 5 TABLET ORAL at 09:00

## 2022-07-20 RX ADMIN — ATORVASTATIN CALCIUM 10 MG: 10 TABLET, FILM COATED ORAL at 08:16

## 2022-07-20 RX ADMIN — FAMOTIDINE 20 MG: 20 TABLET, FILM COATED ORAL at 08:16

## 2022-07-20 RX ADMIN — INSULIN LISPRO 3 UNITS: 100 INJECTION, SOLUTION INTRAVENOUS; SUBCUTANEOUS at 16:21

## 2022-07-20 ASSESSMENT — ENCOUNTER SYMPTOMS
COUGH: 0
CONSTIPATION: 0
NAUSEA: 0
CHEST TIGHTNESS: 0
WHEEZING: 0
SHORTNESS OF BREATH: 0
BLOOD IN STOOL: 0
VOMITING: 0
DIARRHEA: 0
ABDOMINAL PAIN: 0

## 2022-07-20 ASSESSMENT — PAIN SCALES - GENERAL
PAINLEVEL_OUTOF10: 9
PAINLEVEL_OUTOF10: 7
PAINLEVEL_OUTOF10: 6

## 2022-07-20 NOTE — PROGRESS NOTES
nearly occlusive on the left-hand side and he right SFA was also occluded. Dr. Corby Ham, with Vascular Surgery, completed an emergent aorta bifemoral graft endarterectomy and bilateral common femoral embolectomy, bilateral lower extremity arterial ectomy, aortography, right limb bypass graft stent and bilateral femoral bovine patches    A fasciotomy of the left lower leg was also performed per vascular surgery and intraoperatively orthopedic surgery was consulted for possible left thigh compartment syndrome. Rossy needle assessment was performed and found that the thigh compartment was normal and did not require any acute intervention. An echocardiogram was completed and was shown the patient had ejection fraction of 15%. He had also been initiated on a heparin drip for elevated troponin and atrial fibrillation. Nephrology was subsequently consulted for oliguria and CAROLYN. The patient received CVVHD from 7/10 to 7/15/2022 and was intubated from 7/8 to 7/12/2022 while intermittently requiring vasopressors from 7/7 to 7/13/2022. It appears that the patient has had leukocytosis since admission. All cultures have remained negative for bacterial growth. He was on rocephin from 7/11/22 through 7/17/22 for UTI. I was unable to find a positive urine culture. Infectious disease has been consulted for continued leukocytosis    CURRENT EVALUATION 7/20/2022    Blood pressure 132/65, pulse 88, temperature 98.6 °F (37 °C), temperature source Oral, resp. rate 18, height 5' 10.5\" (1.791 m), weight 210 lb 15.7 oz (95.7 kg), SpO2 97 %. Afebrile  Vital signs stable on room air    Patient was alert and oriented upon evaluation. He is doing well. Possibly will require tunnel cath for outpatient HD. He denied any acute issues other than some pain in his sacral ulcer. Pt has a stage 2 sacrococcygeal ulcer with some serous discharge. Continue using protective barrier creams.      No signs of infection/inflammation seen at fasciotomy site with wound vac. Leokocytosis is trending down. Continue monitoring off antibiotics. No acute issues noted at this time  Discussed with RN    Labs, X rays reviewed: 2022    BUN:50  Cr:2.27    WBC: 16.3-->18.0-->13.4-->12.6  Hb:6.9-->8.2-->7.8-->8.8  Plat: 301-->464-->412    CRP: 127  Sed rate: 37    Cultures:  Urine:  22: No growth  Blood:  22: No growth  Sputum :    Wound:    MRSA Nares:      Imagin22        Discussed with patient, RN, IM. I have personally reviewed the past medical history, past surgical history, medications, social history, and family history, and I have updated the database accordingly. Past Medical History:     Past Medical History:   Diagnosis Date    Anxiety     Arthritis associated with another disorder     CAD (coronary artery disease)     with history of multiple MI    Carotid artery occlusion     Family history of kidney stone     HTN (hypertension)     Hyperkalemia     Hyperlipidemia     Hypertension     Nephrolithiasis     multiple times    Neuropathy     PAD (peripheral artery disease) (HCC)     Peripheral vascular disease (HCC)     Personal history of MRSA (methicillin resistant Staphylococcus aureus)     Seasonal allergies     Vasculopathy        Past Surgical  History:     Past Surgical History:   Procedure Laterality Date    ABDOMINAL ADHESION SURGERY  2011-[facility]    Reexploration of abdominal wound and reclosure of the abdominal wound with fascial sutures and retention sutures as well    AORTO-FEMORAL BYPASS GRAFT  2011-[facility]    ABF bypass of 16x8 PTFE graft. This is an end-to-side anastomosis proximally    AXILLARY-FEMORAL BYPASS GRAFT Bilateral 2022    AORTA BIFEMORAL GRAFT ENDARTERECTOMY, BILATERAL COMMON FEMORAL EMBOLECTOMY, BILATERAL LOWER EXTREMEITY ARTERECTOMY, AORTAGRAPHY, RIGHT LIMB OF BYPASS GRAFT STENT, BILATERAL FEMORAL BOVINE PATCHES performed by Years since quittin.3    Smokeless tobacco: Never    Tobacco comments:     quite 2011    Substance and Sexual Activity    Alcohol use: No     Alcohol/week: 0.0 standard drinks    Drug use: No    Sexual activity: Yes     Partners: Female     Comment: with has trouble   Other Topics Concern    Not on file   Social History Narrative    Not on file     Social Determinants of Health     Financial Resource Strain: Not on file   Food Insecurity: Not on file   Transportation Needs: Not on file   Physical Activity: Not on file   Stress: Not on file   Social Connections: Not on file   Intimate Partner Violence: Not on file   Housing Stability: Not on file       Family History:     Family History   Problem Relation Age of Onset    Hypertension Father     Heart Disease Sister     Hypertension Sister     Cancer Mother 28        breast cancer        Allergies:   Faviola [morphine sulfate], Methadone, and Morphine     Review of Systems:   Constitutional: No fevers or chills. No systemic complaints  Head: No headaches  Eyes: No double vision or blurry vision. No conjunctival inflammation. ENT: No sore throat or runny nose. . No hearing loss, tinnitus or vertigo. Cardiovascular: No chest pain or palpitations. No shortness of breath. No DELATORRE  Lung: No shortness of breath or cough. No sputum production  Abdomen: No nausea, vomiting, diarrhea, or abdominal pain. Bisi Karst No cramps. Genitourinary: No increased urinary frequency, or dysuria. No hematuria. No suprapubic or CVA pain  Musculoskeletal: LLE pain  Hematologic: No bleeding or bruising. Neurologic: No headache, weakness, numbness, or tingling. Integument: No rash, no ulcers. Psychiatric: No depression. Endocrine: No polyuria, no polydipsia, no polyphagia.     Physical Examination :   Patient Vitals for the past 8 hrs:   BP Temp Temp src Pulse Resp SpO2   22 1432 132/65 98.6 °F (37 °C) Oral 88 18 97 %   22 0845 (!) 110/56 97.7 °F (36.5 °C) Oral 88 18 96 % General Appearance: Awake, alert, and in no apparent distress  Head:  Normocephalic, no trauma  Eyes: Pupils equal, round, reactive to light and accommodation; extraocular movements intact; sclera anicteric; conjunctivae pink. No embolic phenomena. ENT: Oropharynx clear, without erythema, exudate, or thrush. No tenderness of sinuses. Mouth/throat: mucosa pink and moist. No lesions. Dentition in good repair. Neck:Supple, without lymphadenopathy. Thyroid normal, No bruits. Pulmonary/Chest: Clear to auscultation, without wheezes, rales, or rhonchi. No dullness to percussion. Cardiovascular: Regular rate and rhythm without murmurs, rubs, or gallops. Abdomen: Soft, non tender. Bowel sounds normal. No organomegaly  All four Extremities: No cyanosis, clubbing, edema, or effusions. Fasciotomy site LLE with wound vac in place  Neurologic: No gross sensory or motor deficits. Skin: Warm and dry. Pale  Fasciotomy site LLE with wound vac in place    Medical Decision Making -Laboratory:   I have independently reviewed/ordered the following labs:    CBC with Differential:   Recent Labs     07/19/22  0729 07/20/22  0408   WBC 13.4* 12.6*   HGB 7.8* 8.8*   HCT 23.0* 28.1*   * 412     BMP:   Recent Labs     07/19/22  0651 07/20/22  0408   * 134*   K 4.1 4.3   CL 97* 99   CO2 24 24   BUN 59* 61*   CREATININE 2.30* 2.32*   MG 1.7 1.7     Hepatic Function Panel: No results for input(s): PROT, LABALBU, BILIDIR, IBILI, BILITOT, ALKPHOS, ALT, AST in the last 72 hours. No results for input(s): RPR in the last 72 hours. No results for input(s): HIV in the last 72 hours. No results for input(s): BC in the last 72 hours.   Lab Results   Component Value Date/Time    MUCUS 1+ 06/30/2012 09:40 PM    PH 7.37 02/12/2011 03:46 AM    RBC 2.85 07/20/2022 04:08 AM    RBC 4.69 06/06/2012 07:29 PM    TRICHOMONAS NOT REPORTED 06/30/2012 09:40 PM    WBC 12.6 07/20/2022 04:08 AM    YEAST NOT REPORTED 06/30/2012 09:40 PM

## 2022-07-20 NOTE — PROGRESS NOTES
Division of Vascular Surgery         Progress Note      Name: Milana Lowe  MRN: [id number]         Overnight Events:      No acute events overnight. Subjective:     Patient examined bedside this morning. Pain is well controlled and he reports doing well. He is tolerating a diet without nausea or vomiting and urine output continues to be adequate. He is passing flatus but can't remember his last BM. Physical Exam:     Vitals:  BP (!) 110/56   Pulse 88   Temp 97.7 °F (36.5 °C) (Oral)   Resp 18   Ht 5' 10.5\" (1.791 m) Comment: no inital recorded height; 9/13/12 office visit  Wt 210 lb 15.7 oz (95.7 kg)   SpO2 96%   BMI 29.84 kg/m²     General appearance - Alert, disoriented  Mental status - following some commands  Head - normocephalic and atraumatic  Chest - no respiratory distress, no accessory muscle use  Heart - tachycardia, irregular rhythm  Abdomen - soft, non-tender, non-distended  Neurological - continues to have decreased sedation in lower extremities  Extremities -  edema to RLE, function improving, LLE fasciotomy incision with wound VAC in place with good suction, left groin full, however no surrounding erythema to suggest infection  Skin - BL groin incisions clean and dry, no signs of infection  Vascular Exam - palpable left DP, doppler signals; weak left PT, weak right PT    Imaging/Labs:     XR CHEST (SINGLE VIEW FRONTAL)     Result Date: 7/10/2022  Right IJ CVC catheter tip overlies the superior cavoatrial junction. Left IJ CVC catheter tip overlies the mid/distal SVC. No pneumothorax is seen. Mild left basilar atelectasis. XR CHEST PORTABLE     Result Date: 7/11/2022  Stable chest.     XR CHEST PORTABLE     Result Date: 7/11/2022  1. The deeper temporary dialysis catheter on the previous exam is been removed, with a new temporary dialysis catheter seen overlying the proximal superior vena cava. 2. Other tubes and lines as above.  3. Patchy infiltrates in the lungs bilaterally which may represent edema or pneumonia. XR CHEST PORTABLE     Result Date: 7/11/2022  1. The right jugular catheter appears in a stable position with the tip at the cavoatrial junction. 2. Endotracheal tube, nasogastric tube, and left jugular line are also redemonstrated. 3.  Some hazy opacities in the lungs which could be subtle edema or inflammatory. XR CHEST PORTABLE     Result Date: 7/10/2022  1. Tubes and right IJ line as detailed above. 2. Improvement in aeration of both lung since the prior study with residual airspace disease at the left upper and left lower lung zones as well as right parahilar region. No new focal airspace disease. 3. Prior median sternotomy and CABG. Assessment/Plan:     65 y/o male critically ill s/p BL femoral artery cutdown with thrombectomy of aortobifemoral graft, LLE fasciotomy, bilateral lower extremity angiography    Plan:   Appreciate medical management per primary team  Cont neurovascular checks  Cont Eliquis  Increased bowel reg  Appreciate nephrology recommendations  If patient would require long-term dialysis would plan on placing tunneled catheter sometime early this week  Urine output increasing  Nephro recommended holding hemodialysis on 7/18  Appreciate cardiology recommendations  Vascular surgery would request we hold off on cardiac catheterization during this admission  Wound VAC in place, change 3 times weekly  Changed Monday 7/18/22, to be changed again today        [address] [facility] Student  7/20/22  9:02 AM EDT    Addendum:    Patient seen and examined with Medical Student and Surgical Team.  Agree with assessment and plan with changes made accordingly.       Sudeep Tan DO  PGY-4 General Surgery  07/20/22  9:13 AM        [address] [place] [address],[address] [place] [address]: [phone]

## 2022-07-20 NOTE — PROGRESS NOTES
I spoke with Elias Horn his ex wife and she said he gets his prescriptions filled at [facility] on \[facility]\"" The pharmacist said he will fax over his recent med list

## 2022-07-20 NOTE — PROGRESS NOTES
[id number] [facility] Wound Ostomy Continence Nurse  Follow Up      NAME:  Joi Gautam  MEDICAL RECORD NUMBER:  [id number]  AGE: 64 y.o. GENDER: male  : 1960  TODAY'S DATE:  2022    Subjective:     Reason for WOCN Evaluation and Assessment: \"sacral ulcer\"      Joi Gautam is a 64 y.o. male referred by:  [x] Physician  [] Nursing  [] Other:     Wound Identification:  Wound Type: pressure  Contributing Factors: chronic pressure, decreased mobility, shear force, arterial insufficiency, decreased tissue oxygenation, anticoagulation therapy and incontinence of stool            Objective:      /65   Pulse 88   Temp 98.6 °F (37 °C) (Oral)   Resp 18   Ht 5' 10.5\" (1.791 m) Comment: no inital recorded height; 12 office visit  Wt 210 lb 15.7 oz (95.7 kg)   SpO2 97%   BMI 29.84 kg/m²   Uday Risk Score: Uday Scale Score: 16    LABS    CBC:   Lab Results   Component Value Date/Time    WBC 12.6 2022 04:08 AM    RBC 2.85 2022 04:08 AM    RBC 4.69 2012 07:29 PM    HGB 8.8 2022 04:08 AM     CMP:  Albumin:    Lab Results   Component Value Date/Time    LABALBU 2.6 2022 06:31 AM    LABALBU 3.6 2012 05:11 AM    LABALBU 4.4 2011 09:50 AM     PT/INR:    Lab Results   Component Value Date/Time    PROTIME DUPLICATE ORDER  07:00 PM    PROTIME 11.9 2012 01:19 AM    PROTIME 11.40 2011 66:93 AM    INR DUPLICATE ORDER  07:00 PM     HgBA1c:    Lab Results   Component Value Date/Time    LABA1C 7.5 2022 06:27 AM    LABA1C 6.0 2011 02:10 AM     PTT: No components found for: LABPTT      Assessment:       Measurements:        Pink moist wound bed with few areas of soft eschar. Hyperpigmenation and dusky skin proximally. Calmazine and Zinc Paste not adhering well to the wound bed. Episode of fecal incontinence occurred today. Will begin use of Triad Hydrophilic Wound Dressing to offer better adherence, coverage, and protection. Response to treatment:  Well tolerated by patient. Plan:      Plan of Care: Turn every 2 hours. Side to side only. Avoid supine. Use the wedges to offload the sacrum  Float heels off of bed with pillows under calves     Use lift sling to reposition patient to minimize potential for shear injury. Routine incontinence care with incontinence barrier cloths  Apply Triad Hydrophilic Wound Dressing paste daily and prn incontinence care to the sacral injury.   Moisture wicking under pads        Specialty Bed Required : Yes: [place] Isoflex Surface in use  [] Low Air Loss  [x] Pressure Redistribution  [] Fluid Immersion  [] Bariatric  [] Total Pressure Relief  [] Other:     Discharge Plan:  TBD     Patient/Caregiver Teaching:     [] Indicates understanding                [] Needs reinforcement  [] Unsuccessful                                  [] Verbal Understanding  [] Demonstrated understanding        [x] No evidence of learning  [] Refused teaching                           [] N/A       Electronically signed by Rashad Hutchins RN, CWON on 7/20/2022 at 2:45 PM

## 2022-07-20 NOTE — PLAN OF CARE
Problem: Discharge Planning  Goal: Discharge to home or other facility with appropriate resources  Outcome: Progressing     Problem: Pain  Goal: Verbalizes/displays adequate comfort level or baseline comfort level  Outcome: Progressing     Problem: Skin/Tissue Integrity  Goal: Absence of new skin breakdown  Description: 1. Monitor for areas of redness and/or skin breakdown  2. Assess vascular access sites hourly  3. Every 4-6 hours minimum:  Change oxygen saturation probe site  4. Every 4-6 hours:  If on nasal continuous positive airway pressure, respiratory therapy assess nares and determine need for appliance change or resting period. Outcome: Progressing     Problem: Safety - Adult  Goal: Free from fall injury  Outcome: Progressing     Problem: ABCDS Injury Assessment  Goal: Absence of physical injury  Outcome: Progressing     Problem: Respiratory - Adult  Goal: Achieves optimal ventilation and oxygenation  Outcome: Progressing     Problem: Confusion  Goal: Confusion, delirium, dementia, or psychosis is improved or at baseline  Description: INTERVENTIONS:  1. Assess for possible contributors to thought disturbance, including medications, impaired vision or hearing, underlying metabolic abnormalities, dehydration, psychiatric diagnoses, and notify attending LIP  2. Rewey high risk fall precautions, as indicated  3. Provide frequent short contacts to provide reality reorientation, refocusing and direction  4. Decrease environmental stimuli, including noise as appropriate  5. Monitor and intervene to maintain adequate nutrition, hydration, elimination, sleep and activity  6. If unable to ensure safety without constant attention obtain sitter and review sitter guidelines with assigned personnel  7.  Initiate Psychosocial CNS and Spiritual Care consult, as indicated  Outcome: Progressing  Flowsheets (Taken 7/19/2022 2000)  Effect of thought disturbance (confusion, delirium, dementia, or psychosis) are managed with adequate functional status: Assess for contributors to thought disturbance, including medications, impaired vision or hearing, underlying metabolic abnormalities, dehydration, psychiatric diagnoses, notify LIP

## 2022-07-20 NOTE — PROGRESS NOTES
Nephrology Progress Note      SUBJECTIVE      Patient seen and examined this morning. Serum creatinine is 2.3 essentially unchanged from yesterday. Urine output between 1.3-1.4 liters last 24 hours. Dexter indwelling. Still has his very dialysis catheter in place. Patient denies any orthopnea or paroxysmal dyspnea. Patient is without any chest pain. OBJECTIVE      CURRENT TEMPERATURE:  Temp: 97.7 °F (36.5 °C)  MAXIMUM TEMPERATURE OVER 24HRS:  Temp (24hrs), Av.7 °F (37.1 °C), Min:97.7 °F (36.5 °C), Max:98.9 °F (37.2 °C)    CURRENT RESPIRATORY RATE:  Resp: 18  CURRENT PULSE:  Heart Rate: 88  CURRENT BLOOD PRESSURE:  BP: (!) 110/56  24HR BLOOD PRESSURE RANGE:  Systolic (96EPZ), QSN:828 , Min:87 , OSW:681   ; Diastolic (30HER), SYR:22, Min:45, Max:72    24HR INTAKE/OUTPUT:    Intake/Output Summary (Last 24 hours) at 2022 0929  Last data filed at 2022 7174  Gross per 24 hour   Intake 350 ml   Output 1300 ml   Net -950 ml     WEIGHT :Patient Vitals for the past 96 hrs (Last 3 readings):   Weight   22 0600 210 lb 15.7 oz (95.7 kg)   22 0315 217 lb 6 oz (98.6 kg)   22 0317 224 lb 10.4 oz (101.9 kg)     PHYSICAL EXAM      GENERAL APPEARANCE:Awake, alert, in no acute distress  SKIN: warm and dry, no rash or erythema  EYES: conjunctivae pale and sclera anicteric  ENT: no thrush no pharyngeal congestion   NECK:   No JVD. No carotid bruits and no carotid lymphadenopathy . PULMONARY: lungs are clear to auscultation. No Wheezing, no ronchi . CADRDIOVASCULAR: S1 and S2 normal NO S3 and NO S4 . No rubs , no murmur. ABDOMEN: soft nontender, bowel sounds present, no organomegaly, no ascites.      EXTREMITIES: Left greater than right lower extremity edema, wound VAC left leg    CURRENT MEDICATIONS      [START ON 2022] sennosides-docusate sodium (SENOKOT-S) 8.6-50 MG tablet 2 tablet, Daily  0.9 % sodium chloride infusion, PRN  0.9 % sodium chloride infusion, PRN  OLANZapine (ZYPREXA) 5 mg in sterile water 1 mL injection, Once  apixaban (ELIQUIS) tablet 5 mg, BID  torsemide (DEMADEX) tablet 20 mg, Daily  0.9 % sodium chloride infusion, PRN  fentaNYL (SUBLIMAZE) injection 50 mcg, Q1H PRN  0.9 % sodium chloride infusion, PRN  oxyCODONE (ROXICODONE) immediate release tablet 5 mg, Q6H PRN  metoprolol succinate (TOPROL XL) extended release tablet 12.5 mg, BID  famotidine (PEPCID) tablet 20 mg, Daily  atorvastatin (LIPITOR) tablet 10 mg, Daily  tamsulosin (FLOMAX) capsule 0.4 mg, Daily  insulin lispro (HUMALOG) injection vial 0-18 Units, 4x Daily AC & HS  melatonin tablet 5 mg, Nightly  QUEtiapine (SEROQUEL) tablet 50 mg, Nightly  heparin (porcine) injection 1,300 Units, PRN  heparin (porcine) injection 1,400 Units, PRN  aspirin chewable tablet 81 mg, Daily  sodium chloride flush 0.9 % injection 5-40 mL, PRN  polyethylene glycol (GLYCOLAX) packet 17 g, Daily  ondansetron (ZOFRAN) injection 4 mg, Q6H PRN  glucose chewable tablet 16 g, PRN  glucagon (rDNA) injection 1 mg, PRN          LABS      CBC:   Recent Labs     07/18/22  0616 07/18/22  1600 07/19/22  0729 07/20/22  0408   WBC 18.0*  --  13.4* 12.6*   RBC 2.15*  --  2.36* 2.85*   HGB 6.9* 8.2* 7.8* 8.8*   HCT 21.3* 24.6* 23.0* 28.1*   MCV 99.1  --  97.5 98.6   MCH 32.1  --  33.1 30.9   MCHC 32.4  --  33.9 31.3   RDW 16.4*  --  17.1* 15.9*     --  464* 412   MPV 12.6  --  12.2 11.9      BMP:   Recent Labs     07/18/22  0320 07/19/22  0651 07/20/22  0408   * 134* 134*   K 3.6* 4.1 4.3   CL 98 97* 99   CO2 21 24 24   BUN 50* 59* 61*   CREATININE 2.27* 2.30* 2.32*   GLUCOSE 182* 130* 116*   CALCIUM 7.9* 8.1* 8.2*        PHOSPHORUS:    Recent Labs     07/18/22  0320 07/19/22  0651 07/20/22  0408   PHOS 4.2 4.7* 4.7*     MAGNESIUM:   Recent Labs     07/18/22  0320 07/19/22  0651 07/20/22  0408   MG 1.8 1.7 1.7     SPEP:   Lab Results   Component Value Date/Time    PROT 5.3 07/14/2022 06:31 AM    PROT 7.1 07/25/2011 09:50 AM     UPEP: No results EF 15%, cardiology on board-will likely need cardiac catheterization. 4. Rhabdomyolysis  5. Hypotension-requiring pressor support. 6. Total aortobifemoral occulusion s/p Bilateral common femoral arterial access via open cutdown, thrombectomy of the aortobifemoral bypass graft thromboendarterectomy of the common femoral profundofemoral and SFA bilaterally with bovine pericardial patch angioplasty on 7/8/2021  7. Left lower extremity compartment syndrome s/p fasciotomy. 8. U/o gradually improved. PLAN      1. May remove Dexter catheter   2. May remove temporary dialysis catheter  3. Follow up labs ordered. 4.  Before discharge from renal standpoint once outpatient arrangements made      Please do not hesitate to call with questions.     This note is created with the assistance of a speech-recognition program. While intending to generate a document that actually reflects the content of the visit, no guarantees can be provided that every mistake has been identified and corrected by editing    Electronically signed by Yevgeniy De León MD on 7/20/2022 at 9:29 AM

## 2022-07-20 NOTE — CARE COORDINATION
Received call from [address] [place] [address] that precert has been received. Notified pt. Since he is not requiring hemodialysis, he no longer wants to go to [facility]. He wants to go to [facility]. Referral was previously sent. Notified Gareth Cruz. She will review updated notes and call back. Received call. They are able to accept tomorrow as they need to order wound vac. Transport request faxed to [address] [place] [address] for [facility] [employer]. Pt updated.  [address] [place] [address] updated

## 2022-07-20 NOTE — PROGRESS NOTES
Physical Therapy  Facility/Department: [facility] CAR 2  Daily Treatment Note    Name: Maria Luisa Shahid  : 1960  MRN: [id number]  Date of Service: 2022    Discharge Recommendations:  Patient would benefit from continued therapy after discharge          Patient Diagnosis(es): The primary encounter diagnosis was Failing vascular bypass graft, initial encounter. Diagnoses of Left leg numbness, Acute respiratory failure with hypercapnia ([address] and Lactic acidosis were also pertinent to this visit. Past Medical History:  has a past medical history of Anxiety, Arthritis associated with another disorder, CAD (coronary artery disease), Carotid artery occlusion, Family history of kidney stone, HTN (hypertension), Hyperkalemia, Hyperlipidemia, Hypertension, Nephrolithiasis, Neuropathy, PAD (peripheral artery disease) ([address] Peripheral vascular disease ([address] Personal history of MRSA (methicillin resistant Staphylococcus aureus), Seasonal allergies, and Vasculopathy. Past Surgical History:  has a past surgical history that includes Tonsillectomy and adenoidectomy; Percutaneous Transluminal Coronary Angio; Carotid endarterectomy (08); IR Femoral Popliteal Bypass Graft ( Dr Ricardo Rios); Femoral-tibial Bypass Graft (07  Liam Lund ); Balloon angioplasty, artery (11/23/10 Allie Muñoz); Aorto-femoral Bypass Graft ([facility]); Abdominal adhesion surgery (2011-[facility]); Coronary artery bypass graft (); and Axillary-femoral Bypass Graft (Bilateral, 2022). Assessment   Body Structures, Functions, Activity Limitations Requiring Skilled Therapeutic Intervention: Decreased functional mobility ; Decreased ADL status; Decreased ROM; Decreased cognition;Decreased tolerance to work activity; Decreased strength;Decreased endurance;Decreased balance  Assessment: Pt was limited with sitting EOB today d/t orthostatic hypotension, but recovered when positioned in supine. RN notified.   Pt requires maxA x2 for bed mobility. Pt able to sit EOB for 5 mins with min-maxA d/t posterior lean with verbal cues to correct posture/trunk position. Unable to attempt STS at [address] [place] [address] today d/t decreasing BP. Pt is limited by decreased ROM, decreased balance and decreased functional mobility. Pt is currently unsafe to return to prior living arrangements due to high fall risk and would benefit from continued PT following discharge to address functional deficits. Specific Instructions for Next Treatment: progress activity, encourage out of bed  Therapy Prognosis: Fair  Activity Tolerance  Activity Tolerance: Other (comment)  Activity Tolerance Comments: pt limited d/t orthostatic hypotension     Plan   Plan  Plan: 5-7 times per week  Specific Instructions for Next Treatment: progress activity, encourage out of bed  Current Treatment Recommendations: Strengthening, Balance training, Functional mobility training, Transfer training, Therapeutic activities  Safety Devices  Type of Devices: Call light within reach, Gait belt, Left in bed, Patient at risk for falls, Bed alarm in place, Nurse notified  Restraints  Restraints Initially in Place: No     Restrictions  Restrictions/Precautions  Restrictions/Precautions: Fall Risk, General Precautions  Required Braces or Orthoses?: No  Position Activity Restriction  Other position/activity restrictions: left  LE wound vac, WBAT LLE (per vascular, [place] Livers, DO via perfect serve)     Subjective   General  Chart Reviewed: Yes  Response To Previous Treatment: Patient with no complaints from previous session. Family / Caregiver Present: No  General Comment  Comments: Pt and RN agreeable to PT. Pt returned to supine in bed post PT. Subjective  Subjective: Pt and RN agreeable to PT. Pt returned to supine in bed  upon arrival.  Pt pleasant and cooperative and motivated to progress.   Pt c/o pain in L LE, unrated              Cognition   Orientation  Overall Orientation Status: Within Functional Limits  Orientation Level: Oriented to person;Oriented to place;Oriented to situation;Disoriented to time  Cognition  Overall Cognitive Status: Exceptions  Arousal/Alertness: Appropriate responses to stimuli  Following Commands: Follows multistep commands with repitition; Follows multistep commands with increased time  Attention Span: Appears intact  Memory: Decreased recall of biographical Information  Safety Judgement: Decreased awareness of need for safety;Decreased awareness of need for assistance  Problem Solving: Assistance required to correct errors made;Assistance required to identify errors made;Decreased awareness of errors  Insights: Decreased awareness of deficits  Initiation: Requires cues for some  Sequencing: Requires cues for some     Objective   Bed mobility  Rolling to Left: Moderate assistance  Rolling to Right: Moderate assistance  Supine to Sit: Maximum assistance;2 Person assistance  Sit to Supine: Maximum assistance;2 Person assistance  Scooting: Maximal assistance  Bed Mobility Comments: Assessed with HOB elevated, strong  with L UE on bed rail. Pt had orthostatic hypotension when sitting EOB so pt repositioned quickly in flat, supine where pt recovered. RN notified  Transfers  Sit to Stand: Unable to assess (unable d/t orthostatic hypotension when sitting EOB)  Stand to sit: Unable to assess (unable d/t orthostatic hypotension when sitting EOB)  Comment: unable d/t orthostatic hypotension when sitting EOB        Balance  Posture: Fair  Sitting - Static: Fair;-  Sitting - Dynamic: Poor  Comments: Assessed sitting EOB.   Exercise Treatment: AAROM to L LE quad sets and heel slides x 10 reps, AROM L LE ankle pumps x 10 reps, PROM R LE heel slides x 10 reps  Static Sitting Balance Exercises: Pt sat EOB ~5 minues with min-maxA to maintain d/t posterior lean with verbal cues needed for posture / trunk awareness; pt limited d/t orthostatic hypotension        OutComes Score AM-PAC Score  AM-PAC Inpatient Mobility Raw Score : 8 (07/20/22 1408)  AM-PAC Inpatient T-Scale Score : 28.52 (07/20/22 1408)  Mobility Inpatient CMS 0-100% Score: 86.62 (07/20/22 1408)  Mobility Inpatient CMS G-Code Modifier : CM (07/20/22 1408)            Goals  Short Term Goals  Time Frame for Short term goals: 7 visits  Short term goal 1: Pt to completed bed movilty with min assist  Short term goal 2: Pt to scoot left/ rigth at EOB with WB activity as tolerated needed to prepare for standing  Short term goal 3: Pt to tolerate 30 minutes ther ex and activity  to improve functional abiltiy and tolerance  Short term goal 4: Pt to complete standing transfer from bed <> WC with Mod I  Long Term Goals  Time Frame for Long term goals : 14 visist  Long term goal 1: Pt to ambulate up to 150 ft with rolling walker with CGA  Patient Goals   Patient goals : to get stronger       Education  Patient Education  Education Given To: Patient  Education Provided: Role of Therapy;Plan of Care;Precautions  Education Provided Comments: educated pt about importance of raising HOB while supine as tolerated during the day d/t orthostatic hypotension  Education Method: Demonstration  Education Outcome: Continued education needed;Verbalized understanding      Therapy Time   Individual Concurrent Group Co-treatment   Time In 1331         Time Out 1403         Minutes 32         Timed Code Treatment Minutes: [facility]

## 2022-07-20 NOTE — PROGRESS NOTES
[facility]  Office: [address], DO, Luis Uriostegui, DO, Sanjeev Barfield, DO, Crow Ortiz, DO, Cordella Holstein, MD, Bayron Castro MD, Shea Schafer MD, Tommy Perez MD,  Jonas Johnson MD, Cristal Fenton MD, Weston Ocampo, DO, Anna aMrie Manriquez MD,  Renee Garcia MD, Rosa Elena Parks MD, Lizette Beltre DO, Adonis Wilde MD, Rashel Jones MD, Victor Manuel Monroy MD, Carrie Wilde DO, Keaton Gamboa MD, Monalisa Chan MD, Ngoc Sepulveda, CNP,  Vane Marinelli, CNP, Jose M Brooks, CNP, Quinn Pastro, CNP, Dixie Love PANaobrC, Chuck Tello, DNP, Michele Kelly, CNP, Ronaldo Baldwin, CNP, Artemio Lopez, CNP, Carolyn Walsh, CNP, Jasiel Caldera, CNS, Martina Rust, [facility], Benito Vasquez, CNP, Yoni Reed, CNP, Krystal Rankin, CNP, Berenice Lam, CNP           [address]    Progress Note    7/20/2022    9:12 AM    Name:   Lois George  MRN:     [id number]     Acct:      [de-identified]   Room:   2021/2021-01  IP Day:  12  Admit Date:  7/8/2022  4:56 AM    PCP:   Shea Schafer (Inactive)  Code Status:  Full Code    Subjective:     C/C:   Chief Complaint   Patient presents with    Generalized Body Aches     10/10 FULL BODY PAIN     Interval History Status: improved. Patient seen and examined at bedside,     Brief History:     Per my partner:  Lois George is a 64 y.o. Non- / non  male who presents with Generalized Body Aches (10/10 FULL BODY PAIN)   and is admitted to the hospital for the management of Critical ischemia of lower extremity ([facility] 64year old male with past medical history of anxiety, hsitory of CABG x3, CAD, HTN, Peripheral vascular disease with some surgeries performed [place] presents to the ER on 7/8/22 with body aches and shortness of breath. Found to be in Atrial fibrillation.  With critical limb ischemia, underwent thrombectomry of aortobifemoral bypass graft, thromboendarterectomy of common femoral profundofemoral and sfa biltaeraly with bovine patch on 7/8/22 with Vascular surgery. Patient had troponin elevation and cardiology was consulted. Patient was already on a heparin drip per vascular surgery. Found to have an EF 15% by echo, nephrology following due to oliguria. Started on CVVHD 7/10/22-7/15/22. Patient was intubated from 7/8/22-7/12/22. Intermittently requiring presors from 7/7/22-7/13/22. \"    Review of Systems:     Review of Systems   Constitutional:  Positive for activity change. Negative for chills, diaphoresis and fever. HENT:  Negative for congestion. Eyes:  Negative for visual disturbance. Respiratory:  Negative for cough, chest tightness, shortness of breath and wheezing. Cardiovascular:  Positive for leg swelling. Negative for chest pain and palpitations. Gastrointestinal:  Negative for abdominal pain, blood in stool, constipation, diarrhea, nausea and vomiting. Genitourinary:  Negative for difficulty urinating. Musculoskeletal:  Positive for gait problem. Neurological:  Positive for weakness. Negative for dizziness, light-headedness, numbness and headaches. All other systems reviewed and are negative. Medications: Allergies:     Allergies   Allergen Reactions    Faviola [Morphine Sulfate] Itching and Rash    Methadone Rash    Morphine Nausea And Vomiting      Sever \"headache\"       Current Meds:   Scheduled Meds:    [START ON 7/21/2022] sennosides-docusate sodium  2 tablet Oral Daily    OLANZapine (ZyPREXA) in sterile water IntraMUSCular  5 mg IntraMUSCular Once    apixaban  5 mg Oral BID    torsemide  20 mg Oral Daily    metoprolol succinate  12.5 mg Oral BID    famotidine  20 mg Oral Daily    atorvastatin  10 mg Oral Daily    tamsulosin  0.4 mg Oral Daily    insulin lispro  0-18 Units SubCUTAneous 4x Daily AC & HS    melatonin  5 mg Oral Nightly    QUEtiapine  50 mg Oral Nightly    aspirin  81 mg Oral Daily polyethylene glycol  17 g Oral Daily     Continuous Infusions:    sodium chloride      sodium chloride      sodium chloride      sodium chloride 10 mL/hr (22 1848)     PRN Meds: sodium chloride, sodium chloride, sodium chloride, fentanNYL, sodium chloride, oxyCODONE, heparin (porcine), heparin (porcine), sodium chloride flush, ondansetron, glucose, glucagon (rDNA)    Data:     Past Medical History:   has a past medical history of Anxiety, Arthritis associated with another disorder, CAD (coronary artery disease), Carotid artery occlusion, Family history of kidney stone, HTN (hypertension), Hyperkalemia, Hyperlipidemia, Hypertension, Nephrolithiasis, Neuropathy, PAD (peripheral artery disease) ([facility] Peripheral vascular disease ([facility] Personal history of MRSA (methicillin resistant Staphylococcus aureus), Seasonal allergies, and Vasculopathy. Social History:   reports that he has been smoking cigarettes. He has a 66.00 pack-year smoking history. He has never used smokeless tobacco. He reports that he does not drink alcohol and does not use drugs. Family History:   Family History   Problem Relation Age of Onset    Hypertension Father     Heart Disease Sister     Hypertension Sister     Cancer Mother 28        breast cancer       Vitals:  BP (!) 110/56   Pulse 88   Temp 97.7 °F (36.5 °C) (Oral)   Resp 18   Ht 5' 10.5\" (1.791 m) Comment: no inital recorded height; 12 office visit  Wt 210 lb 15.7 oz (95.7 kg)   SpO2 96%   BMI 29.84 kg/m²   Temp (24hrs), Av.7 °F (37.1 °C), Min:97.7 °F (36.5 °C), Max:98.9 °F (37.2 °C)    Recent Labs     22  0034 22  0435 22  0737   POCGLU 185* 140* 116* 150*         I/O (24Hr):     Intake/Output Summary (Last 24 hours) at 2022 0912  Last data filed at 2022 9419  Gross per 24 hour   Intake 350 ml   Output 1300 ml   Net -950 ml         Labs:  Hematology:  Recent Labs     22  0616 22  1600 22  3004 07/19/22  0729 07/20/22  0408   WBC 18.0*  --   --  13.4* 12.6*   RBC 2.15*  --   --  2.36* 2.85*   HGB 6.9* 8.2*  --  7.8* 8.8*   HCT 21.3* 24.6*  --  23.0* 28.1*   MCV 99.1  --   --  97.5 98.6   MCH 32.1  --   --  33.1 30.9   MCHC 32.4  --   --  33.9 31.3   RDW 16.4*  --   --  17.1* 15.9*     --   --  464* 412   MPV 12.6  --   --  12.2 11.9   SEDRATE  --  37*  --   --   --    CRP  --   --  127.2*  --   --        Chemistry:  Recent Labs     07/18/22  0320 07/18/22  0953 07/19/22  0651 07/20/22  0408   *  --  134* 134*   K 3.6*  --  4.1 4.3   CL 98  --  97* 99   CO2 21  --  24 24   GLUCOSE 182*  --  130* 116*   BUN 50*  --  59* 61*   CREATININE 2.27*  --  2.30* 2.32*   MG 1.8  --  1.7 1.7   ANIONGAP 14  --  13 11   LABGLOM 29*  --  29* 29*   GFRAA 36*  --  35* 35*   CALCIUM 7.9*  --  8.1* 8.2*   CAION  --  0.99* 1.05* 1.08*   PHOS 4.2  --  4.7* 4.7*   CKTOTAL  --  3,687*  --   --    MYOGLOBIN  --  1,116*  --   --        Recent Labs     07/19/22  1141 07/19/22  1545 07/19/22  2053 07/20/22  0034 07/20/22  0435 07/20/22  0737   POCGLU 156* 121* 185* 140* 116* 150*       ABG:  Lab Results   Component Value Date/Time    POCPH 7.401 07/13/2022 01:01 AM    PHART 7.30 04/22/2012 02:57 PM    PH 7.37 02/12/2011 03:46 AM    POCPCO2 38.6 07/13/2022 01:01 AM    QIP8YPU 42 04/22/2012 02:57 PM    PCO2 43 02/12/2011 03:46 AM    POCPO2 142.5 07/13/2022 01:01 AM    PO2ART 66 04/22/2012 02:57 PM    PO2 73 02/12/2011 03:46 AM    POCHCO3 24.0 07/13/2022 01:01 AM    CEK1BTA 20.6 04/22/2012 02:57 PM    HCO3 24.9 02/12/2011 03:46 AM    NBEA 1 07/13/2022 01:01 AM    PBEA 0 07/12/2022 01:27 PM    HSC2RXT 22 04/22/2012 02:57 PM    BNMX3ETO 99 07/13/2022 01:01 AM    H0LFRHJV 90 04/22/2012 02:57 PM    O2SAT 15.8 02/12/2011 03:46 AM    FIO2 30.0 07/13/2022 05:18 AM     Lab Results   Component Value Date/Time    SPECIAL LAC 10ML 07/08/2022 06:47 AM     Lab Results   Component Value Date/Time    CULTURE NO GROWTH 07/18/2022 08:45 AM Radiology:  CT ABDOMEN PELVIS WO CONTRAST Additional Contrast? None    Result Date: 7/14/2022  1. Postprocedural findings in the left groin with superficial hematoma, as described. Subcutaneous edema in the left groin and left thighs also present. 2.  No retroperitoneal or intrapelvic hematoma. XR CHEST PORTABLE    Result Date: 7/18/2022  No acute cardiopulmonary disease     XR CHEST PORTABLE    Result Date: 7/16/2022  No acute process. Stable exam.  Stable lines. XR CHEST PORTABLE    Result Date: 7/15/2022  No acute cardiopulmonary process. Stable internal jugular central lines. XR CHEST PORTABLE    Result Date: 7/14/2022  Interval removal of endotracheal and enteric tubes. No new findings otherwise identified in the interval.     XR CHEST PORTABLE    Result Date: 7/13/2022  No acute cardiopulmonary process. Support tubes as described above. Physical Examination:        Physical Exam  Vitals and nursing note reviewed. Constitutional:       General: He is not in acute distress. HENT:      Head: Normocephalic and atraumatic. Eyes:      Conjunctiva/sclera: Conjunctivae normal.      Pupils: Pupils are equal, round, and reactive to light. Neck:      Comments: R IJ temp cath noted  Cardiovascular:      Rate and Rhythm: Normal rate and regular rhythm. Heart sounds: No murmur heard. Pulmonary:      Effort: Pulmonary effort is normal. No accessory muscle usage or respiratory distress. Breath sounds: No stridor. No decreased breath sounds, wheezing, rhonchi or rales. Abdominal:      General: Bowel sounds are normal. There is no distension. Palpations: Abdomen is soft. Abdomen is not rigid. Tenderness: There is no abdominal tenderness. There is no guarding. Genitourinary:     Comments: Dexter noted  Musculoskeletal:         General: No tenderness. Right lower leg: Edema present. Left lower leg: Edema present.       Comments: LLE wound vac noted    Skin: General: Skin is warm and dry. Findings: No erythema, lesion or rash. Neurological:      Mental Status: He is alert and oriented to person, place, and time. Cranial Nerves: No cranial nerve deficit. Motor: No seizure activity. Psychiatric:         Speech: Speech normal.         Behavior: Behavior normal. Behavior is cooperative.        Assessment:        Hospital Problems             Last Modified POA    * (Principal) Critical ischemia of lower extremity ([address] 7/8/2022 Yes    PAD (peripheral artery disease) ([address] 7/18/2022 Yes    CAROLYN (acute kidney injury) ([address] 7/18/2022 Yes    Dependence on renal dialysis ([address] 7/18/2022 Yes    Failing vascular bypass graft 7/10/2022 Yes    Arterial hypotension 7/10/2022 Yes    Non-traumatic rhabdomyolysis 7/10/2022 Yes    Cardiomyopathy ([address] 7/10/2022 Yes    Decreased urine output 7/10/2022 Yes    Acute respiratory failure with hypercapnia ([address] 7/17/2022 Yes    Lactic acidosis 7/17/2022 Yes    Left leg numbness 7/17/2022 Yes    Cardiomyopathy, ischemic 7/18/2022 Yes    Anemia 7/19/2022 Yes    H/O: CVA (cerebrovascular accident) 7/19/2022 Yes    CAD (coronary artery disease) 7/18/2022 Yes    Overview Signed 3/10/2011 11:35 AM by TIERRA Rios     with history of multiple MI          Plan:        Prolonged complicated hospital stay,  I reviewed hospital course including labs, images and notes    Total aortobifemoral occulusion /L LE compartment syndrome: S/p BL femoral artery cutdown with thrombectomy of aortobifemoral graft, LLE fasciotomy, bilateral lower extremity angiography: patient currently continues with wound vac to LLE wound   Continue anticoagulation    CAROLYN/CKD: Required CVVHD and being placed on pressors initially on admission then required intermittent dialysis, last dialysis 7/16 and creatinine is stable , per nephrology non tunneled cath and saldana are out today   Continue Demadex 20 mg daily    Non-STEMI: Likely type I with a EF of 15% on echocardiogram given sepsis, CAROLYN along with vascular access and patient being currently hemodynamically stable decision was made to postpone cardiac cath till com     New diagnosis of cardiomyopathy with EF 15%: Continue current medication, will need cardiac cath once stable    Acute blood loss anemia: Patient received multiple units since admission, also received unit of packed RBCs yesterday, today hemoglobin again dropped less than 8 and given concerns for coronary stenosis and non-STEMI type I we will go ahead and order another unit today to keep hemoglobin above 8    Rhabdomyolysis: Resolved    Acute cystitis: Patient finished course of antibiotics     H/O CVA with residual weakness: continue ASA and tolerable dose of statin    Tobacco abuse: Cessation education    PT/OT    Discussed with the patient nurse    Stable for DC once placement arranged     Med rec done  Scripts added   AYO signed  30+ minutes spent      Osorio Gunderson MD  7/20/2022  9:12 AM

## 2022-07-21 VITALS
TEMPERATURE: 97.9 F | HEIGHT: 71 IN | HEART RATE: 91 BPM | OXYGEN SATURATION: 94 % | SYSTOLIC BLOOD PRESSURE: 129 MMHG | DIASTOLIC BLOOD PRESSURE: 66 MMHG | BODY MASS INDEX: 29.54 KG/M2 | WEIGHT: 210.98 LBS | RESPIRATION RATE: 16 BRPM

## 2022-07-21 LAB
ANION GAP SERPL CALCULATED.3IONS-SCNC: 13 MMOL/L (ref 9–17)
BUN BLDV-MCNC: 59 MG/DL (ref 8–23)
CALCIUM IONIZED: 1.11 MMOL/L (ref 1.13–1.33)
CALCIUM SERPL-MCNC: 8.2 MG/DL (ref 8.6–10.4)
CHLORIDE BLD-SCNC: 98 MMOL/L (ref 98–107)
CO2: 24 MMOL/L (ref 20–31)
COMPLEMENT C3: 156 MG/DL (ref 90–180)
COMPLEMENT C3: 161 MG/DL (ref 90–180)
COMPLEMENT C3: 174 MG/DL (ref 90–180)
CREAT SERPL-MCNC: 2.03 MG/DL (ref 0.7–1.2)
GFR AFRICAN AMERICAN: 41 ML/MIN
GFR NON-AFRICAN AMERICAN: 34 ML/MIN
GFR SERPL CREATININE-BSD FRML MDRD: ABNORMAL ML/MIN/{1.73_M2}
GLUCOSE BLD-MCNC: 139 MG/DL (ref 70–99)
GLUCOSE BLD-MCNC: 140 MG/DL (ref 75–110)
GLUCOSE BLD-MCNC: 168 MG/DL (ref 75–110)
GLUCOSE BLD-MCNC: 195 MG/DL (ref 75–110)
HCT VFR BLD CALC: 29.8 % (ref 40.7–50.3)
HEMOGLOBIN: 9.4 G/DL (ref 13–17)
MAGNESIUM: 1.7 MG/DL (ref 1.6–2.6)
MCH RBC QN AUTO: 31.1 PG (ref 25.2–33.5)
MCHC RBC AUTO-ENTMCNC: 31.5 G/DL (ref 28.4–34.8)
MCV RBC AUTO: 98.7 FL (ref 82.6–102.9)
NRBC AUTOMATED: 0 PER 100 WBC
PDW BLD-RTO: 15.6 % (ref 11.8–14.4)
PHOSPHORUS: 4.6 MG/DL (ref 2.5–4.5)
PLATELET # BLD: 473 K/UL (ref 138–453)
PMV BLD AUTO: 12 FL (ref 8.1–13.5)
POTASSIUM SERPL-SCNC: 4.3 MMOL/L (ref 3.7–5.3)
RBC # BLD: 3.02 M/UL (ref 4.21–5.77)
SODIUM BLD-SCNC: 135 MMOL/L (ref 135–144)
WBC # BLD: 11.6 K/UL (ref 3.5–11.3)

## 2022-07-21 PROCEDURE — 6370000000 HC RX 637 (ALT 250 FOR IP): Performed by: STUDENT IN AN ORGANIZED HEALTH CARE EDUCATION/TRAINING PROGRAM

## 2022-07-21 PROCEDURE — 80048 BASIC METABOLIC PNL TOTAL CA: CPT

## 2022-07-21 PROCEDURE — 6370000000 HC RX 637 (ALT 250 FOR IP): Performed by: NURSE PRACTITIONER

## 2022-07-21 PROCEDURE — 82330 ASSAY OF CALCIUM: CPT

## 2022-07-21 PROCEDURE — 86160 COMPLEMENT ANTIGEN: CPT

## 2022-07-21 PROCEDURE — 99239 HOSP IP/OBS DSCHRG MGMT >30: CPT | Performed by: FAMILY MEDICINE

## 2022-07-21 PROCEDURE — 99232 SBSQ HOSP IP/OBS MODERATE 35: CPT | Performed by: INTERNAL MEDICINE

## 2022-07-21 PROCEDURE — 85027 COMPLETE CBC AUTOMATED: CPT

## 2022-07-21 PROCEDURE — 83735 ASSAY OF MAGNESIUM: CPT

## 2022-07-21 PROCEDURE — 82947 ASSAY GLUCOSE BLOOD QUANT: CPT

## 2022-07-21 PROCEDURE — 84100 ASSAY OF PHOSPHORUS: CPT

## 2022-07-21 PROCEDURE — 6370000000 HC RX 637 (ALT 250 FOR IP): Performed by: INTERNAL MEDICINE

## 2022-07-21 PROCEDURE — 36415 COLL VENOUS BLD VENIPUNCTURE: CPT

## 2022-07-21 RX ADMIN — FAMOTIDINE 20 MG: 20 TABLET, FILM COATED ORAL at 08:24

## 2022-07-21 RX ADMIN — ATORVASTATIN CALCIUM 10 MG: 10 TABLET, FILM COATED ORAL at 08:24

## 2022-07-21 RX ADMIN — TAMSULOSIN HYDROCHLORIDE 0.4 MG: 0.4 CAPSULE ORAL at 08:24

## 2022-07-21 RX ADMIN — ASPIRIN 81 MG: 81 TABLET, CHEWABLE ORAL at 08:24

## 2022-07-21 RX ADMIN — INSULIN LISPRO 3 UNITS: 100 INJECTION, SOLUTION INTRAVENOUS; SUBCUTANEOUS at 08:24

## 2022-07-21 RX ADMIN — INSULIN LISPRO 3 UNITS: 100 INJECTION, SOLUTION INTRAVENOUS; SUBCUTANEOUS at 11:41

## 2022-07-21 RX ADMIN — TORSEMIDE 20 MG: 20 TABLET ORAL at 08:24

## 2022-07-21 RX ADMIN — OXYCODONE 5 MG: 5 TABLET ORAL at 03:45

## 2022-07-21 RX ADMIN — DOCUSATE SODIUM 50 MG AND SENNOSIDES 8.6 MG 2 TABLET: 8.6; 5 TABLET, FILM COATED ORAL at 08:25

## 2022-07-21 RX ADMIN — METOPROLOL SUCCINATE 12.5 MG: 25 TABLET, FILM COATED, EXTENDED RELEASE ORAL at 08:24

## 2022-07-21 RX ADMIN — OXYCODONE 5 MG: 5 TABLET ORAL at 11:40

## 2022-07-21 RX ADMIN — APIXABAN 5 MG: 5 TABLET, FILM COATED ORAL at 08:24

## 2022-07-21 ASSESSMENT — PAIN DESCRIPTION - LOCATION: LOCATION: BUTTOCKS

## 2022-07-21 ASSESSMENT — PAIN SCALES - GENERAL: PAINLEVEL_OUTOF10: 6

## 2022-07-21 NOTE — DISCHARGE SUMMARY
[facility]  Office: [address], DO, Christopher Doe, DO, Royce Maldonado, DO, Jordan Ze Blood, DO, Denise Evans MD, Heber Resendiz MD, Umberto Mena MD, Madeleine Banks MD,  Jenaro Yi MD, Lorelei Stapleton MD, Latasha Mann, DO, Dipti Cooper MD,  Albaro Fernandez MD, Mario Sadler MD, Melissa Dickey DO, John Aleman MD, Jayda Sequeira MD, Vanai Norris MD, Kaylyn Griffith DO, Gayathri Correia MD, Lydia Alvarez MD, Salima Doyle, CNP,  Bess Bennett, CNP, Harry Conner, CNP, Roberto Sellers, CNP, Nadia Diaz PA-C, Ananya Lees, DNP, Aldair Go, CNP, Rg Latif, CNP, Catalina Salgado, CNP, Cedrick Kaiser, CNP, Franco Driscoll, CNS, Chantell Perera, [facility], Rossy Shah, CNP, Qing Gee, CNP, Joanie Casanova, CNP, Royerbruna Dick, [facility]    Discharge Summary     Patient ID: Rancho Sauer  :  1960   MRN: [id number]     ACCOUNT:  [de-identified]   Patient's PCP: Umberto Mena (Inactive)  Admit Date: 2022   Discharge Date: 2022     Length of Stay: 13  Code Status:  Full Code  Admitting Physician: No admitting provider for patient encounter.   Discharge Physician: Madeleine Banks MD     Active Discharge Diagnoses:     Hospital Problem Lists:  Principal Problem:    Critical ischemia of lower extremity ([address]  Active Problems:    PAD (peripheral artery disease) ([address]    CAROLYN (acute kidney injury) ([address]    Dependence on renal dialysis ([address]    Failing vascular bypass graft    Arterial hypotension    Non-traumatic rhabdomyolysis    Cardiomyopathy ([address]    Decreased urine output    Acute respiratory failure with hypercapnia (HCC)    Lactic acidosis    Left leg numbness    Cardiomyopathy, ischemic    Anemia    H/O: CVA (cerebrovascular accident)    CAD (coronary artery disease)  Resolved Problems:    Hyperkalemia      Admission Condition:  poor     Discharged Condition: stable    Hospital Stay:     Hospital Course:     Per my partner:  Mary Mcmullen is a 64 y.o. Non- / non  male who presents with Generalized Body Aches (10/10 FULL BODY PAIN)   and is admitted to the hospital for the management of Critical ischemia of lower extremity ([facility] [address] 64year old male with past medical history of anxiety, hsitory of CABG x3, CAD, HTN, Peripheral vascular disease with some surgeries performed [place] presents to the ER on 7/8/22 with body aches and shortness of breath. Found to be in Atrial fibrillation. With critical limb ischemia, underwent thrombectomry of aortobifemoral bypass graft, thromboendarterectomy of common femoral profundofemoral and sfa biltaeraly with bovine patch on 7/8/22 with Vascular surgery. Patient had troponin elevation and cardiology was consulted. Patient was already on a heparin drip per vascular surgery. Found to have an EF 15% by echo, nephrology following due to oliguria. Started on CVVHD 7/10/22-7/15/22. Patient was intubated from 7/8/22-7/12/22. Intermittently requiring presors from 7/7/22-7/13/22. \"     During the admission patient was managed as follow    Prolonged complicated hospital stay,  I reviewed hospital course including labs, images and notes     Total aortobifemoral occulusion /L LE compartment syndrome: S/p BL femoral artery cutdown with thrombectomy of aortobifemoral graft, LLE fasciotomy, bilateral lower extremity angiography: patient currently continues with wound vac to LLE wound  Continue anticoagulation     CAROLYN/CKD: Required CVVHD and being placed on pressors initially on admission then required intermittent dialysis, last dialysis 7/16 and creatinine is stable , per nephrology non tunneled cath and saldana are out today  Continue Demadex 20 mg daily     Non-STEMI: Likely type I with a EF of 15% on echocardiogram given sepsis, CAROLYN along with vascular access and patient being currently hemodynamically stable decision was made to postpone cardiac cath till com     New diagnosis of cardiomyopathy with EF 15%: Continue current medication, will need cardiac cath once stable     Acute blood loss anemia: Patient received multiple units since admission, also received unit of packed RBCs yesterday, today hemoglobin again dropped less than 8 and given concerns for coronary stenosis and non-STEMI type I we will go ahead and order another unit today to keep hemoglobin above 8     Rhabdomyolysis: Resolved     Acute cystitis: Patient finished course of antibiotics      H/O CVA with residual weakness: continue ASA and tolerable dose of statin     Tobacco abuse: Cessation education     PT/OT     Discussed with the patient nurse     Stable for DC once placement arranged      Med rec done  Scripts added  AYO signed  30+ minutes spent  Significant therapeutic interventions:   As above    Significant Diagnostic Studies:   Labs / Micro:  CBC:   Lab Results   Component Value Date/Time    WBC 11.6 07/21/2022 03:36 AM    RBC 3.02 07/21/2022 03:36 AM    RBC 4.69 06/06/2012 07:29 PM    HGB 9.4 07/21/2022 03:36 AM    HCT 29.8 07/21/2022 03:36 AM    HCT 46.6 07/25/2011 09:50 AM    MCV 98.7 07/21/2022 03:36 AM    MCH 31.1 07/21/2022 03:36 AM    MCHC 31.5 07/21/2022 03:36 AM    RDW 15.6 07/21/2022 03:36 AM     07/21/2022 03:36 AM     06/06/2012 07:29 PM     BMP:    Lab Results   Component Value Date/Time    GLUCOSE 139 07/21/2022 03:36 AM    GLUCOSE 128 06/06/2012 07:29 PM     07/21/2022 03:36 AM     07/25/2011 09:50 AM    K 4.3 07/21/2022 03:36 AM    K 4.7 07/25/2011 09:50 AM    CL 98 07/21/2022 03:36 AM     07/25/2011 09:50 AM    CO2 24 07/21/2022 03:36 AM    ANIONGAP 13 07/21/2022 03:36 AM    BUN 59 07/21/2022 03:36 AM    CREATININE 2.03 07/21/2022 03:36 AM    CREATININE 1.2 07/25/2011 09:50 AM    BUNCRER 20 07/16/2012 08:30 AM    CALCIUM 8.2 07/21/2022 03:36 AM    LABGLOM 34 07/21/2022 03:36 AM    GFRAA 41 07/21/2022 03:36 AM GFR      07/21/2022 03:36 AM     HFP:    Lab Results   Component Value Date/Time    PROT 5.3 07/14/2022 06:31 AM    PROT 7.1 07/25/2011 09:50 AM     PT/INR:    Lab Results   Component Value Date/Time    PROTIME DUPLICATE ORDER 66/50/0304 07:00 PM    PROTIME 11.9 06/03/2012 01:19 AM    PROTIME 11.40 07/25/2011 51:19 AM    INR DUPLICATE ORDER 18/80/4547 07:00 PM     PTT:   Lab Results   Component Value Date/Time    APTT 53.9 07/18/2022 03:19 AM     FLP:    Lab Results   Component Value Date/Time    CHOL 145 06/30/2012 09:46 AM    CHOL 195 05/07/2011 03:21 AM    TRIG 288 07/11/2022 10:30 AM    HDL 29 06/30/2012 09:46 AM     U/A:    Lab Results   Component Value Date/Time    COLORU Yellow 07/18/2022 10:32 AM    TURBIDITY Cloudy 07/18/2022 10:32 AM    SPECGRAV 1.018 07/18/2022 10:32 AM    HGBUR LARGE 07/18/2022 10:32 AM    PHUR 5.5 07/18/2022 10:32 AM    PROTEINU 1+ 07/18/2022 10:32 AM    GLUCOSEU NEGATIVE 07/18/2022 10:32 AM    GLUCOSEU NEGATIVE 06/06/2012 05:45 AM    KETUA NEGATIVE 07/18/2022 10:32 AM    BILIRUBINUR NEGATIVE 07/18/2022 10:32 AM    BILIRUBINUR NEGATIVE 06/06/2012 05:45 AM    UROBILINOGEN Normal 07/18/2022 10:32 AM    NITRU NEGATIVE 07/18/2022 10:32 AM    LEUKOCYTESUR TRACE 07/18/2022 10:32 AM     TSH:    Lab Results   Component Value Date/Time    TSH 0.90 06/30/2012 09:46 AM        Radiology:  XR CHEST PORTABLE    Result Date: 7/18/2022  No acute cardiopulmonary disease     XR CHEST PORTABLE    Result Date: 7/16/2022  No acute process. Stable exam.  Stable lines. XR CHEST PORTABLE    Result Date: 7/15/2022  No acute cardiopulmonary process. Stable internal jugular central lines.        Consultations:    Consults:     Final Specialist Recommendations/Findings:   IP CONSULT TO CRITICAL CARE  IP CONSULT TO VASCULAR SURGERY  IP CONSULT TO ORTHOPEDIC SURGERY  IP CONSULT TO CARDIOLOGY  IP CONSULT TO NEPHROLOGY  IP CONSULT TO INFECTIOUS DISEASES      The patient was seen and examined on day of discharge a    Physical Exam  Vitals and nursing note reviewed. Constitutional:       General: He is not in acute distress. HENT:     Head: Normocephalic and atraumatic. Eyes:     Conjunctiva/sclera: Conjunctivae normal.     Pupils: Pupils are equal, round, and reactive to light. Neck:     Comments: R IJ temp cath out  Cardiovascular:     Rate and Rhythm: Normal rate and regular rhythm. Heart sounds: No murmur heard. Pulmonary:     Effort: Pulmonary effort is normal. No accessory muscle usage or respiratory distress. Breath sounds: No stridor. No decreased breath sounds, wheezing, rhonchi or rales. Abdominal:     General: Bowel sounds are normal. There is no distension. Palpations: Abdomen is soft. Abdomen is not rigid. Tenderness: There is no abdominal tenderness. There is no guarding. Genitourinary:     Comments: Dexter out  Musculoskeletal:         General: No tenderness. Right lower leg: Edema present. Left lower leg: Edema present. Comments: LLE wound vac noted    Skin:     General: Skin is warm and dry. Findings: No erythema, lesion or rash. Neurological:     Mental Status: He is alert and oriented to person, place, and time. Cranial Nerves: No cranial nerve deficit. Motor: No seizure activity. Psychiatric:         Speech: Speech normal.         Behavior: Behavior normal. Behavior is cooperative. Discharge plan:     Disposition: SNF    Physician Follow Up:      Wilfredo Dinero [address] [place] [id number]    Schedule an appointment as soon as possible for a visit  Follow up with PCP re: hospital visit     Nelson Osorio MD  [address] [facility]    [address] [place] [address]  [phone]    Schedule an appointment as soon as possible for a visit in 7 day(s)  Follow up with Vascular [address], [address] [place] [address]  [phone]      Follow up with Orthopedic Surgery as needed    Elizabeth Kelly MD  [address] [place] [address] [facility]  [phone]      Follow up with Nephrology    [facility]  [address] [facility] [address]  [phone]    Follow up with [address] [facility] [address]  [phone]           Requiring Further Evaluation/Follow Up POST HOSPITALIZATION/Incidental Findings:     Diet: cardiac diet and renal diet    Activity: As tolerated    Instructions to Patient:     Discharge Medications:      Medication List        START taking these medications      apixaban 5 MG Tabs tablet  Commonly known as: ELIQUIS  Take 1 tablet by mouth in the morning and 1 tablet before bedtime. atorvastatin 10 MG tablet  Commonly known as: LIPITOR  Take 1 tablet by mouth in the morning. Replaces: simvastatin 40 MG tablet     melatonin 5 MG Tabs tablet  Take 1 tablet by mouth nightly     metoprolol succinate 25 MG extended release tablet  Commonly known as: TOPROL XL  Take 0.5 tablets by mouth in the morning and at bedtime     mirtazapine 30 MG tablet  Commonly known as: REMERON  Take 1 tablet by mouth nightly. torsemide 20 MG tablet  Commonly known as: DEMADEX  Take 1 tablet by mouth in the morning. CONTINUE taking these medications      baclofen 10 MG tablet  Commonly known as: LIORESAL     clopidogrel 75 MG tablet  Commonly known as: PLAVIX     ezetimibe 10 MG tablet  Commonly known as: ZETIA     isosorbide mononitrate 30 MG extended release tablet  Commonly known as: IMDUR     tamsulosin 0.4 MG capsule  Commonly known as: FLOMAX  Take 1 capsule by mouth daily.      TRUEtest Test strip  Generic drug: blood glucose test strips  TEST 2 TIMES DAILY            STOP taking these medications      ALPRAZolam 1 MG tablet  Commonly known as: XANAX     aspirin 325 MG EC tablet     aspirin-dipyridamole  MG per extended release capsule  Commonly known as: Aggrenox     atorvastatin 80 MG tablet  Commonly known as: LIPITOR fenofibrate 145 MG tablet  Commonly known as: TRICOR     lisinopril 10 MG tablet  Commonly known as: PRINIVIL;ZESTRIL     metFORMIN 500 MG tablet  Commonly known as: GLUCOPHAGE     metoprolol tartrate 25 MG tablet  Commonly known as: LOPRESSOR     oxyCODONE 40 MG CR tablet  Commonly known as: OXYCONTIN     oxyCODONE-acetaminophen  MG per tablet  Commonly known as: PERCOCET     simvastatin 40 MG tablet  Commonly known as: ZOCOR  Replaced by: atorvastatin 10 MG tablet     varenicline 0.5 MG X 11 & 1 MG X 42 tablet  Commonly known as: Chantix Starting Month Jay               Where to Get Your Medications        These medications were sent to [facility], [place] - [address] [address]      Phone: [phone]   apixaban 5 MG Tabs tablet  atorvastatin 10 MG tablet  melatonin 5 MG Tabs tablet  metoprolol succinate 25 MG extended release tablet  torsemide 20 MG tablet         No discharge procedures on file. Time Spent on discharge is  36 mins in patient examination, evaluation, counseling as well as medication reconciliation, prescriptions for required medications, discharge plan and follow up. Electronically signed by   Jessica Lau MD  7/21/2022  12:17 PM      Thank you Dr. Markos Rendon (Inactive) for the opportunity to be involved in this patient's care.

## 2022-07-21 NOTE — CARE COORDINATION
Discharge 1 [facility] Case Management Department  Written by: Theresa Muñoz RN    Patient Name: Lois George  Attending Provider: Tommy Perez MD  Admit Date: 2022  4:56 AM  MRN: [id number]  Account: [de-identified]                     : 1960  Discharge Date: 2022      Disposition: [facility] do/micah at [facility],

## 2022-07-21 NOTE — PROGRESS NOTES
Division of Vascular Surgery         Progress Note      Name: Clint Villatoro  MRN: [id number]         Overnight Events:      No acute events overnight. Subjective:     Patent seen and examined bedside this morning. Pain well controlled, is tolerating diet and has been passing gas but reports no recent bowel movements and is unsure of the last time. Physical Exam:     Vitals:  BP (!) 108/58   Pulse 84   Temp 97.8 °F (36.6 °C) (Oral)   Resp 17   Ht 5' 10.5\" (1.791 m) Comment: no inital recorded height; 9/13/12 office visit  Wt 210 lb 15.7 oz (95.7 kg)   SpO2 99%   BMI 29.84 kg/m²     General appearance - Alert, disoriented  Mental status - following some commands  Head - normocephalic and atraumatic  Chest - no respiratory distress, no accessory muscle use  Heart - tachycardia, irregular rhythm  Abdomen - soft, non-tender, non-distended  Neurological - continues to have decreased sedation in lower extremities  Extremities -  edema to RLE, function improving, LLE fasciotomy incision with wound VAC in place with good suction, left groin full, however no surrounding erythema to suggest infection  Skin - BL groin incisions clean and dry, no signs of infection  Vascular Exam - palpable left DP, doppler signals; weak left PT, weak right PT    Imaging/Labs:     XR CHEST (SINGLE VIEW FRONTAL)     Result Date: 7/10/2022  Right IJ CVC catheter tip overlies the superior cavoatrial junction. Left IJ CVC catheter tip overlies the mid/distal SVC. No pneumothorax is seen. Mild left basilar atelectasis. XR CHEST PORTABLE     Result Date: 7/11/2022  Stable chest.     XR CHEST PORTABLE     Result Date: 7/11/2022  1. The deeper temporary dialysis catheter on the previous exam is been removed, with a new temporary dialysis catheter seen overlying the proximal superior vena cava. 2. Other tubes and lines as above. 3. Patchy infiltrates in the lungs bilaterally which may represent edema or pneumonia.      XR CHEST PORTABLE     Result Date: 7/11/2022  1. The right jugular catheter appears in a stable position with the tip at the cavoatrial junction. 2. Endotracheal tube, nasogastric tube, and left jugular line are also redemonstrated. 3.  Some hazy opacities in the lungs which could be subtle edema or inflammatory. XR CHEST PORTABLE     Result Date: 7/10/2022  1. Tubes and right IJ line as detailed above. 2. Improvement in aeration of both lung since the prior study with residual airspace disease at the left upper and left lower lung zones as well as right parahilar region. No new focal airspace disease. 3. Prior median sternotomy and CABG. Assessment/Plan:     63 y/o male critically ill s/p BL femoral artery cutdown with thrombectomy of aortobifemoral graft, LLE fasciotomy, bilateral lower extremity angiography     Plan:   Appreciate medical management per primary team  Cont neurovascular checks  Cont Eliquis  Increased bowel reg  Appreciate nephrology recommendations  Appreciate cardiology recommendations  Vascular surgery would request we hold off on cardiac catheterization during this admission  Wound VAC in place, change 3 times weekly  Changed Wednesday 7/20/22  Okay to discharge from vascular surgery standpoint just awaiting placement      [facility] Student  7/21/22   7:17 AM EDT    Addendum:    Patient seen and examined with Medical Student and Surgical Team.  Agree with assessment and plan with changes made accordingly.     Sushma Moran DO  PGY-4 General Surgery  07/21/22  7:41 AM       [address] [place] [address] Street: [phone]

## 2022-07-21 NOTE — DISCHARGE SUMMARY
Patient discharged to [facility]. All belongings were sent with the patient. IV line was removed prior to discharge. No signs of acute distress noted at discharge. Attempted to call report three times, with no success. Advised transporter to have facility nurse call writer for report. 1627: Report called and given to Josemanuel Nunez at [facility]. All questions answered, no concerns voiced.

## 2022-07-21 NOTE — PROGRESS NOTES
[facility] - Progress Note    Today's Date and Time: 7/21/2022, 9:18 AM    Impression :   Leukocytosis  Critical limb ischemia-bilateral lower extremity  S/p thrombectomy of aorta of bifemoral bypass graft and thromboendarterectomy of common femoral profundofemoral and SFA bilaterally with bovine patch on 7/8/2022  Elevated troponin  CAROLYN requiring CVVHD from 7/10/2022 to 7/15/2022  Nontraumatic rhabdomyolysis  Cardiomyopathy with EF of 15%  CAD with history of CABG x4  Atrial fibrillation  BPH  Stage 2 Sacrococcygeal ulcer    Recommendations:   Monitor off antibiotics  No positive culture data  Likely leucocytosis reactive to inflammation    Medical Decision Making/Summary/Discussion:7/21/2022       Infection Control Recommendations   [place] Precautions    Antimicrobial Stewardship Recommendations     Discontinuation of therapy  Coordination of Outpatient Care:   Estimated Length of IV antimicrobials:TBD  Patient will need Midline Catheter Insertion: TBD  Patient will need PICC line Insertion:BD  Patient will need: Home IV , [place],  SNF,  LTAC: TBD  Patient will need outpatient wound care:    Chief complaint/reason for consultation:   Leukocytosis      History of Present Illness:   Luz Faust is a 64y.o.-year-old male who was initially admitted on 7/8/2022. Patient seen at the request of Dr. Ortiz    INITIAL HISTORY:    This patient initially presented to the ED on 7/8/2022 with severe sudden onset bilateral hip pain. He has a history of aortic and popliteal bypasses with a previous aortobifemoral bypass graft that was done in [place]. Upon evaluation, it was noted that left lower extremity was very pale at the level of calf down to his foot and bilateral mottling up to his groin. He was also noted to be in atrial fibrillation.     Imaging found that the aortobifemoral bypass graft was occluded and there was a bilateral femoral artery thrombus which was Problem: Goal Outcome Summary  Goal: Goal Outcome Summary  Outcome: Improving  Pt denies SI HI SIB anxiety depression and pain. pts mood has improved, is less paranoid. Pt states he feels better since being on Invega. Pt states he has trouble swallowing pills so he took his a.m. Dose of invega with food. Pt takes liquid depakote and took this a.m. Without issue. Pt pleasant and cooperative with assessments. Pt attending groups.     1500: continued care of pt for evening shift  1901: pt out on unit and social. Pt participating in groups. Pt has had visitors this evening that went well.     Problem: Suicide Risk (Adult)  Goal: Strength-Based Wellness/Recovery  Patient will attend 50% of groups while hospitalized.   Pt will verbalize a reduction or resolution in suicidal thoughts by discharge.   Outcome: Improving  Pt attends groups with out prompting.   Goal: Physical Safety  Patient will be free from harming self and others while on the unit until discharge.   Outcome: Improving  Pt has been free of self harm and harm to others    Problem: Thought Process Alteration (Adult)  Goal: Improved Thought Process  Paranoid thoughts will resolve by discharge.   Outcome: Improving  pts paranoia is improving. Pt appears less paranaoid this shift       nearly occlusive on the left-hand side and he right SFA was also occluded. Dr. Aaron French, with Vascular Surgery, completed an emergent aorta bifemoral graft endarterectomy and bilateral common femoral embolectomy, bilateral lower extremity arterial ectomy, aortography, right limb bypass graft stent and bilateral femoral bovine patches    A fasciotomy of the left lower leg was also performed per vascular surgery and intraoperatively orthopedic surgery was consulted for possible left thigh compartment syndrome. Rossy needle assessment was performed and found that the thigh compartment was normal and did not require any acute intervention. An echocardiogram was completed and was shown the patient had ejection fraction of 15%. He had also been initiated on a heparin drip for elevated troponin and atrial fibrillation. Nephrology was subsequently consulted for oliguria and CAROLYN. The patient received CVVHD from 7/10 to 7/15/2022 and was intubated from 7/8 to 7/12/2022 while intermittently requiring vasopressors from 7/7 to 7/13/2022. It appears that the patient has had leukocytosis since admission. All cultures have remained negative for bacterial growth. He was on rocephin from 7/11/22 through 7/17/22 for UTI. I was unable to find a positive urine culture. Infectious disease has been consulted for continued leukocytosis    CURRENT EVALUATION 7/21/2022    Blood pressure 127/67, pulse 84, temperature 97.9 °F (36.6 °C), temperature source Oral, resp. rate 16, height 5' 10.5\" (1.791 m), weight 210 lb 15.7 oz (95.7 kg), SpO2 96 %. Afebrile  Vital signs stable on room air    Patient was alert and oriented upon evaluation. He is doing well. Possibly will require tunnel cath for outpatient HD. He denies any acute issues. No fever, chills, night sweats or chest pain. Pt has a stage 2 sacrococcygeal ulcer with some serous discharge. Continue using protective barrier creams.      No signs of PATCHES performed by Nirmal Muir MD at [facility] 1452, ARTERY  11/23/10 [address]    1. Placement of 5 sheath in the left femoral artery with duplex guidance. 2. Left Iliac arteriogram. 3. Attempted ballon angioplasty left iliac artery occlusion. CAROTID ENDARTERECTOMY  07/19/08    Left carotid endarterectomy using shunt dacron patch aplasty    CORONARY ARTERY BYPASS GRAFT  2008    CABG x3     FEMORAL-TIBIAL BYPASS GRAFT  12/31/07  [facility]     Right femoral to posterior tibial artery bypass graft with in situ saphenous vein graft    IR FEMORAL POPLITEAL BYPASS GRAFT  01/24/208 Dr Lima Elma    1. Exploration of right fem-pop bypass graft. 2.Thrombectomy of right fem-post-tib saph vein graft.  3. Replacement of femoral to popliteal bypass graft from midthigh to near the anastomosis by reversed greater saph vein harvested from left leg    PTCA      PTCA with stent x6    TONSILLECTOMY AND ADENOIDECTOMY         Medications:      sennosides-docusate sodium  2 tablet Oral Daily    OLANZapine (ZyPREXA) in sterile water IntraMUSCular  5 mg IntraMUSCular Once    apixaban  5 mg Oral BID    torsemide  20 mg Oral Daily    metoprolol succinate  12.5 mg Oral BID    famotidine  20 mg Oral Daily    atorvastatin  10 mg Oral Daily    tamsulosin  0.4 mg Oral Daily    insulin lispro  0-18 Units SubCUTAneous 4x Daily AC & HS    melatonin  5 mg Oral Nightly    QUEtiapine  50 mg Oral Nightly    aspirin  81 mg Oral Daily    polyethylene glycol  17 g Oral Daily       Social History:     Social History     Socioeconomic History    Marital status:      Spouse name: Not on file    Number of children: 1    Years of education: 14    Highest education level: Not on file   Occupational History    Occupation: disabled      Comment: SSI    Tobacco Use    Smoking status: Every Day     Packs/day: 2.00     Years: 33.00     Pack years: 66.00     Types: Cigarettes     Last attempt to quit: 3/10/2011 Years since quittin.3    Smokeless tobacco: Never    Tobacco comments:     quite 2011    Substance and Sexual Activity    Alcohol use: No     Alcohol/week: 0.0 standard drinks    Drug use: No    Sexual activity: Yes     Partners: Female     Comment: with has trouble   Other Topics Concern    Not on file   Social History Narrative    Not on file     Social Determinants of Health     Financial Resource Strain: Not on file   Food Insecurity: Not on file   Transportation Needs: Not on file   Physical Activity: Not on file   Stress: Not on file   Social Connections: Not on file   Intimate Partner Violence: Not on file   Housing Stability: Not on file       Family History:     Family History   Problem Relation Age of Onset    Hypertension Father     Heart Disease Sister     Hypertension Sister     Cancer Mother 28        breast cancer        Allergies:   Faviola [morphine sulfate], Methadone, and Morphine     Review of Systems:   Constitutional: No fevers or chills. No systemic complaints  Head: No headaches  Eyes: No double vision or blurry vision. No conjunctival inflammation. ENT: No sore throat or runny nose. . No hearing loss, tinnitus or vertigo. Cardiovascular: No chest pain or palpitations. No shortness of breath. No DELATORRE  Lung: No shortness of breath or cough. No sputum production  Abdomen: No nausea, vomiting, diarrhea, or abdominal pain. Fernanda Chime No cramps. Genitourinary: No increased urinary frequency, or dysuria. No hematuria. No suprapubic or CVA pain  Musculoskeletal: LLE pain  Hematologic: No bleeding or bruising. Neurologic: No headache, weakness, numbness, or tingling. Integument: No rash, no ulcers. Psychiatric: No depression. Endocrine: No polyuria, no polydipsia, no polyphagia.     Physical Examination :   Patient Vitals for the past 8 hrs:   BP Temp Temp src Pulse Resp SpO2   22 0815 127/67 97.9 °F (36.6 °C) Oral 84 16 96 %   22 0322 (!) 108/58 97.8 °F (36.6 °C) Oral 84 17 99 % Cloudy 07/18/2022 10:32 AM     Lab Results   Component Value Date/Time    CREATININE 2.03 07/21/2022 03:36 AM    CREATININE 1.2 07/25/2011 09:50 AM    GLUCOSE 139 07/21/2022 03:36 AM    GLUCOSE 128 06/06/2012 07:29 PM       Medical Decision Making-Imaging:     Narrative   EXAMINATION:   ONE XRAY VIEW OF THE CHEST       7/18/2022 10:00 am       COMPARISON:   Chest x-ray dated 16 July 2022       HISTORY:   ORDERING SYSTEM PROVIDED HISTORY: Increasing WBC   TECHNOLOGIST PROVIDED HISTORY:   Increasing WBC       FINDINGS:   Linear opacity in the right mid lung field consistent with atelectasis. No   consolidation. No pneumothorax or pleural effusion. Normal   cardiomediastinal silhouette. Right-sided central venous catheter with the   tip in the SVC. Impression   No acute cardiopulmonary disease       Medical Decision Jbkakw-[id number]-Yfaex:       Medical Decision Making-Other:     Note:  Labs, medications, radiologic studies were reviewed with personal review of films  Large amounts of data were reviewed  Discussed with nursing Staff, Discharge planner  Infection Control and Prevention measures reviewed  All prior entries were reviewed  Administer medications as ordered  Prognosis: [address] [facility]  Discharge planning reviewed      Thank you for allowing us to participate in the care of this patient. Please call with questions. Brenda Benites MD    ATTESTATION:    I have discussed the case, including pertinent history and exam findings with the APRN. I have evaluated the  History, physical findings and pictures of the patient and the key elements of the encounter have been performed by me. I have reviewed the laboratory data, other diagnostic studies and discussed them with the APRN. I have updated the medical record where necessary. I agree with the assessment, plan and orders as documented by the APRN.     Erik Perla MD.      Pager: [phone] - Office: [phone]

## 2022-07-21 NOTE — PROGRESS NOTES
Physical Therapy        Physical Therapy Cancel Note      DATE: 2022    NAME: Jose M Lorenzo  MRN: [id number]   : 1960      Patient not seen this date for Physical Therapy due to:    Patient Declined: Pt refusing to participate in PT today, States \"Im being discharged soon. \"       Electronically signed by Nita Meigs, PTA on 2022 at 2:04 PM

## 2022-07-21 NOTE — PROGRESS NOTES
Nephrology Progress Note      SUBJECTIVE      Patient seen and examined this morning. Serum creatinine is 2.0 a little better from yesterday. Urine output reviewed   Dexter and dialysis catheter were removed yesterday. Patient denies any orthopnea or paroxysmal dyspnea. Patient is without any chest pain. OBJECTIVE      CURRENT TEMPERATURE:  Temp: 97.9 °F (36.6 °C)  MAXIMUM TEMPERATURE OVER 24HRS:  Temp (24hrs), Av.3 °F (36.8 °C), Min:97.8 °F (36.6 °C), Max:99 °F (37.2 °C)    CURRENT RESPIRATORY RATE:  Resp: 16  CURRENT PULSE:  Heart Rate: 91  CURRENT BLOOD PRESSURE:  BP: 129/66  24HR BLOOD PRESSURE RANGE:  Systolic (39SZF), LGD:913 , Min:100 , FMI:251   ; Diastolic (84HNY), DNU:61, Min:58, Max:79    24HR INTAKE/OUTPUT:    Intake/Output Summary (Last 24 hours) at 2022 1228  Last data filed at 2022 1135  Gross per 24 hour   Intake --   Output 1625 ml   Net -1625 ml     WEIGHT :Patient Vitals for the past 96 hrs (Last 3 readings):   Weight   22 0600 210 lb 15.7 oz (95.7 kg)   22 0315 217 lb 6 oz (98.6 kg)     PHYSICAL EXAM      GENERAL APPEARANCE:Awake, alert, in no acute distress  SKIN: warm and dry, no rash or erythema  EYES: conjunctivae pale and sclera anicteric  ENT: no thrush no pharyngeal congestion   NECK:   No JVD. No carotid bruits and no carotid lymphadenopathy . PULMONARY: lungs are clear to auscultation. No Wheezing, no ronchi . CADRDIOVASCULAR: S1 and S2 normal NO S3 and NO S4 . No rubs , no murmur. ABDOMEN: soft nontender, bowel sounds present, no organomegaly, no ascites.      EXTREMITIES: Left greater than right lower extremity edema, wound VAC left leg    CURRENT MEDICATIONS      sennosides-docusate sodium (SENOKOT-S) 8.6-50 MG tablet 2 tablet, Daily  0.9 % sodium chloride infusion, PRN  0.9 % sodium chloride infusion, PRN  OLANZapine (ZYPREXA) 5 mg in sterile water 1 mL injection, Once  apixaban (ELIQUIS) tablet 5 mg, BID  torsemide (DEMADEX) tablet 20 mg, Daily  0.9 % sodium chloride infusion, PRN  fentaNYL (SUBLIMAZE) injection 50 mcg, Q1H PRN  0.9 % sodium chloride infusion, PRN  oxyCODONE (ROXICODONE) immediate release tablet 5 mg, Q6H PRN  metoprolol succinate (TOPROL XL) extended release tablet 12.5 mg, BID  famotidine (PEPCID) tablet 20 mg, Daily  atorvastatin (LIPITOR) tablet 10 mg, Daily  tamsulosin (FLOMAX) capsule 0.4 mg, Daily  insulin lispro (HUMALOG) injection vial 0-18 Units, 4x Daily AC & HS  melatonin tablet 5 mg, Nightly  QUEtiapine (SEROQUEL) tablet 50 mg, Nightly  heparin (porcine) injection 1,300 Units, PRN  heparin (porcine) injection 1,400 Units, PRN  aspirin chewable tablet 81 mg, Daily  sodium chloride flush 0.9 % injection 5-40 mL, PRN  polyethylene glycol (GLYCOLAX) packet 17 g, Daily  ondansetron (ZOFRAN) injection 4 mg, Q6H PRN  glucose chewable tablet 16 g, PRN  glucagon (rDNA) injection 1 mg, PRN        LABS      CBC:   Recent Labs     07/19/22  0729 07/20/22  0408 07/21/22  0336   WBC 13.4* 12.6* 11.6*   RBC 2.36* 2.85* 3.02*   HGB 7.8* 8.8* 9.4*   HCT 23.0* 28.1* 29.8*   MCV 97.5 98.6 98.7   MCH 33.1 30.9 31.1   MCHC 33.9 31.3 31.5   RDW 17.1* 15.9* 15.6*   * 412 473*   MPV 12.2 11.9 12.0      BMP:   Recent Labs     07/19/22  0651 07/20/22  0408 07/21/22  0336   * 134* 135   K 4.1 4.3 4.3   CL 97* 99 98   CO2 24 24 24   BUN 59* 61* 59*   CREATININE 2.30* 2.32* 2.03*   GLUCOSE 130* 116* 139*   CALCIUM 8.1* 8.2* 8.2*        PHOSPHORUS:    Recent Labs     07/19/22  0651 07/20/22  0408 07/21/22  0336   PHOS 4.7* 4.7* 4.6*     MAGNESIUM:   Recent Labs     07/19/22  0651 07/20/22  0408 07/21/22  0336   MG 1.7 1.7 1.7     SPEP:   Lab Results   Component Value Date/Time    PROT 5.3 07/14/2022 06:31 AM    PROT 7.1 07/25/2011 09:50 AM       HEPBSAG:  Lab Results   Component Value Date/Time    HEPBSAG NONREACTIVE 07/18/2022 12:06 PM     HEPCAB:  Lab Results   Component Value Date/Time    HEPCAB NONREACTIVE 07/18/2022 12:06 PM     C3: Lab Results   Component Value Date    C3 156 07/21/2022     C4:   Lab Results   Component Value Date    C4 21 07/17/2022     URINE CREATININE:    Lab Results   Component Value Date/Time    LABCREA 141.6 07/10/2022 12:28 PM     URINE EOSINOPHILS:   Lab Results   Component Value Date/Time    UREO NONE SEEN 07/10/2022 12:28 PM       URINALYSIS:  U/A:   Lab Results   Component Value Date/Time    NITRU NEGATIVE 07/18/2022 10:32 AM    COLORU Yellow 07/18/2022 10:32 AM    PHUR 5.5 07/18/2022 10:32 AM    WBCUA 20 TO 50 07/18/2022 10:32 AM    RBCUA 5 TO 10 07/18/2022 10:32 AM    MUCUS 1+ 06/30/2012 09:40 PM    TRICHOMONAS NOT REPORTED 06/30/2012 09:40 PM    YEAST NOT REPORTED 06/30/2012 09:40 PM    BACTERIA MODERATE 07/10/2022 12:28 PM    SPECGRAV 1.018 07/18/2022 10:32 AM    LEUKOCYTESUR TRACE 07/18/2022 10:32 AM    UROBILINOGEN Normal 07/18/2022 10:32 AM    BILIRUBINUR NEGATIVE 07/18/2022 10:32 AM    BILIRUBINUR NEGATIVE 06/06/2012 05:45 AM    GLUCOSEU NEGATIVE 07/18/2022 10:32 AM    GLUCOSEU NEGATIVE 06/06/2012 05:45 AM    KETUA NEGATIVE 07/18/2022 10:32 AM    AMORPHOUS 3+ 07/10/2022 12:28 PM         ASSESSMENT      1. Acute Kidney Injury: ATN with contrast nephropathy, circulatory shock, and rhabdomyolysis pigment nephropathy requiring pressor support and now postop. Baseline creatinine seems to be around 1-1.2 mg/dl. Was on CVVHD initially then switched to intermittent hemodialysis received 1 treatment 7/16/2022. Thereafter dialysis on hold. Renal function appears to be stabilizing creatinine  plateauing around 2.3 urine output continues to be good   2. Hyperkalemia- likely due to underlying renal dysfunction. Improved with improved renal function  3. 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 4. Rhabdomyolysis  5. Hypotension-requiring pressor support.   6. Total aortobifemoral occulusion s/p Bilateral common femoral arterial access via open cutdown, thrombectomy of the aortobifemoral bypass graft thromboendarterectomy of the common femoral profundofemoral and SFA bilaterally with bovine pericardial patch angioplasty on 7/8/2021  7. Left lower extremity compartment syndrome s/p fasciotomy. 8. U/o gradually improved. PLAN      1. I would continue current diuretics   2. From my side he is okay for discharge once outpatient arrangements are made  3. Follow up labs ordered. Please do not hesitate to call with questions.     This note is created with the assistance of a speech-recognition program. While intending to generate a document that actually reflects the content of the visit, no guarantees can be provided that every mistake has been identified and corrected by editing    Electronically signed by Edinson Can MD on 7/21/2022 at 12:28 PM

## 2022-07-27 NOTE — PROGRESS NOTES
Physician Progress Note      PATIENTLevonia Roles  [facility]:                  [id number]  :                       1960  ADMIT DATE:       2022 4:56 AM  DISCH DATE:        2022 3:37 PM  RESPONDING  PROVIDER #:        Dav De Leon MD          QUERY TEXT:    Pt admitted with complete occlusion of aortobifemoral graft. . Pt noted to have   NSTEMI type 1. If possible, please document in progress notes and discharge   summary the present on admission status of NSTEMI type 1:    The medical record reflects the following:  Risk Factors:  hsitory of CABG x3, CAD, HTN, Peripheral vascular disease  Clinical Indicators: EF of 15% on echocardiogram,  Trop 963>117>132 ,    cardiology ; Developed NSTEMI. Will continue IV heparin. Trend Troponin. Obtain   Echo. Patient is currently intubated and sedated. EKG  showed normal sinus   rhythm with deep T wave inversion in anterior lead. Currently on heparin drip   for anticoagulation  Treatment: Heparin gtt, echo, EKG, cardiology consult, monitoring      Thank you, Please call if questions  Clarissa Hdz RN [facility]  [phone]  Options provided:  -- Yes, NSTEMI type 1 was present at the time of the order to admit to the   hospital  -- No, NSTEMI type 1 was not present on admission and developed during the   inpatient stay  -- Other - I will add my own diagnosis  -- Disagree - Not applicable / Not valid  -- Disagree - Clinically unable to determine / Unknown  -- Refer to Clinical Documentation Reviewer    PROVIDER RESPONSE TEXT:    Yes, NSTEMI type 1 was present at the time of the order to admit to the   hospital.    Query created by: Rayshawn Carlson on 2022 7:23 AM      QUERY TEXT:    Pt admitted with management of Critical ischemia of lower extremity. Pt noted   to have Sepsis.  If possible, please document in progress notes and discharge   summary the present on admission status of Sepsis:    The medical record reflects the following:  Risk Factors: UTI , S/P CL fem arter cutdown with thrombectomy, LLE   fasciotomy. Clinical Indicators: WBC:19.3-->16.0-->13.5-->16.3-->18.0, Sedimentation Rate   37, .2, Lactic Acid 8.0, Myoglobin 1116,   Procalcitonin 0.82,   UA;   Bacteria MODERATE, Clarity, Urine 3+, Leuckocyte   Esterase TRACE,Nitrite,Ur POSITIVE, Urine WBC's20 to 50, Resp 36, BP   178/124>80/52, >141, SpO2 89, Urine cultures and blood cultures no   growth  Treatment:  Rocephin IV, ceFAZolin  IV, vancomycin  IV, Levophed gtt,   vasopressin gtt,  0.9 % NaCl bolus ,  0.9 % sodium chloride infusion 100   ml/hr,  intubated on vent,  monitoring    Thank you, Please call if questions  Clarissa Stanford RN [facility]  [phone]  Options provided:  -- Sepsis was present at the time of the order to admit to the hospital please   provide source, please provide source.   -- Sepsis and UTI ruled out  -- Other - I will add my own diagnosis  -- Disagree - Not applicable / Not valid  -- Disagree - Clinically unable to determine / Unknown  -- Refer to Clinical Documentation Reviewer    PROVIDER RESPONSE TEXT:    Sepsis was present at the time of the order to admit to the hospital    Query created by: Suzanne River on 7/26/2022 8:03 AM      Electronically signed by:  Catia Nogueira MD 7/27/2022 4:43 PM

## 2022-08-05 ENCOUNTER — TELEPHONE (OUTPATIENT)
Dept: VASCULAR SURGERY | Age: 62
End: 2022-08-05

## 2022-08-05 NOTE — TELEPHONE ENCOUNTER
LVM stating appointment has been rescheduled left new de day and time.  Stated if this does nor work to contact office

## 2022-08-12 ENCOUNTER — OFFICE VISIT (OUTPATIENT)
Dept: VASCULAR SURGERY | Age: 62
End: 2022-08-12

## 2022-08-12 ENCOUNTER — HOSPITAL ENCOUNTER (INPATIENT)
Age: 62
LOS: 8 days | Discharge: NURSING, REHAB OR LONG-TERM CARE | DRG: 721 | End: 2022-08-20
Attending: FAMILY MEDICINE | Admitting: SURGERY
Payer: COMMERCIAL

## 2022-08-12 VITALS
SYSTOLIC BLOOD PRESSURE: 93 MMHG | HEIGHT: 71 IN | BODY MASS INDEX: 22.54 KG/M2 | WEIGHT: 161 LBS | RESPIRATION RATE: 20 BRPM | HEART RATE: 66 BPM | TEMPERATURE: 98.6 F | OXYGEN SATURATION: 98 % | DIASTOLIC BLOOD PRESSURE: 67 MMHG

## 2022-08-12 DIAGNOSIS — S31.104D NON-HEALING OPEN WOUND OF LEFT GROIN, SUBSEQUENT ENCOUNTER: Primary | ICD-10-CM

## 2022-08-12 DIAGNOSIS — Z09 POSTOPERATIVE EXAMINATION: ICD-10-CM

## 2022-08-12 PROBLEM — E11.59 TYPE 2 DIABETES MELLITUS WITH CIRCULATORY DISORDER, WITHOUT LONG-TERM CURRENT USE OF INSULIN (HCC): Status: ACTIVE | Noted: 2022-08-12

## 2022-08-12 PROBLEM — T81.49XA SURGICAL WOUND INFECTION: Status: ACTIVE | Noted: 2022-08-12

## 2022-08-12 LAB
ANION GAP SERPL CALCULATED.3IONS-SCNC: 14 MMOL/L (ref 9–17)
BUN BLDV-MCNC: 32 MG/DL (ref 8–23)
CALCIUM SERPL-MCNC: 8.7 MG/DL (ref 8.6–10.4)
CHLORIDE BLD-SCNC: 101 MMOL/L (ref 98–107)
CO2: 20 MMOL/L (ref 20–31)
CREAT SERPL-MCNC: 1.24 MG/DL (ref 0.7–1.2)
ESTIMATED AVERAGE GLUCOSE: 108 MG/DL
GFR AFRICAN AMERICAN: >60 ML/MIN
GFR NON-AFRICAN AMERICAN: 59 ML/MIN
GFR SERPL CREATININE-BSD FRML MDRD: ABNORMAL ML/MIN/{1.73_M2}
GLUCOSE BLD-MCNC: 129 MG/DL (ref 70–99)
HBA1C MFR BLD: 5.4 % (ref 4–6)
HCT VFR BLD CALC: 35.6 % (ref 40.7–50.3)
HEMOGLOBIN: 11.4 G/DL (ref 13–17)
MAGNESIUM: 1.5 MG/DL (ref 1.6–2.6)
MCH RBC QN AUTO: 29.5 PG (ref 25.2–33.5)
MCHC RBC AUTO-ENTMCNC: 32 G/DL (ref 28.4–34.8)
MCV RBC AUTO: 92 FL (ref 82.6–102.9)
NRBC AUTOMATED: 0 PER 100 WBC
PDW BLD-RTO: 14.4 % (ref 11.8–14.4)
PLATELET # BLD: 460 K/UL (ref 138–453)
PMV BLD AUTO: 11.7 FL (ref 8.1–13.5)
POTASSIUM SERPL-SCNC: 3.1 MMOL/L (ref 3.7–5.3)
RBC # BLD: 3.87 M/UL (ref 4.21–5.77)
SODIUM BLD-SCNC: 135 MMOL/L (ref 135–144)
WBC # BLD: 11.7 K/UL (ref 3.5–11.3)

## 2022-08-12 PROCEDURE — 6370000000 HC RX 637 (ALT 250 FOR IP): Performed by: NURSE PRACTITIONER

## 2022-08-12 PROCEDURE — 80048 BASIC METABOLIC PNL TOTAL CA: CPT

## 2022-08-12 PROCEDURE — 99024 POSTOP FOLLOW-UP VISIT: CPT | Performed by: SURGERY

## 2022-08-12 PROCEDURE — 36415 COLL VENOUS BLD VENIPUNCTURE: CPT

## 2022-08-12 PROCEDURE — 83036 HEMOGLOBIN GLYCOSYLATED A1C: CPT

## 2022-08-12 PROCEDURE — 83735 ASSAY OF MAGNESIUM: CPT

## 2022-08-12 PROCEDURE — 99222 1ST HOSP IP/OBS MODERATE 55: CPT | Performed by: NURSE PRACTITIONER

## 2022-08-12 PROCEDURE — 85027 COMPLETE CBC AUTOMATED: CPT

## 2022-08-12 PROCEDURE — 6370000000 HC RX 637 (ALT 250 FOR IP)

## 2022-08-12 PROCEDURE — 1200000000 HC SEMI PRIVATE

## 2022-08-12 RX ORDER — MAGNESIUM SULFATE 1 G/100ML
1000 INJECTION INTRAVENOUS PRN
Status: DISCONTINUED | OUTPATIENT
Start: 2022-08-12 | End: 2022-08-17

## 2022-08-12 RX ORDER — METOPROLOL SUCCINATE 25 MG/1
12.5 TABLET, EXTENDED RELEASE ORAL 2 TIMES DAILY
Status: DISCONTINUED | OUTPATIENT
Start: 2022-08-12 | End: 2022-08-20 | Stop reason: HOSPADM

## 2022-08-12 RX ORDER — LANOLIN ALCOHOL/MO/W.PET/CERES
4.5 CREAM (GRAM) TOPICAL NIGHTLY
Status: DISCONTINUED | OUTPATIENT
Start: 2022-08-12 | End: 2022-08-20 | Stop reason: HOSPADM

## 2022-08-12 RX ORDER — INSULIN LISPRO 100 [IU]/ML
0-4 INJECTION, SOLUTION INTRAVENOUS; SUBCUTANEOUS NIGHTLY
Status: DISCONTINUED | OUTPATIENT
Start: 2022-08-12 | End: 2022-08-20 | Stop reason: HOSPADM

## 2022-08-12 RX ORDER — INSULIN LISPRO 100 [IU]/ML
0-8 INJECTION, SOLUTION INTRAVENOUS; SUBCUTANEOUS
Status: DISCONTINUED | OUTPATIENT
Start: 2022-08-12 | End: 2022-08-20 | Stop reason: HOSPADM

## 2022-08-12 RX ORDER — TORSEMIDE 20 MG/1
20 TABLET ORAL DAILY
Status: DISCONTINUED | OUTPATIENT
Start: 2022-08-12 | End: 2022-08-19

## 2022-08-12 RX ORDER — SODIUM CHLORIDE 0.9 % (FLUSH) 0.9 %
5-40 SYRINGE (ML) INJECTION EVERY 12 HOURS SCHEDULED
Status: DISCONTINUED | OUTPATIENT
Start: 2022-08-12 | End: 2022-08-20 | Stop reason: HOSPADM

## 2022-08-12 RX ORDER — ISOSORBIDE MONONITRATE 30 MG/1
30 TABLET, EXTENDED RELEASE ORAL DAILY
Status: DISCONTINUED | OUTPATIENT
Start: 2022-08-12 | End: 2022-08-17

## 2022-08-12 RX ORDER — TRAMADOL HYDROCHLORIDE 50 MG/1
50 TABLET ORAL EVERY 6 HOURS PRN
Status: DISCONTINUED | OUTPATIENT
Start: 2022-08-12 | End: 2022-08-20 | Stop reason: HOSPADM

## 2022-08-12 RX ORDER — ACETAMINOPHEN 650 MG/1
650 SUPPOSITORY RECTAL EVERY 6 HOURS PRN
Status: DISCONTINUED | OUTPATIENT
Start: 2022-08-12 | End: 2022-08-14

## 2022-08-12 RX ORDER — EZETIMIBE 10 MG/1
10 TABLET ORAL DAILY
Status: DISCONTINUED | OUTPATIENT
Start: 2022-08-12 | End: 2022-08-20 | Stop reason: HOSPADM

## 2022-08-12 RX ORDER — DOXYCYCLINE HYCLATE 100 MG
100 TABLET ORAL EVERY 12 HOURS SCHEDULED
Status: DISCONTINUED | OUTPATIENT
Start: 2022-08-12 | End: 2022-08-18

## 2022-08-12 RX ORDER — BACLOFEN 10 MG/1
10 TABLET ORAL 3 TIMES DAILY
Status: DISCONTINUED | OUTPATIENT
Start: 2022-08-12 | End: 2022-08-20 | Stop reason: HOSPADM

## 2022-08-12 RX ORDER — CLOPIDOGREL BISULFATE 75 MG/1
75 TABLET ORAL DAILY
Status: DISCONTINUED | OUTPATIENT
Start: 2022-08-12 | End: 2022-08-20 | Stop reason: HOSPADM

## 2022-08-12 RX ORDER — POTASSIUM CHLORIDE 7.45 MG/ML
10 INJECTION INTRAVENOUS PRN
Status: DISCONTINUED | OUTPATIENT
Start: 2022-08-12 | End: 2022-08-17

## 2022-08-12 RX ORDER — ACETAMINOPHEN 325 MG/1
650 TABLET ORAL EVERY 6 HOURS PRN
Status: DISCONTINUED | OUTPATIENT
Start: 2022-08-12 | End: 2022-08-14

## 2022-08-12 RX ORDER — SODIUM CHLORIDE 0.9 % (FLUSH) 0.9 %
10 SYRINGE (ML) INJECTION PRN
Status: DISCONTINUED | OUTPATIENT
Start: 2022-08-12 | End: 2022-08-20 | Stop reason: HOSPADM

## 2022-08-12 RX ORDER — ATORVASTATIN CALCIUM 10 MG/1
10 TABLET, FILM COATED ORAL DAILY
Status: DISCONTINUED | OUTPATIENT
Start: 2022-08-12 | End: 2022-08-20 | Stop reason: HOSPADM

## 2022-08-12 RX ORDER — ONDANSETRON 2 MG/ML
4 INJECTION INTRAMUSCULAR; INTRAVENOUS EVERY 6 HOURS PRN
Status: DISCONTINUED | OUTPATIENT
Start: 2022-08-12 | End: 2022-08-12

## 2022-08-12 RX ORDER — MIRTAZAPINE 30 MG/1
30 TABLET, FILM COATED ORAL NIGHTLY
Status: DISCONTINUED | OUTPATIENT
Start: 2022-08-12 | End: 2022-08-20 | Stop reason: HOSPADM

## 2022-08-12 RX ORDER — POTASSIUM CHLORIDE 20 MEQ/1
40 TABLET, EXTENDED RELEASE ORAL PRN
Status: DISCONTINUED | OUTPATIENT
Start: 2022-08-12 | End: 2022-08-17

## 2022-08-12 RX ORDER — TAMSULOSIN HYDROCHLORIDE 0.4 MG/1
0.4 CAPSULE ORAL DAILY
Status: DISCONTINUED | OUTPATIENT
Start: 2022-08-12 | End: 2022-08-20 | Stop reason: HOSPADM

## 2022-08-12 RX ORDER — SODIUM HYPOCHLORITE 1.25 MG/ML
SOLUTION TOPICAL 3 TIMES DAILY
Status: DISCONTINUED | OUTPATIENT
Start: 2022-08-12 | End: 2022-08-13

## 2022-08-12 RX ORDER — DEXTROSE MONOHYDRATE 100 MG/ML
INJECTION, SOLUTION INTRAVENOUS CONTINUOUS PRN
Status: DISCONTINUED | OUTPATIENT
Start: 2022-08-12 | End: 2022-08-20 | Stop reason: HOSPADM

## 2022-08-12 RX ORDER — POLYETHYLENE GLYCOL 3350 17 G/17G
17 POWDER, FOR SOLUTION ORAL DAILY PRN
Status: DISCONTINUED | OUTPATIENT
Start: 2022-08-12 | End: 2022-08-13

## 2022-08-12 RX ORDER — TRAMADOL HYDROCHLORIDE 50 MG/1
50 TABLET ORAL ONCE
Status: COMPLETED | OUTPATIENT
Start: 2022-08-12 | End: 2022-08-12

## 2022-08-12 RX ORDER — SODIUM CHLORIDE 9 MG/ML
INJECTION, SOLUTION INTRAVENOUS PRN
Status: DISCONTINUED | OUTPATIENT
Start: 2022-08-12 | End: 2022-08-20 | Stop reason: HOSPADM

## 2022-08-12 RX ORDER — ONDANSETRON 4 MG/1
4 TABLET, ORALLY DISINTEGRATING ORAL EVERY 8 HOURS PRN
Status: DISCONTINUED | OUTPATIENT
Start: 2022-08-12 | End: 2022-08-12

## 2022-08-12 RX ADMIN — APIXABAN 5 MG: 5 TABLET, FILM COATED ORAL at 22:38

## 2022-08-12 RX ADMIN — METOPROLOL SUCCINATE 12.5 MG: 25 TABLET, FILM COATED, EXTENDED RELEASE ORAL at 22:37

## 2022-08-12 RX ADMIN — TRAMADOL HYDROCHLORIDE 50 MG: 50 TABLET, COATED ORAL at 18:51

## 2022-08-12 RX ADMIN — DAKIN'S SOLUTION 0.125% (QUARTER STRENGTH): 0.12 SOLUTION at 23:00

## 2022-08-12 RX ADMIN — TRAMADOL HYDROCHLORIDE 50 MG: 50 TABLET, COATED ORAL at 23:18

## 2022-08-12 RX ADMIN — ACETAMINOPHEN 650 MG: 325 TABLET ORAL at 22:38

## 2022-08-12 RX ADMIN — MIRTAZAPINE 30 MG: 30 TABLET, FILM COATED ORAL at 22:38

## 2022-08-12 RX ADMIN — BACLOFEN 10 MG: 10 TABLET ORAL at 22:37

## 2022-08-12 RX ADMIN — Medication 4.5 MG: at 22:37

## 2022-08-12 RX ADMIN — DOXYCYCLINE HYCLATE 100 MG: 100 TABLET, COATED ORAL at 22:41

## 2022-08-12 ASSESSMENT — ENCOUNTER SYMPTOMS
ABDOMINAL DISTENTION: 0
COUGH: 0
EYE REDNESS: 0
COUGH: 0
CONSTIPATION: 0
VOICE CHANGE: 0
SHORTNESS OF BREATH: 0
TROUBLE SWALLOWING: 0
PHOTOPHOBIA: 0
COLOR CHANGE: 0
DIARRHEA: 0
VOMITING: 0
ABDOMINAL DISTENTION: 0
SINUS PAIN: 0
EYE PAIN: 0
SHORTNESS OF BREATH: 0
ABDOMINAL PAIN: 0
CHEST TIGHTNESS: 0

## 2022-08-12 ASSESSMENT — PAIN SCALES - GENERAL: PAINLEVEL_OUTOF10: 10

## 2022-08-12 NOTE — H&P
[facility]  Office: [address], DO, Christopher Doe DO, Royce Maldonado, DO, Jordan Ze Blood, DO, Denise Evans MD, Heber Resendiz MD, Umberto Mena MD, Madeleine Banks MD,  Jenaro Yi MD, Lorelei Stapleton MD, Latasha Mann DO, Dipti Cooper MD,  Albaro Fernandez MD, Mario Sadler MD, Melissa Dickey DO, John Aleman MD, Jayda Sequeira MD, Vania Norris MD, Kaylyn Griffith DO, Gayathri Correia MD, Lydia Alvarez MD, Salima Doyle, CNP,  Bess Bennett, CNP, Harry Conner, CNP, Roberto Sellers, CNP, Nadia Diaz PA-C, Ananya Lees, DNP, Aldair Go, CNP, Rg Latif, CNP, Catalina Salgado, CNP, Qing Gee, CNP, Cedrick Kaiser, CNP, Franco Nurse, CNS, Chantell Perera, [facility], Rossy Shah, CNP, Joanie Casanova, CNP, Sukhi Jamil, [facility]    HISTORY AND PHYSICAL EXAMINATION            Date:   8/12/2022  Patient name:  Rancho Sauer  Date of admission:  8/12/2022  5:34 PM  MRN:   [id number]  Account:  [de-identified]  YOB: 1960  PCP:    Umberto Mena (Inactive)  Room:   [address] [place] [address]  Code Status:    Full Code    Chief Complaint:     No chief complaint on file. S/P aorto bi-fem thrombectomy with infection    History Obtained From:     patient, electronic medical record    History of Present Illness:     Rancho Sauer is a 64year old male with medical history significant for CAD, HTN, CVA, T2DM, HLD, PVD who is sent by vascular surgery for post op wound infection. He is status post aortobifem thrombectomy on 7/8/2022 due to extreme lower extremity ischemia from graft occlusion. He was seen today in the vascular office for post-op visit and there is concern for left femoral wound infection with noted purulent drainage on exam. Patient states dressing changes were occurring Q3 days in the ECF where he resides.  He also has left lower leg fasciotomy wounds which are healing. He will be admitted inpatient for left femoral dressing changes and ultimately a wound vac application. Past Medical History:     Past Medical History:   Diagnosis Date    Anxiety     Arthritis associated with another disorder     CAD (coronary artery disease)     with history of multiple MI    Carotid artery occlusion     Family history of kidney stone     HTN (hypertension)     Hyperkalemia     Hyperlipidemia     Hypertension     Nephrolithiasis     multiple times    Neuropathy     PAD (peripheral artery disease) (HCC)     Peripheral vascular disease (HCC)     Personal history of MRSA (methicillin resistant Staphylococcus aureus)     Seasonal allergies     Vasculopathy         Past Surgical History:     Past Surgical History:   Procedure Laterality Date    ABDOMINAL ADHESION SURGERY  2/11/2011-[facility]    Reexploration of abdominal wound and reclosure of the abdominal wound with fascial sutures and retention sutures as well    AORTO-FEMORAL BYPASS GRAFT  2/11/2011-[facility]    ABF bypass of 16x8 PTFE graft. This is an end-to-side anastomosis proximally    AXILLARY-FEMORAL BYPASS GRAFT Bilateral 7/8/2022    AORTA BIFEMORAL GRAFT ENDARTERECTOMY, BILATERAL COMMON FEMORAL EMBOLECTOMY, BILATERAL LOWER EXTREMEITY ARTERECTOMY, AORTAGRAPHY, RIGHT LIMB OF BYPASS GRAFT STENT, BILATERAL FEMORAL BOVINE PATCHES performed by Kate Monreal MD at [place] [address], ARTERY  11/23/10 [address]    1. Placement of 5 sheath in the left femoral artery with duplex guidance. 2. Left Iliac arteriogram. 3. Attempted ballon angioplasty left iliac artery occlusion.     CAROTID ENDARTERECTOMY  07/19/08    Left carotid endarterectomy using shunt dacron patch aplasty    CORONARY ARTERY BYPASS GRAFT  2008    CABG x3     FEMORAL-TIBIAL BYPASS GRAFT  12/31/07  Sridevi Viramontes     Right femoral to posterior tibial artery bypass graft with in situ saphenous vein graft    IR FEMORAL POPLITEAL BYPASS GRAFT  01/24/208  Nicholas Peacock    1. Exploration of right fem-pop bypass graft. 2.Thrombectomy of right fem-post-tib saph vein graft. 3. Replacement of femoral to popliteal bypass graft from midthigh to near the anastomosis by reversed greater saph vein harvested from left leg    PTCA      PTCA with stent x6    TONSILLECTOMY AND ADENOIDECTOMY          Medications Prior to Admission:     Prior to Admission medications    Medication Sig Start Date End Date Taking? Authorizing Provider   atorvastatin (LIPITOR) 10 MG tablet Take 1 tablet by mouth in the morning. 7/21/22   Chon Duarte MD   metoprolol succinate (TOPROL XL) 25 MG extended release tablet Take 0.5 tablets by mouth in the morning and at bedtime 7/20/22   Chon Duarte MD   torsemide (DEMADEX) 20 MG tablet Take 1 tablet by mouth in the morning. 7/21/22   Chon Duarte MD   melatonin 5 MG TABS tablet Take 1 tablet by mouth nightly 7/20/22   Chon Duarte MD   baclofen (LIORESAL) 10 MG tablet Take 10 mg by mouth in the morning and 10 mg at noon and 10 mg before bedtime. Historical Provider, MD   clopidogrel (PLAVIX) 75 MG tablet Take 75 mg by mouth in the morning. Historical Provider, MD   ezetimibe (ZETIA) 10 MG tablet Take 10 mg by mouth in the morning. Historical Provider, MD   apixaban (ELIQUIS) 5 MG TABS tablet Take 1 tablet by mouth in the morning and 1 tablet before bedtime. 7/20/22   Chon Duarte MD   fenofibrate (TRICOR) 145 MG tablet Take 145 mg by mouth in the morning.  7/20/22  Historical Provider, MD   isosorbide mononitrate (IMDUR) 30 MG CR tablet Take 30 mg by mouth daily. Historical Provider, MD   mirtazapine (REMERON) 30 MG tablet Take 1 tablet by mouth nightly. 9/13/12   Abdirahman Adame MD   tamsulosin (FLOMAX) 0.4 MG capsule Take 1 capsule by mouth daily. 9/13/12   Abdirahman Adame MD   lisinopril (PRINIVIL;ZESTRIL) 10 MG tablet Take 0.5 tablets by mouth daily.  9/13/12 7/20/22  Abdirahman Adame MD   dipyridamole-aspirin (AGGRENOX)  MG Skin:  Positive for wound. Negative for pallor. Allergic/Immunologic: Negative for environmental allergies, food allergies and immunocompromised state. Neurological:  Positive for weakness. Negative for dizziness, numbness and headaches. Hematological:  Negative for adenopathy. Does not bruise/bleed easily. Psychiatric/Behavioral:  Negative for agitation, confusion and sleep disturbance. Physical Exam:   Resp 16   Temp (24hrs), Av.6 °F (37 °C), Min:98.6 °F (37 °C), Max:98.6 °F (37 °C)    No results for input(s): POCGLU in the last 72 hours. No intake or output data in the 24 hours ending 22    Physical Exam  Vitals and nursing note reviewed. Constitutional:       General: He is in acute distress (C/O generalized pain). Appearance: He is normal weight. He is not ill-appearing. HENT:      Mouth/Throat:      Mouth: Mucous membranes are moist.      Pharynx: Oropharynx is clear. Comments: Edentulous  Eyes:      Pupils: Pupils are equal, round, and reactive to light. Cardiovascular:      Rate and Rhythm: Normal rate and regular rhythm. Pulses: Normal pulses. Heart sounds: Normal heart sounds. Pulmonary:      Effort: Pulmonary effort is normal.      Breath sounds: Normal breath sounds. Abdominal:      General: Bowel sounds are normal. There is no distension. Palpations: Abdomen is soft. Tenderness: There is no abdominal tenderness. Genitourinary:     Comments: External male catheter intact  Musculoskeletal:         General: Deformity (right foot contracture) present. No tenderness. Cervical back: Normal range of motion. Right lower leg: Edema (trace) present. Left lower leg: Edema (trace) present. Comments: Decrease  strength on right. Flaccid left leg with foot contracture. Skin:     General: Skin is warm and dry. Capillary Refill: Capillary refill takes less than 2 seconds.       Comments: CDI dressing to left fem and left lower leg fasciotomy sites. Dressings changed in office prior to arrival.    Neurological:      General: No focal deficit present. Mental Status: He is alert. Mental status is at baseline. Psychiatric:         Mood and Affect: Mood normal.         Behavior: Behavior is cooperative. Investigations:      Laboratory Testing:  Recent Results (from the past 24 hour(s))   Basic Metabolic Panel w/ Reflex to MG    Collection Time: 08/12/22  6:22 PM   Result Value Ref Range    Glucose 129 (H) 70 - 99 mg/dL    BUN 32 (H) 8 - 23 mg/dL    Creatinine 1.24 (H) 0.70 - 1.20 mg/dL    Calcium 8.7 8.6 - 10.4 mg/dL    Sodium 135 135 - 144 mmol/L    Potassium 3.1 (L) 3.7 - 5.3 mmol/L    Chloride 101 98 - 107 mmol/L    CO2 20 20 - 31 mmol/L    Anion Gap 14 9 - 17 mmol/L    GFR Non-African American 59 (L) >60 mL/min    GFR African American >60 >60 mL/min    GFR Comment         CBC    Collection Time: 08/12/22  6:22 PM   Result Value Ref Range    WBC 11.7 (H) 3.5 - 11.3 k/uL    RBC 3.87 (L) 4.21 - 5.77 m/uL    Hemoglobin 11.4 (L) 13.0 - 17.0 g/dL    Hematocrit 35.6 (L) 40.7 - 50.3 %    MCV 92.0 82.6 - 102.9 fL    MCH 29.5 25.2 - 33.5 pg    MCHC 32.0 28.4 - 34.8 g/dL    RDW 14.4 11.8 - 14.4 %    Platelets 641 (H) 516 - 453 k/uL    MPV 11.7 8.1 - 13.5 fL    NRBC Automated 0.0 0.0 per 100 WBC   Magnesium    Collection Time: 08/12/22  6:22 PM   Result Value Ref Range    Magnesium 1.5 (L) 1.6 - 2.6 mg/dL       Imaging/Diagnostics:  No results found.     Assessment :      Hospital Problems             Last Modified POA    * (Principal) Surgical wound infection 8/12/2022 Yes    Hypertension 8/12/2022 Yes    Overview Signed 12/27/2011 11:21 AM by Harriett Baker MD     Uncontrolled          PAD (peripheral artery disease) ([facility] 8/12/2022 Yes    Type 2 diabetes mellitus with circulatory disorder, without long-term current use of insulin ([facility] 8/12/2022 Yes    Hyperlipidemia 8/12/2022 Yes       Plan:     Patient status inpatient in the Med/Surge    Patient consult to vascular surgery team.  Appreciate recommendations. Dressing changes as per vascular surgery. Obtain CBC. Trend WBC. Antibiotic coverage. Obtain BMP. Trend kidney function. Replete electrolytes as needed. Diabetic management with tight glycemic control for wound healing. Accu-Chek AC/at bedtime with SSI. Hypoglycemia treatment orders as needed. Carb controlled diet. Pain management. Manage HTN: Continue home medications. PT/OT eval and treat  Case management for discharge to SNF. Requiring increased dressing changes then current SNF is providing. Consultations:   IP CONSULT TO VASCULAR SURGERY  IP CONSULT TO IV TEAM    Patient is admitted as inpatient status because of co-morbidities listed above, severity of signs and symptoms as outlined, requirement for current medical therapies and most importantly because of direct risk to patient if care not provided in a hospital setting. Expected length of stay > 48 hours.     TIERRA Rodriguez NP  8/12/2022  7:59 PM    Copy sent to Dr. Chalo Butler (Inactive)

## 2022-08-12 NOTE — CONSULTS
VASCULAR SURGERY  CONSULT NOTE    PATIENT: Cintia Dhaliwal           : 1960  MRN: [id number]  ADMISSION DATE: No admission date for patient encounter. TODAY'S DATE: 22     ATTENDING PHYSICIAN: Javi Patricia MD  CONSULTING PHYSICIAN: Dr. Ambriz Asp:  Wound care L groin, sent from office    HISTORY OF PRESENT ILLNESS:  Cintia Dhaliwal is a 64 y.o. male who is admitted for L groin wound care s/p bilateral groin exploration and thrombectomy of his aortobifemoral bypass graft. He was admitted under extreme duress with extreme acidemia due to lower extremity ischemia from his graft occlusion in the beginning of January. We were able to get it back on line and have flow to the lower extremities in a meaningful fashion. He did undergo fasciotomy on the left but not the right. The fasciotomy wounds are healing well at this time however the left groin has some issues with colonization and poor wound healing. The nursing facility has not been doing daily dressing changes and there is purulence in the left groin wound at this point.     PAST MEDICAL HISTORY:        Diagnosis Date    Anxiety     Arthritis associated with another disorder     CAD (coronary artery disease)     with history of multiple MI    Carotid artery occlusion     Family history of kidney stone     HTN (hypertension)     Hyperkalemia     Hyperlipidemia     Hypertension     Nephrolithiasis     multiple times    Neuropathy     PAD (peripheral artery disease) (HCC)     Peripheral vascular disease (HCC)     Personal history of MRSA (methicillin resistant Staphylococcus aureus)     Seasonal allergies     Vasculopathy        PAST SURGICAL HISTORY:        Procedure Laterality Date    ABDOMINAL ADHESION SURGERY  2011-[facility]    Reexploration of abdominal wound and reclosure of the abdominal wound with fascial sutures and retention sutures as well    AORTO-FEMORAL BYPASS GRAFT  2011-[facility]    ABF bypass of 16x8 PTFE graft.This is an end-to-side anastomosis proximally    AXILLARY-FEMORAL BYPASS GRAFT Bilateral 2022    AORTA BIFEMORAL GRAFT ENDARTERECTOMY, BILATERAL COMMON FEMORAL EMBOLECTOMY, BILATERAL LOWER EXTREMEITY ARTERECTOMY, AORTAGRAPHY, RIGHT LIMB OF BYPASS GRAFT STENT, BILATERAL FEMORAL BOVINE PATCHES performed by Rossy Eugene MD at [place] [id number], ARTERY  11/23/10 [address]    1. Placement of 5 sheath in the left femoral artery with duplex guidance. 2. Left Iliac arteriogram. 3. Attempted ballon angioplasty left iliac artery occlusion. CAROTID ENDARTERECTOMY  08    Left carotid endarterectomy using shunt dacron patch aplasty    CORONARY ARTERY BYPASS GRAFT      CABG x3     FEMORAL-TIBIAL BYPASS GRAFT  07  Marisol Rule     Right femoral to posterior tibial artery bypass graft with in situ saphenous vein graft    IR FEMORAL POPLITEAL BYPASS GRAFT   Dr Nicholas Peacock    1. Exploration of right fem-pop bypass graft. 2.Thrombectomy of right fem-post-tib saph vein graft.  3. Replacement of femoral to popliteal bypass graft from midthigh to near the anastomosis by reversed greater saph vein harvested from left leg    PTCA      PTCA with stent x6    TONSILLECTOMY AND ADENOIDECTOMY         ALLERGIES:    Faviola [morphine sulfate], Methadone, and Morphine    SOCIAL HISTORY   Social History     Tobacco Use    Smoking status: Former     Packs/day: 2.00     Years: 33.00     Pack years: 66.00     Types: Cigarettes     Quit date: 3/10/2011     Years since quittin.4    Smokeless tobacco: Never    Tobacco comments:     quite 2011    Substance Use Topics    Alcohol use: No     Alcohol/week: 0.0 standard drinks    Drug use: No        FAMILY HISTORY:       Problem Relation Age of Onset    Hypertension Father     Heart Disease Sister     Hypertension Sister     Cancer Mother 28        breast cancer       MEDICATIONS PRIOR TO ADMISSION:   No medications prior to admission. REVIEW OF SYSTEMS:    Constitutional:  Negative for activity change, fever and unexpected weight change. HENT:  Negative for trouble swallowing and voice change. Eyes:  Negative for pain and visual disturbance. Respiratory:  Negative for cough, chest tightness and shortness of breath. Cardiovascular:  Negative for chest pain and palpitations. Gastrointestinal:  Negative for abdominal distention, abdominal pain and vomiting. Endocrine: Negative for cold intolerance and heat intolerance. Genitourinary:  Negative for dysuria, flank pain and hematuria. Musculoskeletal:  Negative for joint swelling and neck pain. Skin:  Negative for color change and rash. Allergic/Immunologic: Negative for immunocompromised state. Neurological:  Positive for weakness. Negative for syncope, speech difficulty, numbness and headaches. Hematological:  Negative for adenopathy. Psychiatric/Behavioral:  Negative for behavioral problems and suicidal ideas. PHYSICAL EXAM:    Temp  Av.6 °F (37 °C)  Min: 98.6 °F (37 °C)  Max: 98.6 °F (37 °C)  Systolic (59KHK), JRY:95 , Min:93 , DSS:52   Diastolic (58XSC), WQM:21, Min:67, Max:67  Pulse  Av  Min: 66  Max: 66  Resp  Av  Min: 20  Max: 20    SpO2  Av %  Min: 98 %  Max: 98 %     General: No acute distress. A&Ox3. HEENT:  NC/AT. Conjunctiva moist without icterus. Ears are symmetric. Nares are patent. Oral mucus membranes are moist.  Neck:  Supple. No LAD. Cardiovascular:  Regular rate and rhythm. His feet are warm and well-perfused. He is unable to move his right lower extremity due to previous stroke. He has palpable femoral pulses bilaterally. Respiratory:  Equal chest rise bilaterally. No wheezes, stridor, or increased work of breathing. Abdomen:  Soft, non tender, non distended. No organomegaly. No masses palpated. Neuro: Motor and sensory grossly intact. Extremities:  Warm, dry, and well perfused.  The left fasciotomy wounds are healing extremely well with good granulation tissue. Skin:   The left groin has cavitation with some purulent drainage. There is granulation in the wound. He has no surrounding erythema. There is no visible graft. The wound has an odor. LABS:  No results for input(s): WBC, HGB, PLT in the last 72 hours. No results for input(s): NA, K, CL, CO2, BUN, CREATININE, GLUCOSE in the last 72 hours. No results for input(s): AST, ALT, ALB, ALKPHOS, BILITOT, BILIDIR in the last 72 hours. No results for input(s): APTT, PROT, INR in the last 72 hours. IMAGING:  No results found. ASSESSMENT   Patient Active Problem List   Diagnosis    Seasonal allergies    Anxiety    Carotid artery occlusion    CAD (coronary artery disease)    Hypertension    Neuropathy    PAD (peripheral artery disease) ([facility])    HTN (hypertension)    Arthritis associated with another disorder    Hernia, hiatal    Hyperlipidemia    CAD (coronary artery disease)    Critical ischemia of lower extremity ([facility])    CAROLYN (acute kidney injury) ([facility]    Dependence on renal dialysis ([facility]    Failing vascular bypass graft    Arterial hypotension    Non-traumatic rhabdomyolysis    Cardiomyopathy ([address]    Decreased urine output    Acute respiratory failure with hypercapnia ([facility])    Lactic acidosis    Left leg numbness    Cardiomyopathy, ischemic    Anemia    H/O: CVA (cerebrovascular accident)         PLAN  -Patient seen and evaluated. History, physical exam, laboratory and radiographic findings reviewed and discussed with on-call attending.  -Pt will need 3x daily dressing changes with wet-to-dry dressing to L groin. Will alternate soaking gauze in Dakins and Acetic acid. Resident to do morning dressing change, RN to do afternoon and evening changes.  -Plan for wound vac application at bedside on Sunday   -Pt will need case management to help with proper SNF placement.  He was not receiving adequate wound care at previous facility which is the cause of his current admission    Kory Hankins DO PGY-2  8/12/22 5:12 PM

## 2022-08-12 NOTE — PROGRESS NOTES
[facility]  [address] [facility]  MOB [address] [place] [address]  Dept: [phone]     Patient: Jose M Lorenzo  : 1960  MRN: [id number]  DOS: 2022    Referring provider:  No ref. provider found         HPI:  Jose M Lorenzo is a 64 y.o. male who returns to the office for follow-up regarding bilateral groin exploration and thrombectomy of his aortobifemoral bypass graft. Recall that he was admitted under extreme duress with extreme acidemia due to lower extremity ischemia from his graft occlusion. We were able to get it back on line and have flow to the lower extremities in a meaningful fashion. He did undergo fasciotomy on the left but not the right. The fasciotomy wounds are healing well at this time however the left groin has some issues with colonization and poor wound healing. For this reason and this reason alone I think he should be admitted to clean this wound over the weekend with [facility] solution and acetic acid solution and eventually wound VAC.   Past Medical History:   Diagnosis Date    Anxiety     Arthritis associated with another disorder     CAD (coronary artery disease)     with history of multiple MI    Carotid artery occlusion     Family history of kidney stone     HTN (hypertension)     Hyperkalemia     Hyperlipidemia     Hypertension     Nephrolithiasis     multiple times    Neuropathy     PAD (peripheral artery disease) (HCC)     Peripheral vascular disease (HCC)     Personal history of MRSA (methicillin resistant Staphylococcus aureus)     Seasonal allergies     Vasculopathy      Family History   Problem Relation Age of Onset    Hypertension Father     Heart Disease Sister     Hypertension Sister     Cancer Mother 28        breast cancer      Social History     Socioeconomic History    Marital status:      Spouse name: Not on file    Number of children: 1    Years of education: 14    Highest education level: Not on file   Occupational History    Occupation: disabled      Comment: [employer]    Tobacco Use    Smoking status: Every Day     Packs/day: 2.00     Years: 33.00     Pack years: 66.00     Types: Cigarettes     Last attempt to quit: 3/10/2011     Years since quittin.4    Smokeless tobacco: Never    Tobacco comments:     quite 2011    Substance and Sexual Activity    Alcohol use: No     Alcohol/week: 0.0 standard drinks    Drug use: No    Sexual activity: Yes     Partners: Female     Comment: with has trouble   Other Topics Concern    Not on file   Social History Narrative    Not on file     Social Determinants of Health     Financial Resource Strain: Not on file   Food Insecurity: Not on file   Transportation Needs: Not on file   Physical Activity: Not on file   Stress: Not on file   Social Connections: Not on file   Intimate Partner Violence: Not on file   Housing Stability: Not on file      Past Surgical History:   Procedure Laterality Date    ABDOMINAL ADHESION SURGERY  2011-[facility]    Reexploration of abdominal wound and reclosure of the abdominal wound with fascial sutures and retention sutures as well    AORTO-FEMORAL BYPASS GRAFT  2011-[facility]    ABF bypass of 16x8 PTFE graft. This is an end-to-side anastomosis proximally    AXILLARY-FEMORAL BYPASS GRAFT Bilateral 2022    AORTA BIFEMORAL GRAFT ENDARTERECTOMY, BILATERAL COMMON FEMORAL EMBOLECTOMY, BILATERAL LOWER EXTREMEITY ARTERECTOMY, AORTAGRAPHY, RIGHT LIMB OF BYPASS GRAFT STENT, BILATERAL FEMORAL BOVINE PATCHES performed by Agusto Winchester MD at [place] [address], ARTERY  11/23/10 [address]    1. Placement of 5 sheath in the left femoral artery with duplex guidance. 2. Left Iliac arteriogram. 3. Attempted ballon angioplasty left iliac artery occlusion.     CAROTID ENDARTERECTOMY  08    Left carotid endarterectomy using shunt dacron patch aplasty    CORONARY ARTERY BYPASS GRAFT 2008    CABG x3     FEMORAL-TIBIAL BYPASS GRAFT  12/31/07  [facility]rickson     Right femoral to posterior tibial artery bypass graft with in situ saphenous vein graft    IR FEMORAL POPLITEAL BYPASS GRAFT  01/24/208 Dr Mary Ann Salvador    1. Exploration of right fem-pop bypass graft. 2.Thrombectomy of right fem-post-tib saph vein graft. 3. Replacement of femoral to popliteal bypass graft from midthigh to near the anastomosis by reversed greater saph vein harvested from left leg    PTCA      PTCA with stent x6    TONSILLECTOMY AND ADENOIDECTOMY        Review of Systems   Constitutional:  Negative for activity change, fever and unexpected weight change. HENT:  Negative for trouble swallowing and voice change. Eyes:  Negative for pain and visual disturbance. Respiratory:  Negative for cough, chest tightness and shortness of breath. Cardiovascular:  Negative for chest pain and palpitations. Gastrointestinal:  Negative for abdominal distention, abdominal pain and vomiting. Endocrine: Negative for cold intolerance and heat intolerance. Genitourinary:  Negative for dysuria, flank pain and hematuria. Musculoskeletal:  Negative for joint swelling and neck pain. Skin:  Negative for color change and rash. Allergic/Immunologic: Negative for immunocompromised state. Neurological:  Positive for weakness. Negative for syncope, speech difficulty, numbness and headaches. Hematological:  Negative for adenopathy. Psychiatric/Behavioral:  Negative for behavioral problems and suicidal ideas. Vitals:    08/12/22 1517   BP: 93/67   Site: Left Upper Arm   Position: Sitting   Cuff Size: Medium Adult   Pulse: 66   Resp: 20   Temp: 98.6 °F (37 °C)   TempSrc: Temporal   SpO2: 98%   Weight: 161 lb (73 kg)   Height: 5' 11\" (1.803 m)          Physical Exam  Cardiovascular:      Comments: On examination besides the groin wound he is actually doing quite well. His feet are warm and well-perfused.   He is unable to move his right lower extremity due to previous stroke. He has palpable femoral pulses bilaterally. The left groin has cavitation with some purulent drainage. There is granulation in the wound. He has no surrounding erythema. There is no visible graft. The wound has an odor. The fasciotomy wounds are healing extremely well with good granulation tissue. Assessment:  1. Postoperative examination          Plan: At this point I think his best option is admission with care for this groin wound on the left such that the wound does not lead to graft infection. I would place him on IV antibiotics and then continue with doxycycline long-term. More importantly the wound needs to be dressed on a schedule of at least 3-4 times per day which I am certain is not being done in the nursing home. Once we have that done then we can switch to a wound VAC. He understands and agrees.     Electronically signed by:  Nirmal Muir MD

## 2022-08-13 PROBLEM — D72.829 LEUKOCYTOSIS: Status: ACTIVE | Noted: 2022-08-13

## 2022-08-13 LAB
GLUCOSE BLD-MCNC: 109 MG/DL (ref 75–110)
GLUCOSE BLD-MCNC: 130 MG/DL (ref 75–110)
GLUCOSE BLD-MCNC: 140 MG/DL (ref 75–110)
GLUCOSE BLD-MCNC: 159 MG/DL (ref 75–110)

## 2022-08-13 PROCEDURE — 99232 SBSQ HOSP IP/OBS MODERATE 35: CPT | Performed by: STUDENT IN AN ORGANIZED HEALTH CARE EDUCATION/TRAINING PROGRAM

## 2022-08-13 PROCEDURE — 99254 IP/OBS CNSLTJ NEW/EST MOD 60: CPT | Performed by: NURSE PRACTITIONER

## 2022-08-13 PROCEDURE — 6370000000 HC RX 637 (ALT 250 FOR IP): Performed by: NURSE PRACTITIONER

## 2022-08-13 PROCEDURE — 6360000002 HC RX W HCPCS: Performed by: NURSE PRACTITIONER

## 2022-08-13 PROCEDURE — 2500000003 HC RX 250 WO HCPCS: Performed by: STUDENT IN AN ORGANIZED HEALTH CARE EDUCATION/TRAINING PROGRAM

## 2022-08-13 PROCEDURE — 6370000000 HC RX 637 (ALT 250 FOR IP): Performed by: STUDENT IN AN ORGANIZED HEALTH CARE EDUCATION/TRAINING PROGRAM

## 2022-08-13 PROCEDURE — 2580000003 HC RX 258: Performed by: NURSE PRACTITIONER

## 2022-08-13 PROCEDURE — 82947 ASSAY GLUCOSE BLOOD QUANT: CPT

## 2022-08-13 PROCEDURE — 1200000000 HC SEMI PRIVATE

## 2022-08-13 RX ORDER — MAGNESIUM SULFATE 1 G/100ML
1000 INJECTION INTRAVENOUS PRN
Status: DISCONTINUED | OUTPATIENT
Start: 2022-08-13 | End: 2022-08-17

## 2022-08-13 RX ORDER — SENNA AND DOCUSATE SODIUM 50; 8.6 MG/1; MG/1
2 TABLET, FILM COATED ORAL DAILY
Status: DISCONTINUED | OUTPATIENT
Start: 2022-08-13 | End: 2022-08-20 | Stop reason: HOSPADM

## 2022-08-13 RX ORDER — ACETIC ACID 0.25 G/100ML
1000 IRRIGANT IRRIGATION DAILY
Status: DISCONTINUED | OUTPATIENT
Start: 2022-08-13 | End: 2022-08-15

## 2022-08-13 RX ORDER — POLYETHYLENE GLYCOL 3350 17 G/17G
17 POWDER, FOR SOLUTION ORAL DAILY
Status: DISCONTINUED | OUTPATIENT
Start: 2022-08-13 | End: 2022-08-20 | Stop reason: HOSPADM

## 2022-08-13 RX ORDER — SODIUM HYPOCHLORITE 1.25 MG/ML
SOLUTION TOPICAL 2 TIMES DAILY
Status: DISCONTINUED | OUTPATIENT
Start: 2022-08-13 | End: 2022-08-15

## 2022-08-13 RX ORDER — POTASSIUM CHLORIDE 7.45 MG/ML
10 INJECTION INTRAVENOUS PRN
Status: DISCONTINUED | OUTPATIENT
Start: 2022-08-13 | End: 2022-08-17

## 2022-08-13 RX ADMIN — APIXABAN 5 MG: 5 TABLET, FILM COATED ORAL at 21:34

## 2022-08-13 RX ADMIN — Medication 4.5 MG: at 21:33

## 2022-08-13 RX ADMIN — TRAMADOL HYDROCHLORIDE 50 MG: 50 TABLET, COATED ORAL at 12:19

## 2022-08-13 RX ADMIN — POTASSIUM CHLORIDE 40 MEQ: 1500 TABLET, EXTENDED RELEASE ORAL at 02:31

## 2022-08-13 RX ADMIN — BACLOFEN 10 MG: 10 TABLET ORAL at 17:56

## 2022-08-13 RX ADMIN — METOPROLOL SUCCINATE 12.5 MG: 25 TABLET, FILM COATED, EXTENDED RELEASE ORAL at 21:33

## 2022-08-13 RX ADMIN — EZETIMIBE 10 MG: 10 TABLET ORAL at 09:36

## 2022-08-13 RX ADMIN — DOCUSATE SODIUM 50 MG AND SENNOSIDES 8.6 MG 2 TABLET: 8.6; 5 TABLET, FILM COATED ORAL at 15:00

## 2022-08-13 RX ADMIN — SODIUM CHLORIDE, PRESERVATIVE FREE 10 ML: 5 INJECTION INTRAVENOUS at 21:34

## 2022-08-13 RX ADMIN — CLOPIDOGREL 75 MG: 75 TABLET, FILM COATED ORAL at 09:36

## 2022-08-13 RX ADMIN — DAKIN'S SOLUTION 0.125% (QUARTER STRENGTH): 0.12 SOLUTION at 07:00

## 2022-08-13 RX ADMIN — SODIUM CHLORIDE, PRESERVATIVE FREE 10 ML: 5 INJECTION INTRAVENOUS at 09:37

## 2022-08-13 RX ADMIN — TORSEMIDE 20 MG: 20 TABLET ORAL at 09:36

## 2022-08-13 RX ADMIN — DOXYCYCLINE HYCLATE 100 MG: 100 TABLET, COATED ORAL at 09:37

## 2022-08-13 RX ADMIN — TAMSULOSIN HYDROCHLORIDE 0.4 MG: 0.4 CAPSULE ORAL at 09:36

## 2022-08-13 RX ADMIN — BACLOFEN 10 MG: 10 TABLET ORAL at 21:34

## 2022-08-13 RX ADMIN — DOXYCYCLINE HYCLATE 100 MG: 100 TABLET, COATED ORAL at 21:34

## 2022-08-13 RX ADMIN — APIXABAN 5 MG: 5 TABLET, FILM COATED ORAL at 09:37

## 2022-08-13 RX ADMIN — ATORVASTATIN CALCIUM 10 MG: 10 TABLET, FILM COATED ORAL at 09:37

## 2022-08-13 RX ADMIN — ISOSORBIDE MONONITRATE 30 MG: 30 TABLET ORAL at 09:36

## 2022-08-13 RX ADMIN — BACLOFEN 10 MG: 10 TABLET ORAL at 09:37

## 2022-08-13 RX ADMIN — METOPROLOL SUCCINATE 12.5 MG: 25 TABLET, FILM COATED, EXTENDED RELEASE ORAL at 09:36

## 2022-08-13 RX ADMIN — ACETIC ACID 1000 ML: 0.25 IRRIGANT IRRIGATION at 12:20

## 2022-08-13 RX ADMIN — MAGNESIUM SULFATE HEPTAHYDRATE 1000 MG: 1 INJECTION, SOLUTION INTRAVENOUS at 04:01

## 2022-08-13 RX ADMIN — TRAMADOL HYDROCHLORIDE 50 MG: 50 TABLET, COATED ORAL at 23:16

## 2022-08-13 RX ADMIN — MAGNESIUM SULFATE HEPTAHYDRATE 1000 MG: 1 INJECTION, SOLUTION INTRAVENOUS at 02:29

## 2022-08-13 RX ADMIN — SODIUM HYPOCHLORITE: 1.25 SOLUTION TOPICAL at 21:36

## 2022-08-13 RX ADMIN — MIRTAZAPINE 30 MG: 30 TABLET, FILM COATED ORAL at 21:33

## 2022-08-13 ASSESSMENT — PAIN DESCRIPTION - LOCATION
LOCATION: HIP
LOCATION: HIP

## 2022-08-13 ASSESSMENT — PAIN SCALES - GENERAL: PAINLEVEL_OUTOF10: 8

## 2022-08-13 ASSESSMENT — PAIN DESCRIPTION - ORIENTATION
ORIENTATION: LEFT
ORIENTATION: LEFT

## 2022-08-13 NOTE — PROGRESS NOTES
Division of Vascular Surgery     Progress Note      Name: Violet Becker  MRN: [id number]         Overnight Events:   No acute overnight events      Subjective:     Pt seen and examined at bedside. Left groin wound dressing changed using wet-to-dry dressing with Dakins. Pt is tearful and anxious about being in the hospital again, and is saying he is tired of doing this. Hemodynamically stable, afebrile. Left leg fasciotomy dressing also changed at bedside. Physical Exam:     Vitals:  /72   Pulse 80   Temp 97.3 °F (36.3 °C) (Oral)   Resp 16   SpO2 97%     General: No acute distress. A&Ox3. HEENT:  NC/AT. Conjunctiva moist without icterus. Ears are symmetric. Nares are patent. Oral mucus membranes are moist.  Neck:  Supple. No LAD. Cardiovascular:  Regular rate and rhythm. His feet are warm and well-perfused. He is unable to move his right lower extremity due to previous stroke. He has palpable femoral pulses bilaterally. Respiratory:  Equal chest rise bilaterally. No wheezes, stridor, or increased work of breathing. Abdomen:  Soft, non tender, non distended. Midline incision well-healed  Neuro: Motor and sensory grossly intact. Extremities:  Warm, dry, and well perfused. The left fasciotomy wounds are healing extremely well with good granulation tissue. Skin:   The left groin has cavitation with some purulent drainage. There is granulation in the wound. He has no surrounding erythema. There is no visible graft. Imaging:   No new imaging      Assessment/ Plan: It is crucial that patient receive q8 hour dressing changes as this is the reason he is in the hospital. These should be wet to dry dressings to the left groin, alternating using Acetic acid and Dakins for the wet dressing portion. Resident to do AM dressing change, RN to do the other 2 throughout the day. Case management consulted for facility placement. Pt will be discharged with wound vac.  Resident to place wound vac Sunday.        Chaparro Fowler DO PGY-2  8/13/22 8:24 AM

## 2022-08-13 NOTE — PROGRESS NOTES
Anesthetic History   No history of anesthetic complications            Review of Systems / Medical History  Patient summary reviewed and pertinent labs reviewed    Pulmonary  Within defined limits                 Neuro/Psych   Within defined limits           Cardiovascular    Hypertension: well controlled          Hyperlipidemia    Exercise tolerance: >4 METS     GI/Hepatic/Renal     GERD: well controlled  Hepatitis: type B         Endo/Other      Hypothyroidism       Other Findings              Physical Exam    Airway  Mallampati: II  TM Distance: 4 - 6 cm  Neck ROM: normal range of motion   Mouth opening: Normal     Cardiovascular  Regular rate and rhythm,  S1 and S2 normal,  no murmur, click, rub, or gallop  Rhythm: regular  Rate: normal         Dental    Dentition: Bridges     Pulmonary  Breath sounds clear to auscultation               Abdominal  GI exam deferred       Other Findings            Anesthetic Plan    ASA: 2  Anesthesia type: general          Induction: Intravenous  Anesthetic plan and risks discussed with: Patient [facility]  Office: [phone]  Meka Me, DO, Marcus Cooper, DO, Jenice Duane, DO, Marcus Ortiz, DO, Lavell Gunn MD, Omar Ordonez MD, Aanyeli Whitney MD, Homero Madsen MD,  Alan Morrison MD, Hardeep Best MD, Kostas Woodward, DO, Katelyn Dickey MD,  Brant Nelson MD, Jordan Ellison MD, Leonora Hendrickson DO, Heather Hoffman MD, Froy Palma MD, Sofya Haque MD, Laury Chowdhury DO, Chloe Hebert MD, Jan Valdes MD, Mandy Vasquez, CNP,  Isha Perez, CNP, Nurys Rachel CNP, Delbert Aase, CNP, Lady Graf PA-C, Danette Merchant, [facility], Tita Grimm, CNP, Beverly Lomas, CNP, Mario Mcdowell, CNP, Leanna Hinds, CNP, Corry Mcgee, CNP, Tanda Lesches, CNS, Osmar Campa, [facility], Andrez Suárez, CNP, Gorman Oppenheim, [facility], [facility], [address] [place] [address]    Progress Note    8/13/2022    8:44 AM    Name:   Apollo Galdamez  MRN:     [id number]     Acct:      [de-identified]   Room:   [address] [place] [address] Day:  1  Admit Date:  8/12/2022  5:34 PM    PCP:   Anayeli Whitney (Inactive)  Code Status:  Full Code    Subjective:     C/C: No chief complaint on file. Interval History Status: improved. Awaiting wound vac Sunday   Then DC to [facility]    Brief History:     64 M recently underwent fasciotomy bilateral groin exploration thrombectomy of his aortobifemoral bypass graft. Patient presented from outpatient vascular surgeon's office for concern of left femoral wound infection and he was noted to have purulent drainage on exam.    He was recently staying in an extended care facility where he was receiving dressing changes every 3 days where he resides since last hospitalization. He was admitted for left femoral dressing changes and wound VAC application which will be performed tomorrow.     He is nontoxic-appearing has a very mild leukocytosis and is currently on doxycycline for his leg infection    Review of Systems:     Constitutional:  negative for chills, fevers, sweats  Respiratory:  negative for cough, dyspnea on exertion, shortness of breath, wheezing  Cardiovascular:  negative for chest pain, chest pressure/discomfort, lower extremity edema, palpitations  Gastrointestinal:  negative for abdominal pain, constipation, diarrhea, nausea, vomiting  Neurological:  negative for dizziness, headache    Medications: Allergies:     Allergies   Allergen Reactions    Faviola [Morphine Sulfate] Itching and Rash    Methadone Rash    Morphine Nausea And Vomiting      Sever \"headache\"       Current Meds:   Scheduled Meds:    acetic acid  1,000 mL Irrigation Daily    apixaban  5 mg Oral BID    atorvastatin  10 mg Oral Daily    baclofen  10 mg Oral TID    clopidogrel  75 mg Oral Daily    ezetimibe  10 mg Oral Daily    isosorbide mononitrate  30 mg Oral Daily    melatonin  4.5 mg Oral Nightly    metoprolol succinate  12.5 mg Oral BID    mirtazapine  30 mg Oral Nightly    tamsulosin  0.4 mg Oral Daily    torsemide  20 mg Oral Daily    sodium chloride flush  5-40 mL IntraVENous 2 times per day    insulin lispro  0-8 Units SubCUTAneous TID WC    insulin lispro  0-4 Units SubCUTAneous Nightly    doxycycline hyclate  100 mg Oral 2 times per day    sodium hypochlorite   Irrigation TID     Continuous Infusions:    sodium chloride      dextrose       PRN Meds: sodium chloride flush, sodium chloride, potassium chloride **OR** potassium alternative oral replacement **OR** potassium chloride, magnesium sulfate, polyethylene glycol, acetaminophen **OR** acetaminophen, glucose, dextrose bolus **OR** dextrose bolus, glucagon (rDNA), dextrose, traMADol    Data:     Past Medical History:   has a past medical history of Anxiety, Arthritis associated with another disorder, CAD (coronary artery disease), Carotid artery occlusion, Family history of kidney stone, HTN (hypertension), Hyperkalemia, Hyperlipidemia, Hypertension, Nephrolithiasis, Neuropathy, PAD (peripheral artery disease) ([facility] [address] Peripheral vascular disease ([facility] [address] Personal history of MRSA (methicillin resistant Staphylococcus aureus), Seasonal allergies, and Vasculopathy. Social History:   reports that he quit smoking about 11 years ago. His smoking use included cigarettes. He has a 66.00 pack-year smoking history. He has never used smokeless tobacco. He reports that he does not drink alcohol and does not use drugs. Family History:   Family History   Problem Relation Age of Onset    Hypertension Father     Heart Disease Sister     Hypertension Sister     Cancer Mother 28        breast cancer       Vitals:  /72   Pulse 80   Temp 97.3 °F (36.3 °C) (Oral)   Resp 16   SpO2 97%   Temp (24hrs), Av.2 °F (36.8 °C), Min:97.3 °F (36.3 °C), Max:98.6 °F (37 °C)    No results for input(s): POCGLU in the last 72 hours. I/O (24Hr):   No intake or output data in the 24 hours ending 22 0844    Labs:  Hematology:  Recent Labs     222   WBC 11.7*   RBC 3.87*   HGB 11.4*   HCT 35.6*   MCV 92.0   MCH 29.5   MCHC 32.0   RDW 14.4   *   MPV 11.7     Chemistry:  Recent Labs     22  1822      K 3.1*      CO2 20   GLUCOSE 129*   BUN 32*   CREATININE 1.24*   MG 1.5*   ANIONGAP 14   LABGLOM 59*   GFRAA >60   CALCIUM 8.7     Recent Labs     22  1822   LABA1C 5.4     ABG:  Lab Results   Component Value Date/Time    POCPH 7.401 2022 01:01 AM    PHART 7.30 2012 02:57 PM    PH 7.37 2011 03:46 AM    POCPCO2 38.6 2022 01:01 AM    KYX6GYT 42 2012 02:57 PM    PCO2 43 2011 03:46 AM    POCPO2 142.5 2022 01:01 AM    PO2ART 66 2012 02:57 PM    PO2 73 2011 03:46 AM    POCHCO3 24.0 2022 01:01 AM    MAF8PQQ 20.6 2012 02:57 PM    HCO3 24.9 2011 03:46 AM    NBEA 1 2022 01:01 AM    PBEA 0 2022 01:27 PM    IEG1YNW 22 2012 02:57 PM    EQPG1CHQ 99 2022 01:01 AM

## 2022-08-13 NOTE — FLOWSHEET NOTE
SPIRITUAL CARE DEPARTMENT - [facility] [address]  PROGRESS NOTE    Shift date: 08/13/22  Shift day: Saturday   Shift # 1    Room # 0502/0502-01   Name: Milana Lowe                Baptist:    Place of Anabaptism:     Referral: Routine Visit    Admit Date & Time: 8/12/2022  5:34 PM    Assessment:  Milana Lowe is a 64 y.o. male       Intervention:  Writer introduced self and title as . Patient did not appear to mind  presence and engaged in [place] [facility]. Writer offered space for patient  to express feelings, needs, and concerns and provided a ministry presence. Outcome:  While william/spirituality was not discussed, patient appeared receptive to  presence. Plan:  Chaplains will remain available to offer spiritual and emotional support as needed. Electronically signed by Demarcus Pizano, on 8/13/2022 at 3:34 PM.  Deric Marcos  [phone]        .

## 2022-08-13 NOTE — CONSULTS
[facility] -   Infectious diseases evaluation  admission date 8/12/2022    reason for consultation:   Surgical Wound Infection    Impression :   Current:  L groin post-op surgical wound infection. S/p aorto-bifem graft endarterectomy 7/8/22. Leukocytosis  PVD    Other:    Discussion / summary of stay / plan of care     Recommendations   Continue Doxycycline 100 mg po twice daily x 7 days until 8/19/22. Follow CBC and renal function  Wound care. Discussed with patient, RN. Infection Control Recommendations   [place] Precautions      Antimicrobial Stewardship Recommendations   Simplification of therapy  Targeted therapy    History of Present Illness:   Initial history:  Charly Chavarria is a 64y.o.-year-old male who was admitted for left groin wound care s/p bilateral groin exploration and thrombectomy of his aortobifemoral bypass graft. Patient was seen in the vascular office 8/12/22 for post-op visit and there was concern for left femoral wound infection with noted purulent drainage on exam. No cx of the drainage was sent. Patient states the dressings are being changed every 3 days in the ECF. No associated symptoms of fever, chills, n/v/d. Initial labs: WBC: 11.7, Cre: 1.24. Interval changes  8/13/2022   Patient Vitals for the past 8 hrs:   BP Temp Temp src Pulse Resp SpO2   08/13/22 0819 116/72 97.3 °F (36.3 °C) Oral 80 -- 97 %   08/13/22 0422 121/68 98.6 °F (37 °C) Oral 88 16 97 %     Feeling good. Left groin examined. Wound clean, no erythema, induration, foul odor, drainage. Tissue pink with an area on tan slough. LLE fasciotomy wounds clean. Denies any fever, chills, n/v/d. Summary of relevant labs:  Labs:    Micro:    Imaging:      I have personally reviewed the past medical history, past surgical history, medications, social history, and family history, and I haveupdated the database accordingly.       Allergies:   Faviola [morphine sulfate], Methadone, and Morphine     Review of Systems:     Review of Systems   Skin:  Positive for wound. L groin. BLE fasciotomies. All other systems reviewed and are negative. Physical Examination :       Physical Exam  Vitals and nursing note reviewed. Constitutional:       Appearance: Normal appearance. He is not ill-appearing. HENT:      Head: Normocephalic and atraumatic. Right Ear: External ear normal.      Left Ear: External ear normal.      Nose: Nose normal.      Mouth/Throat:      Mouth: Mucous membranes are moist.      Pharynx: Oropharynx is clear. No oropharyngeal exudate. Eyes:      General: No scleral icterus. Extraocular Movements: Extraocular movements intact. Pupils: Pupils are equal, round, and reactive to light. Cardiovascular:      Rate and Rhythm: Normal rate and regular rhythm. Heart sounds: Normal heart sounds. No murmur heard. Pulmonary:      Effort: Pulmonary effort is normal.      Breath sounds: Normal breath sounds. Comments: RA  Abdominal:      General: Bowel sounds are normal. There is no distension. Palpations: Abdomen is soft. Tenderness: There is no abdominal tenderness. Genitourinary:     Comments: Condom cath. Urine clear. Musculoskeletal:         General: Normal range of motion. Cervical back: Normal range of motion. Right lower leg: No edema. Left lower leg: No edema. Skin:     General: Skin is warm and dry. Findings: No rash. Comments: L groin wound with pink granulating tissue with moderate area of tan slough. No erythema, induration, drainage, foul odor noted. LLE fasciotomy wounds clean with pink granulation tissue. Neurological:      Mental Status: He is alert.        Past Medical History:     Past Medical History:   Diagnosis Date    Anxiety     Arthritis associated with another disorder     CAD (coronary artery disease)     with history of multiple MI    Carotid artery occlusion Family history of kidney stone     HTN (hypertension)     Hyperkalemia     Hyperlipidemia     Hypertension     Nephrolithiasis     multiple times    Neuropathy     PAD (peripheral artery disease) (HCC)     Peripheral vascular disease (HCC)     Personal history of MRSA (methicillin resistant Staphylococcus aureus)     Seasonal allergies     Vasculopathy        Past Surgical  History:     Past Surgical History:   Procedure Laterality Date    ABDOMINAL ADHESION SURGERY  2/11/2011-[facility]    Reexploration of abdominal wound and reclosure of the abdominal wound with fascial sutures and retention sutures as well    AORTO-FEMORAL BYPASS GRAFT  2/11/2011-[facility]    ABF bypass of 16x8 PTFE graft. This is an end-to-side anastomosis proximally    AXILLARY-FEMORAL BYPASS GRAFT Bilateral 7/8/2022    AORTA BIFEMORAL GRAFT ENDARTERECTOMY, BILATERAL COMMON FEMORAL EMBOLECTOMY, BILATERAL LOWER EXTREMEITY ARTERECTOMY, AORTAGRAPHY, RIGHT LIMB OF BYPASS GRAFT STENT, BILATERAL FEMORAL BOVINE PATCHES performed by Dayanara Prajapati MD at [place] [id number], ARTERY  11/23/10 [address]    1. Placement of 5 sheath in the left femoral artery with duplex guidance. 2. Left Iliac arteriogram. 3. Attempted ballon angioplasty left iliac artery occlusion. CAROTID ENDARTERECTOMY  07/19/08    Left carotid endarterectomy using shunt dacron patch aplasty    CORONARY ARTERY BYPASS GRAFT  2008    CABG x3     FEMORAL-TIBIAL BYPASS GRAFT  12/31/07  Pasquale Aguilar     Right femoral to posterior tibial artery bypass graft with in situ saphenous vein graft    IR FEMORAL POPLITEAL BYPASS GRAFT  01/24/208 Dr Jewell Hernandez    1. Exploration of right fem-pop bypass graft. 2.Thrombectomy of right fem-post-tib saph vein graft.  3. Replacement of femoral to popliteal bypass graft from midthigh to near the anastomosis by reversed greater saph vein harvested from left leg    PTCA      PTCA with stent x6    TONSILLECTOMY AND ADENOIDECTOMY Medications:      acetic acid  1,000 mL Irrigation Daily    apixaban  5 mg Oral BID    atorvastatin  10 mg Oral Daily    baclofen  10 mg Oral TID    clopidogrel  75 mg Oral Daily    ezetimibe  10 mg Oral Daily    isosorbide mononitrate  30 mg Oral Daily    melatonin  4.5 mg Oral Nightly    metoprolol succinate  12.5 mg Oral BID    mirtazapine  30 mg Oral Nightly    tamsulosin  0.4 mg Oral Daily    torsemide  20 mg Oral Daily    sodium chloride flush  5-40 mL IntraVENous 2 times per day    insulin lispro  0-8 Units SubCUTAneous TID WC    insulin lispro  0-4 Units SubCUTAneous Nightly    doxycycline hyclate  100 mg Oral 2 times per day    sodium hypochlorite   Irrigation TID       Social History:     Social History     Socioeconomic History    Marital status:      Spouse name: Not on file    Number of children: 1    Years of education: 14    Highest education level: Not on file   Occupational History    Occupation: disabled      Comment: SSI    Tobacco Use    Smoking status: Former     Packs/day: 2.00     Years: 33.00     Pack years: 66.00     Types: Cigarettes     Quit date: 3/10/2011     Years since quittin.4    Smokeless tobacco: Never    Tobacco comments:     quite 2011    Substance and Sexual Activity    Alcohol use: No     Alcohol/week: 0.0 standard drinks    Drug use: No    Sexual activity: Yes     Partners: Female     Comment: with has trouble   Other Topics Concern    Not on file   Social History Narrative    Not on file     Social Determinants of Health     Financial Resource Strain: Not on file   Food Insecurity: Not on file   Transportation Needs: Not on file   Physical Activity: Not on file   Stress: Not on file   Social Connections: Not on file   Intimate Partner Violence: Not on file   Housing Stability: Not on file       Family History:     Family History   Problem Relation Age of Onset    Hypertension Father     Heart Disease Sister     Hypertension Sister     Cancer Mother 28 breast cancer      Medical Decision Making:   I have independently reviewed/ordered the following labs:    CBC with Differential:   Recent Labs     08/12/22 1822   WBC 11.7*   HGB 11.4*   HCT 35.6*   *     BMP:  Recent Labs     08/12/22 1822      K 3.1*      CO2 20   BUN 32*   CREATININE 1.24*   MG 1.5*     Hepatic Function Panel: No results for input(s): PROT, LABALBU, BILIDIR, IBILI, BILITOT, ALKPHOS, ALT, AST in the last 72 hours. No results for input(s): RPR in the last 72 hours. No results for input(s): HIV in the last 72 hours. No results for input(s): BC in the last 72 hours. Lab Results   Component Value Date/Time    CREATININE 1.24 08/12/2022 06:22 PM    CREATININE 1.2 07/25/2011 09:50 AM    GLUCOSE 129 08/12/2022 06:22 PM    GLUCOSE 128 06/06/2012 07:29 PM       Detailed results: Thank you for allowing us to participate in the care of this patient. Please call with questions. This note is created with the assistance of a speech recognition program.  While intending to generate adocument that actually reflects the content of the visit, the document can still have some errors including those of syntax and sound a like substitutions which may escape proof reading. It such instances, actual meaningcan be extrapolated by contextual diversion.     TIERRA De Los Santos - CNP  Office: [phone]  Perfect serve / office [phone]

## 2022-08-13 NOTE — PLAN OF CARE
Plan - Patient's Chronic Conditions and Co-Morbidity Symptoms are Monitored and Maintained or Improved:   Monitor and assess patient's chronic conditions and comorbid symptoms for stability, deterioration, or improvement   Collaborate with multidisciplinary team to address chronic and comorbid conditions and prevent exacerbation or deterioration  8/13/2022 0902 by Lisette Parnell RN  Outcome: Not Progressing

## 2022-08-13 NOTE — PLAN OF CARE
Problem: Chronic Conditions and Co-morbidities  Goal: Patient's chronic conditions and co-morbidity symptoms are monitored and maintained or improved  Outcome: Not Progressing

## 2022-08-14 PROBLEM — T81.49XA WOUND INFECTION AFTER SURGERY: Status: ACTIVE | Noted: 2022-08-14

## 2022-08-14 LAB
ALBUMIN SERPL-MCNC: 2.7 G/DL (ref 3.5–5.2)
ALBUMIN/GLOBULIN RATIO: 1.1 (ref 1–2.5)
ALP BLD-CCNC: 192 U/L (ref 40–129)
ALT SERPL-CCNC: 36 U/L (ref 5–41)
ANION GAP SERPL CALCULATED.3IONS-SCNC: 11 MMOL/L (ref 9–17)
ANION GAP SERPL CALCULATED.3IONS-SCNC: 14 MMOL/L (ref 9–17)
AST SERPL-CCNC: 38 U/L
BILIRUB SERPL-MCNC: 0.39 MG/DL (ref 0.3–1.2)
BUN BLDV-MCNC: 32 MG/DL (ref 8–23)
BUN BLDV-MCNC: 36 MG/DL (ref 8–23)
CALCIUM SERPL-MCNC: 8.4 MG/DL (ref 8.6–10.4)
CALCIUM SERPL-MCNC: 8.8 MG/DL (ref 8.6–10.4)
CHLORIDE BLD-SCNC: 101 MMOL/L (ref 98–107)
CHLORIDE BLD-SCNC: 102 MMOL/L (ref 98–107)
CO2: 21 MMOL/L (ref 20–31)
CO2: 23 MMOL/L (ref 20–31)
CREAT SERPL-MCNC: 1.08 MG/DL (ref 0.7–1.2)
CREAT SERPL-MCNC: 1.15 MG/DL (ref 0.7–1.2)
GFR AFRICAN AMERICAN: >60 ML/MIN
GFR AFRICAN AMERICAN: >60 ML/MIN
GFR NON-AFRICAN AMERICAN: >60 ML/MIN
GFR NON-AFRICAN AMERICAN: >60 ML/MIN
GFR SERPL CREATININE-BSD FRML MDRD: ABNORMAL ML/MIN/{1.73_M2}
GFR SERPL CREATININE-BSD FRML MDRD: ABNORMAL ML/MIN/{1.73_M2}
GLUCOSE BLD-MCNC: 108 MG/DL (ref 75–110)
GLUCOSE BLD-MCNC: 108 MG/DL (ref 75–110)
GLUCOSE BLD-MCNC: 112 MG/DL (ref 70–99)
GLUCOSE BLD-MCNC: 139 MG/DL (ref 70–99)
GLUCOSE BLD-MCNC: 149 MG/DL (ref 75–110)
GLUCOSE BLD-MCNC: 155 MG/DL (ref 75–110)
INR BLD: 1.2
MAGNESIUM: 2 MG/DL (ref 1.6–2.6)
POTASSIUM SERPL-SCNC: 2.9 MMOL/L (ref 3.7–5.3)
POTASSIUM SERPL-SCNC: 3.7 MMOL/L (ref 3.7–5.3)
PROTHROMBIN TIME: 12.8 SEC (ref 9.1–12.3)
SODIUM BLD-SCNC: 134 MMOL/L (ref 135–144)
SODIUM BLD-SCNC: 138 MMOL/L (ref 135–144)
TOTAL PROTEIN: 5.2 G/DL (ref 6.4–8.3)

## 2022-08-14 PROCEDURE — 85610 PROTHROMBIN TIME: CPT

## 2022-08-14 PROCEDURE — 6370000000 HC RX 637 (ALT 250 FOR IP): Performed by: NURSE PRACTITIONER

## 2022-08-14 PROCEDURE — 82947 ASSAY GLUCOSE BLOOD QUANT: CPT

## 2022-08-14 PROCEDURE — 1200000000 HC SEMI PRIVATE

## 2022-08-14 PROCEDURE — 6370000000 HC RX 637 (ALT 250 FOR IP): Performed by: STUDENT IN AN ORGANIZED HEALTH CARE EDUCATION/TRAINING PROGRAM

## 2022-08-14 PROCEDURE — 2580000003 HC RX 258: Performed by: NURSE PRACTITIONER

## 2022-08-14 PROCEDURE — 80048 BASIC METABOLIC PNL TOTAL CA: CPT

## 2022-08-14 PROCEDURE — 99253 IP/OBS CNSLTJ NEW/EST LOW 45: CPT | Performed by: STUDENT IN AN ORGANIZED HEALTH CARE EDUCATION/TRAINING PROGRAM

## 2022-08-14 PROCEDURE — 80053 COMPREHEN METABOLIC PANEL: CPT

## 2022-08-14 PROCEDURE — 6360000002 HC RX W HCPCS: Performed by: NURSE PRACTITIONER

## 2022-08-14 PROCEDURE — 83735 ASSAY OF MAGNESIUM: CPT

## 2022-08-14 PROCEDURE — 99232 SBSQ HOSP IP/OBS MODERATE 35: CPT | Performed by: NURSE PRACTITIONER

## 2022-08-14 PROCEDURE — 36415 COLL VENOUS BLD VENIPUNCTURE: CPT

## 2022-08-14 RX ORDER — POTASSIUM CHLORIDE 7.45 MG/ML
10 INJECTION INTRAVENOUS PRN
Status: DISCONTINUED | OUTPATIENT
Start: 2022-08-14 | End: 2022-08-17

## 2022-08-14 RX ORDER — GABAPENTIN 300 MG/1
300 CAPSULE ORAL EVERY 8 HOURS
Status: DISCONTINUED | OUTPATIENT
Start: 2022-08-14 | End: 2022-08-20 | Stop reason: HOSPADM

## 2022-08-14 RX ORDER — ACETAMINOPHEN 500 MG
1000 TABLET ORAL EVERY 8 HOURS SCHEDULED
Status: DISCONTINUED | OUTPATIENT
Start: 2022-08-14 | End: 2022-08-20 | Stop reason: HOSPADM

## 2022-08-14 RX ORDER — OXYCODONE HYDROCHLORIDE 5 MG/1
5 TABLET ORAL EVERY 6 HOURS PRN
Status: DISCONTINUED | OUTPATIENT
Start: 2022-08-14 | End: 2022-08-20 | Stop reason: HOSPADM

## 2022-08-14 RX ADMIN — TRAMADOL HYDROCHLORIDE 50 MG: 50 TABLET, COATED ORAL at 21:41

## 2022-08-14 RX ADMIN — SODIUM CHLORIDE 1000 ML: 9 INJECTION, SOLUTION INTRAVENOUS at 00:53

## 2022-08-14 RX ADMIN — ACETAMINOPHEN 1000 MG: 500 TABLET ORAL at 20:27

## 2022-08-14 RX ADMIN — POTASSIUM CHLORIDE 10 MEQ: 7.46 INJECTION, SOLUTION INTRAVENOUS at 06:19

## 2022-08-14 RX ADMIN — BACLOFEN 10 MG: 10 TABLET ORAL at 20:28

## 2022-08-14 RX ADMIN — CLOPIDOGREL 75 MG: 75 TABLET, FILM COATED ORAL at 08:33

## 2022-08-14 RX ADMIN — DOXYCYCLINE HYCLATE 100 MG: 100 TABLET, COATED ORAL at 08:34

## 2022-08-14 RX ADMIN — DOCUSATE SODIUM 50 MG AND SENNOSIDES 8.6 MG 2 TABLET: 8.6; 5 TABLET, FILM COATED ORAL at 08:34

## 2022-08-14 RX ADMIN — EZETIMIBE 10 MG: 10 TABLET ORAL at 08:35

## 2022-08-14 RX ADMIN — BACLOFEN 10 MG: 10 TABLET ORAL at 15:53

## 2022-08-14 RX ADMIN — POTASSIUM CHLORIDE 10 MEQ: 7.46 INJECTION, SOLUTION INTRAVENOUS at 03:12

## 2022-08-14 RX ADMIN — POTASSIUM CHLORIDE 10 MEQ: 7.46 INJECTION, SOLUTION INTRAVENOUS at 04:16

## 2022-08-14 RX ADMIN — TORSEMIDE 20 MG: 20 TABLET ORAL at 08:34

## 2022-08-14 RX ADMIN — Medication 4.5 MG: at 20:27

## 2022-08-14 RX ADMIN — POTASSIUM CHLORIDE 10 MEQ: 7.46 INJECTION, SOLUTION INTRAVENOUS at 00:54

## 2022-08-14 RX ADMIN — ISOSORBIDE MONONITRATE 30 MG: 30 TABLET ORAL at 08:34

## 2022-08-14 RX ADMIN — SODIUM CHLORIDE, PRESERVATIVE FREE 10 ML: 5 INJECTION INTRAVENOUS at 20:16

## 2022-08-14 RX ADMIN — SODIUM HYPOCHLORITE: 1.25 SOLUTION TOPICAL at 08:41

## 2022-08-14 RX ADMIN — POTASSIUM CHLORIDE 10 MEQ: 7.46 INJECTION, SOLUTION INTRAVENOUS at 05:17

## 2022-08-14 RX ADMIN — SODIUM CHLORIDE 25 ML: 9 INJECTION, SOLUTION INTRAVENOUS at 00:53

## 2022-08-14 RX ADMIN — MIRTAZAPINE 30 MG: 30 TABLET, FILM COATED ORAL at 20:28

## 2022-08-14 RX ADMIN — GABAPENTIN 300 MG: 300 CAPSULE ORAL at 17:53

## 2022-08-14 RX ADMIN — DOXYCYCLINE HYCLATE 100 MG: 100 TABLET, COATED ORAL at 20:28

## 2022-08-14 RX ADMIN — POTASSIUM CHLORIDE 10 MEQ: 7.46 INJECTION, SOLUTION INTRAVENOUS at 02:02

## 2022-08-14 RX ADMIN — TAMSULOSIN HYDROCHLORIDE 0.4 MG: 0.4 CAPSULE ORAL at 08:34

## 2022-08-14 RX ADMIN — OXYCODONE 5 MG: 5 TABLET ORAL at 17:52

## 2022-08-14 RX ADMIN — METOPROLOL SUCCINATE 12.5 MG: 25 TABLET, FILM COATED, EXTENDED RELEASE ORAL at 08:35

## 2022-08-14 RX ADMIN — TRAMADOL HYDROCHLORIDE 50 MG: 50 TABLET, COATED ORAL at 08:41

## 2022-08-14 RX ADMIN — APIXABAN 5 MG: 5 TABLET, FILM COATED ORAL at 08:33

## 2022-08-14 RX ADMIN — BACLOFEN 10 MG: 10 TABLET ORAL at 08:33

## 2022-08-14 RX ADMIN — ATORVASTATIN CALCIUM 10 MG: 10 TABLET, FILM COATED ORAL at 08:33

## 2022-08-14 RX ADMIN — APIXABAN 5 MG: 5 TABLET, FILM COATED ORAL at 20:28

## 2022-08-14 ASSESSMENT — PAIN SCALES - GENERAL
PAINLEVEL_OUTOF10: 4
PAINLEVEL_OUTOF10: 10
PAINLEVEL_OUTOF10: 4
PAINLEVEL_OUTOF10: 8

## 2022-08-14 ASSESSMENT — PAIN DESCRIPTION - LOCATION
LOCATION: GROIN
LOCATION: COCCYX
LOCATION: BACK
LOCATION: COCCYX

## 2022-08-14 ASSESSMENT — ENCOUNTER SYMPTOMS
RESPIRATORY NEGATIVE: 1
GASTROINTESTINAL NEGATIVE: 1

## 2022-08-14 ASSESSMENT — PAIN DESCRIPTION - ORIENTATION
ORIENTATION: MID
ORIENTATION: MID
ORIENTATION: LEFT

## 2022-08-14 NOTE — PROGRESS NOTES
Pt refusing to put telemetry monitor back on. Made Dr. Cailin Perrin aware.  She states that is fine

## 2022-08-14 NOTE — PROGRESS NOTES
[facility] -   Infectious diseases evaluation  admission date 8/12/2022    reason for consultation:   Surgical Wound Infection    Impression :   Current:  L groin post-op surgical wound infection. S/p aorto-bifem graft endarterectomy 7/8/22. Leukocytosis  PVD    Other:    Discussion / summary of stay / plan of care     Recommendations   Continue Doxycycline 100 mg po twice daily x 7 days until 8/19/22. Follow CBC and renal function  Wound care. Discussed with patient    Infection Control Recommendations   [place] Precautions      Antimicrobial Stewardship Recommendations   Simplification of therapy  Targeted therapy    History of Present Illness:   Initial history:  Anai Hoang is a 64y.o.-year-old male who was admitted for left groin wound care s/p bilateral groin exploration and thrombectomy of his aortobifemoral bypass graft. Patient was seen in the vascular office 8/12/22 for post-op visit and there was concern for left femoral wound infection with noted purulent drainage on exam. No cx of the drainage was sent. Patient states the dressings are being changed every 3 days in the ECF. No associated symptoms of fever, chills, n/v/d. Initial labs: WBC: 11.7, Cre: 1.24. Interval changes  8/14/2022   Patient Vitals for the past 8 hrs:   BP Temp Temp src Pulse Resp SpO2 Weight   08/14/22 0835 (!) 96/58 98.1 °F (36.7 °C) Oral 77 20 97 % --   08/14/22 0415 111/71 97.5 °F (36.4 °C) Oral 80 18 97 % --   08/14/22 0313 -- -- -- -- -- -- 170 lb 6.7 oz (77.3 kg)     Afebrile  SpO2 97% RA  Feeling well  Eating/drinking, denies n/v/d  Left groin with dressing intact. Wet to dry dressing changes TID. Plan for wound vac application.    LLE fasciotomy dressing clean    Summary of relevant labs:  Labs:  Creat 1.24-1.15  WBC 11.7    Micro:    Imaging:      I have personally reviewed the past medical history, past surgical history, medications, social history, and family history, and I haveupdated the database accordingly. Allergies:   Faviola [morphine sulfate], Methadone, and Morphine     Review of Systems:     Review of Systems   Constitutional: Negative. HENT: Negative. Respiratory: Negative. Cardiovascular: Negative. Gastrointestinal: Negative. Genitourinary: Negative. Skin:  Positive for wound. L groin. BLE fasciotomies. All other systems reviewed and are negative. Physical Examination :       Physical Exam  Vitals and nursing note reviewed. Constitutional:       Appearance: Normal appearance. He is not ill-appearing. HENT:      Head: Normocephalic and atraumatic. Right Ear: External ear normal.      Left Ear: External ear normal.      Nose: Nose normal.      Mouth/Throat:      Mouth: Mucous membranes are moist.      Pharynx: Oropharynx is clear. Eyes:      Conjunctiva/sclera: Conjunctivae normal.   Cardiovascular:      Rate and Rhythm: Normal rate and regular rhythm. Pulses: Normal pulses. Heart sounds: Normal heart sounds. Pulmonary:      Effort: Pulmonary effort is normal.      Breath sounds: Normal breath sounds. Comments: RA  Abdominal:      General: Bowel sounds are normal. There is no distension. Palpations: Abdomen is soft. Tenderness: There is no abdominal tenderness. Genitourinary:     Comments: Condom cath. Urine clear. Musculoskeletal:         General: Normal range of motion. Cervical back: Normal range of motion. Right lower leg: No edema. Left lower leg: No edema. Skin:     General: Skin is warm and dry. Findings: No rash. Comments: Lt groin wound dressing CDI   Neurological:      Mental Status: He is alert and oriented to person, place, and time.    Psychiatric:         Behavior: Behavior normal.       Past Medical History:     Past Medical History:   Diagnosis Date    Anxiety     Arthritis associated with another disorder     CAD (coronary artery disease)     with history of multiple MI    Carotid artery occlusion     Family history of kidney stone     HTN (hypertension)     Hyperkalemia     Hyperlipidemia     Hypertension     Nephrolithiasis     multiple times    Neuropathy     PAD (peripheral artery disease) (HCC)     Peripheral vascular disease (HCC)     Personal history of MRSA (methicillin resistant Staphylococcus aureus)     Seasonal allergies     Vasculopathy        Past Surgical  History:     Past Surgical History:   Procedure Laterality Date    ABDOMINAL ADHESION SURGERY  2/11/2011-[facility]    Reexploration of abdominal wound and reclosure of the abdominal wound with fascial sutures and retention sutures as well    AORTO-FEMORAL BYPASS GRAFT  2/11/2011-[facility]    ABF bypass of 16x8 PTFE graft. This is an end-to-side anastomosis proximally    AXILLARY-FEMORAL BYPASS GRAFT Bilateral 7/8/2022    AORTA BIFEMORAL GRAFT ENDARTERECTOMY, BILATERAL COMMON FEMORAL EMBOLECTOMY, BILATERAL LOWER EXTREMEITY ARTERECTOMY, AORTAGRAPHY, RIGHT LIMB OF BYPASS GRAFT STENT, BILATERAL FEMORAL BOVINE PATCHES performed by Rossy Eugene MD at [place] [address], ARTERY  11/23/10 [address]    1. Placement of 5 sheath in the left femoral artery with duplex guidance. 2. Left Iliac arteriogram. 3. Attempted ballon angioplasty left iliac artery occlusion. CAROTID ENDARTERECTOMY  07/19/08    Left carotid endarterectomy using shunt dacron patch aplasty    CORONARY ARTERY BYPASS GRAFT  2008    CABG x3     FEMORAL-TIBIAL BYPASS GRAFT  12/31/07  Marisol Rule     Right femoral to posterior tibial artery bypass graft with in situ saphenous vein graft    IR FEMORAL POPLITEAL BYPASS GRAFT  01/24/208 Dr Nicholas Peacock    1. Exploration of right fem-pop bypass graft. 2.Thrombectomy of right fem-post-tib saph vein graft.  3. Replacement of femoral to popliteal bypass graft from midthigh to near the anastomosis by reversed greater saph vein harvested from left leg    PTCA PTCA with stent x6    TONSILLECTOMY AND ADENOIDECTOMY         Medications:      acetic acid  1,000 mL Irrigation Daily    sodium hypochlorite   Irrigation BID    polyethylene glycol  17 g Oral Daily    sennosides-docusate sodium  2 tablet Oral Daily    apixaban  5 mg Oral BID    atorvastatin  10 mg Oral Daily    baclofen  10 mg Oral TID    clopidogrel  75 mg Oral Daily    ezetimibe  10 mg Oral Daily    isosorbide mononitrate  30 mg Oral Daily    melatonin  4.5 mg Oral Nightly    metoprolol succinate  12.5 mg Oral BID    mirtazapine  30 mg Oral Nightly    tamsulosin  0.4 mg Oral Daily    torsemide  20 mg Oral Daily    sodium chloride flush  5-40 mL IntraVENous 2 times per day    insulin lispro  0-8 Units SubCUTAneous TID WC    insulin lispro  0-4 Units SubCUTAneous Nightly    doxycycline hyclate  100 mg Oral 2 times per day       Social History:     Social History     Socioeconomic History    Marital status:      Spouse name: Not on file    Number of children: 1    Years of education: 14    Highest education level: Not on file   Occupational History    Occupation: disabled      Comment: SSI    Tobacco Use    Smoking status: Former     Packs/day: 2.00     Years: 33.00     Pack years: 66.00     Types: Cigarettes     Quit date: 3/10/2011     Years since quittin.4    Smokeless tobacco: Never    Tobacco comments:     quite 2011    Substance and Sexual Activity    Alcohol use: No     Alcohol/week: 0.0 standard drinks    Drug use: No    Sexual activity: Yes     Partners: Female     Comment: with has trouble   Other Topics Concern    Not on file   Social History Narrative    Not on file     Social Determinants of Health     Financial Resource Strain: Not on file   Food Insecurity: Not on file   Transportation Needs: Not on file   Physical Activity: Not on file   Stress: Not on file   Social Connections: Not on file   Intimate Partner Violence: Not on file   Housing Stability: Not on file       Family History:     Family History   Problem Relation Age of Onset    Hypertension Father     Heart Disease Sister     Hypertension Sister     Cancer Mother 28        breast cancer      Medical Decision Making:   I have independently reviewed/ordered the following labs:    CBC with Differential:   Recent Labs     08/12/22 1822   WBC 11.7*   HGB 11.4*   HCT 35.6*   *       BMP:  Recent Labs     08/12/22 1822 08/14/22  0007    138   K 3.1* 2.9*    101   CO2 20 23   BUN 32* 36*   CREATININE 1.24* 1.15   MG 1.5* 2.0       Hepatic Function Panel: No results for input(s): PROT, LABALBU, BILIDIR, IBILI, BILITOT, ALKPHOS, ALT, AST in the last 72 hours. No results for input(s): RPR in the last 72 hours. No results for input(s): HIV in the last 72 hours. No results for input(s): BC in the last 72 hours. Lab Results   Component Value Date/Time    CREATININE 1.15 08/14/2022 12:07 AM    CREATININE 1.2 07/25/2011 09:50 AM    GLUCOSE 112 08/14/2022 12:07 AM    GLUCOSE 128 06/06/2012 07:29 PM       Detailed results: Thank you for allowing us to participate in the care of this patient. Please call with questions. This note is created with the assistance of a speech recognition program.  While intending to generate adocument that actually reflects the content of the visit, the document can still have some errors including those of syntax and sound a like substitutions which may escape proof reading. It such instances, actual meaningcan be extrapolated by contextual diversion.     TIERRA Mcdonald - CNP  Office: [phone]  Perfect serve / office [phone]

## 2022-08-14 NOTE — CONSULTS
[facility]  Office: [address], DO, Marcus Jonellelakshmi, DO, Meli Duane, DO, Marcus De La O [facility], DO, Lavell Gunn MD, Omar Ordonez MD, Anayeli Whitney MD, Homero Madsen MD,  Alan Morrison MD, Hardeep Best MD, Kostas Woodward, DO, Katelyn Dickey MD,  Brant Nelson MD, Jordan Ellison MD, Leonora Hendrickson DO, Heather Hoffman MD, Froy Palma MD, Sofya Haque MD, Laury Chowdhury DO, Chloe Hebert MD, Jan Valdes MD, Mandy Vasquez, CNP,  Isha Perez, CNP, Nurys Rachel, CNP, Delbert Aase, CNP, Lady Graf PA-C, Danette Merchant, [facility], Tita Grimm, CNP, Beverly Lomas, CNP, Mario Mcdowell, CNP, Leanna Hinds, CNP, Corry Mcgee, CNP, Tanda Lesches, CNS, Osmar Campa, [facility], Andrez Suárez, CNP, Gorman Oppenheim, CNP, Asad Tejeda, [facility]           [address] / HISTORY AND PHYSICAL EXAMINATION            Date:   8/14/2022  Patient name:  Apollo Galdamez  Date of admission:  8/12/2022  5:34 PM  MRN:   [id number]  Account:  [de-identified]  YOB: 1960  PCP:    Anayeli Whitney (Inactive)  Room:   [address] [place] [address]  Code Status:    Full Code    Physician Requesting Consult: Lalito Sunshine MD    Reason for Consult:      Chief Complaint:     No chief complaint on file. Leg pain    History Obtained From:     patient    History of Present Illness:     64 M recently underwent fasciotomy bilateral groin exploration thrombectomy of his aortobifemoral bypass graft. Patient presented from outpatient vascular surgeon's office for concern of left femoral wound infection and he was noted to have purulent drainage on exam.    He was recently staying in an extended care facility where he was receiving dressing changes every 3 days where he resides since last hospitalization.      He was admitted for left femoral dressing changes and wound VAC application which will be performed tomorrow. He is nontoxic-appearing has a very mild leukocytosis and is currently on doxycycline for his leg infection    Care was transferred to vascular surgery as primarily surgical issue with minimal medical management    Has some hypokalemia    CAROLYN resolved    Some uremia, possibly from bleeding related to wound  Wound vac placement today    Past Medical History:     Past Medical History:   Diagnosis Date    Anxiety     Arthritis associated with another disorder     CAD (coronary artery disease)     with history of multiple MI    Carotid artery occlusion     Family history of kidney stone     HTN (hypertension)     Hyperkalemia     Hyperlipidemia     Hypertension     Nephrolithiasis     multiple times    Neuropathy     PAD (peripheral artery disease) ([facility])     Peripheral vascular disease ([facility] [address]     Personal history of MRSA (methicillin resistant Staphylococcus aureus)     Seasonal allergies     Vasculopathy         Past Surgical History:     Past Surgical History:   Procedure Laterality Date    ABDOMINAL ADHESION SURGERY  2/11/2011-[facility]    Reexploration of abdominal wound and reclosure of the abdominal wound with fascial sutures and retention sutures as well    AORTO-FEMORAL BYPASS GRAFT  2/11/2011-[facility]    ABF bypass of 16x8 PTFE graft. This is an end-to-side anastomosis proximally    AXILLARY-FEMORAL BYPASS GRAFT Bilateral 7/8/2022    AORTA BIFEMORAL GRAFT ENDARTERECTOMY, BILATERAL COMMON FEMORAL EMBOLECTOMY, BILATERAL LOWER EXTREMEITY ARTERECTOMY, AORTAGRAPHY, RIGHT LIMB OF BYPASS GRAFT STENT, BILATERAL FEMORAL BOVINE PATCHES performed by Parveen Win MD at [place] [id number], ARTERY  11/23/10 [address]    1. Placement of 5 sheath in the left femoral artery with duplex guidance. 2. Left Iliac arteriogram. 3. Attempted ballon angioplasty left iliac artery occlusion.     CAROTID ENDARTERECTOMY  07/19/08    Left carotid endarterectomy using shunt dacron patch aplasty    CORONARY ARTERY BYPASS GRAFT  2008    CABG x3     FEMORAL-TIBIAL BYPASS GRAFT  12/31/07  [facility] Narvaez     Right femoral to posterior tibial artery bypass graft with in situ saphenous vein graft    IR FEMORAL POPLITEAL BYPASS GRAFT  01/24/208 Dr Anayeli Chong    1. Exploration of right fem-pop bypass graft. 2.Thrombectomy of right fem-post-tib saph vein graft. 3. Replacement of femoral to popliteal bypass graft from midthigh to near the anastomosis by reversed greater saph vein harvested from left leg    PTCA      PTCA with stent x6    TONSILLECTOMY AND ADENOIDECTOMY          Medications Prior to Admission:     Prior to Admission medications    Medication Sig Start Date End Date Taking? Authorizing Provider   atorvastatin (LIPITOR) 10 MG tablet Take 1 tablet by mouth in the morning. 7/21/22   Adele Timmons MD   metoprolol succinate (TOPROL XL) 25 MG extended release tablet Take 0.5 tablets by mouth in the morning and at bedtime 7/20/22   Adele Timmons MD   torsemide (DEMADEX) 20 MG tablet Take 1 tablet by mouth in the morning. 7/21/22   Adele Timmons MD   melatonin 5 MG TABS tablet Take 1 tablet by mouth nightly 7/20/22   Adele Timmons MD   baclofen (LIORESAL) 10 MG tablet Take 10 mg by mouth in the morning and 10 mg at noon and 10 mg before bedtime. Historical Provider, MD   clopidogrel (PLAVIX) 75 MG tablet Take 75 mg by mouth in the morning. Historical Provider, MD   ezetimibe (ZETIA) 10 MG tablet Take 10 mg by mouth in the morning. Historical Provider, MD   apixaban (ELIQUIS) 5 MG TABS tablet Take 1 tablet by mouth in the morning and 1 tablet before bedtime. 7/20/22   Adele Timmons MD   fenofibrate (TRICOR) 145 MG tablet Take 145 mg by mouth in the morning.  7/20/22  Historical Provider, MD   isosorbide mononitrate (IMDUR) 30 MG CR tablet Take 30 mg by mouth daily. Historical Provider, MD   mirtazapine (REMERON) 30 MG tablet Take 1 tablet by mouth nightly.  9/13/12   Saeid Reis MD   [facility]) GENITOURINARY:  negative for difficulty of urination, burning with urination, frequency   INTEGUMENT:  negative for rash, skin lesions, easy bruising   HEMATOLOGIC/LYMPHATIC:  negative for swelling/edema   ALLERGIC/IMMUNOLOGIC:  negative for urticaria , itching  ENDOCRINE:  negative increase in drinking, increase in urination, hot or cold intolerance  MUSCULOSKELETAL:  negative joint pains, muscle aches, swelling of joints  NEUROLOGICAL:  negative for headaches, dizziness, lightheadedness, numbness, pain, tingling extremities  BEHAVIOR/PSYCH:  negative for depression, anxiety    Physical Exam:     /71   Pulse 80   Temp 97.5 °F (36.4 °C) (Oral)   Resp 18   Wt 170 lb 6.7 oz (77.3 kg)   SpO2 97%   BMI 23.77 kg/m²   Temp (24hrs), Av.7 °F (36.5 °C), Min:97.3 °F (36.3 °C), Max:97.9 °F (36.6 °C)    Recent Labs     22  0812 22  1216 22  1820 22  2128   POCGLU 109 140* 159* 130*       Intake/Output Summary (Last 24 hours) at 2022 0757  Last data filed at 2022 1247  Gross per 24 hour   Intake 932.42 ml   Output 1750 ml   Net -817.58 ml     General appearance:  alert, cooperative and no distress  Mental Status:  oriented to person, place and time and normal affect  Lungs:  clear to auscultation bilaterally, normal effort  Heart:  regular rate and rhythm, no murmur  Abdomen:  soft, nontender, nondistended, normal bowel sounds, no masses, hepatomegaly, splenomegaly  Extremities:   Skin: left leg leg skin dressing    Investigations:      Laboratory Testing:  Recent Results (from the past 24 hour(s))   POC Glucose Fingerstick    Collection Time: 22  8:12 AM   Result Value Ref Range    POC Glucose 109 75 - 110 mg/dL   POC Glucose Fingerstick    Collection Time: 22 12:16 PM   Result Value Ref Range    POC Glucose 140 (H) 75 - 110 mg/dL   POC Glucose Fingerstick    Collection Time: 22  6:20 PM   Result Value Ref Range    POC Glucose 159 (H) 75 - 110 mg/dL   POC Glucose Fingerstick    Collection Time: 08/13/22  9:28 PM   Result Value Ref Range    POC Glucose 130 (H) 75 - 110 mg/dL   Basic Metabolic Panel w/ Reflex to MG    Collection Time: 08/14/22 12:07 AM   Result Value Ref Range    Glucose 112 (H) 70 - 99 mg/dL    BUN 36 (H) 8 - 23 mg/dL    Creatinine 1.15 0.70 - 1.20 mg/dL    Calcium 8.8 8.6 - 10.4 mg/dL    Sodium 138 135 - 144 mmol/L    Potassium 2.9 (LL) 3.7 - 5.3 mmol/L    Chloride 101 98 - 107 mmol/L    CO2 23 20 - 31 mmol/L    Anion Gap 14 9 - 17 mmol/L    GFR Non-African American >60 >60 mL/min    GFR African American >60 >60 mL/min    GFR Comment         Protime-INR    Collection Time: 08/14/22 12:07 AM   Result Value Ref Range    Protime 12.8 (H) 9.1 - 12.3 sec    INR 1.2    Magnesium    Collection Time: 08/14/22 12:07 AM   Result Value Ref Range    Magnesium 2.0 1.6 - 2.6 mg/dL       Imaging/Diagonstics:  No results found.     Assessment :      Hospital Problems             Last Modified POA    * (Principal) Surgical wound infection 8/12/2022 Yes    Hypertension 8/12/2022 Yes    Overview Signed 12/27/2011 11:21 AM by Diogo Macias MD     Uncontrolled          PAD (peripheral artery disease) ([facility] [address] 8/12/2022 Yes    Type 2 diabetes mellitus with circulatory disorder, without long-term current use of insulin ([facility] [address] 8/12/2022 Yes    Leukocytosis 8/13/2022 Yes    Hyperlipidemia 8/12/2022 Yes       Plan:     BG excellent  BP excellent  Potassium mag replacement per protocol - cont repletion per order set  Doxycycline as per ID  Will sign off - thank you for allowing us to participate in the care of this patient    Consultations:   [id number] TO INFECTIOUS DISEASES      Elijah Maldonado MD  8/14/2022  7:57 AM    Copy sent to Dr. Diogo Macias (Inactive)

## 2022-08-14 NOTE — PLAN OF CARE
Problem: Discharge Planning  Goal: Discharge to home or other facility with appropriate resources  8/14/2022 0336 by Isak Garcia RN  Outcome: Progressing  Flowsheets (Taken 8/13/2022 2130)  Discharge to home or other facility with appropriate resources:   Identify barriers to discharge with patient and caregiver   Arrange for needed discharge resources and transportation as appropriate   Identify discharge learning needs (meds, wound care, etc)  8/13/2022 1930 by Yolie Vee RN  Outcome: Progressing  Flowsheets (Taken 8/13/2022 1930)  Discharge to home or other facility with appropriate resources:   Identify barriers to discharge with patient and caregiver   Arrange for needed discharge resources and transportation as appropriate   Identify discharge learning needs (meds, wound care, etc)     Problem: Chronic Conditions and Co-morbidities  Goal: Patient's chronic conditions and co-morbidity symptoms are monitored and maintained or improved  8/14/2022 0336 by Isak Garcia RN  Outcome: Progressing  Flowsheets (Taken 8/13/2022 2130)  Care Plan - Patient's Chronic Conditions and Co-Morbidity Symptoms are Monitored and Maintained or Improved:   Monitor and assess patient's chronic conditions and comorbid symptoms for stability, deterioration, or improvement   Collaborate with multidisciplinary team to address chronic and comorbid conditions and prevent exacerbation or deterioration   Update acute care plan with appropriate goals if chronic or comorbid symptoms are exacerbated and prevent overall improvement and discharge  8/13/2022 1930 by Yolie Vee RN  Outcome: Progressing  Flowsheets (Taken 8/13/2022 1930)  Care Plan - Patient's Chronic Conditions and Co-Morbidity Symptoms are Monitored and Maintained or Improved:   Monitor and assess patient's chronic conditions and comorbid symptoms for stability, deterioration, or improvement   Collaborate with multidisciplinary team to address chronic and comorbid conditions and prevent exacerbation or deterioration     Problem: Skin/Tissue Integrity  Goal: Absence of new skin breakdown  Description: 1. Monitor for areas of redness and/or skin breakdown  2. Assess vascular access sites hourly  3. Every 4-6 hours minimum:  Change oxygen saturation probe site  4. Every 4-6 hours:  If on nasal continuous positive airway pressure, respiratory therapy assess nares and determine need for appliance change or resting period.   8/14/2022 0336 by Manohar Callaway RN  Outcome: Progressing  8/13/2022 1930 by Debbie Loya RN  Outcome: Progressing     Problem: Safety - Adult  Goal: Free from fall injury  8/14/2022 0336 by Manohar Callaway RN  Outcome: Progressing  Flowsheets (Taken 8/13/2022 2130)  Free From Fall Injury: Instruct family/caregiver on patient safety  8/13/2022 1930 by Debbie Loya RN  Outcome: Progressing  Flowsheets (Taken 8/13/2022 1930)  Free From Fall Injury: Instruct family/caregiver on patient safety     Problem: Pain  Goal: Verbalizes/displays adequate comfort level or baseline comfort level  Outcome: Progressing

## 2022-08-14 NOTE — PROGRESS NOTES
Division of Vascular Surgery     Progress Note      Name: Asad Lopez  MRN: [id number]         Overnight Events:   No acute overnight events    Subjective:     Pt seen and examined at bedside. Left groin wound dressing changed using wet-to-dry dressing with Dakins and Acetic acid every other dressing. Hemodynamically stable, afebrile. Left leg fasciotomy dressing also changed at bedside. K low, will replace. Physical Exam:     Vitals:  BP (!) 96/58   Pulse 77   Temp 97.5 °F (36.4 °C) (Oral)   Resp 18   Wt 170 lb 6.7 oz (77.3 kg)   SpO2 97%   BMI 23.77 kg/m²     General: No acute distress. A&Ox3. HEENT:  NC/AT. Conjunctiva moist without icterus. Ears are symmetric. Nares are patent. Oral mucus membranes are moist.  Neck:  Supple. No LAD. Cardiovascular:  Regular rate and rhythm. His feet are warm and well-perfused. He is unable to move his right lower extremity due to previous stroke. He has palpable femoral pulses bilaterally. Respiratory:  Equal chest rise bilaterally. No wheezes, stridor, or increased work of breathing. Abdomen:  Soft, non tender, non distended. Midline incision well-healed  Neuro: Motor and sensory grossly intact. Extremities:  Warm, dry, and well perfused. The left fasciotomy wounds are healing extremely well with good granulation tissue. Skin:   The left groin has cavitation with some purulent drainage. There is granulation in the wound. He has no surrounding erythema. There is no visible graft. Imaging:   No new imaging    Assessment/ Plan: Will place wound vac on left groin today. Oral pain medication to be given 30 minutes ahead of time. Continue Doxycycline  Regular diet  Case management consulted for facility placement. Pt will be discharged with wound vac and will need home wound vac supplies as well.       Johnny Hernandez DO PGY-2  8/14/22 9:11 AM

## 2022-08-14 NOTE — CONSENT
Informed Consent for Blood Component Transfusion Note    I have discussed with the patient the rationale for blood component transfusion; its benefits in treating or preventing fatigue, organ damage, or death; and its risk which includes mild transfusion reactions, rare risk of blood borne infection, or more serious but rare reactions. I have discussed the alternatives to transfusion, including the risk and consequences of not receiving transfusion. The patient had an opportunity to ask questions and had agreed to proceed with transfusion of blood components.     Electronically signed by Melecio Hammond DO on 8/14/22 at 10:54 AM EDT

## 2022-08-15 LAB
GLUCOSE BLD-MCNC: 106 MG/DL (ref 75–110)
GLUCOSE BLD-MCNC: 135 MG/DL (ref 75–110)
GLUCOSE BLD-MCNC: 151 MG/DL (ref 75–110)
GLUCOSE BLD-MCNC: 164 MG/DL (ref 75–110)

## 2022-08-15 PROCEDURE — 97163 PT EVAL HIGH COMPLEX 45 MIN: CPT

## 2022-08-15 PROCEDURE — 6370000000 HC RX 637 (ALT 250 FOR IP): Performed by: NURSE PRACTITIONER

## 2022-08-15 PROCEDURE — 99212 OFFICE O/P EST SF 10 MIN: CPT

## 2022-08-15 PROCEDURE — 1200000000 HC SEMI PRIVATE

## 2022-08-15 PROCEDURE — 97530 THERAPEUTIC ACTIVITIES: CPT

## 2022-08-15 PROCEDURE — 2580000003 HC RX 258: Performed by: NURSE PRACTITIONER

## 2022-08-15 PROCEDURE — 82947 ASSAY GLUCOSE BLOOD QUANT: CPT

## 2022-08-15 PROCEDURE — 6370000000 HC RX 637 (ALT 250 FOR IP): Performed by: STUDENT IN AN ORGANIZED HEALTH CARE EDUCATION/TRAINING PROGRAM

## 2022-08-15 PROCEDURE — 99254 IP/OBS CNSLTJ NEW/EST MOD 60: CPT | Performed by: INTERNAL MEDICINE

## 2022-08-15 RX ADMIN — EZETIMIBE 10 MG: 10 TABLET ORAL at 08:34

## 2022-08-15 RX ADMIN — APIXABAN 5 MG: 5 TABLET, FILM COATED ORAL at 08:34

## 2022-08-15 RX ADMIN — SODIUM CHLORIDE, PRESERVATIVE FREE 10 ML: 5 INJECTION INTRAVENOUS at 20:31

## 2022-08-15 RX ADMIN — ACETAMINOPHEN 1000 MG: 500 TABLET ORAL at 14:26

## 2022-08-15 RX ADMIN — BACLOFEN 10 MG: 10 TABLET ORAL at 14:27

## 2022-08-15 RX ADMIN — DOXYCYCLINE HYCLATE 100 MG: 100 TABLET, COATED ORAL at 20:31

## 2022-08-15 RX ADMIN — GABAPENTIN 300 MG: 300 CAPSULE ORAL at 14:27

## 2022-08-15 RX ADMIN — ACETAMINOPHEN 1000 MG: 500 TABLET ORAL at 20:30

## 2022-08-15 RX ADMIN — CLOPIDOGREL 75 MG: 75 TABLET, FILM COATED ORAL at 08:34

## 2022-08-15 RX ADMIN — BACLOFEN 10 MG: 10 TABLET ORAL at 20:31

## 2022-08-15 RX ADMIN — ATORVASTATIN CALCIUM 10 MG: 10 TABLET, FILM COATED ORAL at 08:34

## 2022-08-15 RX ADMIN — GABAPENTIN 300 MG: 300 CAPSULE ORAL at 20:30

## 2022-08-15 RX ADMIN — BACLOFEN 10 MG: 10 TABLET ORAL at 08:34

## 2022-08-15 RX ADMIN — OXYCODONE 5 MG: 5 TABLET ORAL at 13:40

## 2022-08-15 RX ADMIN — SODIUM CHLORIDE, PRESERVATIVE FREE 10 ML: 5 INJECTION INTRAVENOUS at 08:34

## 2022-08-15 RX ADMIN — ACETAMINOPHEN 1000 MG: 500 TABLET ORAL at 06:11

## 2022-08-15 RX ADMIN — METOPROLOL SUCCINATE 12.5 MG: 25 TABLET, FILM COATED, EXTENDED RELEASE ORAL at 20:30

## 2022-08-15 RX ADMIN — TAMSULOSIN HYDROCHLORIDE 0.4 MG: 0.4 CAPSULE ORAL at 08:34

## 2022-08-15 RX ADMIN — GABAPENTIN 300 MG: 300 CAPSULE ORAL at 06:12

## 2022-08-15 RX ADMIN — ISOSORBIDE MONONITRATE 30 MG: 30 TABLET ORAL at 08:34

## 2022-08-15 RX ADMIN — POLYETHYLENE GLYCOL 3350 17 G: 17 POWDER, FOR SOLUTION ORAL at 08:31

## 2022-08-15 RX ADMIN — DOXYCYCLINE HYCLATE 100 MG: 100 TABLET, COATED ORAL at 08:34

## 2022-08-15 RX ADMIN — MIRTAZAPINE 30 MG: 30 TABLET, FILM COATED ORAL at 20:30

## 2022-08-15 RX ADMIN — Medication 4.5 MG: at 20:31

## 2022-08-15 RX ADMIN — TORSEMIDE 20 MG: 20 TABLET ORAL at 08:34

## 2022-08-15 RX ADMIN — DOCUSATE SODIUM 50 MG AND SENNOSIDES 8.6 MG 2 TABLET: 8.6; 5 TABLET, FILM COATED ORAL at 08:34

## 2022-08-15 RX ADMIN — APIXABAN 5 MG: 5 TABLET, FILM COATED ORAL at 20:31

## 2022-08-15 ASSESSMENT — PAIN SCALES - GENERAL
PAINLEVEL_OUTOF10: 6
PAINLEVEL_OUTOF10: 8
PAINLEVEL_OUTOF10: 6
PAINLEVEL_OUTOF10: 9
PAINLEVEL_OUTOF10: 8
PAINLEVEL_OUTOF10: 8

## 2022-08-15 ASSESSMENT — PAIN DESCRIPTION - LOCATION
LOCATION: HIP
LOCATION: COCCYX
LOCATION: COCCYX

## 2022-08-15 ASSESSMENT — PAIN DESCRIPTION - ORIENTATION
ORIENTATION: MID
ORIENTATION: MID

## 2022-08-15 ASSESSMENT — ENCOUNTER SYMPTOMS
WHEEZING: 0
GASTROINTESTINAL NEGATIVE: 1
RHINORRHEA: 0
PHOTOPHOBIA: 0
CHOKING: 0
COUGH: 0
EYE PAIN: 0
ABDOMINAL PAIN: 0
BLOOD IN STOOL: 0
SHORTNESS OF BREATH: 0
BACK PAIN: 0

## 2022-08-15 ASSESSMENT — PAIN DESCRIPTION - DESCRIPTORS: DESCRIPTORS: ACHING

## 2022-08-15 NOTE — PLAN OF CARE

## 2022-08-15 NOTE — PROGRESS NOTES
[facility] Wound Ostomy  Nurse  Consult Note       NAME:  Cintia Dhaliwal  MEDICAL RECORD NUMBER:  [id number]  AGE: 64 y.o. GENDER: male  : 1960  TODAY'S DATE:  8/15/2022    Subjective   Reason for [address] Nurse Evaluation and Assessment: Sacral pressure injury    Patient presents with Stage 3 pressure injuries, cluster of 3, to Sacrococcygeal area' periwound tissue is hyperpigmented and macerated. Wound is evolution of deep tissue pressure injury  noted in July admission. Cintia Dhaliwal is a 64 y.o. male referred by:   [x] Physician  [] Nursing  [] Other:     Wound Identification:  Wound Type: pressure  Contributing Factors: chronic pressure, decreased mobility, shear force, incontinence of stool, incontinence of urine, and moisture          PAST MEDICAL HISTORY        Diagnosis Date    Anxiety     Arthritis associated with another disorder     CAD (coronary artery disease)     with history of multiple MI    Carotid artery occlusion     Family history of kidney stone     HTN (hypertension)     Hyperkalemia     Hyperlipidemia     Hypertension     Nephrolithiasis     multiple times    Neuropathy     PAD (peripheral artery disease) ([facility])     Peripheral vascular disease ([facility] [address]     Personal history of MRSA (methicillin resistant Staphylococcus aureus)     Seasonal allergies     Vasculopathy        PAST SURGICAL HISTORY    Past Surgical History:   Procedure Laterality Date    ABDOMINAL ADHESION SURGERY  2011-[facility]    Reexploration of abdominal wound and reclosure of the abdominal wound with fascial sutures and retention sutures as well    AORTO-FEMORAL BYPASS GRAFT  2011-[facility]    ABF bypass of 16x8 PTFE graft. This is an end-to-side anastomosis proximally    AXILLARY-FEMORAL BYPASS GRAFT Bilateral 2022    AORTA BIFEMORAL GRAFT ENDARTERECTOMY, BILATERAL COMMON FEMORAL EMBOLECTOMY, BILATERAL LOWER EXTREMEITY ARTERECTOMY, AORTAGRAPHY, RIGHT LIMB OF BYPASS GRAFT STENT, BILATERAL FEMORAL BOVINE PATCHES performed by Agusto Winchester MD at [facility]52, ARTERY  11/23/10 [address]    1. Placement of 5 sheath in the left femoral artery with duplex guidance. 2. Left Iliac arteriogram. 3. Attempted ballon angioplasty left iliac artery occlusion. CAROTID ENDARTERECTOMY  08    Left carotid endarterectomy using shunt dacron patch aplasty    CORONARY ARTERY BYPASS GRAFT      CABG x3     FEMORAL-TIBIAL BYPASS GRAFT  07  Liam Lund     Right femoral to posterior tibial artery bypass graft with in situ saphenous vein graft    IR FEMORAL POPLITEAL BYPASS GRAFT   Dr Ricardo Rios    1. Exploration of right fem-pop bypass graft. 2.Thrombectomy of right fem-post-tib saph vein graft. 3. Replacement of femoral to popliteal bypass graft from midthigh to near the anastomosis by reversed greater saph vein harvested from left leg    PTCA      PTCA with stent x6    TONSILLECTOMY AND ADENOIDECTOMY         FAMILY HISTORY    Family History   Problem Relation Age of Onset    Hypertension Father     Heart Disease Sister     Hypertension Sister     Cancer Mother 28        breast cancer       SOCIAL HISTORY    Social History     Tobacco Use    Smoking status: Former     Packs/day: 2.00     Years: 33.00     Pack years: 66.00     Types: Cigarettes     Quit date: 3/10/2011     Years since quittin.4    Smokeless tobacco: Never    Tobacco comments:     quite 2011    Substance Use Topics    Alcohol use: No     Alcohol/week: 0.0 standard drinks    Drug use: No       ALLERGIES    Allergies   Allergen Reactions    Faviola [Morphine Sulfate] Itching and Rash    Methadone Rash    Morphine Nausea And Vomiting      Sever \"headache\"       MEDICATIONS    No current facility-administered medications on file prior to encounter.      Current Outpatient Medications on File Prior to Encounter   Medication Sig Dispense Refill    atorvastatin (LIPITOR) 10 MG tablet Take 1 tablet by mouth in the morning. 30 tablet 2    metoprolol succinate (TOPROL XL) 25 MG extended release tablet Take 0.5 tablets by mouth in the morning and at bedtime 30 tablet 3    torsemide (DEMADEX) 20 MG tablet Take 1 tablet by mouth in the morning. 30 tablet 3    melatonin 5 MG TABS tablet Take 1 tablet by mouth nightly 30 tablet 0    baclofen (LIORESAL) 10 MG tablet Take 10 mg by mouth in the morning and 10 mg at noon and 10 mg before bedtime. clopidogrel (PLAVIX) 75 MG tablet Take 75 mg by mouth in the morning.      ezetimibe (ZETIA) 10 MG tablet Take 10 mg by mouth in the morning. apixaban (ELIQUIS) 5 MG TABS tablet Take 1 tablet by mouth in the morning and 1 tablet before bedtime. 60 tablet 3    [DISCONTINUED] fenofibrate (TRICOR) 145 MG tablet Take 145 mg by mouth in the morning. isosorbide mononitrate (IMDUR) 30 MG CR tablet Take 30 mg by mouth daily. mirtazapine (REMERON) 30 MG tablet Take 1 tablet by mouth nightly. 30 tablet 1    tamsulosin (FLOMAX) 0.4 MG capsule Take 1 capsule by mouth daily. 30 capsule 1    [DISCONTINUED] lisinopril (PRINIVIL;ZESTRIL) 10 MG tablet Take 0.5 tablets by mouth daily. 30 tablet 2    [DISCONTINUED] dipyridamole-aspirin (AGGRENOX)  MG per SR capsule Take 1 capsule by mouth daily. (Patient not taking: Reported on 7/20/2022) 60 capsule 1    [DISCONTINUED] simvastatin (ZOCOR) 40 MG tablet Take 1 tablet by mouth nightly. (Patient not taking: Reported on 7/20/2022) 30 tablet 2    [DISCONTINUED] varenicline (CHANTIX STARTING MONTH PAK) 0.5 MG X 11 & 1 MG X 42 tablet Take by mouth. (Patient not taking: Reported on 7/20/2022) 53 tablet 0    [DISCONTINUED] metoprolol (LOPRESSOR) 25 MG tablet Take 1 tablet by mouth 2 times daily.  Takes 1/2 tab 2 times a day (Patient not taking: Reported on 7/20/2022) 30 tablet 1    TRUETEST TEST strip TEST 2 TIMES DAILY 60 strip 11       Objective    BP (!) 100/59   Pulse 79   Temp 97.5 °F (36.4 °C) (Axillary)   Resp 20   Wt 174 lb 9.7 oz (79.2 kg)   SpO2 100%   BMI 24.35 kg/m²     LABS:  WBC:    Lab Results   Component Value Date/Time    WBC 11.7 08/12/2022 06:22 PM     H/H:    Lab Results   Component Value Date/Time    HGB 11.4 08/12/2022 06:22 PM    HCT 35.6 08/12/2022 06:22 PM    HCT 46.6 07/25/2011 09:50 AM     PTT:    Lab Results   Component Value Date/Time    APTT 53.9 07/18/2022 03:19 AM    PTT 21.70 07/25/2011 09:50 AM   [APTT}  PT/INR:    Lab Results   Component Value Date/Time    PROTIME 12.8 08/14/2022 12:07 AM    PROTIME 11.9 06/03/2012 01:19 AM    PROTIME 11.40 07/25/2011 09:50 AM    INR 1.2 08/14/2022 12:07 AM     HgBA1c:    Lab Results   Component Value Date/Time    LABA1C 5.4 08/12/2022 06:22 PM    LABA1C 6.0 02/12/2011 02:10 AM       Assessment   Uday Risk Score: Uday Scale Score: 14    Patient Active Problem List   Diagnosis Code    Seasonal allergies J30.2    Anxiety F41.9    Carotid artery occlusion I65.29    CAD (coronary artery disease) I25.10    Hypertension I10    Neuropathy G62.9    PAD (peripheral artery disease) ([facility]) I73.9    HTN (hypertension) I10    Arthritis associated with another disorder M19.90    Hernia, hiatal K44.9    Hyperlipidemia E78.5    CAD (coronary artery disease) I25.10    Critical ischemia of lower extremity ([facility]) I70.229    CAROLYN (acute kidney injury) ([facility] [address] N17.9    Dependence on renal dialysis ([facility] [address] Z99.2    Failing vascular bypass graft T82.599A    Arterial hypotension I95.9    Non-traumatic rhabdomyolysis M62.82    Cardiomyopathy ([facility]) I42.9    Decreased urine output R34    Acute respiratory failure with hypercapnia ([facility]) J96.02    Lactic acidosis E87.2    Left leg numbness R20.0    Cardiomyopathy, ischemic I25.5    Anemia D64.9    H/O: CVA (cerebrovascular accident) Z86.73    Surgical wound infection T81.49XA    Type 2 diabetes mellitus with circulatory disorder, without long-term current use of insulin (HCC) E11.59    Leukocytosis D72.829    Wound infection after surgery T81.49XA Measurements:       08/15/22 1102   Wound 07/11/22 Coccyx Cluster of 3   Date First Assessed/Time First Assessed: 07/11/22 2000   Primary Wound Type: Pressure Injury  Location: Coccyx  Wound Description (Comments): Cluster of 3   Wound Image    Wound Etiology Pressure Stage 3   Dressing Status New dressing applied   Wound Cleansed Cleansed with saline   Dressing/Treatment Alginate with Ag; Foam   Dressing Change Due 08/17/22   Wound Length (cm) 8.4 cm   Wound Width (cm) 10 cm   Wound Surface Area (cm^2) 84 cm^2   Change in Wound Size % (l*w) 10.16   Wound Assessment Subcutaneous;Pink/red   Lillie-wound Assessment Maceration; Hyperpigmented   Wound Thickness Description not for Pressure Injury Full thickness        Response to treatment:  tearful and emotional.  Reassurance and encouragement offered. Plan   Plan of Care:     SACROCOCCYGEAL:  Rinse wounds with saline. Apply SurePREP to wound perimeter. Apply OPTICELL AG to wounds and cover with Sacral Mepilex foam.  Change every 2 days    Turn every 2 hours  Float heels off of bed with pillows under calves    Use lift sling to reposition patient to minimize potential for shear injury. Routine incontinence care with incontinence barrier cloths and zinc oxide cream. Apply zinc oxide cream BID and prn incontinence. Moisture wicking under pads     Specialty Bed Required :   [x] Low Air Loss   [x] Pressure Redistribution  [place] Dream Air mattress in place  [] Fluid Immersion  [] Bariatric  [] Total Pressure Relief  [] Other:     Current Diet: ADULT DIET;  Regular; 4 carb choices (60 gm/meal)      Patient/Caregiver Teaching:  Level of patient/caregiver understanding able to:   [] Indicates understanding       [x] Needs reinforcement  [] Unsuccessful      [] Verbal Understanding  [] Demonstrated understanding       [] No evidence of learning  [] Refused teaching         [] Brittany Fernández RN BSN, [employer]

## 2022-08-15 NOTE — PROGRESS NOTES
[facility] -   Infectious diseases evaluation  admission date 8/12/2022    reason for consultation:   Surgical Wound Infection    Impression :   Current:  L groin post-op surgical wound infection. S/p aorto-bifem graft endarterectomy 7/8/22. Leukocytosis  PVD    Other:    Discussion / summary of stay / plan of care     Recommendations   Continue Doxycycline 100 mg po twice daily x 7 days until 8/19/22. Need to eval the groin wound off VAC  Follow CBC and renal function  Wound care. Discussed with patient    Infection Control Recommendations   [place] Precautions      Antimicrobial Stewardship Recommendations   Simplification of therapy  Targeted therapy    History of Present Illness:   Initial history:  Luisa Castanon is a 64y.o.-year-old male who was admitted for left groin wound care s/p bilateral groin exploration and thrombectomy of his aortobifemoral bypass graft. Patient was seen in the vascular office 8/12/22 for post-op visit and there was concern for left femoral wound infection with noted purulent drainage on exam. No cx of the drainage was sent. Patient states the dressings are being changed every 3 days in the ECF. No associated symptoms of fever, chills, n/v/d. Initial labs: WBC: 11.7, Cre: 1.24. Interval changes  8/15/2022   Patient Vitals for the past 8 hrs:   BP Temp Temp src Pulse Resp SpO2 Weight   08/15/22 0600 -- -- -- -- -- -- 174 lb 9.7 oz (79.2 kg)   08/15/22 0350 101/62 97.5 °F (36.4 °C) Axillary 83 16 100 % --     Afebrile  SpO2 97% RA  Feeling well  Eating/drinking, denies n/v/d  Left groin with dressing intact. Wet to dry dressing changes TID.    LLE fasciotomy dressing clean  No labs today      Summary of relevant labs:  Labs:  Creat 1.24-1.15-1.08  WBC 11.7    Micro:    Imaging:      I have personally reviewed the past medical history, past surgical history, medications, social history, and family history, and I haveupdated the database accordingly. Allergies:   Faviola [morphine sulfate], Methadone, and Morphine     Review of Systems:     Review of Systems   Constitutional: Negative. Negative for chills and fever. HENT:  Negative for congestion, ear discharge, postnasal drip and rhinorrhea. Eyes:  Negative for photophobia and pain. Respiratory:  Negative for cough, choking, shortness of breath and wheezing. Cardiovascular: Negative. Negative for chest pain and palpitations. Gastrointestinal: Negative. Negative for abdominal pain and blood in stool. Endocrine: Negative for polyphagia. Genitourinary: Negative. Negative for dysuria and flank pain. Musculoskeletal:  Negative for arthralgias and back pain. Skin:  Positive for wound. L groin. BLE fasciotomies. Allergic/Immunologic: Negative for immunocompromised state. Neurological:  Negative for dizziness and headaches. Psychiatric/Behavioral:  Negative for agitation and confusion. All other systems reviewed and are negative. Physical Examination :       Physical Exam  Vitals and nursing note reviewed. Constitutional:       General: He is not in acute distress. Appearance: Normal appearance. HENT:      Head: Normocephalic and atraumatic. Right Ear: External ear normal.      Left Ear: External ear normal.      Nose: Nose normal.      Mouth/Throat:      Mouth: Mucous membranes are moist.      Pharynx: Oropharynx is clear. Eyes:      Conjunctiva/sclera: Conjunctivae normal.   Cardiovascular:      Rate and Rhythm: Normal rate and regular rhythm. Pulses: Normal pulses. Heart sounds: Normal heart sounds. Pulmonary:      Effort: Pulmonary effort is normal.      Breath sounds: Normal breath sounds. Comments: RA  Abdominal:      General: Bowel sounds are normal. There is no distension. Palpations: Abdomen is soft. There is no mass. Tenderness: There is no abdominal tenderness.    Genitourinary: Comments: Condom cath. Urine clear. Musculoskeletal:         General: Normal range of motion. Cervical back: Normal range of motion. Right lower leg: No edema. Left lower leg: No edema. Skin:     General: Skin is warm and dry. Findings: No rash. Comments: Lt groin wound dressing CDI   Neurological:      Mental Status: He is alert and oriented to person, place, and time. Psychiatric:         Behavior: Behavior normal.       Past Medical History:     Past Medical History:   Diagnosis Date    Anxiety     Arthritis associated with another disorder     CAD (coronary artery disease)     with history of multiple MI    Carotid artery occlusion     Family history of kidney stone     HTN (hypertension)     Hyperkalemia     Hyperlipidemia     Hypertension     Nephrolithiasis     multiple times    Neuropathy     PAD (peripheral artery disease) (HCC)     Peripheral vascular disease (HCC)     Personal history of MRSA (methicillin resistant Staphylococcus aureus)     Seasonal allergies     Vasculopathy        Past Surgical  History:     Past Surgical History:   Procedure Laterality Date    ABDOMINAL ADHESION SURGERY  2/11/2011-[facility]    Reexploration of abdominal wound and reclosure of the abdominal wound with fascial sutures and retention sutures as well    AORTO-FEMORAL BYPASS GRAFT  2/11/2011-[facility]    ABF bypass of 16x8 PTFE graft. This is an end-to-side anastomosis proximally    AXILLARY-FEMORAL BYPASS GRAFT Bilateral 7/8/2022    AORTA BIFEMORAL GRAFT ENDARTERECTOMY, BILATERAL COMMON FEMORAL EMBOLECTOMY, BILATERAL LOWER EXTREMEITY ARTERECTOMY, AORTAGRAPHY, RIGHT LIMB OF BYPASS GRAFT STENT, BILATERAL FEMORAL BOVINE PATCHES performed by Judy Javier MD at [place] [address], ARTERY  11/23/10 [address] [address]    1. Placement of 5 sheath in the left femoral artery with duplex guidance. 2. Left Iliac arteriogram. 3. Attempted ballon angioplasty left iliac artery occlusion.     CAROTID ENDARTERECTOMY  08    Left carotid endarterectomy using shunt dacron patch aplasty    CORONARY ARTERY BYPASS GRAFT      CABG x3     FEMORAL-TIBIAL BYPASS GRAFT  07  Leonora Hendrickson     Right femoral to posterior tibial artery bypass graft with in situ saphenous vein graft    IR FEMORAL POPLITEAL BYPASS GRAFT   Dr Mary Ann Salvador    1. Exploration of right fem-pop bypass graft. 2.Thrombectomy of right fem-post-tib saph vein graft.  3. Replacement of femoral to popliteal bypass graft from midthigh to near the anastomosis by reversed greater saph vein harvested from left leg    PTCA      PTCA with stent x6    TONSILLECTOMY AND ADENOIDECTOMY         Medications:      acetaminophen  1,000 mg Oral 3 times per day    gabapentin  300 mg Oral q8h    polyethylene glycol  17 g Oral Daily    sennosides-docusate sodium  2 tablet Oral Daily    apixaban  5 mg Oral BID    atorvastatin  10 mg Oral Daily    baclofen  10 mg Oral TID    clopidogrel  75 mg Oral Daily    ezetimibe  10 mg Oral Daily    isosorbide mononitrate  30 mg Oral Daily    melatonin  4.5 mg Oral Nightly    metoprolol succinate  12.5 mg Oral BID    mirtazapine  30 mg Oral Nightly    tamsulosin  0.4 mg Oral Daily    torsemide  20 mg Oral Daily    sodium chloride flush  5-40 mL IntraVENous 2 times per day    insulin lispro  0-8 Units SubCUTAneous TID WC    insulin lispro  0-4 Units SubCUTAneous Nightly    doxycycline hyclate  100 mg Oral 2 times per day       Social History:     Social History     Socioeconomic History    Marital status:      Spouse name: Not on file    Number of children: 1    Years of education: 14    Highest education level: Not on file   Occupational History    Occupation: disabled      Comment: SSI    Tobacco Use    Smoking status: Former     Packs/day: 2.00     Years: 33.00     Pack years: 66.00     Types: Cigarettes     Quit date: 3/10/2011     Years since quittin.4    Smokeless tobacco: Never    Tobacco comments:     quite 2/2011    Substance and Sexual Activity    Alcohol use: No     Alcohol/week: 0.0 standard drinks    Drug use: No    Sexual activity: Yes     Partners: Female     Comment: with has trouble   Other Topics Concern    Not on file   Social History Narrative    Not on file     Social Determinants of Health     Financial Resource Strain: Not on file   Food Insecurity: Not on file   Transportation Needs: Not on file   Physical Activity: Not on file   Stress: Not on file   Social Connections: Not on file   Intimate Partner Violence: Not on file   Housing Stability: Not on file       Family History:     Family History   Problem Relation Age of Onset    Hypertension Father     Heart Disease Sister     Hypertension Sister     Cancer Mother 28        breast cancer      Medical Decision Making:   I have independently reviewed/ordered the following labs:    CBC with Differential:   Recent Labs     08/12/22 1822   WBC 11.7*   HGB 11.4*   HCT 35.6*   *       BMP:  Recent Labs     08/12/22  1822 08/14/22  0007 08/14/22  1158    138 134*   K 3.1* 2.9* 3.7    101 102   CO2 20 23 21   BUN 32* 36* 32*   CREATININE 1.24* 1.15 1.08   MG 1.5* 2.0  --        Hepatic Function Panel:   Recent Labs     08/14/22  1158   PROT 5.2*   LABALBU 2.7*   BILITOT 0.39   ALKPHOS 192*   ALT 36   AST 38     No results for input(s): RPR in the last 72 hours. No results for input(s): HIV in the last 72 hours. No results for input(s): BC in the last 72 hours. Lab Results   Component Value Date/Time    CREATININE 1.08 08/14/2022 11:58 AM    CREATININE 1.2 07/25/2011 09:50 AM    GLUCOSE 139 08/14/2022 11:58 AM    GLUCOSE 128 06/06/2012 07:29 PM       Detailed results: Thank you for allowing us to participate in the care of this patient. Please call with questions.     This note is created with the assistance of a speech recognition program.  While intending to generate adocument that actually reflects the content of the visit, the document can still have some errors including those of syntax and sound a like substitutions which may escape proof reading. It such instances, actual meaningcan be extrapolated by contextual diversion. Barbie Michelle MD  Office: [phone]  Perfect serve / office [phone]        I have discussed the care of the patient, including pertinent history and exam findings,  with the resident. I have seen and examined the patient and the key elements of all parts of the encounter have been performed by me. I agree with the assessment, plan and orders as documented by the resident.     Marissa Yarbrough, Infectious Diseases

## 2022-08-15 NOTE — CARE COORDINATION
08/15/22 1001   Service Assessment   Patient Orientation   (slightly confused)   Cognition Other (see comment)  (slightly confused)   Accompanied By/Relationship no one   Support Systems Other (Comment); Children;Family Members  (Ex wife Steven Holbrook)   [facility] is: Legal Next of Ankit 69   PCP Verified by CM   (doesn't know name)   Prior Functional Level Assistance with the following:;Bathing;Dressing; Toileting;Mobility   Current Functional Level Assistance with the following:;Bathing;Dressing; Toileting;Mobility   Can patient return to prior living arrangement Yes  ([facility])   Ability to make needs known: [address] Medicaid   Community Resources ECF/Home Care   Social/Functional History   Lives With Other (comment)  (SNF)   Type of [address] [place] [address] Help From Other (comment)  (SNF staff)   ADL Assistance Needs assistance   Bath Moderate assistance   Dressing Moderate assistance   Grooming Moderate assistance   Feeding Independent   Toileting Needs assistance   Ambulation Assistance Needs assistance   Transfer Assistance Needs assistance   Active  No   Discharge Planning   Type of Residence [facility] Other (Comment)  (SNF-[facility])   Current Services Prior To Admission [address]   DME Wheelchair;Cane   DME Ordered?  No   Potential Assistance Purchasing Medications No   Type of Home Care Services None   Patient expects to be discharged to: Skilled nursing facility   One/Two Story Residence [facility] level   Services At/After Discharge   Transition of Care Consult (CM Consult) SNF   Mode of Transport at Discharge   (will need ambulette/ ambulance)   Condition of Participation: Discharge Planning   The Plan for Transition of Care is related to the following treatment goals: safety   Freedom of Choice list was provided with basic dialogue that supports the patient's individualized plan of care/goals, treatment preferences, and shares the quality data associated with the providers? Yes   Transitional planning-plan is to return to [facility]. Talked with Dejan-he will find out if patient can return and if precert is needed. Verified address, insurance,aan emergency contacts. States he was living with daughter Rose Mary-no contact number available. 1025 return call from Jordy Vizcaino at [address] will need precert. Told me that patient's family is wanting a different facility. 56 called Ex wife Roselia Martinez is wanting [facility] to [facility]. Doesn't want him to return to [facility]. [phone] faxed referral to [facility]. Called Clarissa-directed to call Brijesh Boothe. Directed to call [phone]. Called Sylvie at intake and referral called for [employer].

## 2022-08-15 NOTE — PROGRESS NOTES
Division of Vascular Surgery             Progress Note      Name: Chet Pope  MRN: [id number]         Overnight Events:      No acute event overnight      Subjective:     Patient is seen and examined this morning. Afebrile and no acute event overnight. Wound dressing was loosely intact this morning, we changed his fasciotomy wound dressing with wet-dry. Wound vac is functioning well with <5cc output. K was replaced, came back 3.7. Physical Exam:     Vitals:  /62   Pulse 83   Temp 97.5 °F (36.4 °C) (Axillary)   Resp 16   Wt 174 lb 9.7 oz (79.2 kg)   SpO2 100%   BMI 24.35 kg/m²     General appearance - alert, well appearing and in no acute distress  Mental status - oriented to person, place and time with normal affect  Head - normocephalic and atraumatic  Neck - supple, no carotid bruits, thyroid not palpable, no JVD  Chest - clear to auscultation, normal effort  Heart - normal rate, regular rhythm, no murmurs  Abdomen - soft, non-tender, non-distended, bowel sounds present all four quadrants, no masses  Neurological - normal speech, no focal findings or movement disorder noted, cranial nerves II through XII grossly intact  Extremities - wound vac on the left groin. Fasciotomy site are clean and in tact, no erythema or purulent drainage.  peripheral pulses palpable, no pedal edema or calf pain with palpation        Data:  CBC:   Recent Labs     08/12/22  1822   WBC 11.7*   HGB 11.4*   *     Chemistry:   Recent Labs     08/12/22  1822 08/14/22  0007 08/14/22  1158    138 134*   K 3.1* 2.9* 3.7    101 102   CO2 20 23 21   GLUCOSE 129* 112* 139*   BUN 32* 36* 32*   CREATININE 1.24* 1.15 1.08   MG 1.5* 2.0  --    ANIONGAP 14 14 11   LABGLOM 59* >60 >60   GFRAA >60 >60 >60   CALCIUM 8.7 8.8 8.4*     Hepatic:   Recent Labs     08/14/22  1158   AST 38   ALT 36   ALKPHOS 192*   BILITOT 0.39     Coagulation:   Recent Labs     08/14/22  0007 08/14/22  1158   PROT  --  5.2*   INR 1.2 --          Radiology Review:    No results found. Assessment:     Abena Sarabia is a 64 y.o. male who is admitted for L groin wound care s/p bilateral groin exploration and thrombectomy of his aortobifemoral bypass graft, and fasciotomy. Plan:     Wound vac intact. Pending wound vac paper work from .  DC to facility with wound vac and wound vac supplies  Wound vac will need to be changed M-W-F  Continue Doxycycline until 8/19/2022  Regular diet  Wound dress changed this morning        [address] [place] [address]  Electronically signed by Taj Hardwick DO  on 8/15/2022 at 6:54 AM

## 2022-08-15 NOTE — CARE COORDINATION
08/15/22 1000   Readmission Assessment   Number of Days since last admission? 8-30 days   Previous Disposition SNF  ([facility])   Who is being Interviewed Patient   What was the patient's/caregiver's perception as to why they think they needed to return back to the hospital? Other (Comment)  (left leg infection)   Who advised the patient to return to the hospital? Physician   Does the patient report anything that got in the way of taking their medications? No   In our efforts to provide the best possible care to you and others like you, can you think of anything that we could have done to help you after you left the hospital the first time, so that you might not have needed to return so soon?  Other (Comment)  (nothing)

## 2022-08-15 NOTE — PROGRESS NOTES
Physical Therapy  Facility/Department: [facility]  Physical Therapy Initial Assessment    Name: Maria Luisa Shahid  : 1960  MRN: [id number]  Date of Service: 8/15/2022  Copied from chart: Maria Luisa Shahid is a 64year old male with medical history significant for CAD, HTN, CVA, T2DM, HLD, PVD who is sent by vascular surgery for post op wound infection. He is status post aortobifem thrombectomy on 2022 due to extreme lower extremity ischemia from graft occlusion. He was seen today in the vascular office for post-op visit and there is concern for left femoral wound infection with noted purulent drainage on exam. Patient states dressing changes were occurring Q3 days in the [facility] where he [facility]. He also has left lower leg fasciotomy wounds which are healing. He will be admitted inpatient for left femoral dressing changes and ultimately a wound vac application. \"  Discharge Recommendations: Further therapy recommended at discharge. PT Equipment Recommendations  Equipment Needed: No      Patient Diagnosis(es): There were no encounter diagnoses. Past Medical History:  has a past medical history of Anxiety, Arthritis associated with another disorder, CAD (coronary artery disease), Carotid artery occlusion, Family history of kidney stone, HTN (hypertension), Hyperkalemia, Hyperlipidemia, Hypertension, Nephrolithiasis, Neuropathy, PAD (peripheral artery disease) ([address] Peripheral vascular disease ([address] Personal history of MRSA (methicillin resistant Staphylococcus aureus), Seasonal allergies, and Vasculopathy. Past Surgical History:  has a past surgical history that includes Tonsillectomy and adenoidectomy; Percutaneous Transluminal Coronary Angio; Carotid endarterectomy (08); IR Femoral Popliteal Bypass Graft ( Dr Ricardo Rios); Femoral-tibial Bypass Graft (07  Liam Lund ); Balloon angioplasty, artery (11/23/10 Allie Muñoz);  Aorto-femoral Bypass Graft (2011-[facility]); Abdominal adhesion surgery (2/11/2011-[facility]); Coronary artery bypass graft (2008); and Axillary-femoral Bypass Graft (Bilateral, 7/8/2022). Assessment   Body Structures, Functions, Activity Limitations Requiring Skilled Therapeutic Intervention: Decreased functional mobility ; Decreased endurance;Decreased strength;Decreased balance; Increased pain  Assessment: The pt required Max A x2 for bed mobility and sit to stand transfers. Recommend continued PT to address deficits and maximize safety and independence. The pt currently requires 24hr physical assistance for all mobility. Therapy Prognosis: Fair  Decision Making: High Complexity  Requires PT Follow-Up: Yes  Activity Tolerance  Activity Tolerance: Patient limited by endurance; Patient limited by pain     Plan   Plan  Plan:  (5-6x/wk)  Current Treatment Recommendations: Strengthening, ROM, Balance training, Functional mobility training, Transfer training, Endurance training, Therapeutic activities, Patient/Caregiver education & training, Safety education & training, Home exercise program, Gait training, Equipment evaluation, education, & procurement, Neuromuscular re-education, Wheelchair mobility training  Safety Devices  Type of Devices: Nurse notified, Gait belt, Call light within reach, Left in bed, Bed alarm in place  Restraints  Restraints Initially in Place: No     Restrictions  Restrictions/Precautions  Restrictions/Precautions: Fall Risk  Required Braces or Orthoses?: No  Position Activity Restriction  Other position/activity restrictions: up with assist, R side deficits from prior CVA     Subjective   General  Patient assessed for rehabilitation services?: Yes  Response To Previous Treatment: Not applicable  Family / Caregiver Present: No  Follows Commands: Within Functional Limits  General Comment  Comments: Pt reports buttocks pain 9/10, pt rolled L with wedges placed to offload. L groin wound vac  Subjective  Subjective: RN and pt agreeable to PT.  Pt supine in bed upon arrival, pleasantly confused and cooperative throughout. Social/Functional History  Social/Functional History  Lives With: Daughter (and 5 grandsons)  Type of Home: House  Home Layout: Multi-level, Performs ADL's on one level  Home Access: Stairs to enter without rails  Entrance Stairs - Number of Steps: 1  Bathroom Shower/Tub: Tub/Shower unit  Bathroom Toilet: Standard  Bathroom Equipment: Tub transfer bench  Home Equipment: Romi Dent, quad, Wheelchair-manual (quad cane within the home, wheelchair for community distances)  Receives Help From: Family  ADL Assistance: Independent  Bath: Moderate assistance  Dressing: Moderate assistance  Grooming: Moderate assistance  Feeding: Independent  Toileting: Needs assistance  Homemaking Assistance: Needs assistance (dtr completes)  Homemaking Responsibilities: Yes  Ambulation Assistance: Independent (with quad cane short distances)  Transfer Assistance: Independent  Active : No  Patient's  Info: dtr drives  Mode of Transportation: Family, Car  Occupation: Retired  Type of Occupation: Contractor  Leisure & Hobbies: Enjoys computer games  Additional Comments: Pt reports dtr does not work, unable to provide physical assistance. Information obtained from pt report, however pt questionable historian. Vision/Hearing  Vision  Vision: Impaired  Vision Exceptions: Wears glasses for reading  Hearing  Hearing: Within functional limits    Cognition   Orientation  Overall Orientation Status: Impaired  Orientation Level: Disoriented to situation;Disoriented to time  Cognition  Overall Cognitive Status: Exceptions  Arousal/Alertness: Delayed responses to stimuli  Following Commands: Follows one step commands with increased time; Follows one step commands with repetition  Attention Span: Difficulty dividing attention; Attends with cues to redirect  Memory: Decreased recall of recent events  Safety Judgement: Decreased awareness of need for 81.38 (08/15/22 1410)  Mobility Inpatient CMS G-Code Modifier : CM (08/15/22 1410)        Goals  Short Term Goals  Time Frame for Short term goals: 14 visits  Short term goal 1: Perform bed mobility with Mod A  Short term goal 2: Perform functional transfers with Min A  Short term goal 3: Demo Fair- dynamic standing balance to decrease risk of falls  Short term goal 4: Ambulate 20ft with appropriate AD and Mod A  Short term goal 5: Propel wheelchair 100ft with Min A       Education  Patient Education  Education Given To: Patient  Education Provided: Role of Therapy;Plan of Care;Transfer Training  Education Method: Verbal  Barriers to Learning: Cognition  Education Outcome: Verbalized understanding;Continued education needed      Therapy Time   Individual Concurrent Group Co-treatment   Time In 0841         Time Out 0923         Minutes 42         Timed Code Treatment Minutes: [address], PT

## 2022-08-16 ENCOUNTER — APPOINTMENT (OUTPATIENT)
Dept: GENERAL RADIOLOGY | Age: 62
DRG: 721 | End: 2022-08-16
Attending: FAMILY MEDICINE
Payer: COMMERCIAL

## 2022-08-16 LAB
ABSOLUTE EOS #: 0.11 K/UL (ref 0–0.44)
ABSOLUTE IMMATURE GRANULOCYTE: 0.05 K/UL (ref 0–0.3)
ABSOLUTE LYMPH #: 2.01 K/UL (ref 1.1–3.7)
ABSOLUTE MONO #: 0.69 K/UL (ref 0.1–1.2)
ALBUMIN SERPL-MCNC: 2.3 G/DL (ref 3.5–5.2)
ALBUMIN/GLOBULIN RATIO: 0.9 (ref 1–2.5)
ALP BLD-CCNC: 166 U/L (ref 40–129)
ALT SERPL-CCNC: 29 U/L (ref 5–41)
ANION GAP SERPL CALCULATED.3IONS-SCNC: 15 MMOL/L (ref 9–17)
AST SERPL-CCNC: 22 U/L
BASOPHILS # BLD: 1 % (ref 0–2)
BASOPHILS ABSOLUTE: 0.08 K/UL (ref 0–0.2)
BILIRUB SERPL-MCNC: 0.26 MG/DL (ref 0.3–1.2)
BILIRUBIN URINE: NEGATIVE
BUN BLDV-MCNC: 29 MG/DL (ref 8–23)
CALCIUM SERPL-MCNC: 8.1 MG/DL (ref 8.6–10.4)
CHLORIDE BLD-SCNC: 100 MMOL/L (ref 98–107)
CO2: 18 MMOL/L (ref 20–31)
COLOR: YELLOW
COMMENT UA: NORMAL
CREAT SERPL-MCNC: 1.31 MG/DL (ref 0.7–1.2)
EOSINOPHILS RELATIVE PERCENT: 1 % (ref 1–4)
GFR AFRICAN AMERICAN: >60 ML/MIN
GFR NON-AFRICAN AMERICAN: 55 ML/MIN
GFR SERPL CREATININE-BSD FRML MDRD: ABNORMAL ML/MIN/{1.73_M2}
GLUCOSE BLD-MCNC: 114 MG/DL (ref 75–110)
GLUCOSE BLD-MCNC: 143 MG/DL (ref 75–110)
GLUCOSE BLD-MCNC: 149 MG/DL (ref 75–110)
GLUCOSE BLD-MCNC: 176 MG/DL (ref 75–110)
GLUCOSE BLD-MCNC: 189 MG/DL (ref 70–99)
GLUCOSE URINE: NEGATIVE
HCT VFR BLD CALC: 31.7 % (ref 40.7–50.3)
HEMOGLOBIN: 10.6 G/DL (ref 13–17)
IMMATURE GRANULOCYTES: 1 %
KETONES, URINE: NEGATIVE
LACTIC ACID, WHOLE BLOOD: 2.5 MMOL/L (ref 0.7–2.1)
LEUKOCYTE ESTERASE, URINE: NEGATIVE
LYMPHOCYTES # BLD: 23 % (ref 24–43)
MAGNESIUM: 1.6 MG/DL (ref 1.6–2.6)
MCH RBC QN AUTO: 30.5 PG (ref 25.2–33.5)
MCHC RBC AUTO-ENTMCNC: 33.4 G/DL (ref 28.4–34.8)
MCV RBC AUTO: 91.1 FL (ref 82.6–102.9)
MONOCYTES # BLD: 8 % (ref 3–12)
NITRITE, URINE: NEGATIVE
NRBC AUTOMATED: 0 PER 100 WBC
PARTIAL THROMBOPLASTIN TIME: 28.4 SEC (ref 20.5–30.5)
PDW BLD-RTO: 14.5 % (ref 11.8–14.4)
PH UA: 5 (ref 5–8)
PHOSPHORUS: 3.6 MG/DL (ref 2.5–4.5)
PLATELET # BLD: 419 K/UL (ref 138–453)
PMV BLD AUTO: 11.4 FL (ref 8.1–13.5)
POTASSIUM SERPL-SCNC: 3.5 MMOL/L (ref 3.7–5.3)
PROTEIN UA: NEGATIVE
RBC # BLD: 3.48 M/UL (ref 4.21–5.77)
RBC # BLD: ABNORMAL 10*6/UL
SEG NEUTROPHILS: 66 % (ref 36–65)
SEGMENTED NEUTROPHILS ABSOLUTE COUNT: 6.01 K/UL (ref 1.5–8.1)
SODIUM BLD-SCNC: 133 MMOL/L (ref 135–144)
SPECIFIC GRAVITY UA: 1.01 (ref 1–1.03)
TOTAL PROTEIN: 4.8 G/DL (ref 6.4–8.3)
TROPONIN, HIGH SENSITIVITY: 229 NG/L (ref 0–22)
TURBIDITY: CLEAR
URINE HGB: NEGATIVE
UROBILINOGEN, URINE: NORMAL
WBC # BLD: 9 K/UL (ref 3.5–11.3)

## 2022-08-16 PROCEDURE — 36415 COLL VENOUS BLD VENIPUNCTURE: CPT

## 2022-08-16 PROCEDURE — 82947 ASSAY GLUCOSE BLOOD QUANT: CPT

## 2022-08-16 PROCEDURE — 80053 COMPREHEN METABOLIC PANEL: CPT

## 2022-08-16 PROCEDURE — 85730 THROMBOPLASTIN TIME PARTIAL: CPT

## 2022-08-16 PROCEDURE — 97530 THERAPEUTIC ACTIVITIES: CPT

## 2022-08-16 PROCEDURE — 1200000000 HC SEMI PRIVATE

## 2022-08-16 PROCEDURE — 97535 SELF CARE MNGMENT TRAINING: CPT

## 2022-08-16 PROCEDURE — 93005 ELECTROCARDIOGRAM TRACING: CPT

## 2022-08-16 PROCEDURE — 6370000000 HC RX 637 (ALT 250 FOR IP): Performed by: NURSE PRACTITIONER

## 2022-08-16 PROCEDURE — 93005 ELECTROCARDIOGRAM TRACING: CPT | Performed by: INTERNAL MEDICINE

## 2022-08-16 PROCEDURE — 84484 ASSAY OF TROPONIN QUANT: CPT

## 2022-08-16 PROCEDURE — 85025 COMPLETE CBC W/AUTO DIFF WBC: CPT

## 2022-08-16 PROCEDURE — 83735 ASSAY OF MAGNESIUM: CPT

## 2022-08-16 PROCEDURE — 71045 X-RAY EXAM CHEST 1 VIEW: CPT

## 2022-08-16 PROCEDURE — 2580000003 HC RX 258: Performed by: INTERNAL MEDICINE

## 2022-08-16 PROCEDURE — 6370000000 HC RX 637 (ALT 250 FOR IP): Performed by: STUDENT IN AN ORGANIZED HEALTH CARE EDUCATION/TRAINING PROGRAM

## 2022-08-16 PROCEDURE — 83605 ASSAY OF LACTIC ACID: CPT

## 2022-08-16 PROCEDURE — 6360000002 HC RX W HCPCS

## 2022-08-16 PROCEDURE — 2580000003 HC RX 258: Performed by: NURSE PRACTITIONER

## 2022-08-16 PROCEDURE — 99232 SBSQ HOSP IP/OBS MODERATE 35: CPT | Performed by: INTERNAL MEDICINE

## 2022-08-16 PROCEDURE — 81003 URINALYSIS AUTO W/O SCOPE: CPT

## 2022-08-16 PROCEDURE — 97166 OT EVAL MOD COMPLEX 45 MIN: CPT

## 2022-08-16 PROCEDURE — 2580000003 HC RX 258: Performed by: STUDENT IN AN ORGANIZED HEALTH CARE EDUCATION/TRAINING PROGRAM

## 2022-08-16 PROCEDURE — 84100 ASSAY OF PHOSPHORUS: CPT

## 2022-08-16 PROCEDURE — 97110 THERAPEUTIC EXERCISES: CPT

## 2022-08-16 RX ORDER — HEPARIN SODIUM 1000 [USP'U]/ML
2000 INJECTION, SOLUTION INTRAVENOUS; SUBCUTANEOUS PRN
Status: DISCONTINUED | OUTPATIENT
Start: 2022-08-16 | End: 2022-08-20 | Stop reason: HOSPADM

## 2022-08-16 RX ORDER — MAGNESIUM SULFATE IN WATER 40 MG/ML
2000 INJECTION, SOLUTION INTRAVENOUS ONCE
Status: COMPLETED | OUTPATIENT
Start: 2022-08-17 | End: 2022-08-17

## 2022-08-16 RX ORDER — HEPARIN SODIUM 1000 [USP'U]/ML
4000 INJECTION, SOLUTION INTRAVENOUS; SUBCUTANEOUS PRN
Status: DISCONTINUED | OUTPATIENT
Start: 2022-08-16 | End: 2022-08-20 | Stop reason: HOSPADM

## 2022-08-16 RX ORDER — POTASSIUM CHLORIDE 20 MEQ/1
40 TABLET, EXTENDED RELEASE ORAL ONCE
Status: DISCONTINUED | OUTPATIENT
Start: 2022-08-17 | End: 2022-08-17

## 2022-08-16 RX ORDER — HEPARIN SODIUM 1000 [USP'U]/ML
4000 INJECTION, SOLUTION INTRAVENOUS; SUBCUTANEOUS ONCE
Status: COMPLETED | OUTPATIENT
Start: 2022-08-16 | End: 2022-08-16

## 2022-08-16 RX ORDER — SODIUM CHLORIDE 9 MG/ML
INJECTION, SOLUTION INTRAVENOUS CONTINUOUS
Status: ACTIVE | OUTPATIENT
Start: 2022-08-16 | End: 2022-08-17

## 2022-08-16 RX ORDER — HEPARIN SODIUM AND DEXTROSE 10000; 5 [USP'U]/100ML; G/100ML
5-30 INJECTION INTRAVENOUS CONTINUOUS
Status: DISCONTINUED | OUTPATIENT
Start: 2022-08-16 | End: 2022-08-19

## 2022-08-16 RX ORDER — 0.9 % SODIUM CHLORIDE 0.9 %
500 INTRAVENOUS SOLUTION INTRAVENOUS ONCE
Status: COMPLETED | OUTPATIENT
Start: 2022-08-16 | End: 2022-08-16

## 2022-08-16 RX ADMIN — BACLOFEN 10 MG: 10 TABLET ORAL at 13:42

## 2022-08-16 RX ADMIN — SODIUM CHLORIDE 500 ML: 9 INJECTION, SOLUTION INTRAVENOUS at 17:06

## 2022-08-16 RX ADMIN — TRAMADOL HYDROCHLORIDE 50 MG: 50 TABLET, COATED ORAL at 03:18

## 2022-08-16 RX ADMIN — ACETAMINOPHEN 1000 MG: 500 TABLET ORAL at 13:43

## 2022-08-16 RX ADMIN — SODIUM CHLORIDE: 9 INJECTION, SOLUTION INTRAVENOUS at 18:52

## 2022-08-16 RX ADMIN — Medication 12 UNITS/KG/HR: at 23:31

## 2022-08-16 RX ADMIN — METOPROLOL SUCCINATE 12.5 MG: 25 TABLET, FILM COATED, EXTENDED RELEASE ORAL at 08:56

## 2022-08-16 RX ADMIN — ACETAMINOPHEN 1000 MG: 500 TABLET ORAL at 06:41

## 2022-08-16 RX ADMIN — CLOPIDOGREL 75 MG: 75 TABLET, FILM COATED ORAL at 08:57

## 2022-08-16 RX ADMIN — GABAPENTIN 300 MG: 300 CAPSULE ORAL at 21:11

## 2022-08-16 RX ADMIN — GABAPENTIN 300 MG: 300 CAPSULE ORAL at 06:41

## 2022-08-16 RX ADMIN — DOXYCYCLINE HYCLATE 100 MG: 100 TABLET, COATED ORAL at 08:57

## 2022-08-16 RX ADMIN — HEPARIN SODIUM 4000 UNITS: 1000 INJECTION INTRAVENOUS; SUBCUTANEOUS at 23:29

## 2022-08-16 RX ADMIN — Medication 4.5 MG: at 21:10

## 2022-08-16 RX ADMIN — EZETIMIBE 10 MG: 10 TABLET ORAL at 08:57

## 2022-08-16 RX ADMIN — GABAPENTIN 300 MG: 300 CAPSULE ORAL at 13:43

## 2022-08-16 RX ADMIN — OXYCODONE 5 MG: 5 TABLET ORAL at 13:43

## 2022-08-16 RX ADMIN — ACETAMINOPHEN 1000 MG: 500 TABLET ORAL at 21:11

## 2022-08-16 RX ADMIN — MIRTAZAPINE 30 MG: 30 TABLET, FILM COATED ORAL at 21:11

## 2022-08-16 RX ADMIN — ISOSORBIDE MONONITRATE 30 MG: 30 TABLET ORAL at 08:57

## 2022-08-16 RX ADMIN — DOXYCYCLINE HYCLATE 100 MG: 100 TABLET, COATED ORAL at 21:11

## 2022-08-16 RX ADMIN — POLYETHYLENE GLYCOL 3350 17 G: 17 POWDER, FOR SOLUTION ORAL at 08:57

## 2022-08-16 RX ADMIN — ATORVASTATIN CALCIUM 10 MG: 10 TABLET, FILM COATED ORAL at 08:57

## 2022-08-16 RX ADMIN — TAMSULOSIN HYDROCHLORIDE 0.4 MG: 0.4 CAPSULE ORAL at 08:56

## 2022-08-16 RX ADMIN — SODIUM CHLORIDE, PRESERVATIVE FREE 10 ML: 5 INJECTION INTRAVENOUS at 09:00

## 2022-08-16 RX ADMIN — BACLOFEN 10 MG: 10 TABLET ORAL at 21:11

## 2022-08-16 RX ADMIN — TORSEMIDE 20 MG: 20 TABLET ORAL at 08:57

## 2022-08-16 RX ADMIN — DOCUSATE SODIUM 50 MG AND SENNOSIDES 8.6 MG 2 TABLET: 8.6; 5 TABLET, FILM COATED ORAL at 08:56

## 2022-08-16 RX ADMIN — BACLOFEN 10 MG: 10 TABLET ORAL at 08:57

## 2022-08-16 RX ADMIN — METOPROLOL SUCCINATE 12.5 MG: 25 TABLET, FILM COATED, EXTENDED RELEASE ORAL at 21:11

## 2022-08-16 RX ADMIN — APIXABAN 5 MG: 5 TABLET, FILM COATED ORAL at 08:57

## 2022-08-16 ASSESSMENT — PAIN - FUNCTIONAL ASSESSMENT
PAIN_FUNCTIONAL_ASSESSMENT: PREVENTS OR INTERFERES SOME ACTIVE ACTIVITIES AND ADLS
PAIN_FUNCTIONAL_ASSESSMENT: ACTIVITIES ARE NOT PREVENTED

## 2022-08-16 ASSESSMENT — PAIN DESCRIPTION - LOCATION
LOCATION: BACK
LOCATION: COCCYX
LOCATION: LEG;HIP
LOCATION: COCCYX
LOCATION: COCCYX

## 2022-08-16 ASSESSMENT — ENCOUNTER SYMPTOMS
APNEA: 0
NAUSEA: 0
CHOKING: 0
VOMITING: 0
ABDOMINAL DISTENTION: 0
EYE PAIN: 0
CHEST TIGHTNESS: 0
RHINORRHEA: 0
DIARRHEA: 0
GASTROINTESTINAL NEGATIVE: 1
WHEEZING: 0
CONSTIPATION: 0
ABDOMINAL PAIN: 0
COUGH: 0
PHOTOPHOBIA: 0
SHORTNESS OF BREATH: 0
BACK PAIN: 0
BLOOD IN STOOL: 0

## 2022-08-16 ASSESSMENT — PAIN DESCRIPTION - ORIENTATION
ORIENTATION: MID
ORIENTATION: LEFT
ORIENTATION: MID
ORIENTATION: MID
ORIENTATION: LEFT

## 2022-08-16 ASSESSMENT — PAIN SCALES - GENERAL
PAINLEVEL_OUTOF10: 8
PAINLEVEL_OUTOF10: 8
PAINLEVEL_OUTOF10: 6
PAINLEVEL_OUTOF10: 8
PAINLEVEL_OUTOF10: 6

## 2022-08-16 ASSESSMENT — PAIN DESCRIPTION - PAIN TYPE: TYPE: SURGICAL PAIN

## 2022-08-16 ASSESSMENT — PAIN DESCRIPTION - DESCRIPTORS
DESCRIPTORS: ACHING
DESCRIPTORS: DISCOMFORT

## 2022-08-16 ASSESSMENT — PAIN DESCRIPTION - FREQUENCY: FREQUENCY: CONTINUOUS

## 2022-08-16 ASSESSMENT — PAIN DESCRIPTION - ONSET: ONSET: PROGRESSIVE

## 2022-08-16 NOTE — PROGRESS NOTES
Physical Therapy  Facility/Department: [address] [facility]  Daily Treatment Note     Name: Kale Pickett  : 1960  MRN: [id number]  Date of Service: 2022    Discharge Recommendations:  Patient would benefit from continued therapy after discharge   PT Equipment Recommendations  Equipment Needed: No      Patient Diagnosis(es): There were no encounter diagnoses. Past Medical History:  has a past medical history of Anxiety, Arthritis associated with another disorder, CAD (coronary artery disease), Carotid artery occlusion, Family history of kidney stone, HTN (hypertension), Hyperkalemia, Hyperlipidemia, Hypertension, Nephrolithiasis, Neuropathy, PAD (peripheral artery disease) ([facility] [address] Peripheral vascular disease ([facility] [address] Personal history of MRSA (methicillin resistant Staphylococcus aureus), Seasonal allergies, and Vasculopathy. Past Surgical History:  has a past surgical history that includes Tonsillectomy and adenoidectomy; Percutaneous Transluminal Coronary Angio; Carotid endarterectomy (08); IR Femoral Popliteal Bypass Graft ( Dr Rachel Lazo); Femoral-tibial Bypass Graft (07  Junior Cabrera ); Balloon angioplasty, artery (11/23/10 Allie Muñoz); Aorto-femoral Bypass Graft (2011-[facility]); Abdominal adhesion surgery (2011-[facility]); Coronary artery bypass graft (); and Axillary-femoral Bypass Graft (Bilateral, 2022). Assessment   Assessment: Pt required maxA x2 for bed mobility. Pt sat EOB for 8 min's with modA to maintain upright posture due to significant posterior lean. Pt was provided with verbal cues to bring trunk forward to increase sitting balance with poor return. Pt required maxA x2 for STS transfer using the SS along with verbal cues for sequencing/ hand placement with fair return. Pt demonstrated decreased arousal and became unresponsive upon standing in refugio stedy, nurse notified and present.  Pt was responsive, returned to supine in bed with max A x2 for assist with LE and trunk progression. Pt is currently unsafe to return to prior living arrangements and would benefit from continued PT following discharge to address functional deficits. Therapy Prognosis: Fair  Decision Making: High Complexity  Requires PT Follow-Up: Yes  Activity Tolerance  Activity Tolerance: Patient limited by endurance; Patient limited by pain     Plan   Plan  Plan:  (5-6x/wk)  Current Treatment Recommendations: Strengthening, ROM, Balance training, Functional mobility training, Transfer training, Endurance training, Therapeutic activities, Patient/Caregiver education & training, Safety education & training, Home exercise program, Gait training, Equipment evaluation, education, & procurement, Neuromuscular re-education, Wheelchair mobility training  Safety Devices  Type of Devices: Gait belt, Nurse notified, Left in bed, Bed alarm in place, Call light within reach  Restraints  Restraints Initially in Place: No     Restrictions  Restrictions/Precautions  Restrictions/Precautions: Fall Risk  Required Braces or Orthoses?: No  Position Activity Restriction  Other position/activity restrictions: up w/ assist, R side deficits from prior CVA     Subjective   General  Chart Reviewed: Yes  Patient assessed for rehabilitation services?: Yes  Response To Previous Treatment: Patient with no complaints from previous session. Family / Caregiver Present: No  Follows Commands: Within Functional Limits  General Comment  Comments: Pt reports buttocks/ LLE pain 9/10. Pt returned to supine in bed post PT. Subjective  Subjective: RN and pt agreeable to PT. Pt supine in bed upon arrival, pleasantly confused and cooperative throughout. Cognition   Orientation  Overall Orientation Status: Impaired  Orientation Level: Oriented to person;Oriented to place; Disoriented to situation;Oriented to time  Cognition  Overall Cognitive Status: Exceptions  Arousal/Alertness: Delayed responses to stimuli  Following Commands: Follows one step commands with increased time; Follows one step commands with repetition  Attention Span: Difficulty dividing attention; Attends with cues to redirect  Memory: Decreased recall of recent events  Safety Judgement: Decreased awareness of need for assistance  Problem Solving: Assistance required to generate solutions;Assistance required to correct errors made  Insights: Decreased awareness of deficits  Initiation: Requires cues for some  Sequencing: Requires cues for some     Objective   Bed mobility  Supine to Sit: Maximum assistance;2 Person assistance  Sit to Supine: Maximum assistance;2 Person assistance  Scooting: Maximal assistance;2 Person assistance  Bed Mobility Comments: HOB elevated, VCs for sequencing  Transfers  Sit to Stand: Maximum Assistance;2 Person Assistance  Stand to sit: Maximum Assistance;2 Person Assistance  Comment: STS transfer completed with the SS. Along with verbal cues for sequencing. Balance  Posture: Fair  Sitting - Static: Fair  Sitting - Dynamic: Fair;-  Standing - Static: Poor  Standing - Dynamic: Poor  Comments: Assessed with SS.  A/AROM Exercises:  Ankle pumps x10, Heel slides x10, (AAROM)                 AM-PAC Score  AM-PAC Inpatient Mobility Raw Score : 9 (08/16/22 1602)  AM-PAC Inpatient T-Scale Score : 30.55 (08/16/22 1602)  Mobility Inpatient CMS 0-100% Score: 81.38 (08/16/22 1602)  Mobility Inpatient CMS G-Code Modifier : CM (08/16/22 1602)             Goals  Short Term Goals  Time Frame for Short term goals: 14 visits  Short term goal 1: Perform bed mobility with Mod A  Short term goal 2: Perform functional transfers with Min A  Short term goal 3: Demo Fair- dynamic standing balance to decrease risk of falls  Short term goal 4: Ambulate 20ft with appropriate AD and Mod A  Short term goal 5: Propel wheelchair 100ft with Min A           Therapy Time   Individual Concurrent Group Co-treatment   Time In 1530         Time Out 1553         Minutes 23 Timed Code Treatment Minutes: [address], PTA

## 2022-08-16 NOTE — PLAN OF CARE
Problem: Discharge Planning  Goal: Discharge to home or other facility with appropriate resources  Outcome: Progressing  Flowsheets (Taken 8/15/2022 2030)  Discharge to home or other facility with appropriate resources:   Identify barriers to discharge with patient and caregiver   Arrange for needed discharge resources and transportation as appropriate   Identify discharge learning needs (meds, wound care, etc)     Problem: Chronic Conditions and Co-morbidities  Goal: Patient's chronic conditions and co-morbidity symptoms are monitored and maintained or improved  Outcome: Progressing  Flowsheets (Taken 8/15/2022 2030)  Care Plan - Patient's Chronic Conditions and Co-Morbidity Symptoms are Monitored and Maintained or Improved:   Monitor and assess patient's chronic conditions and comorbid symptoms for stability, deterioration, or improvement   Collaborate with multidisciplinary team to address chronic and comorbid conditions and prevent exacerbation or deterioration   Update acute care plan with appropriate goals if chronic or comorbid symptoms are exacerbated and prevent overall improvement and discharge     Problem: Skin/Tissue Integrity  Goal: Absence of new skin breakdown  Description: 1. Monitor for areas of redness and/or skin breakdown  2. Assess vascular access sites hourly  3. Every 4-6 hours minimum:  Change oxygen saturation probe site  4. Every 4-6 hours:  If on nasal continuous positive airway pressure, respiratory therapy assess nares and determine need for appliance change or resting period.   Outcome: Progressing     Problem: Safety - Adult  Goal: Free from fall injury  Outcome: Progressing  Flowsheets (Taken 8/15/2022 2030)  Free From Fall Injury: Instruct family/caregiver on patient safety     Problem: Pain  Goal: Verbalizes/displays adequate comfort level or baseline comfort level  Outcome: Progressing     Problem: ABCDS Injury Assessment  Goal: Absence of physical injury  Outcome: Progressing  Flowsheets (Taken 8/15/2022 2030)  Absence of Physical Injury: Implement safety measures based on patient assessment

## 2022-08-16 NOTE — PROGRESS NOTES
Pt became hypotensive this evening around 5 pm and upon my exam he was stuttering and had new onset confusion. When asked directly where he is he can answer \"[place] but will then talk about being on a tight rope at a gym. Pt is weepy and reports bilateral leg pain. A detailed physical exam was performed without any signs of new infection. His left groin has the wound vac in place and appears to have a good seal. The right groin is without erythema, the left fasciotomy sites are healing well with granulation tissue. The sacral wound does not have signs of hemorrhage. All IV sites are without erythema. Urine is yellow without sediment. Dr. Emerald Arriaga at bedside as well. Stat labs sent, UA ordered and 500 cc bolus of normal saline given. Blood pressure increased to 120/60. Will follow-up labs and continue to monitor. Internal Medicine was called back to assess patient and neurology was consulted as well. Appreciate recommendations and will follow-up.       Dariana Morris,  PGY-2  8/16/22 6:00 PM

## 2022-08-16 NOTE — PLAN OF CARE
Problem: Discharge Planning  Goal: Discharge to home or other facility with appropriate resources  8/16/2022 1050 by Breanne Jensen RN  Outcome: Progressing  8/16/2022 0156 by Gloria Tao RN  Outcome: Progressing  Flowsheets (Taken 8/15/2022 2030)  Discharge to home or other facility with appropriate resources:   Identify barriers to discharge with patient and caregiver   Arrange for needed discharge resources and transportation as appropriate   Identify discharge learning needs (meds, wound care, etc)     Problem: Chronic Conditions and Co-morbidities  Goal: Patient's chronic conditions and co-morbidity symptoms are monitored and maintained or improved  8/16/2022 1050 by Breanne Jensen RN  Outcome: Progressing  8/16/2022 0156 by Gloria Tao RN  Outcome: Progressing  Flowsheets (Taken 8/15/2022 2030)  Care Plan - Patient's Chronic Conditions and Co-Morbidity Symptoms are Monitored and Maintained or Improved:   Monitor and assess patient's chronic conditions and comorbid symptoms for stability, deterioration, or improvement   Collaborate with multidisciplinary team to address chronic and comorbid conditions and prevent exacerbation or deterioration   Update acute care plan with appropriate goals if chronic or comorbid symptoms are exacerbated and prevent overall improvement and discharge     Problem: Skin/Tissue Integrity  Goal: Absence of new skin breakdown  Description: 1. Monitor for areas of redness and/or skin breakdown  2. Assess vascular access sites hourly  3. Every 4-6 hours minimum:  Change oxygen saturation probe site  4. Every 4-6 hours:  If on nasal continuous positive airway pressure, respiratory therapy assess nares and determine need for appliance change or resting period.   8/16/2022 1050 by Breanne Jensen RN  Outcome: Progressing  8/16/2022 0156 by Gloria Tao RN  Outcome: Progressing     Problem: Safety - Adult  Goal: Free from fall injury  8/16/2022 1050 by Breanne Jensen

## 2022-08-16 NOTE — PROGRESS NOTES
[facility] -   Infectious diseases evaluation  admission date 8/12/2022    reason for consultation:   Surgical Wound Infection    Impression :   Current:  L groin post-op surgical wound infection. S/p aorto-bifem graft endarterectomy 7/8/22. Leukocytosis  PVD    Other:    Discussion / summary of stay / plan of care     Recommendations   Continue Doxycycline 100 mg po twice daily x 7 days until 8/19/22. Need to eval the groin wound off VAC  Follow CBC and renal function  Wound care. Discussed with patient    Infection Control Recommendations   [place]ier Precautions      Antimicrobial Stewardship Recommendations   Simplification of therapy  Targeted therapy    History of Present Illness:   Initial history:  Carleen Rick is a 58y.o.-year-old male who was admitted for left groin wound care s/p bilateral groin exploration and thrombectomy of his aortobifemoral bypass graft. Patient was seen in the vascular office 8/12/22 for post-op visit and there was concern for left femoral wound infection with noted purulent drainage on exam. No cx of the drainage was sent. Patient states the dressings are being changed every 3 days in the ECF. No associated symptoms of fever, chills, n/v/d. Initial labs: WBC: 11.7, Cre: 1.24. Interval changes  8/16/2022   Patient Vitals for the past 8 hrs:   BP Temp Temp src Pulse Resp SpO2   08/16/22 0348 -- -- -- -- 16 --   08/16/22 0318 125/78 97.7 °F (36.5 °C) Oral 77 16 99 %     Afebrile  SpO2 99% RA  Feeling well  Eating/drinking, denies n/v/d  Left groin with wound VAC in place. LLE fasciotomy dressing clean  No labs today      Summary of relevant labs:  Labs:  Creat 1.24-1.15-1.08  WBC 11.7    Micro:    Imaging:      I have personally reviewed the past medical history, past surgical history, medications, social history, and family history, and I haveupdated the database accordingly.       Allergies:   Faviola [morphine sulfate], Methadone, and Morphine     Review of Systems:     Review of Systems   Constitutional: Negative. Negative for chills and fever. HENT:  Negative for congestion, ear discharge, postnasal drip and rhinorrhea. Eyes:  Negative for photophobia and pain. Respiratory:  Negative for cough, choking, shortness of breath and wheezing. Cardiovascular: Negative. Negative for chest pain and palpitations. Gastrointestinal: Negative. Negative for abdominal pain and blood in stool. Endocrine: Negative for polyphagia. Genitourinary: Negative. Negative for dysuria and flank pain. Musculoskeletal:  Negative for arthralgias and back pain. Skin:  Positive for wound. L groin. LLE fasciotomies. Allergic/Immunologic: Negative for immunocompromised state. Neurological:  Negative for dizziness and headaches. Psychiatric/Behavioral:  Negative for agitation and confusion. All other systems reviewed and are negative. Physical Examination :       Physical Exam  Vitals and nursing note reviewed. Constitutional:       General: He is not in acute distress. Appearance: Normal appearance. HENT:      Head: Normocephalic and atraumatic. Right Ear: External ear normal.      Left Ear: External ear normal.      Nose: Nose normal.      Mouth/Throat:      Mouth: Mucous membranes are moist.      Pharynx: Oropharynx is clear. Eyes:      Conjunctiva/sclera: Conjunctivae normal.   Cardiovascular:      Rate and Rhythm: Normal rate and regular rhythm. Pulses: Normal pulses. Heart sounds: Normal heart sounds. Pulmonary:      Effort: Pulmonary effort is normal.      Breath sounds: Normal breath sounds. Abdominal:      General: Bowel sounds are normal. There is no distension. Palpations: Abdomen is soft. There is no mass. Tenderness: There is no abdominal tenderness. Genitourinary:     Comments: Condom cath. Urine clear.   Musculoskeletal:         General: Normal range of motion. Cervical back: Normal range of motion. Right lower leg: No edema. Left lower leg: No edema. Skin:     General: Skin is warm and dry. Findings: No rash. Comments: Lt groin wound with wound VAC in place  L LE dressing clean and dry   Neurological:      Mental Status: He is alert and oriented to person, place, and time. Psychiatric:         Behavior: Behavior normal.       Past Medical History:     Past Medical History:   Diagnosis Date    Anxiety     Arthritis associated with another disorder     CAD (coronary artery disease)     with history of multiple MI    Carotid artery occlusion     Family history of kidney stone     HTN (hypertension)     Hyperkalemia     Hyperlipidemia     Hypertension     Nephrolithiasis     multiple times    Neuropathy     PAD (peripheral artery disease) (HCC)     Peripheral vascular disease (HCC)     Personal history of MRSA (methicillin resistant Staphylococcus aureus)     Seasonal allergies     Vasculopathy        Past Surgical  History:     Past Surgical History:   Procedure Laterality Date    ABDOMINAL ADHESION SURGERY  2/11/2011-[facility]    Reexploration of abdominal wound and reclosure of the abdominal wound with fascial sutures and retention sutures as well    AORTO-FEMORAL BYPASS GRAFT  2/11/2011-[facility]    ABF bypass of 16x8 PTFE graft. This is an end-to-side anastomosis proximally    AXILLARY-FEMORAL BYPASS GRAFT Bilateral 7/8/2022    AORTA BIFEMORAL GRAFT ENDARTERECTOMY, BILATERAL COMMON FEMORAL EMBOLECTOMY, BILATERAL LOWER EXTREMEITY ARTERECTOMY, AORTAGRAPHY, RIGHT LIMB OF BYPASS GRAFT STENT, BILATERAL FEMORAL BOVINE PATCHES performed by Nelson Osorio MD at [place] [address], ARTERY  11/23/10 [address]    1. Placement of 5 sheath in the left femoral artery with duplex guidance. 2. Left Iliac arteriogram. 3. Attempted ballon angioplasty left iliac artery occlusion.     CAROTID ENDARTERECTOMY  07/19/08    Left carotid endarterectomy using shunt dacron patch aplasty    CORONARY ARTERY BYPASS GRAFT      CABG x3     FEMORAL-TIBIAL BYPASS GRAFT  07  [facility]wight Ram     Right femoral to posterior tibial artery bypass graft with in situ saphenous vein graft    IR FEMORAL POPLITEAL BYPASS GRAFT   Dr Rogers Lentz    1. Exploration of right fem-pop bypass graft. 2.Thrombectomy of right fem-post-tib saph vein graft.  3. Replacement of femoral to popliteal bypass graft from midthigh to near the anastomosis by reversed greater saph vein harvested from left leg    PTCA      PTCA with stent x6    TONSILLECTOMY AND ADENOIDECTOMY         Medications:      acetaminophen  1,000 mg Oral 3 times per day    gabapentin  300 mg Oral q8h    polyethylene glycol  17 g Oral Daily    sennosides-docusate sodium  2 tablet Oral Daily    apixaban  5 mg Oral BID    atorvastatin  10 mg Oral Daily    baclofen  10 mg Oral TID    clopidogrel  75 mg Oral Daily    ezetimibe  10 mg Oral Daily    isosorbide mononitrate  30 mg Oral Daily    melatonin  4.5 mg Oral Nightly    metoprolol succinate  12.5 mg Oral BID    mirtazapine  30 mg Oral Nightly    tamsulosin  0.4 mg Oral Daily    torsemide  20 mg Oral Daily    sodium chloride flush  5-40 mL IntraVENous 2 times per day    insulin lispro  0-8 Units SubCUTAneous TID WC    insulin lispro  0-4 Units SubCUTAneous Nightly    doxycycline hyclate  100 mg Oral 2 times per day       Social History:     Social History     Socioeconomic History    Marital status:      Spouse name: Not on file    Number of children: 1    Years of education: 14    Highest education level: Not on file   Occupational History    Occupation: disabled      Comment: SSI    Tobacco Use    Smoking status: Former     Packs/day: 2.00     Years: 33.00     Pack years: 66.00     Types: Cigarettes     Quit date: 3/10/2011     Years since quittin.4    Smokeless tobacco: Never    Tobacco comments:     quite 2011    Substance and Sexual Activity    Alcohol use: No     Alcohol/week: 0.0 standard drinks    Drug use: No    Sexual activity: Yes     Partners: Female     Comment: with has trouble   Other Topics Concern    Not on file   Social History Narrative    Not on file     Social Determinants of Health     Financial Resource Strain: Not on file   Food Insecurity: Not on file   Transportation Needs: Not on file   Physical Activity: Not on file   Stress: Not on file   Social Connections: Not on file   Intimate Partner Violence: Not on file   Housing Stability: Not on file       Family History:     Family History   Problem Relation Age of Onset    Hypertension Father     Heart Disease Sister     Hypertension Sister     Cancer Mother 28        breast cancer      Medical Decision Making:   I have independently reviewed/ordered the following labs:    CBC with Differential:   No results for input(s): WBC, HGB, HCT, PLT, SEGSPCT, BANDSPCT, LYMPHOPCT, MONOPCT, EOSPCT in the last 72 hours. BMP:  Recent Labs     08/14/22  0007 08/14/22  1158    134*   K 2.9* 3.7    102   CO2 23 21   BUN 36* 32*   CREATININE 1.15 1.08   MG 2.0  --        Hepatic Function Panel:   Recent Labs     08/14/22  1158   PROT 5.2*   LABALBU 2.7*   BILITOT 0.39   ALKPHOS 192*   ALT 36   AST 38       No results for input(s): RPR in the last 72 hours. No results for input(s): HIV in the last 72 hours. No results for input(s): BC in the last 72 hours. Lab Results   Component Value Date/Time    CREATININE 1.08 08/14/2022 11:58 AM    CREATININE 1.2 07/25/2011 09:50 AM    GLUCOSE 139 08/14/2022 11:58 AM    GLUCOSE 128 06/06/2012 07:29 PM       Detailed results: Thank you for allowing us to participate in the care of this patient. Please call with questions.     This note is created with the assistance of a speech recognition program.  While intending to generate adocument that actually reflects the content of the visit, the document can still have some errors including those of syntax and sound a like substitutions which may escape proof reading. It such instances, actual meaningcan be extrapolated by contextual diversion. Lito Garcia MD  Office: [phone]  Perfect serve / office [phone]        I have discussed the care of the patient, including pertinent history and exam findings,  with the resident. I have seen and examined the patient and the key elements of all parts of the encounter have been performed by me. I agree with the assessment, plan and orders as documented by the resident.     Marissa Yarbrough, Infectious Diseases

## 2022-08-16 NOTE — PROGRESS NOTES
Division of Vascular Surgery             Progress Note      Name: Dede Narayanan  MRN: [id number]         Overnight Events:      No acute event overnight      Subjective:     Patient is seen and examined this morning. Afebrile and no acute event overnights. LLE wound dressing was loosely intact this morning and wound vac in place    Physical Exam:     Vitals:  /78   Pulse 77   Temp 97.7 °F (36.5 °C) (Oral)   Resp 16   Wt 174 lb 9.7 oz (79.2 kg)   SpO2 99%   BMI 24.35 kg/m²     General appearance - alert, well appearing and in no acute distress  Mental status - oriented to person, place and time with normal affect  Head - normocephalic and atraumatic  Neck - supple, no carotid bruits, thyroid not palpable, no JVD  Chest - clear to auscultation, normal effort  Heart - normal rate, regular rhythm, no murmurs  Abdomen - soft, non-tender, non-distended, bowel sounds present all four quadrants, no masses  Neurological - normal speech, no focal findings or movement disorder noted, cranial nerves II through XII grossly intact  Extremities - wound vac on the left groin. Fasciotomy site are clean and in tact, no erythema or purulent drainage. peripheral pulses palpable, no pedal edema or calf pain with palpation        Data:  CBC:   No results for input(s): WBC, HGB, PLT in the last 72 hours. Chemistry:   Recent Labs     08/14/22  0007 08/14/22  1158    134*   K 2.9* 3.7    102   CO2 23 21   GLUCOSE 112* 139*   BUN 36* 32*   CREATININE 1.15 1.08   MG 2.0  --    ANIONGAP 14 11   LABGLOM >60 >60   GFRAA >60 >60   CALCIUM 8.8 8.4*       Hepatic:   Recent Labs     08/14/22  1158   AST 38   ALT 36   ALKPHOS 192*   BILITOT 0.39       Coagulation:   Recent Labs     08/14/22  0007 08/14/22  1158   PROT  --  5.2*   INR 1.2  --            Radiology Review:    No results found.       Assessment:     Dede Narayanan is a 64 y.o. male who is admitted for L groin wound care s/p bilateral groin exploration and thrombectomy of his aortobifemoral bypass graft, and fasciotomy. Plan:     Wound vac intact.  Pending placement  Wound vac will need to be changed M-W-F  Continue Doxycycline until 8/19/2022  Regular diet  Ready for discharge once he is accepted to a facility      [address] [place] [address]  Electronically signed by Juanita Perera DO  on 8/16/2022 at 7:13 AM

## 2022-08-16 NOTE — PROGRESS NOTES
Occupational Therapy  Facility/Department: [facility]  Occupational Therapy Initial Assessment    Name: Anai Hoang  : 1960  MRN: [id number]  Date of Service: 2022    Discharge Recommendations:  Patient would benefit from continued therapy after discharge  OT Equipment Recommendations  Equipment Needed: Yes  Mobility Devices: ADL Assistive Devices  ADL Assistive Devices: Sock-Aid Hard;Long-handled Shoe Horn;Long-handled Sponge       Patient Diagnosis(es): There were no encounter diagnoses. Past Medical History:  has a past medical history of Anxiety, Arthritis associated with another disorder, CAD (coronary artery disease), Carotid artery occlusion, Family history of kidney stone, HTN (hypertension), Hyperkalemia, Hyperlipidemia, Hypertension, Nephrolithiasis, Neuropathy, PAD (peripheral artery disease) ([facility] Peripheral vascular disease ([facility] Personal history of MRSA (methicillin resistant Staphylococcus aureus), Seasonal allergies, and Vasculopathy. Past Surgical History:  has a past surgical history that includes Tonsillectomy and adenoidectomy; Percutaneous Transluminal Coronary Angio; Carotid endarterectomy (08); IR Femoral Popliteal Bypass Graft ( Dr Tayler Hughes); Femoral-tibial Bypass Graft (07  [place] ); Balloon angioplasty, artery (11/23/10 Allie Muñoz); Aorto-femoral Bypass Graft (2011-[facility]); Abdominal adhesion surgery (2011-[facility]); Coronary artery bypass graft (); and Axillary-femoral Bypass Graft (Bilateral, 2022). Assessment   Performance deficits / Impairments: Decreased functional mobility ; Decreased balance;Decreased ADL status; Decreased endurance;Decreased high-level IADLs;Decreased cognition  Assessment: Pt agreeable to OT eval this date. Pt demonstrated bed mobility with modA for assist with trunk progression, able to progress BLE.  Pt demonstrated static/dynamic sitting balance at EOB with CGA, intermittently progressing to SBA. Pt attempted to don socks using figure four technique, requiring max A to complete. Pt attempted sit<>stand functional transfer with use of sarastedy and was unable to achieve. OT raised the bed, and pt was able to complete transfer with max A x2 and use of sarastedy. Pt demonstrated decreased arousal and  became unresponsive upon standing in sarastedy, nurse notified and present. Pt was later able to report he became dizzy. Pt was responsive, returned to lying supine in bed with max A x2 for assist with LE and trunk progression. Pt completed oral hygiene and face washing with HOB elevated, with mod I after setup, requiring increased time to complete. Pt demonstrated deficits in cognition, endurance, and balance inhibiting engagement in ADLS/IADLs. Pt will require continued OT services during acute hospitalization to address the above noted deficits with skilled OT interventions to increase engagement and independence in ADLs/IADLs. Prognosis: Good  Decision Making: Medium Complexity  REQUIRES OT FOLLOW-UP: Yes  Activity Tolerance  Activity Tolerance Comments: Pt limited d/t decrease in medical status during session        Plan   Plan  Times per Week: 3-5x/wk  Current Treatment Recommendations: Balance training, Functional mobility training, Endurance training, Safety education & training, Cognitive reorientation, Patient/Caregiver education & training, Equipment evaluation, education, & procurement, Self-Care / ADL, Home management training     Restrictions  Restrictions/Precautions  Restrictions/Precautions: Fall Risk  Required Braces or Orthoses?: No  Position Activity Restriction  Other position/activity restrictions: up w/ assist, R side deficits from prior CVA    Subjective   General  Patient assessed for rehabilitation services?: Yes  Family / Caregiver Present: No  General Comment  Comments: RN ok'd OT eval this date. Pt agreeable to OT session. Pt cooperative/pleasant throughout.  Pt reports 4/10 pain in hip. Social/Functional History  Social/Functional History  Lives With:  (Pt denies that [facility] can take care of pt and it is no longer an option for discharge. Reports is unsure where he will discharge after the facility. Current plan is to return to facility.)  Type of Home: Facility  Home Layout: One level  Home Access: Level entry  Entrance Stairs - Number of Steps: 1  Bathroom Shower/Tub: Tub/Shower unit  [employer]:  (Unsure, pt reports only using bedpan at facility)  Bathroom Equipment: Tub transfer bench  Home Equipment: [employer], quad, Wheelchair-manual (At home was using quad cane for short distances and WC for community distances. Pt reports at facility was working on transfers but was not able to walk yet.)  Receives Help From: Family  ADL Assistance: Needs assistance (Prior to July pt was completly IND with ADLs. Has required significant assistance in the facility for UB/LB dressing/bathing. Able to complete grooming and feeding with Mod IND.)  Bath: Moderate assistance  Dressing: Moderate assistance  Grooming: Moderate assistance  Feeding: Independent  Toileting: Needs assistance (Was using bedpan at facility)  Homemaking Assistance: Needs assistance (Facility completes. Prior to July pt reports completed all homemaking tasks IND.)  Homemaking Responsibilities:  (Facility completes. Prior to July pt reports completed all homemaking tasks IND.)  Ambulation Assistance: Needs assistance (Prior to July was IND)  Transfer Assistance: Needs assistance (Prior to July was IND)  Active : No  Patient's  Info: dtr drives  Mode of Transportation: Family, Car  Occupation: Retired  Type of Occupation: Contractor  Leisure & Hobbies: Enjoys computer games  Additional Comments: Pt questionable historian this date. Objective   Safety Devices  Type of Devices: Gait belt;Nurse notified; Left in bed;Bed alarm in place;Call light within reach  Restraints  Restraints Initially in Place: No  Balance  Sitting:  (Pt demonstrated static/dynamic sitting balance at EOB with CGA progressing intermittently to SBA. Pt demonstrated ~7 minutes of sitting tolerance.)  Standing:  (Pt seated on sarastedy paddles with CGA and LUE support on bar for ~3 minutes. Pt had decreased arousal and demonstrated decreased medical status upon standing with sarastedy. Nurse notifed and present. Pt retired to lying supine in bed.)  Gait  Overall Level of Assistance:  (Not formally assessed d/t decreased medical status upon standing.)     AROM: Generally decreased, functional (RUE no AROM, LUE WFL)  Strength: Generally decreased, functional (LUE grossly 4/5, RUE not formally assessed d/t RUE deficits from prior CVA. B  strength 4/5)  Coordination: Generally decreased, functional (Pt demonstrated finger to thumb opposition on both hands, with slight decreased coordination on RUE.)  Tone: Normal  Sensation: Intact (Denies any numbness/tingling)  ADL  Feeding: Setup;Modified independent ; Increased time to complete  Grooming: Increased time to complete;Modified independent ;Setup  Grooming Skilled Clinical Factors: Pt completed oral hygiene and face washing task while lying supine in bed with HOB elevated with Lorenzo after setup, requiring increased time to complete. UE Bathing: Minimal assistance;Setup; Increased time to complete  LE Bathing: Maximum assistance; Increased time to complete;Setup  UE Dressing: Contact guard assistance;Setup; Increased time to complete  LE Dressing: Maximum assistance; Increased time to complete;Setup  LE Dressing Skilled Clinical Factors: Pt attempted to don sock while seated at EOB using the figure four technique. Pt required assist to maintain figure four position, and unable to don sock, requiring max A. Toileting: Maximum assistance;Setup; Increased time to complete     Activity Tolerance  Activity Tolerance: Patient limited by endurance; Patient limited by pain  Bed mobility  Supine to Sit: Moderate assistance (Assist for trunk progression; pt able to assist with LE progression)  Sit to Supine: Maximum assistance;2 Person assistance (Assist with LE and trunk progression d/t decreased medical status throughout session)  Bed Mobility Comments: HOB elevated, VCs for sequencing  Transfers  Sit to stand: Maximum assistance;2 Person assistance  Stand to sit: Maximum assistance;2 Person assistance  Transfer Comments: Use of sarastedy  Vision  Vision: Impaired  Vision Exceptions: Wears glasses for reading  Hearing  Hearing: Within functional limits  Cognition  Overall Cognitive Status: Exceptions  Arousal/Alertness: Delayed responses to stimuli  Following Commands: Follows one step commands with increased time; Follows one step commands with repetition  Attention Span: Difficulty dividing attention; Attends with cues to redirect  Memory: Decreased recall of recent events  Safety Judgement: Decreased awareness of need for assistance  Problem Solving: Assistance required to generate solutions;Assistance required to correct errors made  Insights: Decreased awareness of deficits  Initiation: Requires cues for some  Sequencing: Requires cues for some  Orientation  Overall Orientation Status: Impaired  Orientation Level: Oriented to person;Oriented to place; Disoriented to situation;Oriented to time                  Education Given To: Patient  Education Provided Comments: Pt educated on OT role, OT POC, transfer training, safety awareness - good return  Education Method: Demonstration;Verbal  Barriers to Learning: None  Education Outcome: Verbalized understanding;Demonstrated understanding  LUE AROM (degrees)  LUE AROM : WFL  Left Hand AROM (degrees)  Left Hand AROM: WFL  RUE PROM (degrees)  RUE PROM: Exceptions  RUE General PROM: Elbow unable to fully extend elbow after prolonged stretch. Pt demonstrated ~70 degrees of shoulder flex.   RUE AROM (degrees)  RUE AROM : Exceptions  RUE General AROM: Pt unable to complete AROM d/t deficits from prior CVA  Right Hand AROM (degrees)  Right Hand AROM: WFL        Hand Dominance  Hand Dominance: Right (Pt reports being R hand dominant, however has learned how to use L hand since prior CVA)       AM-PAC Score        AM-PAC Inpatient Daily Activity Raw Score: 17 (08/16/22 1422)  AM-PAC Inpatient ADL T-Scale Score : 37.26 (08/16/22 1422)  ADL Inpatient CMS 0-100% Score: 50.11 (08/16/22 1422)  ADL Inpatient CMS G-Code Modifier : CK (08/16/22 1422)           Goals  Short Term Goals  Time Frame for Short term goals: Before discharge, pt will:  Short Term Goal 1: demo bed mobility with min A to promote increased engagement in ADLs/IADLs  Short Term Goal 2: demo functional transfers with mod A x2 and LRD PRN  Short Term Goal 3: demo 2 minutes of standing tolerance with mod A and LRD PRN  Short Term Goal 4: demo UB ADLs with SBA and use of adaptive techniques  Short Term Goal 5: demo LB ADLs with mod A and use of DME PRN  Short Term Goal 6: follow 80% of two step commands to address cognitive function       Therapy Time   Individual Concurrent Group Co-treatment   Time In 1018         Time Out 1104         Minutes 46          Timed Code Treatment Minutes: 38 minutes       [facility]ins

## 2022-08-16 NOTE — PROGRESS NOTES
both times patient has got up with therapy in the sera stedy he has gotten very dizzy for a few seconds and comes back to, we are going to get orthos with therapy tomorrow because he is a heavy 2 assit - notified primary

## 2022-08-16 NOTE — H&P
Patient from [facility] does not want him to return.  Spoke to reema [phone] with [facility] faxed h&p and PT eval    14:52 call from reema with [facility] can accept  Call from [address] [place] [address] [phone] requesting updated physician progress and Ot notes be faxed to [phone]

## 2022-08-16 NOTE — CONSULTS
[facility] Neurology   [address] [facility]    Neurology Consult Note            Date:   8/16/2022  Patient name:  Joi Gautam  Date of admission:  8/12/2022  5:34 PM  MRN:   [id number]  Account:  [de-identified]  YOB: 1960  PCP:    No primary care provider on file. Room:   [address] [place] [address]  Code Status:    Full Code    Chief Complaint:     Dysarthria/stuttering    History Obtained From:     patient, electronic medical record    History of Present Illness: The patient is a 58 y.o. male with significant past medical history of anxiety, HTN, HLD, T2DM, CAD, PAD, A. 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 Patient underwent aortobifemoral thrombectomy on 7/8/2022 due to extreme lower extremity ischemia from graft occlusion. Patient was subsequently discharged to [facility] where he had been staying, subsequently developed postop wound infection and was brought in for evaluation and treatment. He was under treatment by medicine and vascular surgery team, currently on doxycycline for treatment of infection. Neurology was consulted due to concern for acute onset stuttering and altered mentation this afternoon. Per nursing staff patient had a drop in blood pressure down to 73/45 this afternoon around 4 PM and developed stuttering at the same time. Primary team was notified and ordered 500 cc bolus of normal saline, after that blood pressure increased to 120/60. Lab work was also ordered. Patient denies any new symptoms, states his right-sided weakness is at baseline. He also denies any change in his speech, states that he has been told that his speech is different but he has not noticed any change. Patient does endorse prior history of CVA in 2018, has residual RUE spastic paresis and RLE plegia. He denies any other residual deficits.   States he is unsure why he is on Eliquis but does state that he was diagnosed with A. fib a few years ago when he was in the hospital for cardiac issues. He denies any noncompliance with medications. Past Medical History:     Past Medical History:   Diagnosis Date    Anxiety     Arthritis associated with another disorder     CAD (coronary artery disease)     with history of multiple MI    Carotid artery occlusion     Family history of kidney stone     HTN (hypertension)     Hyperkalemia     Hyperlipidemia     Hypertension     Nephrolithiasis     multiple times    Neuropathy     PAD (peripheral artery disease) (HCC)     Peripheral vascular disease (HCC)     Personal history of MRSA (methicillin resistant Staphylococcus aureus)     Seasonal allergies     Vasculopathy         Past Surgical History:     Past Surgical History:   Procedure Laterality Date    ABDOMINAL ADHESION SURGERY  2/11/2011-[facility]    Reexploration of abdominal wound and reclosure of the abdominal wound with fascial sutures and retention sutures as well    AORTO-FEMORAL BYPASS GRAFT  2/11/2011-[facility]    ABF bypass of 16x8 PTFE graft. This is an end-to-side anastomosis proximally    AXILLARY-FEMORAL BYPASS GRAFT Bilateral 7/8/2022    AORTA BIFEMORAL GRAFT ENDARTERECTOMY, BILATERAL COMMON FEMORAL EMBOLECTOMY, BILATERAL LOWER EXTREMEITY ARTERECTOMY, AORTAGRAPHY, RIGHT LIMB OF BYPASS GRAFT STENT, BILATERAL FEMORAL BOVINE PATCHES performed by Georgi Wilde MD at [place] [address], ARTERY  11/23/10 [address]    1. Placement of 5 sheath in the left femoral artery with duplex guidance. 2. Left Iliac arteriogram. 3. Attempted ballon angioplasty left iliac artery occlusion.     CAROTID ENDARTERECTOMY  07/19/08    Left carotid endarterectomy using shunt dacron patch aplasty    CORONARY ARTERY BYPASS GRAFT  2008    CABG x3     FEMORAL-TIBIAL BYPASS GRAFT  12/31/07  [facility]     Right femoral to posterior tibial artery bypass graft with in situ saphenous vein graft    IR FEMORAL POPLITEAL BYPASS GRAFT  01/24/208 Dr Kevin Nichole    1. Exploration of right fem-pop bypass graft. 2.Thrombectomy of right fem-post-tib saph vein graft. 3. Replacement of femoral to popliteal bypass graft from midthigh to near the anastomosis by reversed greater saph vein harvested from left leg    PTCA      PTCA with stent x6    TONSILLECTOMY AND ADENOIDECTOMY          Medications Prior to Admission:     Prior to Admission medications    Medication Sig Start Date End Date Taking? Authorizing Provider   atorvastatin (LIPITOR) 10 MG tablet Take 1 tablet by mouth in the morning. 7/21/22   Max Malik MD   metoprolol succinate (TOPROL XL) 25 MG extended release tablet Take 0.5 tablets by mouth in the morning and at bedtime 7/20/22   Max Malik MD   torsemide (DEMADEX) 20 MG tablet Take 1 tablet by mouth in the morning. 7/21/22   Max Malik MD   melatonin 5 MG TABS tablet Take 1 tablet by mouth nightly 7/20/22   Max Malik MD   baclofen (LIORESAL) 10 MG tablet Take 10 mg by mouth in the morning and 10 mg at noon and 10 mg before bedtime. Historical Provider, MD   clopidogrel (PLAVIX) 75 MG tablet Take 75 mg by mouth in the morning. Historical Provider, MD   ezetimibe (ZETIA) 10 MG tablet Take 10 mg by mouth in the morning. Historical Provider, MD   apixaban (ELIQUIS) 5 MG TABS tablet Take 1 tablet by mouth in the morning and 1 tablet before bedtime. 7/20/22   Max Malik MD   fenofibrate (TRICOR) 145 MG tablet Take 145 mg by mouth in the morning.  7/20/22  Historical Provider, MD   isosorbide mononitrate (IMDUR) 30 MG CR tablet Take 30 mg by mouth daily. Historical Provider, MD   mirtazapine (REMERON) 30 MG tablet Take 1 tablet by mouth nightly. 9/13/12   Shira Mcpherson MD   tamsulosin (FLOMAX) 0.4 MG capsule Take 1 capsule by mouth daily. 9/13/12   Shira Mcpherson MD   lisinopril (PRINIVIL;ZESTRIL) 10 MG tablet Take 0.5 tablets by mouth daily.  9/13/12 7/20/22  Tomer MD Hari   dipyridamole-aspirin (AGGRENOX)  MG per SR capsule Take 1 capsule by mouth daily. Patient not taking: Reported on 7/20/2022 9/13/12 7/20/22  Kane Youngblood MD   simvastatin (ZOCOR) 40 MG tablet Take 1 tablet by mouth nightly. Patient not taking: Reported on 7/20/2022 9/13/12 7/20/22  Kane Youngblood MD   varenicline (CHANTIX STARTING MONTH PAK) 0.5 MG X 11 & 1 MG X 42 tablet Take by mouth. Patient not taking: Reported on 7/20/2022 9/13/12 7/20/22  Kane Youngblood MD   metoprolol (LOPRESSOR) 25 MG tablet Take 1 tablet by mouth 2 times daily. Takes 1/2 tab 2 times a day  Patient not taking: Reported on 7/20/2022 9/13/12 7/20/22  Kane Youngblood MD   TRUETEST TEST strip TEST 2 TIMES DAILY 4/30/12   Ford Aguilar MD        Allergies:     Faviola [morphine sulfate], Methadone, and Morphine    Social History:     Tobacco:    reports that he quit smoking about 11 years ago. His smoking use included cigarettes. He has a 66.00 pack-year smoking history. He has never used smokeless tobacco.  Alcohol:      reports no history of alcohol use. Drug Use:  reports no history of drug use. Family History:     Family History   Problem Relation Age of Onset    Hypertension Father     Heart Disease Sister     Hypertension Sister     Cancer Mother 28        breast cancer       Review of Systems:     Review of Systems   Constitutional:  Negative for activity change, appetite change, chills, diaphoresis and fever. Respiratory:  Negative for apnea, cough, chest tightness, shortness of breath and wheezing. Cardiovascular:  Negative for chest pain and palpitations. Gastrointestinal:  Negative for abdominal distention, abdominal pain, constipation, diarrhea, nausea and vomiting. Genitourinary:  Negative for difficulty urinating, dysuria and frequency. Musculoskeletal:  Positive for myalgias. Negative for arthralgias. Neurological:  Negative for dizziness, light-headedness and headaches. Psychiatric/Behavioral:  Negative for agitation and confusion. All other systems reviewed and are negative.       Physical Exam:   /65   Pulse 81   Temp 99 °F (37.2 °C) (Oral)   Resp 16   Wt 174 lb 9.7 oz (79.2 kg)   SpO2 96%   BMI 24.35 kg/m²   Temp (24hrs), Av °F (36.7 °C), Min:97.2 °F (36.2 °C), Max:99 °F (37.2 °C)    Recent Labs     22  0803 22  1205 22  1639 22  2042   POCGLU 114* 143* 176* 149*       Intake/Output Summary (Last 24 hours) at 2022 2253  Last data filed at 2022 1822  Gross per 24 hour   Intake 440 ml   Output 1200 ml   Net -760 ml         Neurologic Exam    GENERAL  Appears comfortable and in no distress   HEENT  NC/ AT   HEART  S1 and S2 heard; palpation of pulses: radial pulse    NECK  Supple and no bruits heard   MENTAL STATUS:  Alert, oriented, intact memory, no confusion, normal speech, normal language, no hallucination or delusion   CRANIAL NERVES: II     -      Visual fields intact to confrontation  III,IV,VI -  PERR, EOMs full, no ptosis  V     -     Normal facial sensation   VII    -     Normal facial symmetry  VIII   -     Intact hearing   IX,X -     Symmetrical palate  XI    -     Symmetrical shoulder shrug  XII   -     Midline tongue, no atrophy    MOTOR FUNCTION: RUE: Significant for good strength of grade 4-/5 in proximal and 3/5 in distal muscle groups   LUE: Significant for good strength of grade 5/5 in proximal and distal muscle groups   RLE: Significant for good strength of grade 0/5 in proximal and distal muscle groups   LLE: Significant for good strength of grade 5/5 in proximal and distal muscle groups      Normal bulk, normal tone and no involuntary movements, no tremor   SENSORY FUNCTION: No sensation in the right upper and lower extremity   CEREBELLAR FUNCTION:  Intact fine motor control over upper limbs and lower limbs   REFLEX FUNCTION:  Symmetric in upper and lower extremities, no Babinski sign   STATION and GAIT Deferred       Investigations:      Laboratory Testing:  Recent Results (from the past 24 hour(s))   POC Glucose Fingerstick    Collection Time: 08/16/22  8:03 AM   Result Value Ref Range    POC Glucose 114 (H) 75 - 110 mg/dL   POC Glucose Fingerstick    Collection Time: 08/16/22 12:05 PM   Result Value Ref Range    POC Glucose 143 (H) 75 - 110 mg/dL   POC Glucose Fingerstick    Collection Time: 08/16/22  4:39 PM   Result Value Ref Range    POC Glucose 176 (H) 75 - 110 mg/dL   Urinalysis with Reflex to Culture    Collection Time: 08/16/22  6:33 PM    Specimen: Urine   Result Value Ref Range    Color, UA Yellow Yellow    Turbidity UA Clear Clear    Glucose, Ur NEGATIVE NEGATIVE    Bilirubin Urine NEGATIVE NEGATIVE    Ketones, Urine NEGATIVE NEGATIVE    Specific Gravity, UA 1.013 1.005 - 1.030    Urine Hgb NEGATIVE NEGATIVE    pH, UA 5.0 5.0 - 8.0    Protein, UA NEGATIVE NEGATIVE    Urobilinogen, Urine Normal Normal    Nitrite, Urine NEGATIVE NEGATIVE    Leukocyte Esterase, Urine NEGATIVE NEGATIVE    Urinalysis Comments       Microscopic exam not performed based on chemical results unless requested in original order.    EKG 12 Lead    Collection Time: 08/16/22  6:41 PM   Result Value Ref Range    Ventricular Rate 78 BPM    Atrial Rate 78 BPM    P-R Interval 150 ms    QRS Duration 80 ms    Q-T Interval 398 ms    QTc Calculation (Bazett) 453 ms    P Axis 32 degrees    R Axis 14 degrees    T Axis 163 degrees   CBC with Auto Differential    Collection Time: 08/16/22  6:50 PM   Result Value Ref Range    WBC 9.0 3.5 - 11.3 k/uL    RBC 3.48 (L) 4.21 - 5.77 m/uL    Hemoglobin 10.6 (L) 13.0 - 17.0 g/dL    Hematocrit 31.7 (L) 40.7 - 50.3 %    MCV 91.1 82.6 - 102.9 fL    MCH 30.5 25.2 - 33.5 pg    MCHC 33.4 28.4 - 34.8 g/dL    RDW 14.5 (H) 11.8 - 14.4 %    Platelets 922 527 - 650 k/uL    MPV 11.4 8.1 - 13.5 fL    NRBC Automated 0.0 0.0 per 100 WBC    Seg Neutrophils 66 (H) 36 - 65 %    Lymphocytes 23 (L) 24 - 43 %    Monocytes 8 3 - 12 %    Eosinophils % 1 1 - 4 %    Basophils 1 0 - 2 %    Immature Granulocytes 1 (H) 0 %    Segs Absolute 6.01 1.50 - 8.10 k/uL    Absolute Lymph # 2.01 1.10 - 3.70 k/uL    Absolute Mono # 0.69 0.10 - 1.20 k/uL    Absolute Eos # 0.11 0.00 - 0.44 k/uL    Basophils Absolute 0.08 0.00 - 0.20 k/uL    Absolute Immature Granulocyte 0.05 0.00 - 0.30 k/uL    RBC Morphology ANISOCYTOSIS PRESENT    Comprehensive Metabolic Panel w/ Reflex to MG    Collection Time: 08/16/22  6:50 PM   Result Value Ref Range    Glucose 189 (H) 70 - 99 mg/dL    BUN 29 (H) 8 - 23 mg/dL    Creatinine 1.31 (H) 0.70 - 1.20 mg/dL    Calcium 8.1 (L) 8.6 - 10.4 mg/dL    Sodium 133 (L) 135 - 144 mmol/L    Potassium 3.5 (L) 3.7 - 5.3 mmol/L    Chloride 100 98 - 107 mmol/L    CO2 18 (L) 20 - 31 mmol/L    Anion Gap 15 9 - 17 mmol/L    Alkaline Phosphatase 166 (H) 40 - 129 U/L    ALT 29 5 - 41 U/L    AST 22 <40 U/L    Total Bilirubin 0.26 (L) 0.3 - 1.2 mg/dL    Total Protein 4.8 (L) 6.4 - 8.3 g/dL    Albumin 2.3 (L) 3.5 - 5.2 g/dL    Albumin/Globulin Ratio 0.9 (L) 1.0 - 2.5    GFR Non-African American 55 (L) >60 mL/min    GFR African American >60 >60 mL/min    GFR Comment         Magnesium    Collection Time: 08/16/22  6:50 PM   Result Value Ref Range    Magnesium 1.6 1.6 - 2.6 mg/dL   Phosphorus    Collection Time: 08/16/22  6:50 PM   Result Value Ref Range    Phosphorus 3.6 2.5 - 4.5 mg/dL   Troponin    Collection Time: 08/16/22  6:50 PM   Result Value Ref Range    Troponin, High Sensitivity 229 (HH) 0 - 22 ng/L   POC Glucose Fingerstick    Collection Time: 08/16/22  8:42 PM   Result Value Ref Range    POC Glucose 149 (H) 75 - 110 mg/dL   APTT    Collection Time: 08/16/22  9:12 PM   Result Value Ref Range    PTT 28.4 20.5 - 30.5 sec   Lactic Acid    Collection Time: 08/16/22  9:12 PM   Result Value Ref Range    Lactic Acid, Whole Blood 2.5 (H) 0.7 - 2.1 mmol/L   EKG 12 Lead    Collection Time: 08/16/22  9:49 PM   Result Value Ref Range    Ventricular Rate 76 BPM    Atrial Rate 76 BPM    P-R Interval 158 ms    QRS Duration 84 ms    Q-T Interval 380 ms    QTc Calculation (Bazett) 427 ms    P Axis 91 degrees    R Axis -4 degrees    T Axis 141 degrees         Assessment :      Primary Problem  Surgical wound infection    Active Hospital Problems    Diagnosis Date Noted    [facility]per[facility] ([facility] [I73.9]      Priority: High    Hypertension [I10]      Priority: High    Wound infection after surgery [T81.49XA] 08/14/2022     Priority: Medium    Leukocytosis [D72.829] 08/13/2022     Priority: Medium    Surgical wound infection [T81.49XA] 08/12/2022     Priority: Medium    Type 2 diabetes mellitus with circulatory disorder, without long-term current use of insulin ([facility] [E11.59] 08/12/2022     Priority: Medium    Hyperlipidemia [E78.5] 12/27/2011       Patient is a 58 y.o. Non- / non  male with prior history of A. fib on Eliquis, CVA in 2018 with residual RUE paresis and RLE plegia, presented with LLE postop wound infection. Currently under treatment with antibiotics. He had a hypotensive episode this afternoon and subsequently started to stutter per nursing staff. Received IV fluid bolus and neurology team was consulted. Upon evaluation patient is at baseline with no clear stuttering or dysarthria. No new neurologic deficits at this time. We will hold off on MRI. Carotid Doppler due to history of carotid occlusion. Acute onset stuttering in the setting of hypotension, resolved  LLE surgical wound infection  History of atrial fibrillation, on Eliquis  History of CVA with residual right-sided weakness    Plan:       Patient appears to be at baseline with no new deficits  Carotid Doppler ordered due to history of carotid stenosis/occlusion  May consider MRI brain in the morning if symptoms recur  Continue Eliquis 5 mg twice daily for history of A.  Fib  Continue aspirin 81 mg daily  PT/OT  Rest of medical management per primary team      Consultations:   IP CONSULT TO VASCULAR SURGERY  IP CONSULT TO CASE MANAGEMENT  IP CONSULT TO INFECTIOUS DISEASES  IP CONSULT TO NEUROLOGY  IP CONSULT TO INTERNAL MEDICINE  IP CONSULT TO CARDIOLOGY      Follow-up further recommendations after discussing the case with attending  The plan was discussed with the patient, patient's family and the medical staff. Patient is admitted as inpatient status because of co-morbidities listed above, severity of signs and symptoms as outlined, requirement for current medical therapies and most importantly because of direct risk to patient if care not provided in a hospital setting. Isai Mancilla MD  8/16/2022  10:53 PM    Copy sent to Dr. Vogt primary care provider on file.

## 2022-08-16 NOTE — PROGRESS NOTES
patient rating pain 10/10, states we are treating him like a junky and wont give anything to him- messaged primary

## 2022-08-17 LAB
ABSOLUTE EOS #: 0.23 K/UL (ref 0–0.44)
ABSOLUTE IMMATURE GRANULOCYTE: 0.05 K/UL (ref 0–0.3)
ABSOLUTE LYMPH #: 2.14 K/UL (ref 1.1–3.7)
ABSOLUTE MONO #: 0.67 K/UL (ref 0.1–1.2)
ANION GAP SERPL CALCULATED.3IONS-SCNC: 10 MMOL/L (ref 9–17)
BASOPHILS # BLD: 1 % (ref 0–2)
BASOPHILS ABSOLUTE: 0.11 K/UL (ref 0–0.2)
BUN BLDV-MCNC: 26 MG/DL (ref 8–23)
CALCIUM IONIZED: 1.18 MMOL/L (ref 1.13–1.33)
CALCIUM SERPL-MCNC: 8.5 MG/DL (ref 8.6–10.4)
CHLORIDE BLD-SCNC: 103 MMOL/L (ref 98–107)
CO2: 23 MMOL/L (ref 20–31)
CREAT SERPL-MCNC: 1.03 MG/DL (ref 0.7–1.2)
EKG ATRIAL RATE: 78 BPM
EKG ATRIAL RATE: 85 BPM
EKG P AXIS: 32 DEGREES
EKG P AXIS: 90 DEGREES
EKG P-R INTERVAL: 150 MS
EKG P-R INTERVAL: 154 MS
EKG Q-T INTERVAL: 398 MS
EKG Q-T INTERVAL: 418 MS
EKG QRS DURATION: 80 MS
EKG QRS DURATION: 86 MS
EKG QTC CALCULATION (BAZETT): 453 MS
EKG QTC CALCULATION (BAZETT): 497 MS
EKG R AXIS: -4 DEGREES
EKG R AXIS: 14 DEGREES
EKG T AXIS: 163 DEGREES
EKG T AXIS: 171 DEGREES
EKG VENTRICULAR RATE: 78 BPM
EKG VENTRICULAR RATE: 85 BPM
EOSINOPHILS RELATIVE PERCENT: 3 % (ref 1–4)
GFR AFRICAN AMERICAN: >60 ML/MIN
GFR NON-AFRICAN AMERICAN: >60 ML/MIN
GFR SERPL CREATININE-BSD FRML MDRD: ABNORMAL ML/MIN/{1.73_M2}
GLUCOSE BLD-MCNC: 115 MG/DL (ref 70–99)
GLUCOSE BLD-MCNC: 116 MG/DL (ref 75–110)
GLUCOSE BLD-MCNC: 117 MG/DL (ref 75–110)
GLUCOSE BLD-MCNC: 170 MG/DL (ref 75–110)
GLUCOSE BLD-MCNC: 96 MG/DL (ref 75–110)
HCT VFR BLD CALC: 33.6 % (ref 40.7–50.3)
HEMOGLOBIN: 10.7 G/DL (ref 13–17)
IMMATURE GRANULOCYTES: 1 %
LACTIC ACID, WHOLE BLOOD: 1.4 MMOL/L (ref 0.7–2.1)
LYMPHOCYTES # BLD: 25 % (ref 24–43)
MAGNESIUM: 2.2 MG/DL (ref 1.6–2.6)
MCH RBC QN AUTO: 29.8 PG (ref 25.2–33.5)
MCHC RBC AUTO-ENTMCNC: 31.8 G/DL (ref 28.4–34.8)
MCV RBC AUTO: 93.6 FL (ref 82.6–102.9)
MONOCYTES # BLD: 8 % (ref 3–12)
NRBC AUTOMATED: 0 PER 100 WBC
PARTIAL THROMBOPLASTIN TIME: 35 SEC (ref 20.5–30.5)
PARTIAL THROMBOPLASTIN TIME: 44.3 SEC (ref 20.5–30.5)
PDW BLD-RTO: 14.5 % (ref 11.8–14.4)
PHOSPHORUS: 3.7 MG/DL (ref 2.5–4.5)
PLATELET # BLD: 425 K/UL (ref 138–453)
PMV BLD AUTO: 11.3 FL (ref 8.1–13.5)
POTASSIUM SERPL-SCNC: 3.3 MMOL/L (ref 3.7–5.3)
POTASSIUM SERPL-SCNC: 4.5 MMOL/L (ref 3.7–5.3)
RBC # BLD: 3.59 M/UL (ref 4.21–5.77)
RBC # BLD: ABNORMAL 10*6/UL
REASON FOR REJECTION: NORMAL
SEG NEUTROPHILS: 62 % (ref 36–65)
SEGMENTED NEUTROPHILS ABSOLUTE COUNT: 5.49 K/UL (ref 1.5–8.1)
SODIUM BLD-SCNC: 136 MMOL/L (ref 135–144)
TROPONIN, HIGH SENSITIVITY: 235 NG/L (ref 0–22)
WBC # BLD: 8.7 K/UL (ref 3.5–11.3)
ZZ NTE CLEAN UP: ORDERED TEST: NORMAL
ZZ NTE WITH NAME CLEAN UP: SPECIMEN SOURCE: NORMAL

## 2022-08-17 PROCEDURE — 6370000000 HC RX 637 (ALT 250 FOR IP): Performed by: STUDENT IN AN ORGANIZED HEALTH CARE EDUCATION/TRAINING PROGRAM

## 2022-08-17 PROCEDURE — 36415 COLL VENOUS BLD VENIPUNCTURE: CPT

## 2022-08-17 PROCEDURE — 82947 ASSAY GLUCOSE BLOOD QUANT: CPT

## 2022-08-17 PROCEDURE — 97530 THERAPEUTIC ACTIVITIES: CPT

## 2022-08-17 PROCEDURE — 85025 COMPLETE CBC W/AUTO DIFF WBC: CPT

## 2022-08-17 PROCEDURE — 99212 OFFICE O/P EST SF 10 MIN: CPT

## 2022-08-17 PROCEDURE — 6360000002 HC RX W HCPCS: Performed by: INTERNAL MEDICINE

## 2022-08-17 PROCEDURE — 6370000000 HC RX 637 (ALT 250 FOR IP): Performed by: PHYSICIAN ASSISTANT

## 2022-08-17 PROCEDURE — 6360000002 HC RX W HCPCS

## 2022-08-17 PROCEDURE — 6370000000 HC RX 637 (ALT 250 FOR IP): Performed by: NURSE PRACTITIONER

## 2022-08-17 PROCEDURE — 84132 ASSAY OF SERUM POTASSIUM: CPT

## 2022-08-17 PROCEDURE — 85730 THROMBOPLASTIN TIME PARTIAL: CPT

## 2022-08-17 PROCEDURE — 2580000003 HC RX 258

## 2022-08-17 PROCEDURE — 84100 ASSAY OF PHOSPHORUS: CPT

## 2022-08-17 PROCEDURE — 84484 ASSAY OF TROPONIN QUANT: CPT

## 2022-08-17 PROCEDURE — 2580000003 HC RX 258: Performed by: NURSE PRACTITIONER

## 2022-08-17 PROCEDURE — 1200000000 HC SEMI PRIVATE

## 2022-08-17 PROCEDURE — 93010 ELECTROCARDIOGRAM REPORT: CPT | Performed by: INTERNAL MEDICINE

## 2022-08-17 PROCEDURE — 99232 SBSQ HOSP IP/OBS MODERATE 35: CPT | Performed by: PHYSICIAN ASSISTANT

## 2022-08-17 PROCEDURE — 83735 ASSAY OF MAGNESIUM: CPT

## 2022-08-17 PROCEDURE — 99232 SBSQ HOSP IP/OBS MODERATE 35: CPT | Performed by: INTERNAL MEDICINE

## 2022-08-17 PROCEDURE — 83605 ASSAY OF LACTIC ACID: CPT

## 2022-08-17 PROCEDURE — 93880 EXTRACRANIAL BILAT STUDY: CPT

## 2022-08-17 PROCEDURE — 82330 ASSAY OF CALCIUM: CPT

## 2022-08-17 PROCEDURE — 80048 BASIC METABOLIC PNL TOTAL CA: CPT

## 2022-08-17 RX ORDER — MIDODRINE HYDROCHLORIDE 5 MG/1
5 TABLET ORAL
Status: DISCONTINUED | OUTPATIENT
Start: 2022-08-17 | End: 2022-08-17

## 2022-08-17 RX ORDER — POTASSIUM CHLORIDE 1.5 G/1.77G
40 POWDER, FOR SOLUTION ORAL ONCE
Status: DISCONTINUED | OUTPATIENT
Start: 2022-08-17 | End: 2022-08-17 | Stop reason: SDUPTHER

## 2022-08-17 RX ORDER — MIDODRINE HYDROCHLORIDE 5 MG/1
5 TABLET ORAL 3 TIMES DAILY PRN
Status: DISCONTINUED | OUTPATIENT
Start: 2022-08-17 | End: 2022-08-20 | Stop reason: HOSPADM

## 2022-08-17 RX ADMIN — DOXYCYCLINE HYCLATE 100 MG: 100 TABLET, COATED ORAL at 08:39

## 2022-08-17 RX ADMIN — TRAMADOL HYDROCHLORIDE 50 MG: 50 TABLET, COATED ORAL at 23:56

## 2022-08-17 RX ADMIN — GABAPENTIN 300 MG: 300 CAPSULE ORAL at 14:10

## 2022-08-17 RX ADMIN — BACLOFEN 10 MG: 10 TABLET ORAL at 14:08

## 2022-08-17 RX ADMIN — HEPARIN SODIUM 2000 UNITS: 1000 INJECTION INTRAVENOUS; SUBCUTANEOUS at 18:39

## 2022-08-17 RX ADMIN — GABAPENTIN 300 MG: 300 CAPSULE ORAL at 06:18

## 2022-08-17 RX ADMIN — MIDODRINE HYDROCHLORIDE 5 MG: 5 TABLET ORAL at 12:47

## 2022-08-17 RX ADMIN — OXYCODONE 5 MG: 5 TABLET ORAL at 18:16

## 2022-08-17 RX ADMIN — Medication 4.5 MG: at 21:07

## 2022-08-17 RX ADMIN — SODIUM CHLORIDE, PRESERVATIVE FREE 10 ML: 5 INJECTION INTRAVENOUS at 21:08

## 2022-08-17 RX ADMIN — ACETAMINOPHEN 1000 MG: 500 TABLET ORAL at 06:18

## 2022-08-17 RX ADMIN — ISOSORBIDE MONONITRATE 30 MG: 30 TABLET ORAL at 08:34

## 2022-08-17 RX ADMIN — CLOPIDOGREL 75 MG: 75 TABLET, FILM COATED ORAL at 08:41

## 2022-08-17 RX ADMIN — MAGNESIUM SULFATE HEPTAHYDRATE 2000 MG: 40 INJECTION, SOLUTION INTRAVENOUS at 00:53

## 2022-08-17 RX ADMIN — DOCUSATE SODIUM 50 MG AND SENNOSIDES 8.6 MG 2 TABLET: 8.6; 5 TABLET, FILM COATED ORAL at 08:36

## 2022-08-17 RX ADMIN — METOPROLOL SUCCINATE 12.5 MG: 25 TABLET, FILM COATED, EXTENDED RELEASE ORAL at 21:07

## 2022-08-17 RX ADMIN — TAMSULOSIN HYDROCHLORIDE 0.4 MG: 0.4 CAPSULE ORAL at 08:35

## 2022-08-17 RX ADMIN — GABAPENTIN 300 MG: 300 CAPSULE ORAL at 21:07

## 2022-08-17 RX ADMIN — Medication 16.04 UNITS/KG/HR: at 21:35

## 2022-08-17 RX ADMIN — ACETAMINOPHEN 1000 MG: 500 TABLET ORAL at 21:08

## 2022-08-17 RX ADMIN — OXYCODONE 5 MG: 5 TABLET ORAL at 10:33

## 2022-08-17 RX ADMIN — BACLOFEN 10 MG: 10 TABLET ORAL at 21:08

## 2022-08-17 RX ADMIN — ACETAMINOPHEN 1000 MG: 500 TABLET ORAL at 14:10

## 2022-08-17 RX ADMIN — DOXYCYCLINE HYCLATE 100 MG: 100 TABLET, COATED ORAL at 21:08

## 2022-08-17 RX ADMIN — BACLOFEN 10 MG: 10 TABLET ORAL at 08:39

## 2022-08-17 RX ADMIN — METOPROLOL SUCCINATE 12.5 MG: 25 TABLET, FILM COATED, EXTENDED RELEASE ORAL at 08:36

## 2022-08-17 RX ADMIN — MIRTAZAPINE 30 MG: 30 TABLET, FILM COATED ORAL at 21:08

## 2022-08-17 RX ADMIN — EZETIMIBE 10 MG: 10 TABLET ORAL at 08:41

## 2022-08-17 RX ADMIN — ATORVASTATIN CALCIUM 10 MG: 10 TABLET, FILM COATED ORAL at 08:35

## 2022-08-17 RX ADMIN — TRAMADOL HYDROCHLORIDE 50 MG: 50 TABLET, COATED ORAL at 15:08

## 2022-08-17 ASSESSMENT — PAIN SCALES - GENERAL
PAINLEVEL_OUTOF10: 10
PAINLEVEL_OUTOF10: 8
PAINLEVEL_OUTOF10: 8
PAINLEVEL_OUTOF10: 9
PAINLEVEL_OUTOF10: 10
PAINLEVEL_OUTOF10: 8

## 2022-08-17 ASSESSMENT — PAIN DESCRIPTION - DESCRIPTORS
DESCRIPTORS: STABBING
DESCRIPTORS: STABBING;JABBING
DESCRIPTORS: STABBING
DESCRIPTORS: STABBING
DESCRIPTORS: JABBING
DESCRIPTORS: STABBING

## 2022-08-17 ASSESSMENT — PAIN DESCRIPTION - LOCATION
LOCATION: LEG;FOOT
LOCATION: COCCYX
LOCATION: LEG
LOCATION: GROIN
LOCATION: LEG;FOOT
LOCATION: FOOT;LEG
LOCATION: FOOT;LEG

## 2022-08-17 ASSESSMENT — ENCOUNTER SYMPTOMS
COUGH: 0
BACK PAIN: 0
SHORTNESS OF BREATH: 0
EYE PAIN: 0
PHOTOPHOBIA: 0
BLOOD IN STOOL: 0
RHINORRHEA: 0
WHEEZING: 0
ABDOMINAL PAIN: 0
GASTROINTESTINAL NEGATIVE: 1
CHOKING: 0

## 2022-08-17 ASSESSMENT — PAIN DESCRIPTION - ORIENTATION
ORIENTATION: MID
ORIENTATION: RIGHT;LEFT

## 2022-08-17 NOTE — PROGRESS NOTES
[facility] -   Infectious diseases evaluation  admission date 8/12/2022    reason for consultation:   Surgical Wound Infection    Impression :   Current:  L groin post-op surgical wound infection. S/p aorto-bifem graft endarterectomy 7/8/22. Leukocytosis  PVD    Other:    Discussion / summary of stay / plan of care     Recommendations   Continue Doxycycline 100 mg po twice daily x 7 days until 8/19/22. Need to eval the groin wound off VAC  Placement pend    Infection Control Recommendations   Camp Precautions      Antimicrobial Stewardship Recommendations   Simplification of therapy  Targeted therapy    History of Present Illness:   Initial history:  Dede Narayanan is a 58y.o.-year-old male who was admitted for left groin wound care s/p bilateral groin exploration and thrombectomy of his aortobifemoral bypass graft. Patient was seen in the vascular office 8/12/22 for post-op visit and there was concern for left femoral wound infection with noted purulent drainage on exam. No cx of the drainage was sent. Patient states the dressings are being changed every 3 days in the ECF. No associated symptoms of fever, chills, n/v/d. Initial labs: WBC: 11.7, Cre: 1.24. Interval changes  8/17/2022   Patient Vitals for the past 8 hrs:   BP Temp Temp src Pulse Resp SpO2   08/17/22 0800 115/70 97.7 °F (36.5 °C) Oral 78 14 94 %   08/17/22 0415 118/64 97.3 °F (36.3 °C) Oral 73 16 95 %     Yesterday in the evening patient became symptomatic hypotensive with confusion and blood pressure of 73/45 and received 500 mL of NS- returned to normotensive  Afebrile  SpO2 99% RA  Feeling well - at baseline, has some pain over bilateral lower extremity. No pain in the groin  Eating/drinking, denies n/v/d  Left groin with wound VAC in place.    LLE fasciotomy dressing clean  Creatinine-1.03  UA 8/16 is negative for infection  CXR 8/16 negative for consolidation    Summary of relevant labs:  Labs:  Creat 1.24-1.15-1.08-1.03  WBC 11.7-8.7  UA 8/16-negative  Micro:    Imaging:  Chest x-ray 8/16-no acute cardiopulmonary process    I have personally reviewed the past medical history, past surgical history, medications, social history, and family history, and I haveupdated the database accordingly. Allergies:   Faviola [morphine sulfate], Methadone, and Morphine     Review of Systems:     Review of Systems   Constitutional: Negative. Negative for chills and fever. HENT:  Negative for congestion, ear discharge, postnasal drip and rhinorrhea. Eyes:  Negative for photophobia and pain. Respiratory:  Negative for cough, choking, shortness of breath and wheezing. Cardiovascular: Negative. Negative for chest pain and palpitations. Gastrointestinal: Negative. Negative for abdominal pain and blood in stool. Endocrine: Negative for polyphagia. Genitourinary: Negative. Negative for dysuria and flank pain. Musculoskeletal:  Negative for arthralgias and back pain. Bilateral lower extremity pain   Skin:  Positive for wound. L groin. LLE fasciotomies. Allergic/Immunologic: Negative for immunocompromised state. Neurological:  Negative for dizziness, seizures, numbness and headaches. Psychiatric/Behavioral:  Negative for agitation and confusion. All other systems reviewed and are negative. Physical Examination :       Physical Exam  Vitals and nursing note reviewed. Constitutional:       General: He is not in acute distress. Appearance: Normal appearance. HENT:      Head: Normocephalic and atraumatic. Right Ear: External ear normal.      Left Ear: External ear normal.      Nose: Nose normal.      Mouth/Throat:      Mouth: Mucous membranes are moist.      Pharynx: Oropharynx is clear. Eyes:      Conjunctiva/sclera: Conjunctivae normal.   Cardiovascular:      Rate and Rhythm: Normal rate and regular rhythm. Pulses: Normal pulses. Heart sounds: Normal heart sounds. Pulmonary:      Effort: Pulmonary effort is normal.      Breath sounds: Normal breath sounds. Abdominal:      General: Bowel sounds are normal. There is no distension. Palpations: Abdomen is soft. There is no mass. Tenderness: There is no abdominal tenderness. Genitourinary:     Comments: Condom cath. Urine clear. Musculoskeletal:         General: No swelling or tenderness. Normal range of motion. Cervical back: Normal range of motion. Right lower leg: No edema. Left lower leg: No edema. Skin:     General: Skin is warm and dry. Findings: No rash. Comments: Lt groin wound with wound VAC in place  L LE dressing clean and dry   Neurological:      Mental Status: He is alert and oriented to person, place, and time. Psychiatric:         Behavior: Behavior normal.       Past Medical History:     Past Medical History:   Diagnosis Date    Anxiety     Arthritis associated with another disorder     CAD (coronary artery disease)     with history of multiple MI    Carotid artery occlusion     Family history of kidney stone     HTN (hypertension)     Hyperkalemia     Hyperlipidemia     Hypertension     Nephrolithiasis     multiple times    Neuropathy     PAD (peripheral artery disease) (HCC)     Peripheral vascular disease (HCC)     Personal history of MRSA (methicillin resistant Staphylococcus aureus)     Seasonal allergies     Vasculopathy        Past Surgical  History:     Past Surgical History:   Procedure Laterality Date    ABDOMINAL ADHESION SURGERY  2/11/2011-[facility]    Reexploration of abdominal wound and reclosure of the abdominal wound with fascial sutures and retention sutures as well    AORTO-FEMORAL BYPASS GRAFT  2/11/2011-[facility]    ABF bypass of 16x8 PTFE graft. This is an end-to-side anastomosis proximally    AXILLARY-FEMORAL BYPASS GRAFT Bilateral 7/8/2022    AORTA BIFEMORAL GRAFT ENDARTERECTOMY, BILATERAL COMMON FEMORAL EMBOLECTOMY, BILATERAL LOWER EXTREMEITY ARTERECTOMY, AORTAGRAPHY, RIGHT LIMB OF BYPASS GRAFT STENT, BILATERAL FEMORAL BOVINE PATCHES performed by Brittany Fernández MD at [facility], ARTERY  [address]23/[address]    1. Placement of 5 sheath in the left femoral artery with duplex guidance. 2. Left Iliac arteriogram. 3. Attempted ballon angioplasty left iliac artery occlusion. CAROTID ENDARTERECTOMY  07/19/08    Left carotid endarterectomy using shunt dacron patch aplasty    CORONARY ARTERY BYPASS GRAFT  2008    CABG x3     FEMORAL-TIBIAL BYPASS GRAFT  12/31/07  Cristal Rangel     Right femoral to posterior tibial artery bypass graft with in situ saphenous vein graft    IR FEMORAL POPLITEAL BYPASS GRAFT  01/24/208 Dr Shelley Model    1. Exploration of right fem-pop bypass graft. 2.Thrombectomy of right fem-post-tib saph vein graft.  3. Replacement of femoral to popliteal bypass graft from midthigh to near the anastomosis by reversed greater saph vein harvested from left leg    PTCA      PTCA with stent x6    TONSILLECTOMY AND ADENOIDECTOMY         Medications:      potassium chloride  40 mEq Oral Once    acetaminophen  1,000 mg Oral 3 times per day    gabapentin  300 mg Oral q8h    polyethylene glycol  17 g Oral Daily    sennosides-docusate sodium  2 tablet Oral Daily    [Held by provider] apixaban  5 mg Oral BID    atorvastatin  10 mg Oral Daily    baclofen  10 mg Oral TID    clopidogrel  75 mg Oral Daily    ezetimibe  10 mg Oral Daily    isosorbide mononitrate  30 mg Oral Daily    melatonin  4.5 mg Oral Nightly    metoprolol succinate  12.5 mg Oral BID    mirtazapine  30 mg Oral Nightly    tamsulosin  0.4 mg Oral Daily    [Held by provider] torsemide  20 mg Oral Daily    sodium chloride flush  5-40 mL IntraVENous 2 times per day    insulin lispro  0-8 Units SubCUTAneous TID WC    insulin lispro  0-4 Units SubCUTAneous Nightly    doxycycline hyclate  100 mg Oral 2 times per day       Social History: the last 72 hours. No results for input(s): BC in the last 72 hours. Lab Results   Component Value Date/Time    CREATININE 1.03 08/17/2022 06:26 AM    CREATININE 1.2 07/25/2011 09:50 AM    GLUCOSE 115 08/17/2022 06:26 AM    GLUCOSE 128 06/06/2012 07:29 PM       Detailed results: Thank you for allowing us to participate in the care of this patient. Please call with questions. This note is created with the assistance of a speech recognition program.  While intending to generate adocument that actually reflects the content of the visit, the document can still have some errors including those of syntax and sound a like substitutions which may escape proof reading. It such instances, actual meaningcan be extrapolated by contextual diversion. Talat Arredondo MD  Office: [phone]  Perfect serve / office [phone]        I have discussed the care of the patient, including pertinent history and exam findings,  with the resident. I have seen and examined the patient and the key elements of all parts of the encounter have been performed by me. I agree with the assessment, plan and orders as documented by the resident.     Marissa Yarbrough, Infectious Diseases

## 2022-08-17 NOTE — PLAN OF CARE
Problem: Discharge Planning  Goal: Discharge to home or other facility with appropriate resources  Outcome: Progressing     Problem: Chronic Conditions and Co-morbidities  Goal: Patient's chronic conditions and co-morbidity symptoms are monitored and maintained or improved  Outcome: Progressing     Problem: Skin/Tissue Integrity  Goal: Absence of new skin breakdown  Description: 1. Monitor for areas of redness and/or skin breakdown  2. Assess vascular access sites hourly  3. Every 4-6 hours minimum:  Change oxygen saturation probe site  4. Every 4-6 hours:  If on nasal continuous positive airway pressure, respiratory therapy assess nares and determine need for appliance change or resting period.   Outcome: Progressing     Problem: Safety - Adult  Goal: Free from fall injury  Outcome: Progressing  Flowsheets (Taken 8/16/2022 2000)  Free From Fall Injury: Instruct family/caregiver on patient safety     Problem: Pain  Goal: Verbalizes/displays adequate comfort level or baseline comfort level  Outcome: Progressing     Problem: ABCDS Injury Assessment  Goal: Absence of physical injury  Outcome: Progressing  Flowsheets (Taken 8/16/2022 2000)  Absence of Physical Injury: Implement safety measures based on patient assessment

## 2022-08-17 NOTE — PROGRESS NOTES
[facility]  Office: [address], DO, Erich Otoole, DO, Ana Maria Dry, DO, Sarah Soto Blood, DO, Daphnie Jose MD, Josh Welsh MD, Diogo Macias MD, Caterina Rubin MD,  Odilon Brown MD, Sarah Davial MD, Luana Geller, DO, Hollis Tesfaye MD,  Gladys Ba MD, Jose Alejo MD, Lindsay Dawn DO, Alexsander Rivera MD, Elijah Maldonado MD, Kasia Oliva MD, Claudell Orion, DO, Fabian Conner MD, Elnaa Gage MD, Lili Shaw, CNP,  Jah Adams, CNP, Patricia Whitman, CNP, Desire Mathis, CNP, Shila Elizalde PA-C, Clarisa Lancaster, [facility], Dieter Lu, CNP, Berlinda Boast, CNP, Marie Baxter, CNP, Kathy Solorio, CNP, Lizeth Loza, CNP, Cem Chaudhari, CNS, Yesy Kern, DNP, Cristopher Paige, CNP, Avery Wilson, CNP, Brit Oropeza, CNP           [address] / HISTORY AND PHYSICAL EXAMINATION            Date:   8/17/2022  Patient name:  Hanh Gallegos  Date of admission:  8/12/2022  5:34 PM  MRN:   [id number]  Account:  [de-identified]  YOB: 1960  PCP:    No primary care provider on file. Room:   [address] [place] [address]  Code Status:    Full Code    Physician Requesting Consult: Kassandra Mccarthy MD    Reason for Consult:  Medical management    Chief Complaint:     Left groin post-operative infection    History Obtained From:     patient, electronic medical record    History of Present Illness:     64 M recently underwent fasciotomy bilateral groin exploration thrombectomy of his aortobifemoral bypass graft. Patient presented from outpatient vascular surgeon's office for concern of left femoral wound infection and he was noted to have purulent drainage on exam.    He was recently staying in an extended care facility where he was receiving dressing changes every 3 days where he resides since last hospitalization.      He was admitted for left femoral dressing changes and sheath in the left femoral artery with duplex guidance. 2. Left Iliac arteriogram. 3. Attempted ballon angioplasty left iliac artery occlusion. CAROTID ENDARTERECTOMY  07/19/08    Left carotid endarterectomy using shunt dacron patch aplasty    CORONARY ARTERY BYPASS GRAFT  2008    CABG x3     FEMORAL-TIBIAL BYPASS GRAFT  12/31/07  [facility]     Right femoral to posterior tibial artery bypass graft with in situ saphenous vein graft    IR FEMORAL POPLITEAL BYPASS GRAFT  01/24/208 Dr Tayler Hughes    1. Exploration of right fem-pop bypass graft. 2.Thrombectomy of right fem-post-tib saph vein graft. 3. Replacement of femoral to popliteal bypass graft from midthigh to near the anastomosis by reversed greater saph vein harvested from left leg    PTCA      PTCA with stent x6    TONSILLECTOMY AND ADENOIDECTOMY          Medications Prior to Admission:     Prior to Admission medications    Medication Sig Start Date End Date Taking? Authorizing Provider   atorvastatin (LIPITOR) 10 MG tablet Take 1 tablet by mouth in the morning. 7/21/22   Zaida Mcfadden MD   metoprolol succinate (TOPROL XL) 25 MG extended release tablet Take 0.5 tablets by mouth in the morning and at bedtime 7/20/22   Zaida Mcfadden MD   torsemide (DEMADEX) 20 MG tablet Take 1 tablet by mouth in the morning. 7/21/22   Zaida Mcfadden MD   melatonin 5 MG TABS tablet Take 1 tablet by mouth nightly 7/20/22   Zaida Mcfadden MD   baclofen (LIORESAL) 10 MG tablet Take 10 mg by mouth in the morning and 10 mg at noon and 10 mg before bedtime. Historical Provider, MD   clopidogrel (PLAVIX) 75 MG tablet Take 75 mg by mouth in the morning. Historical Provider, MD   ezetimibe (ZETIA) 10 MG tablet Take 10 mg by mouth in the morning. Historical Provider, MD   apixaban (ELIQUIS) 5 MG TABS tablet Take 1 tablet by mouth in the morning and 1 tablet before bedtime.  7/20/22   Zaida Mcfadden MD   fenofibrate (TRICOR) 145 MG tablet Take 145 mg by mouth in the throat pain  RESPIRATORY:  negative for shortness of breath, cough, congestion, wheezing. CARDIOVASCULAR:  negative for chest pain, palpitations. GASTROINTESTINAL:  negative for nausea, vomiting, diarrhea, constipation, change in bowel habits, abdominal pain   GENITOURINARY:  negative for difficulty of urination, burning with urination, frequency   INTEGUMENT:  negative for rash, skin lesions, easy bruising   HEMATOLOGIC/LYMPHATIC:  negative for swelling/edema   ALLERGIC/IMMUNOLOGIC:  negative for urticaria , itching  ENDOCRINE:  negative increase in drinking, increase in urination, hot or cold intolerance  MUSCULOSKELETAL:  negative joint pains, muscle aches, swelling of joints  NEUROLOGICAL:  negative for headaches, numbness, pain, tingling extremities. + dizziness, lightheadedness  BEHAVIOR/PSYCH:  negative for depression, anxiety    Physical Exam:     /70   Pulse 78   Temp 97.7 °F (36.5 °C) (Oral)   Resp 14   Wt 174 lb 9.7 oz (79.2 kg)   SpO2 94%   BMI 24.35 kg/m²   Temp (24hrs), Av.8 °F (36.6 °C), Min:97.2 °F (36.2 °C), Max:99 °F (37.2 °C)    Recent Labs     22  1205 22  1639 22  2042 22  0750   POCGLU 143* 176* 149* 116*       Intake/Output Summary (Last 24 hours) at 2022 0830  Last data filed at 2022 0636  Gross per 24 hour   Intake 1678.88 ml   Output 1000 ml   Net 678.88 ml       General Appearance:  alert, chronically ill appearing and in no acute distress  Mental status: oriented to person, place, and time with normal affect  Head:  normocephalic, atraumatic.   Eye: no icterus, redness, pupils equal and reactive, extraocular eye movements intact, conjunctiva clear  Ear: normal external ear, no discharge, hearing intact  Nose:  no drainage noted  Mouth: mucous membranes moist  Neck: supple, no carotid bruits, thyroid not palpable  Lungs: Bilateral equal air entry, clear to ausculation, no wheezing, rales or rhonchi, normal effort  Cardiovascular: normal rate, regular rhythm, no murmur, gallop, rub. Abdomen: Soft, nontender, nondistended, normal bowel sounds, no hepatomegaly or splenomegaly  Neurologic: There are no new focal motor or sensory deficits, normal muscle tone and bulk, no abnormal sensation, normal speech, cranial nerves II through XII grossly intact  Skin: No gross lesions, rashes, bruising or bleeding on exposed skin area. Left groin wound vac in place. LLE s/p fasciotomy, ACE bandage in place  Extremities:  peripheral pulses palpable, no pedal edema or calf pain with palpation  Psych: normal affect    Investigations:      Laboratory Testing:  Recent Results (from the past 24 hour(s))   POC Glucose Fingerstick    Collection Time: 08/16/22 12:05 PM   Result Value Ref Range    POC Glucose 143 (H) 75 - 110 mg/dL   POC Glucose Fingerstick    Collection Time: 08/16/22  4:39 PM   Result Value Ref Range    POC Glucose 176 (H) 75 - 110 mg/dL   Urinalysis with Reflex to Culture    Collection Time: 08/16/22  6:33 PM    Specimen: Urine   Result Value Ref Range    Color, UA Yellow Yellow    Turbidity UA Clear Clear    Glucose, Ur NEGATIVE NEGATIVE    Bilirubin Urine NEGATIVE NEGATIVE    Ketones, Urine NEGATIVE NEGATIVE    Specific Gravity, UA 1.013 1.005 - 1.030    Urine Hgb NEGATIVE NEGATIVE    pH, UA 5.0 5.0 - 8.0    Protein, UA NEGATIVE NEGATIVE    Urobilinogen, Urine Normal Normal    Nitrite, Urine NEGATIVE NEGATIVE    Leukocyte Esterase, Urine NEGATIVE NEGATIVE    Urinalysis Comments       Microscopic exam not performed based on chemical results unless requested in original order.    EKG 12 Lead    Collection Time: 08/16/22  6:41 PM   Result Value Ref Range    Ventricular Rate 78 BPM    Atrial Rate 78 BPM    P-R Interval 150 ms    QRS Duration 80 ms    Q-T Interval 398 ms    QTc Calculation (Bazett) 453 ms    P Axis 32 degrees    R Axis 14 degrees    T Axis 163 degrees   CBC with Auto Differential    Collection Time: 08/16/22  6:50 PM   Result Value Ref Range WBC 9.0 3.5 - 11.3 k/uL    RBC 3.48 (L) 4.21 - 5.77 m/uL    Hemoglobin 10.6 (L) 13.0 - 17.0 g/dL    Hematocrit 31.7 (L) 40.7 - 50.3 %    MCV 91.1 82.6 - 102.9 fL    MCH 30.5 25.2 - 33.5 pg    MCHC 33.4 28.4 - 34.8 g/dL    RDW 14.5 (H) 11.8 - 14.4 %    Platelets 437 730 - 184 k/uL    MPV 11.4 8.1 - 13.5 fL    NRBC Automated 0.0 0.0 per 100 WBC    Seg Neutrophils 66 (H) 36 - 65 %    Lymphocytes 23 (L) 24 - 43 %    Monocytes 8 3 - 12 %    Eosinophils % 1 1 - 4 %    Basophils 1 0 - 2 %    Immature Granulocytes 1 (H) 0 %    Segs Absolute 6.01 1.50 - 8.10 k/uL    Absolute Lymph # 2.01 1.10 - 3.70 k/uL    Absolute Mono # 0.69 0.10 - 1.20 k/uL    Absolute Eos # 0.11 0.00 - 0.44 k/uL    Basophils Absolute 0.08 0.00 - 0.20 k/uL    Absolute Immature Granulocyte 0.05 0.00 - 0.30 k/uL    RBC Morphology ANISOCYTOSIS PRESENT    Comprehensive Metabolic Panel w/ Reflex to MG    Collection Time: 08/16/22  6:50 PM   Result Value Ref Range    Glucose 189 (H) 70 - 99 mg/dL    BUN 29 (H) 8 - 23 mg/dL    Creatinine 1.31 (H) 0.70 - 1.20 mg/dL    Calcium 8.1 (L) 8.6 - 10.4 mg/dL    Sodium 133 (L) 135 - 144 mmol/L    Potassium 3.5 (L) 3.7 - 5.3 mmol/L    Chloride 100 98 - 107 mmol/L    CO2 18 (L) 20 - 31 mmol/L    Anion Gap 15 9 - 17 mmol/L    Alkaline Phosphatase 166 (H) 40 - 129 U/L    ALT 29 5 - 41 U/L    AST 22 <40 U/L    Total Bilirubin 0.26 (L) 0.3 - 1.2 mg/dL    Total Protein 4.8 (L) 6.4 - 8.3 g/dL    Albumin 2.3 (L) 3.5 - 5.2 g/dL    Albumin/Globulin Ratio 0.9 (L) 1.0 - 2.5    GFR Non-African American 55 (L) >60 mL/min    GFR African American >60 >60 mL/min    GFR Comment         Magnesium    Collection Time: 08/16/22  6:50 PM   Result Value Ref Range    Magnesium 1.6 1.6 - 2.6 mg/dL   Phosphorus    Collection Time: 08/16/22  6:50 PM   Result Value Ref Range    Phosphorus 3.6 2.5 - 4.5 mg/dL   Troponin    Collection Time: 08/16/22  6:50 PM   Result Value Ref Range    Troponin, High Sensitivity 229 (HH) 0 - 22 ng/L   POC Glucose 5.3 mmol/L    Chloride 103 98 - 107 mmol/L    CO2 23 20 - 31 mmol/L    Anion Gap 10 9 - 17 mmol/L    GFR Non-African American >60 >60 mL/min    GFR African American >60 >60 mL/min    GFR Comment         Magnesium    Collection Time: 08/17/22  6:26 AM   Result Value Ref Range    Magnesium 2.2 1.6 - 2.6 mg/dL   Phosphorus    Collection Time: 08/17/22  6:26 AM   Result Value Ref Range    Phosphorus 3.7 2.5 - 4.5 mg/dL   Calcium, Ionized    Collection Time: 08/17/22  6:26 AM   Result Value Ref Range    Calcium, Ionized 1.18 1.13 - 1.33 mmol/L   Lactic Acid    Collection Time: 08/17/22  6:26 AM   Result Value Ref Range    Lactic Acid, Whole Blood 1.4 0.7 - 2.1 mmol/L   SPECIMEN REJECTION    Collection Time: 08/17/22  6:26 AM   Result Value Ref Range    Specimen Source . BLOOD     Ordered Test PTT     Reason for Rejection       Unable to perform testing: Specimen quantity not sufficient. POC Glucose Fingerstick    Collection Time: 08/17/22  7:50 AM   Result Value Ref Range    POC Glucose 116 (H) 75 - 110 mg/dL       Imaging/Diagonstics:  XR CHEST PORTABLE    Result Date: 8/16/2022  No acute airspace disease identified. Assessment :      Hospital Problems             Last Modified POA    * (Principal) Surgical wound infection 8/12/2022 Yes    Hypertension 8/12/2022 Yes    Overview Signed 12/27/2011 11:21 AM by Arash Hernandez MD     Uncontrolled          PAD (peripheral artery disease) ([facility] [address] 8/12/2022 Yes    Type 2 diabetes mellitus with circulatory disorder, without long-term current use of insulin ([facility] [address] 8/12/2022 Yes    Leukocytosis 8/13/2022 Yes    Wound infection after surgery 8/14/2022 Yes    Hyperlipidemia 8/12/2022 Yes       Plan:     #HFrEF  - Currently compensated. Cardiology following. Continue GDMT. Consider addition of SGLT2 inhibitor and entresto as OP at discretion of cardiologist. Cardiology recommending life vest    #PAD, Post-operative wound infection  - continue doxy per ID.  Wound vac per vascular surgery    # Orthostatic hypotension: this is multifactorial given severely reduced EF and potentially some dysautonomia. - responded to IV fluids. Add compression hose. Midodrine prn, administer prior to therapy. Medicine will sign off. Please reach out with any additional questions or concerns    Consultations:   [id number] [facility] CONSULT TO CASE MANAGEMENT  IP CONSULT TO INFECTIOUS DISEASES  IP CONSULT TO NEUROLOGY  IP CONSULT TO CARDIOLOGY  IP CONSULT TO INTERNAL MEDICINE      Rob Hernandez PA-C  8/17/2022  8:30 AM    Copy sent to Dr. Cesar Latif primary care provider on file.

## 2022-08-17 NOTE — PROGRESS NOTES
Cleansed Soap and water;Cleansed with saline   Dressing/Treatment Hydrofiber Ag; Foam   Dressing Change Due 08/19/22   Wound Assessment Pink/red;Subcutaneous   Drainage Amount Moderate   Drainage Description Serosanguinous   Odor None   Radha-wound Assessment Hyperpigmented; Intact  (Sure Prep barrier film spray applied)   Margins Defined edges         Response to treatment:  Well tolerated by patient. Plan:     Plan of Care: Turn every 2 hours  Float heels off of bed with pillows under calves    Use lift sling to reposition patient to minimize potential for shear injury. Routine bathing with soap and water. Routine incontinence care with soap,water, and incontinence barrier cloths. Moisture wicking under pads. Avoid briefs while in bed. Utilize external urinary device  Sacrum: irrigate with saline, pat dry. Apply Sure Prep barrier film spray to the radha-wound skin, apply [employer] Ag gelling fiber to the open wounds, secure with a Mepilex Sacrum Foam. Change QOD and prn soilage. Left leg and groin wound care per the vascular surgeon's instructions. Specialty Bed Required : Yes: AN-APR surface in use. [x] Low Air Loss   [x] Pressure Redistribution  [] Fluid Immersion  [] Bariatric  [] Total Pressure Relief  [] Other:     Discharge Plan:  Placement for patient upon discharge: skilled nursing   Hospice Care: No   Patient appropriate for Outpatient [address]: Yes    Patient/Caregiver Teaching:    [] Indicates understanding       [x] Needs reinforcement  [] Unsuccessful      [] Verbal Understanding  [] Demonstrated understanding       [] No evidence of learning  [] Refused teaching         [] N/A    Contact the Wound Ostomy RN on-call Monday-Friday [phone] via [employer] by searching \"wound\" under \"groups\" and selecting the on-call clinician.         Electronically signed by Jeana Gomez RN, [facility] on 8/17/2022 at 11:17 AM

## 2022-08-17 NOTE — PLAN OF CARE
Problem: Discharge Planning  Goal: Discharge to home or other facility with appropriate resources  8/17/2022 1819 by Rhona Head RN  Outcome: Progressing  8/17/2022 0527 by Martin Mehta RN  Outcome: Progressing     Problem: Chronic Conditions and Co-morbidities  Goal: Patient's chronic conditions and co-morbidity symptoms are monitored and maintained or improved  8/17/2022 1819 by Rhona Head RN  Outcome: Progressing  8/17/2022 0527 by Martin Mehta RN  Outcome: Progressing     Problem: Skin/Tissue Integrity  Goal: Absence of new skin breakdown  Description: 1. Monitor for areas of redness and/or skin breakdown  2. Assess vascular access sites hourly  3. Every 4-6 hours minimum:  Change oxygen saturation probe site  4. Every 4-6 hours:  If on nasal continuous positive airway pressure, respiratory therapy assess nares and determine need for appliance change or resting period.   8/17/2022 1819 by Rhona Head RN  Outcome: Progressing  8/17/2022 0527 by Martin Mehta RN  Outcome: Progressing     Problem: Safety - Adult  Goal: Free from fall injury  8/17/2022 1819 by Rhona Head RN  Outcome: Progressing  8/17/2022 0527 by Martin Mehta RN  Outcome: Progressing  Flowsheets (Taken 8/16/2022 2000)  Free From Fall Injury: Instruct family/caregiver on patient safety     Problem: Pain  Goal: Verbalizes/displays adequate comfort level or baseline comfort level  8/17/2022 1819 by Rhona Head RN  Outcome: Progressing  8/17/2022 0527 by Martin Mehta RN  Outcome: Progressing     Problem: ABCDS Injury Assessment  Goal: Absence of physical injury  8/17/2022 1819 by Rhona Head RN  Outcome: Progressing  8/17/2022 0527 by Martin Mehta RN  Outcome: Progressing  Flowsheets (Taken 8/16/2022 2000)  Absence of Physical Injury: Implement safety measures based on patient assessment

## 2022-08-17 NOTE — CARE COORDINATION
Transitional planning- Call from [address] Joyce Hsu accepted at [facility] started-will need new 7000(HENS).

## 2022-08-17 NOTE — PROGRESS NOTES
2.Continue Doxycycline until 8/19/2022. 3.Regular diet. 4.Follow up on labs and VL Dup Carotid. -AM labs pending this morning.    -Trend troponin: 235, 229   5. Neurology and ID on board, follow up recs. 6.Discharge planning to facility pending acceptance. Electronically signed by Yvon Telles on 8/17/22 at 6:29 AM EDT      General Surgery Resident Statement/Addendum  I have discussed the case, including pertinent history and exam findings with the above medical student. I have personally seen the patient. Pt seen and examined at bedside. I performed both history and physical exam.      Note was edited and changes made by me. Assessment and plan reviewed and changes made by me. I agree with the assessment and plan as stated above. Will change wound vac this afternoon. Will discuss life vest plan with cardiology.     Chaparro Fowler DO PGY-2  8/17/22 1:34 PM

## 2022-08-17 NOTE — PROGRESS NOTES
Physical Therapy  Facility/Department: [address] STEPDOWN  Daily Treatment Note    Name: Luz Faust  : 1960  MRN: [id number]  Date of Service: 2022    Discharge Recommendations:  Patient would benefit from continued therapy after discharge          Patient Diagnosis(es): There were no encounter diagnoses. Past Medical History:  has a past medical history of Anxiety, Arthritis associated with another disorder, CAD (coronary artery disease), Carotid artery occlusion, Family history of kidney stone, HTN (hypertension), Hyperkalemia, Hyperlipidemia, Hypertension, Nephrolithiasis, Neuropathy, PAD (peripheral artery disease) ([facility] [address] Peripheral vascular disease ([facility] [address] Personal history of MRSA (methicillin resistant Staphylococcus aureus), Seasonal allergies, and Vasculopathy. Past Surgical History:  has a past surgical history that includes Tonsillectomy and adenoidectomy; Percutaneous Transluminal Coronary Angio; Carotid endarterectomy (08); IR Femoral Popliteal Bypass Graft ( Dr Althea Kruger); Femoral-tibial Bypass Graft (07  Dwight Godinez ); Balloon angioplasty, artery (11/23/10 Allie Muñoz); Aorto-femoral Bypass Graft (2011-[facility]); Abdominal adhesion surgery (2011-[facility]); Coronary artery bypass graft (); and Axillary-femoral Bypass Graft (Bilateral, 2022). Assessment   Assessment: BP decreasing from supine->sitting EOB->standing; pt started to become lightheaded upon sitting on pads on refugio stedy; positioned pt at EOB and then pt became unresponsive. Pt became responsive after about ~15 seconds in supine. RN entered room to assess pt. Pt required maxA x2 for bed mobility. Pt sat EOB with maxA to position pt but Bryn to maintain. PT  required maxA x2 for STS transfer using the refugio stedy with heavy posterior lean along with verbal cues for sequencing/ hand placement with fair return.   Pt is currently unsafe to return to prior living arrangements and would benefit from continued PT following discharge to address functional deficits. Specific Instructions for Next Treatment: check BP t/o session  Therapy Prognosis: Fair  Activity Tolerance  Activity Tolerance: Patient limited by endurance; Patient limited by pain;Treatment limited secondary to medical complications  Activity Tolerance Comments: BP decreasing from supine->sitting EOB->standing; pt started to become lightheaded upon sitting on pads on refugio stedy; positioned pt at EOB and then pt became unresponsive. Pt became responsive after about ~15 seconds in supine. Plan   Plan  Plan:  (5-6x/wk)  Specific Instructions for Next Treatment: check BP t/o session  Current Treatment Recommendations: Strengthening, ROM, Balance training, Functional mobility training, Transfer training, Endurance training, Therapeutic activities, Patient/Caregiver education & training, Safety education & training, Home exercise program, Gait training, Equipment evaluation, education, & procurement, Neuromuscular re-education, Wheelchair mobility training  Safety Devices  Type of Devices: Gait belt, Nurse notified, Left in bed, Call light within reach (RN present upon exiting)  Restraints  Restraints Initially in Place: No     Restrictions  Restrictions/Precautions  Restrictions/Precautions: Fall Risk  Required Braces or Orthoses?: No  Position Activity Restriction  Other position/activity restrictions: up w/ assist, R side deficits from prior CVA; wound vac L groin; wound on L LE     Subjective   General  Chart Reviewed: Yes  Response To Previous Treatment: Patient with no complaints from previous session. Family / Caregiver Present: No  Subjective  Subjective: RN and pt agreeable to PT. Pt supine in bed upon arrival, Pt pleasant and cooperative t/o.   Pt having pain in B LEs; Dr in to see pt during PT and is aware of B LE pain; pt didn't rate            Cognition   Orientation  Overall Orientation Status: Within Functional Limits  Cognition  Overall Cognitive Status: Exceptions  Arousal/Alertness: Appropriate responses to stimuli  Following Commands: Follows one step commands with repetition; Follows one step commands with increased time  Attention Span: Appears intact  Memory: Appears intact  Safety Judgement: Decreased awareness of need for assistance  Problem Solving: Assistance required to generate solutions;Assistance required to correct errors made  Insights: Decreased awareness of deficits  Initiation: Requires cues for some  Sequencing: Requires cues for some     Objective   Bed mobility  Rolling to Left: Maximum assistance  Supine to Sit: Maximum assistance;2 Person assistance  Sit to Supine: Maximum assistance;2 Person assistance  Scooting: Maximal assistance;2 Person assistance  Bed Mobility Comments: HOB elevated, VCs for sequencing. BP supine 113/71; sitting /60  Transfers  Sit to Stand: Maximum Assistance;2 Person Assistance  Stand to sit: Maximum Assistance;2 Person Assistance  Comment: STS transfer completed with the SS. Heavy posterior lean with standing. Pt became lightheaded sitting on pads of refugio stedy; pt repositioned at EOB and became unresponsive with eyes open and immediately repositioned in supine with B LEs elevated;  ~15 seconds for pt to come responsive again. RN entered room to assess.         Balance  Posture: Fair  Sitting - Static: Fair  Sitting - Dynamic: Fair;-  Standing - Static: Poor  Comments: Assessed sitting EOB with Bryn to maintain and standing at [address] stedy with maxA x 2 to maintain  A/AROM Exercises: L LE AAROM ankle pumps and R LE PROM ankle pumps x 10 reps    AM-PAC Score  AM-PAC Inpatient Mobility Raw Score : 9 (08/17/22 1011)  AM-PAC Inpatient T-Scale Score : 30.55 (08/17/22 1011)  Mobility Inpatient CMS 0-100% Score: 81.38 (08/17/22 1011)  Mobility Inpatient CMS G-Code Modifier : CM (08/17/22 1011)          Tinneti Score       Goals  Short Term Goals  Time Frame for Short term goals: 14 visits  Short term goal 1: Perform bed mobility with Mod A  Short term goal 2: Perform functional transfers with Min A  Short term goal 3: Demo Fair- dynamic standing balance to decrease risk of falls  Short term goal 4: Ambulate 20ft with appropriate AD and Mod A  Short term goal 5: Propel wheelchair 100ft with Min A       Education  Patient Education  Education Given To: Patient  Education Provided: Role of Therapy;Plan of Care;Transfer Training  Education Method: Verbal  Barriers to Learning: Cognition  Education Outcome: Verbalized understanding;Continued education needed      Therapy Time   Individual Concurrent Group Co-treatment   Time In 0936         Time Out 1011         Minutes 35         Timed Code Treatment Minutes: [address], Audelia Rodriguez, [facility]

## 2022-08-17 NOTE — PROGRESS NOTES
[facility] Vascular that pts trop came back at 229.     2045- Vascular to bedside. Orders for EKG obtained, Eliquis on hold. Heparin drip ordered. Cardiology consulted. 2155- EKG done. Emailed to cardiology. Per cardiology. No changes from previous EKG. 2235- Messaged vascular of lactic of 2.5    0150- Informed vascular of 389ml in bladder. Pt was then able to void on his own. Also informed that pt cannot tolerate potassium pills that were ordered. 0330- Informed vascular trop was 235. Georjean Scales they will relay to cardiology. 0400- Vascular said it is okay to wait to replace potassium in the morning until we get labs back. Informed that we do not have BMP or CBC ordered. Order for BMP, CBC, Mag, Phosphorus, calcium ionized, and lactic acid obtained.

## 2022-08-17 NOTE — CONSULTS
[facility]   Consult Note         Today's Date: 8/17/2022  Patient Name: Sushil Melendez  Date of admission: 8/12/2022  5:34 PM  Patient's age: 58 y.o., 1960  Admission Dx: H/O aorta-iliac-femoral bypass [L69.623]    Reason for Consult:  Cardiac evaluation    Requesting Physician: No admitting provider for patient encounter. REASON FOR CONSULT: Elevated troponin, hypotension    History Obtained From:  Patient, chart, staff, records    HISTORY OF PRESENT ILLNESS:    Danitza Messina is a 69-year-old male who is presents to [address] [place] [address] on 8/12/2022 with postop wound infection. Patient is a former smoker (66-pack-year history) past medical history significant for CAD s/p CABGx4 2008, HLD, HTN, PAD, arthritis. Cardiology consulted for hypotension following a syncopal episode on 8/16/2022 while patient was working with physical therapy. Patient reports he experienced loss of consciousness while getting up from a seated to a standing position with physical therapy. RN reports patient was noted to be hypotensive down to 73/45 transiently on 8/16/2022. At that time patient was noted to have a -2 L fluid balance and responded well to IV hydration overnight with improvement in pressures. There was notable CAROLYN with lactic acidosis 2.5. High-sensitivity troponin was elevated at 229 trending up to 235 at the time. Patient vitals at time of evaluation T97.3 RR 16 HR 73 /64. Patient denies any active chest pain, worsening shortness of breath or edema. Patient does report that he is having pain and numbness in his lower extremities which is his chief complaint at time of evaluation. He reports he has not had any palpitations or chest pain for the past several months. On review of chart it is noted that patient does have a baseline troponin elevation.     Prior to this episode patient was seen s/p aorto bifemoral thrombectomy on 7/8/2022 and had previously had Oral, Daily    melatonin, 4.5 mg, Oral, Nightly    metoprolol succinate, 12.5 mg, Oral, BID    mirtazapine, 30 mg, Oral, Nightly    tamsulosin, 0.4 mg, Oral, Daily    [Held by provider] torsemide, 20 mg, Oral, Daily    sodium chloride flush, 5-40 mL, IntraVENous, 2 times per day    insulin lispro, 0-8 Units, SubCUTAneous, TID WC    insulin lispro, 0-4 Units, SubCUTAneous, Nightly    doxycycline hyclate, 100 mg, Oral, 2 times per day      Allergies:  Faviola [morphine sulfate], Methadone, and Morphine    Social History:   reports that he quit smoking about 11 years ago. His smoking use included cigarettes. He has a 66.00 pack-year smoking history. He has never used smokeless tobacco. He reports that he does not drink alcohol and does not use drugs. Family History: family history includes Cancer (age of onset: 28) in his mother; Heart Disease in his sister; Hypertension in his father and sister. No h/o sudden cardiac death. REVIEW OF SYSTEMS:    Constitutional: Weakness, fatigue, fevers    Eyes: No visual changes or diplopia. No scleral icterus. ENT: No Headaches, hearing loss or vertigo. No mouth sores or sore throat. Cardiovascular: per HPI  Respiratory: per HPI  Gastrointestinal: No abdominal pain, appetite loss, blood in stools. No change in bowel or bladder habits. Genitourinary: No dysuria, trouble voiding, or hematuria. Musculoskeletal: Pain in BLE, difficulty with ambulation,   Integumentary: No rash or pruritis. Neurological: Syncope, numbness in BLE. No diplopia, change in muscle strength, . No change in gait, balance, coordination, mood, affect, memory, mentation, behavior. Psychiatric: No anxiety, or depression. Endocrine: No temperature intolerance. No excessive thirst, fluid intake, or urination. No tremor. Hematologic/Lymphatic: No abnormal bruising or bleeding, blood clots or swollen lymph nodes. Allergic/Immunologic: No nasal congestion or hives.       PHYSICAL EXAM:      /64 Pulse 73   Temp 97.3 °F (36.3 °C) (Oral)   Resp 16   Wt 174 lb 9.7 oz (79.2 kg)   SpO2 95%   BMI 24.35 kg/m²    Constitutional and General Appearance: alert, cooperative, no distress and appears stated age  HEENT: PERRL, no cervical lymphadenopathy. No masses palpable. Normal oral mucosa  Respiratory:  Normal excursion and expansion without use of accessory muscles  Resp Auscultation: Good respiratory effort. No for increased work of breathing. On auscultation: clear to auscultation bilaterally  Cardiovascular:  RRR  The apical impulse is not displaced  Heart tones are crisp and normal. regular S1 and S2.  Jugular venous pulsation Normal  The carotid upstroke is normal in amplitude and contour without delay or bruit  Peripheral pulses are symmetrical and full   Abdomen:   No masses or tenderness  Bowel sounds present  Extremities:   No Cyanosis or Clubbing   Lower extremity edema: No   Skin: Warm and dry. Clean intact dressing over LLE  Neurological:  Alert and oriented.   Moves all extremities well, loss of sensation to light touch in extremity  No abnormalities of mood, affect, memory, mentation, or behavior are noted      Labs:     CBC:   Recent Labs     08/16/22  1850 08/17/22  0626   WBC 9.0 8.7   HGB 10.6* 10.7*   HCT 31.7* 33.6*    425     BMP:   Recent Labs     08/14/22  1158 08/16/22  1850   * 133*   K 3.7 3.5*   CO2 21 18*   BUN 32* 29*   CREATININE 1.08 1.31*   LABGLOM >60 55*   GLUCOSE 139* 189*     APTT:  Recent Labs     08/16/22  2112   APTT 28.4     FASTING LIPID PANEL:  Lab Results   Component Value Date/Time    HDL 29 06/30/2012 09:46 AM    LDLCALC 95 05/07/2011 03:21 AM    TRIG 288 07/11/2022 10:30 AM     LIVER PROFILE:  Recent Labs     08/14/22  1158 08/16/22  1850   AST 38 22   ALT 36 29   LABALBU 2.7* 2.3*       EKG:    Date: 8/16/22  NSR w/ PACs PVC    LAST ECHO:  TTE 7/9/22 EF 15%  Akinetic [place]  Global systolic fxn reduced   No valvular disease noted    LAST Stress Test: None in Epic    LAST Cardiac Angiography:. None in Epic  Records request pending    Other Current Problems  Patient Active Problem List   Diagnosis    Seasonal allergies    Anxiety    Carotid artery occlusion    CAD (coronary artery disease)    Hypertension    Neuropathy    PAD (peripheral artery disease) ([facility])    HTN (hypertension)    Arthritis associated with another disorder    Hernia, hiatal    Hyperlipidemia    CAD (coronary artery disease)    Critical ischemia of lower extremity (HCC)    CAROLYN (acute kidney injury) ([address]    Dependence on renal dialysis ([address]    Failing vascular bypass graft    Arterial hypotension    Non-traumatic rhabdomyolysis    Cardiomyopathy ([address]    Decreased urine output    Acute respiratory failure with hypercapnia ([facility])    Lactic acidosis    Left leg numbness    Cardiomyopathy, ischemic    Anemia    H/O: CVA (cerebrovascular accident)    Surgical wound infection    Type 2 diabetes mellitus with circulatory disorder, without long-term current use of insulin ([facility])    Leukocytosis    Wound infection after surgery       IMPRESSION:   Syncope secondary to hypotension and CAROLYN  Elevated troponin due to underlying cardiomyopathy  Orthostatic hypotension  Normocytic Anemia  Tpn elevation (baseline ~83)  Chronic HFrEF NYHA class III (TTE 7/9/22 EF 15%)  obstructive CAD s/p CABGx4 2008 [place]  Hx NSTEMI s/p PCI   Stage I CAROLYN  PAD  HTN  HLD  IDDM    Recommendations:    -Hold antihypertensive medications.  -Stop Imdur  -Orthostatic vitals  -Carotid ultrasound pending  -Monitor on telemetry  -Agree with gentle IV fluids  -Obtain records of previous cardiac workup.  -Life vest on discharge. Clinical Rationale:  Suspect syncopal episode related to orthostasis and hypovolemia. No orthostatic vitals documented in chart at time of evaluation however patient does report that his syncopal episode occurred while standing from a seated position.   Recent echo 7/9/2022 does show severely reduced EF. Recommend evaluation for LifeVest prior to discharge sever ICM. Patient was noted to have elevated troponins however there were no ST changes on EKG patient is noted to have baseline troponin elevation ranging from  given his previous cardiac history. Patient denies any active chest pain and is asymptomatic at time of evaluation. Obtain previous cardiac workup and will consider ischemia workup as OP    Case and plan discussed with attending    Discussed with patient, family, and Nurse. Electronically signed by Millie Boyer DO on 8/17/2022 at 7:02 AM  Internal Medicine Resident, PGY-3  [place] [id number],  [facility].  7:02 AM on 8/17/2022      Attending Physician Statement  I have discussed the case of Brittany Hyde including pertinent history and exam findings with the resident. I have seen and examined the patient and the key elements of the encounter have been performed by me. I agree with the assessment, plan and orders as documented by the resident With changes made to the note.      Electronically signed by Shayan Kaur MD on 8/17/2022 at 2:25 PM.    [facility]      [phone]

## 2022-08-18 LAB
GLUCOSE BLD-MCNC: 102 MG/DL (ref 75–110)
GLUCOSE BLD-MCNC: 151 MG/DL (ref 75–110)
GLUCOSE BLD-MCNC: 164 MG/DL (ref 75–110)
GLUCOSE BLD-MCNC: 92 MG/DL (ref 75–110)
PARTIAL THROMBOPLASTIN TIME: 64.2 SEC (ref 20.5–30.5)
PARTIAL THROMBOPLASTIN TIME: 65.5 SEC (ref 20.5–30.5)

## 2022-08-18 PROCEDURE — 36415 COLL VENOUS BLD VENIPUNCTURE: CPT

## 2022-08-18 PROCEDURE — 97110 THERAPEUTIC EXERCISES: CPT

## 2022-08-18 PROCEDURE — 85730 THROMBOPLASTIN TIME PARTIAL: CPT

## 2022-08-18 PROCEDURE — 6370000000 HC RX 637 (ALT 250 FOR IP): Performed by: NURSE PRACTITIONER

## 2022-08-18 PROCEDURE — 97535 SELF CARE MNGMENT TRAINING: CPT

## 2022-08-18 PROCEDURE — 1200000000 HC SEMI PRIVATE

## 2022-08-18 PROCEDURE — 6370000000 HC RX 637 (ALT 250 FOR IP): Performed by: STUDENT IN AN ORGANIZED HEALTH CARE EDUCATION/TRAINING PROGRAM

## 2022-08-18 PROCEDURE — 99231 SBSQ HOSP IP/OBS SF/LOW 25: CPT | Performed by: PSYCHIATRY & NEUROLOGY

## 2022-08-18 PROCEDURE — 2580000003 HC RX 258: Performed by: NURSE PRACTITIONER

## 2022-08-18 PROCEDURE — 97530 THERAPEUTIC ACTIVITIES: CPT

## 2022-08-18 PROCEDURE — 82947 ASSAY GLUCOSE BLOOD QUANT: CPT

## 2022-08-18 PROCEDURE — 99232 SBSQ HOSP IP/OBS MODERATE 35: CPT | Performed by: INTERNAL MEDICINE

## 2022-08-18 RX ADMIN — METOPROLOL SUCCINATE 12.5 MG: 25 TABLET, FILM COATED, EXTENDED RELEASE ORAL at 09:06

## 2022-08-18 RX ADMIN — TAMSULOSIN HYDROCHLORIDE 0.4 MG: 0.4 CAPSULE ORAL at 09:06

## 2022-08-18 RX ADMIN — BACLOFEN 10 MG: 10 TABLET ORAL at 20:20

## 2022-08-18 RX ADMIN — CLOPIDOGREL 75 MG: 75 TABLET, FILM COATED ORAL at 09:06

## 2022-08-18 RX ADMIN — SODIUM CHLORIDE, PRESERVATIVE FREE 5 ML: 5 INJECTION INTRAVENOUS at 20:21

## 2022-08-18 RX ADMIN — TRAMADOL HYDROCHLORIDE 50 MG: 50 TABLET, COATED ORAL at 20:20

## 2022-08-18 RX ADMIN — GABAPENTIN 300 MG: 300 CAPSULE ORAL at 05:54

## 2022-08-18 RX ADMIN — ACETAMINOPHEN 1000 MG: 500 TABLET ORAL at 05:54

## 2022-08-18 RX ADMIN — MIRTAZAPINE 30 MG: 30 TABLET, FILM COATED ORAL at 20:20

## 2022-08-18 RX ADMIN — GABAPENTIN 300 MG: 300 CAPSULE ORAL at 15:04

## 2022-08-18 RX ADMIN — BACLOFEN 10 MG: 10 TABLET ORAL at 15:04

## 2022-08-18 RX ADMIN — ACETAMINOPHEN 1000 MG: 500 TABLET ORAL at 21:40

## 2022-08-18 RX ADMIN — SODIUM CHLORIDE, PRESERVATIVE FREE 10 ML: 5 INJECTION INTRAVENOUS at 09:59

## 2022-08-18 RX ADMIN — METOPROLOL SUCCINATE 12.5 MG: 25 TABLET, FILM COATED, EXTENDED RELEASE ORAL at 20:20

## 2022-08-18 RX ADMIN — ACETAMINOPHEN 1000 MG: 500 TABLET ORAL at 15:04

## 2022-08-18 RX ADMIN — ATORVASTATIN CALCIUM 10 MG: 10 TABLET, FILM COATED ORAL at 09:06

## 2022-08-18 RX ADMIN — EZETIMIBE 10 MG: 10 TABLET ORAL at 09:58

## 2022-08-18 RX ADMIN — OXYCODONE 5 MG: 5 TABLET ORAL at 07:10

## 2022-08-18 RX ADMIN — DOXYCYCLINE HYCLATE 100 MG: 100 TABLET, COATED ORAL at 09:06

## 2022-08-18 RX ADMIN — BACLOFEN 10 MG: 10 TABLET ORAL at 09:06

## 2022-08-18 RX ADMIN — GABAPENTIN 300 MG: 300 CAPSULE ORAL at 21:40

## 2022-08-18 RX ADMIN — Medication 4.5 MG: at 21:40

## 2022-08-18 ASSESSMENT — ENCOUNTER SYMPTOMS
PHOTOPHOBIA: 0
CHOKING: 0
WHEEZING: 0
SHORTNESS OF BREATH: 0
BACK PAIN: 0
BLOOD IN STOOL: 0
GASTROINTESTINAL NEGATIVE: 1
EYE PAIN: 0
RHINORRHEA: 0
ABDOMINAL PAIN: 0
COUGH: 0

## 2022-08-18 ASSESSMENT — PAIN SCALES - GENERAL
PAINLEVEL_OUTOF10: 9
PAINLEVEL_OUTOF10: 6
PAINLEVEL_OUTOF10: 8

## 2022-08-18 NOTE — PROGRESS NOTES
Date    Anxiety     Arthritis associated with another disorder     CAD (coronary artery disease)     with history of multiple MI    Carotid artery occlusion     Family history of kidney stone     HTN (hypertension)     Hyperkalemia     Hyperlipidemia     Hypertension     Nephrolithiasis     multiple times    Neuropathy     PAD (peripheral artery disease) (HCC)     Peripheral vascular disease (HCC)     Personal history of MRSA (methicillin resistant Staphylococcus aureus)     Seasonal allergies     Vasculopathy         Past Surgical History:     Past Surgical History:   Procedure Laterality Date    ABDOMINAL ADHESION SURGERY  2/11/2011-[facility]    Reexploration of abdominal wound and reclosure of the abdominal wound with fascial sutures and retention sutures as well    AORTO-FEMORAL BYPASS GRAFT  2/11/2011-[facility]    ABF bypass of 16x8 PTFE graft. This is an end-to-side anastomosis proximally    AXILLARY-FEMORAL BYPASS GRAFT Bilateral 7/8/2022    AORTA BIFEMORAL GRAFT ENDARTERECTOMY, BILATERAL COMMON FEMORAL EMBOLECTOMY, BILATERAL LOWER EXTREMEITY ARTERECTOMY, AORTAGRAPHY, RIGHT LIMB OF BYPASS GRAFT STENT, BILATERAL FEMORAL BOVINE PATCHES performed by Brenda Wadsworth MD at [place] [address], ARTERY  11/23/10 [address]    1. Placement of 5 sheath in the left femoral artery with duplex guidance. 2. Left Iliac arteriogram. 3. Attempted ballon angioplasty left iliac artery occlusion. CAROTID ENDARTERECTOMY  07/19/08    Left carotid endarterectomy using shunt dacron patch aplasty    CORONARY ARTERY BYPASS GRAFT  2008    CABG x3     FEMORAL-TIBIAL BYPASS GRAFT  12/31/07  [facility]     Right femoral to posterior tibial artery bypass graft with in situ saphenous vein graft    IR FEMORAL POPLITEAL BYPASS GRAFT  01/24/208 Dr Molina Escobar    1. Exploration of right fem-pop bypass graft. 2.Thrombectomy of right fem-post-tib saph vein graft.  3. Replacement of femoral to popliteal bypass graft from [facility] to near the anastomosis by reversed greater saph vein harvested from left leg    PTCA      PTCA with stent x6    TONSILLECTOMY AND ADENOIDECTOMY          Medications during admission:      acetaminophen  1,000 mg Oral 3 times per day    gabapentin  300 mg Oral q8h    polyethylene glycol  17 g Oral Daily    sennosides-docusate sodium  2 tablet Oral Daily    [Held by provider] apixaban  5 mg Oral BID    atorvastatin  10 mg Oral Daily    baclofen  10 mg Oral TID    clopidogrel  75 mg Oral Daily    ezetimibe  10 mg Oral Daily    melatonin  4.5 mg Oral Nightly    metoprolol succinate  12.5 mg Oral BID    mirtazapine  30 mg Oral Nightly    tamsulosin  0.4 mg Oral Daily    [Held by provider] torsemide  20 mg Oral Daily    sodium chloride flush  5-40 mL IntraVENous 2 times per day    insulin lispro  0-8 Units SubCUTAneous TID WC    insulin lispro  0-4 Units SubCUTAneous Nightly    doxycycline hyclate  100 mg Oral 2 times per day         Physical Exam:   /71   Pulse 71   Temp 97.7 °F (36.5 °C) (Oral)   Resp 16   Wt 174 lb 9.7 oz (79.2 kg)   SpO2 99%   BMI 24.35 kg/m²   Temp (24hrs), Av.2 °F (36.8 °C), Min:97.7 °F (36.5 °C), Max:98.6 °F (37 °C)    Neurological examination:    Mental status   Alert and oriented x 3 except for day; following all commands; speech is fluent, no dysarthria, aphasia.       Cranial nerves   II - visual fields intact to confrontation; pupils reactive  III, IV, VI - extraocular muscles intact; no CHRISTIANO; no nystagmus; no ptosis   V - normal facial sensation                                                               VII -continues to have subtle droop at right corner of mouth                                                            VIII - intact hearing                                                                             IX, X -phonation is clear                                             XI -normal head turning                                                      XII - midline tongue      Motor function  Right hemiplegia with contracture. Able to lift left leg however foot dorsiflexion and extension of toes limited. Left arm functional.               Sensory function Right hemisensory loss. Left leg has sensory decreased from about L3 upper thigh to toes. Cerebellar Intact finger-nose-finger testing. Intact heel-shin testing. No dysdiadochokinesia present. Reflex function Spastic contracture right arm and limited response at left bicep and both knees. Toe sign neutral on right and downgoing on left   Gait                  Not tested         Diagnostics:      Laboratory Testing:  CBC:   Recent Labs     08/16/22 1850 08/17/22 0626   WBC 9.0 8.7   HGB 10.6* 10.7*    425     BMP:    Recent Labs     08/16/22 1850 08/17/22 0626 08/17/22  1244   * 136  --    K 3.5* 3.3* 4.5    103  --    CO2 18* 23  --    BUN 29* 26*  --    CREATININE 1.31* 1.03  --    GLUCOSE 189* 115*  --          Lab Results   Component Value Date    CHOL 145 06/30/2012    LDLCHOLESTEROL 87 06/30/2012    HDL 29 (L) 06/30/2012    TRIG 288 (H) 07/11/2022    ALT 29 08/16/2022    AST 22 08/16/2022    TSH 0.90 06/30/2012    INR 1.2 08/14/2022    LABA1C 5.4 08/12/2022    LABMICR 909 12/27/2011       Lab Results   Component Value Date    VALPROATE 12.9 07/03/2012    VALPROATE 74 07/03/2012       Impression:      Transient neurologic dysfunction associated with episode of hypotension when patient was stood up at bedside. Transient neurologic dysfunction now cleared. Speech is clear mentation appears clear or at baseline. Pre-existing left hemispheric stroke with right hemiplegia  Pre-existing left leg neuropathy complex L3-S1 present for 20 years  Prior left carotid endarterectomy. Carotid Dopplers as noted above. Nonactionable. Plan:     No new recommendations by neurology at this time. Neurology will sign off. Please reconsult as needed.         Electronically signed by Ren Carrington MD on 8/18/2022 at 9:37 AM      Galileo Abarca MD  [facility]  Neurology

## 2022-08-18 NOTE — PROGRESS NOTES
Division of Vascular Surgery             Progress Note      Name: Dodie José  MRN: [id number]         Overnight Events:     No acute event      Subjective:   Pt seen and examined. Nursing at bedside; reports no issues overnight. Pt still complaining of bilateral lower extremity burning and pain, more on the left leg where the fasciotomy at. No confusion or stuttering on exam. Denies CP, SOB, nausea or vomiting. Wound vac and LLE wound dressing intact. Wound dress changed today. Physical Exam:     Vitals:  BP (!) 95/55   Pulse 76   Temp 98.2 °F (36.8 °C) (Oral)   Resp 16   Wt 174 lb 9.7 oz (79.2 kg)   SpO2 98%   BMI 24.35 kg/m²     General appearance - alert, well appearing and in no acute distress  Mental status - oriented to person, place and time with normal affect  Head - normocephalic and atraumatic  Neck - supple, no JVD  Chest - normal work of breathing on room air   Heart - normal rate, regular rhythm  Abdomen - soft, non-tender, non-distended  Neurological - normal speech, no focal findings or movement disorder noted  Extremities - wound vac on the left groin, LLE wound dressing intact without breakthrough drainage, peripheral pulses palpable, no pedal edema.      Data:  CBC:   Recent Labs     08/16/22  1850 08/17/22  0626   WBC 9.0 8.7   HGB 10.6* 10.7*    425     Chemistry:   Recent Labs     08/16/22  1850 08/16/22  2112 08/17/22  0205 08/17/22  0626 08/17/22  1244   *  --   --  136  --    K 3.5*  --   --  3.3* 4.5     --   --  103  --    CO2 18*  --   --  23  --    GLUCOSE 189*  --   --  115*  --    BUN 29*  --   --  26*  --    CREATININE 1.31*  --   --  1.03  --    MG 1.6  --   --  2.2  --    ANIONGAP 15  --   --  10  --    LABGLOM 55*  --   --  >60  --    GFRAA >60  --   --  >60  --    CALCIUM 8.1*  --   --  8.5*  --    CAION  --   --   --  1.18  --    PHOS 3.6  --   --  3.7  --    TROPHS 229*  --  235*  --   --    LACTACIDWB  --  2.5*  --  1.4  --      Hepatic: Recent Labs     08/16/22  1850   AST 22   ALT 29   ALKPHOS 166*   BILITOT 0.26*     Coagulation:   Recent Labs     08/16/22  1850 08/16/22  2112 08/17/22  1657 08/18/22  0022 08/18/22  0558   APTT  --    < > 44.3* 64.2* 65.5*   PROT 4.8*  --   --   --   --     < > = values in this interval not displayed. Radiology Review:    XR CHEST PORTABLE    Result Date: 8/16/2022  No acute airspace disease identified. Assessment/Plan: 1. Wound vac intact. Pending placement  Wound vac will need to be changed M-W-F.  LLE dressing change today. 2.Continue Doxycycline until 8/19/2022. 3.Regular diet. 4.Follow up on labs and VL Dup Carotid. -AM labs pending this morning.              -Trend troponin: 235, 229. Heart cath later per cardiology   -follow up life vest with cardiology  5. Neurology and ID on board, follow up recs. 6.Discharge planning to facility pending acceptance.       Electronically signed by Jane Boggs on 8/17/22 at 6:29 AM EDT          [id number] [facility]  Electronically signed by Holly Ibrahim DO  on 8/18/2022 at 5:16 PM

## 2022-08-18 NOTE — PROGRESS NOTES
[facility] -   Infectious diseases evaluation  admission date 8/12/2022    reason for consultation:   Surgical Wound Infection    Impression :   Current:  L groin post-op surgical wound infection. S/p aorto-bifem graft endarterectomy 7/8/22. Leukocytosis  PVD    Other:    Discussion / summary of stay / plan of care     Recommendations   Continue Doxycycline 100 mg po twice daily x 7 days until 8/18   Vac changed and wound all are clean - stop AB  Wound care  Placement pend    Infection Control Recommendations   [place] Precautions      Antimicrobial Stewardship Recommendations   Simplification of therapy  Targeted therapy    History of Present Illness:   Initial history:  Prince Mallory is a 58y.o.-year-old male who was admitted for left groin wound care s/p bilateral groin exploration and thrombectomy of his aortobifemoral bypass graft. Patient was seen in the vascular office 8/12/22 for post-op visit and there was concern for left femoral wound infection with noted purulent drainage on exam. No cx of the drainage was sent. Patient states the dressings are being changed every 3 days in the ECF. No associated symptoms of fever, chills, n/v/d. Initial labs: WBC: 11.7, Cre: 1.24. Interval changes  8/18/2022   Patient Vitals for the past 8 hrs:   BP Temp Temp src Pulse SpO2   08/18/22 0855 125/71 97.7 °F (36.5 °C) Oral 71 99 %       VSS and no  fever  Abd soft  and wounds look clean and red beefy on the groin and the leg  Still on doxy  Labs reviewed                Summary of relevant labs:  Labs:  Creat 1.24-1.15-1.08-1.03  WBC 11.7-8.7  UA 8/16-negative  Micro:    Imaging:  Chest x-ray 8/16-no acute cardiopulmonary process    I have personally reviewed the past medical history, past surgical history, medications, social history, and family history, and I haveupdated the database accordingly.       Allergies:   Faviola [morphine sulfate], Methadone, and Morphine     Review of Systems:     Review of Systems   Constitutional: Negative. Negative for chills and fever. HENT:  Negative for congestion, ear discharge, postnasal drip and rhinorrhea. Eyes:  Negative for photophobia and pain. Respiratory:  Negative for cough, choking, shortness of breath and wheezing. Cardiovascular: Negative. Negative for chest pain and palpitations. Gastrointestinal: Negative. Negative for abdominal pain and blood in stool. Endocrine: Negative for polyphagia. Genitourinary: Negative. Negative for dysuria and flank pain. Musculoskeletal:  Negative for arthralgias and back pain. Bilateral lower extremity pain   Skin:  Positive for wound. L groin. LLE fasciotomies. Allergic/Immunologic: Negative for immunocompromised state. Neurological:  Negative for dizziness, seizures, numbness and headaches. Psychiatric/Behavioral:  Negative for agitation and confusion. All other systems reviewed and are negative. Physical Examination :       Physical Exam  Vitals and nursing note reviewed. Constitutional:       General: He is not in acute distress. Appearance: Normal appearance. HENT:      Head: Normocephalic and atraumatic. Right Ear: External ear normal.      Left Ear: External ear normal.      Nose: Nose normal.      Mouth/Throat:      Mouth: Mucous membranes are moist.      Pharynx: Oropharynx is clear. Eyes:      Conjunctiva/sclera: Conjunctivae normal.   Cardiovascular:      Rate and Rhythm: Normal rate and regular rhythm. Pulses: Normal pulses. Heart sounds: Normal heart sounds. Pulmonary:      Effort: Pulmonary effort is normal.      Breath sounds: Normal breath sounds. Abdominal:      General: Bowel sounds are normal. There is no distension. Palpations: Abdomen is soft. There is no mass. Tenderness: There is no abdominal tenderness. Genitourinary:     Comments: Condom cath.   Urine clear.  Musculoskeletal:         General: No swelling or tenderness. Normal range of motion. Cervical back: Normal range of motion. Right lower leg: No edema. Left lower leg: No edema. Skin:     General: Skin is warm and dry. Findings: No rash. Comments: Lt groin wound with wound VAC in place  L LE dressing clean and dry   Neurological:      Mental Status: He is alert and oriented to person, place, and time. Psychiatric:         Behavior: Behavior normal.       Past Medical History:     Past Medical History:   Diagnosis Date    Anxiety     Arthritis associated with another disorder     CAD (coronary artery disease)     with history of multiple MI    Carotid artery occlusion     Family history of kidney stone     HTN (hypertension)     Hyperkalemia     Hyperlipidemia     Hypertension     Nephrolithiasis     multiple times    Neuropathy     PAD (peripheral artery disease) (HCC)     Peripheral vascular disease (HCC)     Personal history of MRSA (methicillin resistant Staphylococcus aureus)     Seasonal allergies     Vasculopathy        Past Surgical  History:     Past Surgical History:   Procedure Laterality Date    ABDOMINAL ADHESION SURGERY  2/11/2011-[facility]    Reexploration of abdominal wound and reclosure of the abdominal wound with fascial sutures and retention sutures as well    AORTO-FEMORAL BYPASS GRAFT  2/11/2011-[facility]    ABF bypass of 16x8 PTFE graft. This is an end-to-side anastomosis proximally    AXILLARY-FEMORAL BYPASS GRAFT Bilateral 7/8/2022    AORTA BIFEMORAL GRAFT ENDARTERECTOMY, BILATERAL COMMON FEMORAL EMBOLECTOMY, BILATERAL LOWER EXTREMEITY ARTERECTOMY, AORTAGRAPHY, RIGHT LIMB OF BYPASS GRAFT STENT, BILATERAL FEMORAL BOVINE PATCHES performed by Army Elyse MD at [place] [address], ARTERY  11/23/10 [address]    1. Placement of 5 sheath in the left femoral artery with duplex guidance.  2. Left Iliac arteriogram. 3. Attempted ballon angioplasty left iliac artery occlusion. CAROTID ENDARTERECTOMY  08    Left carotid endarterectomy using shunt dacron patch aplasty    CORONARY ARTERY BYPASS GRAFT      CABG x3     FEMORAL-TIBIAL BYPASS GRAFT  07  Roy[facility]     Right femoral to posterior tibial artery bypass graft with in situ saphenous vein graft    IR FEMORAL POPLITEAL BYPASS GRAFT   Dr Stephan Fenton    1. Exploration of right fem-pop bypass graft. 2.Thrombectomy of right fem-post-tib saph vein graft.  3. Replacement of femoral to popliteal bypass graft from midthigh to near the anastomosis by reversed greater saph vein harvested from left leg    PTCA      PTCA with stent x6    TONSILLECTOMY AND ADENOIDECTOMY         Medications:      acetaminophen  1,000 mg Oral 3 times per day    gabapentin  300 mg Oral q8h    polyethylene glycol  17 g Oral Daily    sennosides-docusate sodium  2 tablet Oral Daily    [Held by provider] apixaban  5 mg Oral BID    atorvastatin  10 mg Oral Daily    baclofen  10 mg Oral TID    clopidogrel  75 mg Oral Daily    ezetimibe  10 mg Oral Daily    melatonin  4.5 mg Oral Nightly    metoprolol succinate  12.5 mg Oral BID    mirtazapine  30 mg Oral Nightly    tamsulosin  0.4 mg Oral Daily    [Held by provider] torsemide  20 mg Oral Daily    sodium chloride flush  5-40 mL IntraVENous 2 times per day    insulin lispro  0-8 Units SubCUTAneous TID WC    insulin lispro  0-4 Units SubCUTAneous Nightly    doxycycline hyclate  100 mg Oral 2 times per day       Social History:     Social History     Socioeconomic History    Marital status:      Spouse name: Not on file    Number of children: 1    Years of education: 14    Highest education level: Not on file   Occupational History    Occupation: disabled      Comment: SSI    Tobacco Use    Smoking status: Former     Packs/day: 2.00     Years: 33.00     Pack years: 66.00     Types: Cigarettes     Quit date: 3/10/2011     Years since quittin.4    Smokeless tobacco: Never    Tobacco comments:     quite 2/2011    Substance and Sexual Activity    Alcohol use: No     Alcohol/week: 0.0 standard drinks    Drug use: No    Sexual activity: Yes     Partners: Female     Comment: with has trouble   Other Topics Concern    Not on file   Social History Narrative    Not on file     Social Determinants of Health     Financial Resource Strain: Not on file   Food Insecurity: Not on file   Transportation Needs: Not on file   Physical Activity: Not on file   Stress: Not on file   Social Connections: Not on file   Intimate Partner Violence: Not on file   Housing Stability: Not on file       Family History:     Family History   Problem Relation Age of Onset    Hypertension Father     Heart Disease Sister     Hypertension Sister     Cancer Mother 28        breast cancer      Medical Decision Making:   I have independently reviewed/ordered the following labs:    CBC with Differential:   Recent Labs     08/16/22 1850 08/17/22  0626   WBC 9.0 8.7   HGB 10.6* 10.7*   HCT 31.7* 33.6*    425   LYMPHOPCT 23* 25   MONOPCT 8 8       BMP:  Recent Labs     08/16/22  1850 08/17/22  0626 08/17/22  1244   * 136  --    K 3.5* 3.3* 4.5    103  --    CO2 18* 23  --    BUN 29* 26*  --    CREATININE 1.31* 1.03  --    MG 1.6 2.2  --        Hepatic Function Panel:   Recent Labs     08/16/22 1850   PROT 4.8*   LABALBU 2.3*   BILITOT 0.26*   ALKPHOS 166*   ALT 29   AST 22       No results for input(s): RPR in the last 72 hours. No results for input(s): HIV in the last 72 hours. No results for input(s): BC in the last 72 hours. Lab Results   Component Value Date/Time    CREATININE 1.03 08/17/2022 06:26 AM    CREATININE 1.2 07/25/2011 09:50 AM    GLUCOSE 115 08/17/2022 06:26 AM    GLUCOSE 128 06/06/2012 07:29 PM       Detailed results: Thank you for allowing us to participate in the care of this patient. Please call with questions.     This note is created with the assistance of catracho speech recognition program.  While intending to generate adocument that actually reflects the content of the visit, the document can still have some errors including those of syntax and sound a like substitutions which may escape proof reading. It such instances, actual meaningcan be extrapolated by contextual diversion. Nino Adams MD  Office: [phone]  Perfect serve / office [phone]        I have discussed the care of the patient, including pertinent history and exam findings,  with the resident. I have seen and examined the patient and the key elements of all parts of the encounter have been performed by me. I agree with the assessment, plan and orders as documented by the resident.     Marissa Yarbrough, Infectious Diseases

## 2022-08-18 NOTE — PLAN OF CARE
Problem: Discharge Planning  Goal: Discharge to home or other facility with appropriate resources  8/18/2022 0314 by Jacqueline Treviño RN  Outcome: Progressing  8/18/2022 0314 by Jacqueline Treviño RN  Outcome: Progressing  8/17/2022 1819 by Angie Garcia RN  Outcome: Progressing     Problem: Chronic Conditions and Co-morbidities  Goal: Patient's chronic conditions and co-morbidity symptoms are monitored and maintained or improved  8/18/2022 0314 by Jacqueline Treviño RN  Outcome: Progressing  8/18/2022 0314 by Jacqueline Treviño RN  Outcome: Progressing  8/17/2022 1819 by Angie Garcia RN  Outcome: Progressing     Problem: Skin/Tissue Integrity  Goal: Absence of new skin breakdown  Description: 1. Monitor for areas of redness and/or skin breakdown  2. Assess vascular access sites hourly  3. Every 4-6 hours minimum:  Change oxygen saturation probe site  4. Every 4-6 hours:  If on nasal continuous positive airway pressure, respiratory therapy assess nares and determine need for appliance change or resting period.   8/18/2022 0314 by Jacqueline Treviño RN  Outcome: Progressing  8/18/2022 0314 by Jacqueline Treviño RN  Outcome: Progressing  8/17/2022 1819 by Angie Garcia RN  Outcome: Progressing     Problem: Safety - Adult  Goal: Free from fall injury  8/18/2022 0314 by Jacqueline Treviño RN  Outcome: Progressing  8/18/2022 0314 by Jacqueline Treviño RN  Outcome: Progressing  8/17/2022 1819 by Angie Garcia RN  Outcome: Progressing     Problem: Pain  Goal: Verbalizes/displays adequate comfort level or baseline comfort level  8/18/2022 0314 by Jacqueline Treviño RN  Outcome: Progressing  8/18/2022 0314 by Jacqueline Treviño RN  Outcome: Progressing  8/17/2022 1819 by Angie Garcia RN  Outcome: Progressing     Problem: ABCDS Injury Assessment  Goal: Absence of physical injury  8/18/2022 0314 by Jacqueline Treviño RN  Outcome: Progressing  8/18/2022 0314 by Jacqueline Treviño RN  Outcome: Progressing  8/17/2022 1819 by Angie Garcia RN  Outcome: Progressing

## 2022-08-18 NOTE — PROGRESS NOTES
[facility]  Progress Note                   Date:   8/18/2022  Patient name: Jose M Lorenzo  Date of admission:  8/12/2022  5:34 PM  MRN:   [id number]  YOB: 1960  PCP: No primary care provider on file. Reason for Admission: H/O aorta-iliac-femoral bypass [Z95.828]    Subjective:       Clinical Changes /Abnormalities:Pt. Seen & examined in room after discussion with RN. Denies any chest pain or SOB. Labs, vitals, & tele reviewed.      Review of Systems    Medications:   Scheduled Meds:   acetaminophen  1,000 mg Oral 3 times per day    gabapentin  300 mg Oral q8h    polyethylene glycol  17 g Oral Daily    sennosides-docusate sodium  2 tablet Oral Daily    [Held by provider] apixaban  5 mg Oral BID    atorvastatin  10 mg Oral Daily    baclofen  10 mg Oral TID    clopidogrel  75 mg Oral Daily    ezetimibe  10 mg Oral Daily    melatonin  4.5 mg Oral Nightly    metoprolol succinate  12.5 mg Oral BID    mirtazapine  30 mg Oral Nightly    tamsulosin  0.4 mg Oral Daily    [Held by provider] torsemide  20 mg Oral Daily    sodium chloride flush  5-40 mL IntraVENous 2 times per day    insulin lispro  0-8 Units SubCUTAneous TID WC    insulin lispro  0-4 Units SubCUTAneous Nightly    doxycycline hyclate  100 mg Oral 2 times per day     Continuous Infusions:   heparin (PORCINE) Infusion 16.035 Units/kg/hr (08/17/22 2135)    sodium chloride Stopped (08/14/22 2002)    dextrose       CBC:   Recent Labs     08/16/22 1850 08/17/22  0626   WBC 9.0 8.7   HGB 10.6* 10.7*    425     BMP:    Recent Labs     08/16/22 1850 08/17/22  0626 08/17/22  1244   * 136  --    K 3.5* 3.3* 4.5    103  --    CO2 18* 23  --    BUN 29* 26*  --    CREATININE 1.31* 1.03  --    GLUCOSE 189* 115*  --      Hepatic:  Recent Labs     08/16/22 1850   AST 22   ALT 29   BILITOT 0.26*   ALKPHOS 166*     Troponin:   Recent Labs     08/16/22 1850 08/17/22  0205   TROPHS 229* 235*     BNP: No results for input(s): BNP in the last 72 hours. Lipids: No results for input(s): CHOL, HDL in the last 72 hours. Invalid input(s): LDLCALCU  INR: No results for input(s): INR in the last 72 hours. Objective:   Vitals: /71   Pulse 71   Temp 97.7 °F (36.5 °C) (Oral)   Resp 16   Wt 174 lb 9.7 oz (79.2 kg)   SpO2 99%   BMI 24.35 kg/m²   General appearance: alert and cooperative with exam  HEENT: Head: Normocephalic, no lesions, without obvious abnormality. Neck:no JVD, trachea midline, no adenopathy  Lungs: Clear to auscultation  Heart: Regular rate and rhythm, s1/s2 auscultated, no murmurs  Abdomen: soft, non-tender, bowel sounds active  Extremities: no edema  Neurologic: not done    Echo 7/2022  Summary  Poor sound transmission. Global left ventricular systolic function is severely reduced. Calculated  ejection fraction 23% by Andrade's method. Visually estimated EF 15%. Akinetic apex. No significant valvular regurgitation or stenosis seen. Assessment / Acute Cardiac Problems:   groin post-op surgical wound infection.  S/p aorto-bifem graft endarterectomy 7/8/22  CAD with hx of CABGx4 in [facility] 19. 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  Hx of Afib - on Eliquis  Orthostatic hypotension    Patient Active Problem List:     Seasonal allergies     Anxiety     Carotid artery occlusion     CAD (coronary artery disease)     Hypertension     Neuropathy     PAD (peripheral artery disease) (HCC)     HTN (hypertension)     Arthritis associated with another disorder     Hernia, hiatal     Hyperlipidemia     CAD (coronary artery disease)     Critical ischemia of lower extremity (HCC)     CAROLYN (acute kidney injury) ([address]     Dependence on renal dialysis ([address]     Failing vascular bypass graft     Arterial hypotension     Non-traumatic rhabdomyolysis     Cardiomyopathy ([address]     Decreased urine output     Acute respiratory failure with hypercapnia (HCC)     Lactic acidosis     Left leg numbness     Cardiomyopathy, ischemic     Anemia     H/O: CVA (cerebrovascular accident)     Surgical wound infection     Type 2 diabetes mellitus with circulatory disorder, without long-term current use of insulin (HCC)     Leukocytosis     Wound infection after surgery      Plan of Treatment:   Stable from CV standpoint. No ECG changes and no c/o CP or anginal symptoms/concerns. Echo with significantly low LVEF and no prior to compare at this time  Discussed in detail with patient. Will plan OP cardiac cath for ischemic work-up once acute issues improve. Will order LV for ICMP and continue GDMT . Continue BB. Diuretic & ACE on hold d/t mild CAROLYN and hypotension. Clinically no volume overload on exam. Recommend to repeat labs in AM and if creat remains stable resume Lisinopril 2.5mg daily. Will reassess need for diuretic as OP.    Keep K>4 and Mg>2   Wound management per [facility] surgery    Electronically signed by TIERRA Benoit CNP on 8/18/2022 at 12:10 PM  [address]  [phone]

## 2022-08-18 NOTE — PROGRESS NOTES
Physical Therapy  Facility/Department: [address] STEPDOWN  Daily Treatment Note     Name: Sterling Fernández  : 1960  MRN: [id number]  Date of Service: 2022    Discharge Recommendations:  Patient would benefit from continued therapy after discharge   PT Equipment Recommendations  Equipment Needed: No      Patient Diagnosis(es): There were no encounter diagnoses. Past Medical History:  has a past medical history of Anxiety, Arthritis associated with another disorder, CAD (coronary artery disease), Carotid artery occlusion, Family history of kidney stone, HTN (hypertension), Hyperkalemia, Hyperlipidemia, Hypertension, Nephrolithiasis, Neuropathy, PAD (peripheral artery disease) ([facility] Peripheral vascular disease ([facility] Personal history of MRSA (methicillin resistant Staphylococcus aureus), Seasonal allergies, and Vasculopathy. Past Surgical History:  has a past surgical history that includes Tonsillectomy and adenoidectomy; Percutaneous Transluminal Coronary Angio; Carotid endarterectomy (08); IR Femoral Popliteal Bypass Graft ( Dr Eugenie Melgoza); Femoral-tibial Bypass Graft (07  Fabricio Joyner ); Balloon angioplasty, artery (11/23/10 Allie Muñoz); Aorto-femoral Bypass Graft (2011-[facility]); Abdominal adhesion surgery (2011-[facility]); Coronary artery bypass graft (); and Axillary-femoral Bypass Graft (Bilateral, 2022). Assessment   Body Structures, Functions, Activity Limitations Requiring Skilled Therapeutic Intervention: Decreased functional mobility ; Decreased endurance;Decreased strength;Decreased balance; Increased pain  Assessment: BP decreasing from supine->sitting EOB->standing; pt started to become lightheaded upon sitting on pads on refugio stedy; positioned pt at EOB and then pt became unresponsive. Pt became responsive after about ~15 seconds in supine. RN aware. Pt required maxA x2 for bed mobility. Pt sat EOB for 25 mins with CGA with no LOB noted.  PT required maxA x2 for STS transfer using the [address] stedy with heavy posterior lean along with verbal cues for sequencing/ hand placement with fair return. Pt is currently unsafe to return to prior living arrangements and would benefit from continued PT following discharge to address functional deficits. Specific Instructions for Next Treatment: check BP t/o session  Therapy Prognosis: Fair  Decision Making: High Complexity  Requires PT Follow-Up: Yes  Activity Tolerance  Activity Tolerance: Patient limited by endurance; Patient limited by pain;Treatment limited secondary to medical complications  Activity Tolerance Comments: BP decreasing from supine->sitting EOB->standing; pt started to become lightheaded upon sitting on pads on refugio stedy; positioned pt at EOB and then pt became unresponsive. Pt became responsive after about ~15 seconds in supine. Plan   Plan  Plan:  (5-6x/wk)  Specific Instructions for Next Treatment: check BP t/o session  Current Treatment Recommendations: Strengthening, ROM, Balance training, Functional mobility training, Transfer training, Endurance training, Therapeutic activities, Patient/Caregiver education & training, Safety education & training, Home exercise program, Gait training, Equipment evaluation, education, & procurement, Neuromuscular re-education, Wheelchair mobility training  Safety Devices  Type of Devices: Gait belt, Nurse notified, Left in bed, Call light within reach  Restraints  Restraints Initially in Place: No     Restrictions  Restrictions/Precautions  Restrictions/Precautions: Fall Risk  Required Braces or Orthoses?: No  Position Activity Restriction  Other position/activity restrictions: up w/ assist, R side deficits from prior CVA; wound vac L groin; wound on L LE     Subjective   General  Chart Reviewed: Yes  Response To Previous Treatment: Patient with no complaints from previous session.   Family / Caregiver Present: No  Follows Commands: Within Functional Limits  General Comment  Comments: Pt reports buttocks/ LLE pain 9/10. Pt returned to supine in bed post PT. Subjective  Subjective: RN and pt agreeable to PT. Pt supine in bed upon arrival, Pt pleasant and cooperative t/o. Cognition   Orientation  Overall Orientation Status: Within Functional Limits  Orientation Level: Oriented to person;Oriented to place; Disoriented to situation;Oriented to time  Cognition  Overall Cognitive Status: Exceptions  Arousal/Alertness: Appropriate responses to stimuli  Following Commands: Follows one step commands with repetition; Follows one step commands with increased time  Attention Span: Appears intact  Memory: Appears intact  Safety Judgement: Decreased awareness of need for assistance  Problem Solving: Assistance required to generate solutions;Assistance required to correct errors made  Insights: Decreased awareness of deficits  Initiation: Requires cues for some  Sequencing: Requires cues for some     Objective   Bed mobility  Supine to Sit: Maximum assistance;2 Person assistance  Sit to Supine: Maximum assistance;2 Person assistance  Scooting: Maximal assistance;2 Person assistance  Bed Mobility Comments: HOB elevated, VCs for sequencing. BP supine 118/63; sitting /72. Transfers  Sit to Stand: Maximum Assistance;2 Person Assistance  Stand to sit: Maximum Assistance;2 Person Assistance  Comment: STS transfer completed with the SS. Heavy posterior lean with standing. Pt became lightheaded sitting on pads of refugio stedy; pt repositioned at EOB and became unresponsive with eyes open and immediately repositioned in supine with B LEs elevated;  ~15 seconds for pt to come responsive again. RN entered room to assess.         Balance  Posture: Fair  Sitting - Static: Fair  Sitting - Dynamic: Fair;-  Standing - Static: Poor  Standing - Dynamic: Poor  Comments: Assessed sitting EOB with CGA to maintain and standing at refugio stedy with maxA x 2 to maintain  A/AROM Exercises: L LE AROM ankle pumps and R LE PROM ankle pumps x 10 reps, LAQs LLE AROM x15, LAQs RLE PROM  Static Sitting Balance Exercises: Pt sat EOB ~25 minues with CGA to maintain d/t posterior lean with verbal cues needed for posture / trunk awareness; pt limited d/t orthostatic hypotension.         AM-PAC Score  AM-PAC Inpatient Mobility Raw Score : 9 (08/16/22 1602)  AM-PAC Inpatient T-Scale Score : 30.55 (08/16/22 1602)  Mobility Inpatient CMS 0-100% Score: 81.38 (08/16/22 1602)  Mobility Inpatient CMS G-Code Modifier : CM (08/16/22 1602)            Goals  Short Term Goals  Time Frame for Short term goals: 14 visits  Short term goal 1: Perform bed mobility with Mod A  Short term goal 2: Perform functional transfers with Min A  Short term goal 3: Demo Fair- dynamic standing balance to decrease risk of falls  Short term goal 4: Ambulate 20ft with appropriate AD and Mod A  Short term goal 5: Propel wheelchair 100ft with Min A         Therapy Time   Individual Concurrent Group Co-treatment   Time In 1325     1325   Time Out 1417     1348   Minutes 52     23   Timed Code Treatment Minutes: Cynthia 354, PTA

## 2022-08-19 LAB
ANION GAP SERPL CALCULATED.3IONS-SCNC: 11 MMOL/L (ref 9–17)
BUN BLDV-MCNC: 20 MG/DL (ref 8–23)
CALCIUM SERPL-MCNC: 8.2 MG/DL (ref 8.6–10.4)
CHLORIDE BLD-SCNC: 106 MMOL/L (ref 98–107)
CO2: 20 MMOL/L (ref 20–31)
CREAT SERPL-MCNC: 0.94 MG/DL (ref 0.7–1.2)
GFR AFRICAN AMERICAN: >60 ML/MIN
GFR NON-AFRICAN AMERICAN: >60 ML/MIN
GFR SERPL CREATININE-BSD FRML MDRD: ABNORMAL ML/MIN/{1.73_M2}
GLUCOSE BLD-MCNC: 102 MG/DL (ref 70–99)
GLUCOSE BLD-MCNC: 110 MG/DL (ref 75–110)
GLUCOSE BLD-MCNC: 129 MG/DL (ref 75–110)
GLUCOSE BLD-MCNC: 134 MG/DL (ref 75–110)
GLUCOSE BLD-MCNC: 147 MG/DL (ref 75–110)
INTERVENTION: NORMAL
PARTIAL THROMBOPLASTIN TIME: 46.2 SEC (ref 20.5–30.5)
PARTIAL THROMBOPLASTIN TIME: 60.7 SEC (ref 20.5–30.5)
POTASSIUM SERPL-SCNC: 3.6 MMOL/L (ref 3.7–5.3)
SODIUM BLD-SCNC: 137 MMOL/L (ref 135–144)

## 2022-08-19 PROCEDURE — 97110 THERAPEUTIC EXERCISES: CPT

## 2022-08-19 PROCEDURE — 6360000002 HC RX W HCPCS

## 2022-08-19 PROCEDURE — 97530 THERAPEUTIC ACTIVITIES: CPT

## 2022-08-19 PROCEDURE — 6370000000 HC RX 637 (ALT 250 FOR IP): Performed by: NURSE PRACTITIONER

## 2022-08-19 PROCEDURE — 80048 BASIC METABOLIC PNL TOTAL CA: CPT

## 2022-08-19 PROCEDURE — 6370000000 HC RX 637 (ALT 250 FOR IP): Performed by: STUDENT IN AN ORGANIZED HEALTH CARE EDUCATION/TRAINING PROGRAM

## 2022-08-19 PROCEDURE — 1200000000 HC SEMI PRIVATE

## 2022-08-19 PROCEDURE — 82947 ASSAY GLUCOSE BLOOD QUANT: CPT

## 2022-08-19 PROCEDURE — 2580000003 HC RX 258: Performed by: NURSE PRACTITIONER

## 2022-08-19 PROCEDURE — 36415 COLL VENOUS BLD VENIPUNCTURE: CPT

## 2022-08-19 PROCEDURE — 85730 THROMBOPLASTIN TIME PARTIAL: CPT

## 2022-08-19 RX ORDER — MIDODRINE HYDROCHLORIDE 5 MG/1
5 TABLET ORAL
Status: DISCONTINUED | OUTPATIENT
Start: 2022-08-19 | End: 2022-08-20 | Stop reason: HOSPADM

## 2022-08-19 RX ADMIN — ACETAMINOPHEN 1000 MG: 500 TABLET ORAL at 06:00

## 2022-08-19 RX ADMIN — Medication 18 UNITS/KG/HR: at 07:21

## 2022-08-19 RX ADMIN — ATORVASTATIN CALCIUM 10 MG: 10 TABLET, FILM COATED ORAL at 09:12

## 2022-08-19 RX ADMIN — OXYCODONE 5 MG: 5 TABLET ORAL at 04:07

## 2022-08-19 RX ADMIN — TAMSULOSIN HYDROCHLORIDE 0.4 MG: 0.4 CAPSULE ORAL at 09:11

## 2022-08-19 RX ADMIN — HEPARIN SODIUM 2000 UNITS: 1000 INJECTION INTRAVENOUS; SUBCUTANEOUS at 07:20

## 2022-08-19 RX ADMIN — CLOPIDOGREL 75 MG: 75 TABLET, FILM COATED ORAL at 09:12

## 2022-08-19 RX ADMIN — MIDODRINE HYDROCHLORIDE 5 MG: 5 TABLET ORAL at 13:25

## 2022-08-19 RX ADMIN — GABAPENTIN 300 MG: 300 CAPSULE ORAL at 22:06

## 2022-08-19 RX ADMIN — POLYETHYLENE GLYCOL 3350 17 G: 17 POWDER, FOR SOLUTION ORAL at 09:09

## 2022-08-19 RX ADMIN — METOPROLOL SUCCINATE 12.5 MG: 25 TABLET, FILM COATED, EXTENDED RELEASE ORAL at 20:25

## 2022-08-19 RX ADMIN — DOCUSATE SODIUM 50 MG AND SENNOSIDES 8.6 MG 2 TABLET: 8.6; 5 TABLET, FILM COATED ORAL at 09:09

## 2022-08-19 RX ADMIN — Medication 4.5 MG: at 22:06

## 2022-08-19 RX ADMIN — ACETAMINOPHEN 1000 MG: 500 TABLET ORAL at 22:05

## 2022-08-19 RX ADMIN — APIXABAN 5 MG: 5 TABLET, FILM COATED ORAL at 20:24

## 2022-08-19 RX ADMIN — SODIUM CHLORIDE, PRESERVATIVE FREE 10 ML: 5 INJECTION INTRAVENOUS at 09:12

## 2022-08-19 RX ADMIN — BACLOFEN 10 MG: 10 TABLET ORAL at 09:12

## 2022-08-19 RX ADMIN — ACETAMINOPHEN 1000 MG: 500 TABLET ORAL at 13:26

## 2022-08-19 RX ADMIN — OXYCODONE 5 MG: 5 TABLET ORAL at 20:20

## 2022-08-19 RX ADMIN — EZETIMIBE 10 MG: 10 TABLET ORAL at 09:11

## 2022-08-19 RX ADMIN — GABAPENTIN 300 MG: 300 CAPSULE ORAL at 06:00

## 2022-08-19 RX ADMIN — MIRTAZAPINE 30 MG: 30 TABLET, FILM COATED ORAL at 20:22

## 2022-08-19 RX ADMIN — METOPROLOL SUCCINATE 12.5 MG: 25 TABLET, FILM COATED, EXTENDED RELEASE ORAL at 09:11

## 2022-08-19 RX ADMIN — BACLOFEN 10 MG: 10 TABLET ORAL at 13:25

## 2022-08-19 RX ADMIN — BACLOFEN 10 MG: 10 TABLET ORAL at 20:24

## 2022-08-19 ASSESSMENT — PAIN DESCRIPTION - LOCATION
LOCATION: LEG;HIP
LOCATION: LEG;HIP
LOCATION: LEG
LOCATION: LEG

## 2022-08-19 ASSESSMENT — PAIN SCALES - GENERAL
PAINLEVEL_OUTOF10: 8
PAINLEVEL_OUTOF10: 9
PAINLEVEL_OUTOF10: 9
PAINLEVEL_OUTOF10: 8
PAINLEVEL_OUTOF10: 8
PAINLEVEL_OUTOF10: 9

## 2022-08-19 ASSESSMENT — PAIN DESCRIPTION - ORIENTATION
ORIENTATION: LEFT

## 2022-08-19 ASSESSMENT — PAIN - FUNCTIONAL ASSESSMENT
PAIN_FUNCTIONAL_ASSESSMENT: PREVENTS OR INTERFERES WITH ALL ACTIVE AND SOME PASSIVE ACTIVITIES
PAIN_FUNCTIONAL_ASSESSMENT: PREVENTS OR INTERFERES WITH ALL ACTIVE AND SOME PASSIVE ACTIVITIES

## 2022-08-19 ASSESSMENT — PAIN DESCRIPTION - DESCRIPTORS
DESCRIPTORS: STABBING
DESCRIPTORS: DISCOMFORT;STABBING

## 2022-08-19 NOTE — PROGRESS NOTES
Comprehensive Nutrition Assessment    Type and Reason for Visit:  Initial (Length of stay)    Nutrition Recommendations/Plan:   Recommend liberalizing diet to regular. Encourage good PO intake - discussed with pt ordering a form of protein with each meal.       Nutrition Assessment:    Pt initially admitted d/t concern for post op wound infection. S/P aortobifem thrombectomy on 7/8/2022 d/t lower extremity ischemia from graft occlusion. Currently has wound vac in place. Noted stage III pressure wound on coccyx as well. Pt states his appetite is good and reports eating well. Ate ~90% of breakfast this morning (oatmeal and fruit). Pt is not interested in any ONS at this time. Encouraged adequate PO intake (including protein) to help aid in wound healing. Labs reviewed, glucose controlled. On Carb Controlled diet. Nutrition Related Findings:    meds/labs reviewed Wound Type: Pressure Injury, Stage III, Wound Vac, Multiple, Surgical Incision (Stage III coccyx pressure wound (cluster of 3))       Current Nutrition Intake & Therapies:    Average Meal Intake: %  Average Supplements Intake: None Ordered  ADULT DIET; Regular; 4 carb choices (60 gm/meal)    Anthropometric Measures:  Height: 5' 11\" (180.3 cm)  Ideal Body Weight (IBW): 172 lbs (78 kg)       Current Body Weight: 174 lb 9.7 oz (79.2 kg), 101.5 % IBW. Weight Source: Bed Scale  Current BMI (kg/m2): 24.4                          BMI Categories: Normal Weight (BMI 18.5-24. 9)    Estimated Daily Nutrient Needs:  Energy Requirements Based On: Kcal/kg  Weight Used for Energy Requirements: Current  Energy (kcal/day): 1322-8191 kcals/day  Weight Used for Protein Requirements: Current  Protein (g/day): 120-145 gm/day  Method Used for Fluid Requirements: ml/Kg (30)  Fluid (ml/day): 2400 mL/day or per MD    Nutrition Diagnosis:   Increased nutrient needs related to  (healing) as evidenced by wounds    Nutrition Interventions:   Food and/or Nutrient Delivery: Modify Current Diet  Nutrition Education/Counseling: No recommendation at this time  Coordination of Nutrition Care: Continue to monitor while inpatient  Plan of Care discussed with: Pt    Goals:  Previous Goal Met:  (goal set)  Goals: Meet at least 75% of estimated needs, prior to discharge       Nutrition Monitoring and Evaluation:   Behavioral-Environmental Outcomes: None Identified  Food/Nutrient Intake Outcomes: Food and Nutrient Intake  Physical Signs/Symptoms Outcomes: Weight, Skin, Biochemical Data, Nutrition Focused Physical Findings    Discharge Planning:     Too soon to determine     Yanira Contreras, MS, RD, LD  Contact: [phone]

## 2022-08-19 NOTE — CARE COORDINATION
Transitional planning-life vest ordered. Faxed face sheet, order, progress note, and echo report to [place] [address]. Attempted to call Johnny Rubin at [address] [facility] answer-couldn't leave message-voice mail box full. Talked with Jackie Howard at [address] waiting on precert    3127 notified Jose at [place] [address] about Life Vest-he states it has been approved. 1645 call from [employer] at [address] [facility]. Can come this weekend-call [phone].    1700 call from Emily at [phone] has not received auth-needs HENS before insurance will approve.

## 2022-08-19 NOTE — PROGRESS NOTES
[facility]  Progress Note                   Date:   8/19/2022  Patient name: Dodie José  Date of admission:  8/12/2022  5:34 PM  MRN:   [id number]  YOB: 1960  PCP: No primary care provider on file. Reason for Admission: H/O aorta-iliac-femoral bypass [Z95.828]    Subjective:       Clinical Changes /Abnormalities:Pt. Seen & examined in room after discussion with RN. Pt continues to have episodes of passing out while getting out of bed.      Review of Systems    Medications:   Scheduled Meds:   acetaminophen  1,000 mg Oral 3 times per day    gabapentin  300 mg Oral q8h    polyethylene glycol  17 g Oral Daily    sennosides-docusate sodium  2 tablet Oral Daily    [Held by provider] apixaban  5 mg Oral BID    atorvastatin  10 mg Oral Daily    baclofen  10 mg Oral TID    clopidogrel  75 mg Oral Daily    ezetimibe  10 mg Oral Daily    melatonin  4.5 mg Oral Nightly    metoprolol succinate  12.5 mg Oral BID    mirtazapine  30 mg Oral Nightly    tamsulosin  0.4 mg Oral Daily    [Held by provider] torsemide  20 mg Oral Daily    sodium chloride flush  5-40 mL IntraVENous 2 times per day    insulin lispro  0-8 Units SubCUTAneous TID WC    insulin lispro  0-4 Units SubCUTAneous Nightly     Continuous Infusions:   heparin (PORCINE) Infusion 18 Units/kg/hr (08/19/22 0721)    sodium chloride Stopped (08/14/22 2002)    dextrose       CBC:   Recent Labs     08/16/22  1850 08/17/22  0626   WBC 9.0 8.7   HGB 10.6* 10.7*    425       BMP:    Recent Labs     08/16/22  1850 08/17/22  0626 08/17/22  1244 08/19/22  0551   * 136  --  137   K 3.5* 3.3* 4.5 3.6*    103  --  106   CO2 18* 23  --  20   BUN 29* 26*  --  20   CREATININE 1.31* 1.03  --  0.94   GLUCOSE 189* 115*  --  102*       Hepatic:  Recent Labs     08/16/22  1850   AST 22   ALT 29   BILITOT 0.26*   ALKPHOS 166*       Troponin:   Recent Labs     08/16/22  1850 08/17/22  0205   TROPHS 229* 235*       BNP: No results for input(s): BNP in the last 72 hours. Lipids: No results for input(s): CHOL, HDL in the last 72 hours. Invalid input(s): LDLCALCU  INR: No results for input(s): INR in the last 72 hours. Objective:   Vitals: /66   Pulse 65   Temp 97.9 °F (36.6 °C) (Oral)   Resp 14   Ht 5' 11\" (1.803 m)   Wt 174 lb 9.7 oz (79.2 kg)   SpO2 100%   BMI 24.35 kg/m²   General appearance: alert and cooperative with exam  HEENT: Head: Normocephalic, no lesions, without obvious abnormality. Neck:no JVD, trachea midline, no adenopathy  Lungs: Clear to auscultation  Heart: Regular rate and rhythm, s1/s2 auscultated, no murmurs  Abdomen: soft, non-tender, bowel sounds active  Extremities: no edema  Neurologic: not done    Echo 7/2022  Summary  Poor sound transmission. Global left ventricular systolic function is severely reduced. Calculated  ejection fraction 23% by Andrade's method. Visually estimated EF 15%. Akinetic apex. No significant valvular regurgitation or stenosis seen. Assessment / Acute Cardiac Problems:   groin post-op surgical wound infection.  S/p aorto-bifem graft endarterectomy 7/8/22  CAD with hx of CABGx4 in [facility] 19. 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  Hx of Afib - on Eliquis  Orthostatic hypotension    Patient Active Problem List:     Seasonal allergies     Anxiety     Carotid artery occlusion     CAD (coronary artery disease)     Hypertension     Neuropathy     PAD (peripheral artery disease) (HCC)     HTN (hypertension)     Arthritis associated with another disorder     Hernia, hiatal     Hyperlipidemia     CAD (coronary artery disease)     Critical ischemia of lower extremity (HCC)     CAROLYN (acute kidney injury) ([address]     Dependence on renal dialysis ([address]     Failing vascular bypass graft     Arterial hypotension     Non-traumatic rhabdomyolysis     Cardiomyopathy ([address]     Decreased urine output     Acute respiratory failure with hypercapnia (HCC)     Lactic acidosis     Left leg numbness     Cardiomyopathy, ischemic     Anemia     H/O: CVA (cerebrovascular accident)     Surgical wound infection     Type 2 diabetes mellitus with circulatory disorder, without long-term current use of insulin (HCC)     Leukocytosis     Wound infection after surgery      Plan of Treatment:   Echo with significantly low LVEF and no prior to compare at this time  Discussed in detail with patient. Will plan OP cardiac cath for ischemic work-up once acute issues improve. Will order LV for ICMP and continue GDMT . Continue BB. Diuretic & ACE on hold d/t mild CAROLYN and hypotension. Clinically no volume overload on exam.   Keep K>4 and Mg>2   Wound management per vasc surgery  Afib. Will restart eliquis   Orthostatic hypotension. Pt does report that he is bedridden. Will d/c Torsemide. No Fluid overload. Add Midodrine. Continue compression stockings   No objection to discharge to SNF from cards standpoint.       Electronically signed by TIERRA Sanchez CNP on 8/19/2022 at 11:48 AM  [address]  [phone]

## 2022-08-19 NOTE — DISCHARGE INSTR - COC
Continuity of Care Form    Patient Name: Ashley Paul   :  1960  MRN:  [id number]    Admit date:  2022  Discharge date:  2022    Code Status Order: Full Code   Advance Directives:     Admitting Physician:  No admitting provider for patient encounter. PCP: No primary care provider on file. Discharging Nurse: [facility] Unit/Room#: 0502/0502-01  Discharging Unit Phone Number:     Emergency Contact:   Extended Emergency Contact Information  Primary Emergency Contact: Lit,Bhargavi   [address] Phone: [phone]  Relation: Brother/Sister  Secondary Emergency Contact: Abby Sanderson[address] Phone: [phone]  Mobile Phone: [phone]  Relation: Other    Past Surgical History:  Past Surgical History:   Procedure Laterality Date    ABDOMINAL ADHESION SURGERY  2011-[facility]    Reexploration of abdominal wound and reclosure of the abdominal wound with fascial sutures and retention sutures as well    AORTO-FEMORAL BYPASS GRAFT  2011-[facility]    ABF bypass of 16x8 PTFE graft. This is an end-to-side anastomosis proximally    AXILLARY-FEMORAL BYPASS GRAFT Bilateral 2022    AORTA BIFEMORAL GRAFT ENDARTERECTOMY, BILATERAL COMMON FEMORAL EMBOLECTOMY, BILATERAL LOWER EXTREMEITY ARTERECTOMY, AORTAGRAPHY, RIGHT LIMB OF BYPASS GRAFT STENT, BILATERAL FEMORAL BOVINE PATCHES performed by Izabela Solano MD at [place] [id number], ARTERY  11/23/10 [address]    1. Placement of 5 sheath in the left femoral artery with duplex guidance. 2. Left Iliac arteriogram. 3. Attempted ballon angioplasty left iliac artery occlusion.     CAROTID ENDARTERECTOMY  08    Left carotid endarterectomy using shunt dacron patch aplasty    CORONARY ARTERY BYPASS GRAFT      CABG x3     FEMORAL-TIBIAL BYPASS GRAFT  07  [facility]     Right femoral to posterior tibial artery bypass graft with in situ saphenous vein graft IR FEMORAL POPLITEAL BYPASS GRAFT  01/24/208 Dr Atul Pabon    1. Exploration of right fem-pop bypass graft. 2.Thrombectomy of right fem-post-tib saph vein graft.  3. Replacement of femoral to popliteal bypass graft from midthigh to near the anastomosis by reversed greater saph vein harvested from left leg    PTCA      PTCA with stent x6    TONSILLECTOMY AND ADENOIDECTOMY         Immunization History:   Immunization History   Administered Date(s) Administered    Influenza Virus Vaccine 12/06/2011       Active Problems:  Patient Active Problem List   Diagnosis Code    Seasonal allergies J30.2    Anxiety F41.9    Carotid artery occlusion I65.29    CAD (coronary artery disease) I25.10    Hypertension I10    Neuropathy G62.9    PAD (peripheral artery disease) ([facility]) I73.9    HTN (hypertension) I10    Arthritis associated with another disorder M19.90    Hernia, hiatal K44.9    Hyperlipidemia E78.5    CAD (coronary artery disease) I25.10    Critical ischemia of lower extremity ([facility]) I70.229    CAROLYN (acute kidney injury) ([facility] N17.9    Dependence on renal dialysis ([facility] Z99.2    Failing vascular bypass graft T82.599A    Arterial hypotension I95.9    Non-traumatic rhabdomyolysis M62.82    Cardiomyopathy ([facility] I42.9    Decreased urine output R34    Acute respiratory failure with hypercapnia ([facility]) J96.02    Lactic acidosis E87.2    Left leg numbness R20.0    Cardiomyopathy, ischemic I25.5    Anemia D64.9    H/O: CVA (cerebrovascular accident) Z86.73    Surgical wound infection T81.49XA    Type 2 diabetes mellitus with circulatory disorder, without long-term current use of insulin ([facility]) E11.59    Leukocytosis D72.829    Wound infection after surgery T81.49XA       Isolation/Infection:   Isolation            No Isolation          Patient Infection Status       Infection Onset Added Last Indicated Last Indicated By Review Planned Expiration Resolved Resolved By    None active    Resolved    COVID-19 (Rule Out) 07/08/22 07/08/22 07/08/22 COVID-19, Rapid (Ordered)   07/09/22 Rule-Out Test Resulted            Nurse Assessment:  Last Vital Signs: /66   Pulse 65   Temp 97.9 °F (36.6 °C) (Oral)   Resp 14   Ht 5' 11\" (1.803 m)   Wt 174 lb 9.7 oz (79.2 kg)   SpO2 100%   BMI 24.35 kg/m²     Last documented pain score (0-10 scale): Pain Level: 8  Last Weight:   Wt Readings from Last 1 Encounters:   08/15/22 174 lb 9.7 oz (79.2 kg)     Mental Status:  oriented    IV Access:  - None    Nursing Mobility/ADLs:  Walking   Dependent  Transfer  Assisted  Bathing  Assisted  Dressing  Assisted  Toileting  Assisted  Feeding  Independent  Med Admin  Assisted  Med Delivery   whole    Wound Care Documentation and Therapy:  Negative Pressure Wound Therapy Leg Left (Active)   Wound Type Surgical 07/21/22 0800   Dressing Type Black Foam 08/18/22 0800   Cycle Continuous 08/18/22 0800   Target Pressure (mmHg) 125 08/18/22 0800   Canister changed? Yes 07/20/22 2057   Dressing Status Clean, dry & intact 08/18/22 0800   Dressing Changed Changed/New 07/18/22 0830   Drainage Amount Small 08/15/22 1600   Drainage Description Serosanguinous 08/15/22 1600   Output (ml) 0 ml 08/18/22 0800   Wound Assessment Other (Comment) 07/20/22 2057   Lillie-wound Assessment Fragile 08/15/22 1600   Number of days:        Wound 07/11/22 Coccyx Cluster of 3 (Active)   Wound Image   08/17/22 1010   Wound Etiology Pressure Stage 3 08/19/22 1200   Dressing Status Clean;Dry; Intact 08/19/22 1200   Wound Cleansed Not Cleansed 08/19/22 1200   Dressing/Treatment Foam;Alginate with Ag 08/19/22 1200   Dressing Change Due 08/19/22 08/18/22 0800   Wound Length (cm) 8.4 cm 08/15/22 1102   Wound Width (cm) 10 cm 08/15/22 1102   Wound Depth (cm) 0.1 cm 07/14/22 1531   Wound Surface Area (cm^2) 84 cm^2 08/15/22 1102   Change in Wound Size % (l*w) 10.16 08/15/22 1102   Wound Volume (cm^3) 9.35 cm^3 07/14/22 1531   Wound Assessment Pink/red;Subcutaneous 08/19/22 1200   Drainage Amount Small 08/19/22 1200   Drainage Description Serosanguinous 08/19/22 1200   Odor Mild 08/19/22 1200   Lillie-wound Assessment Excoriated;Fragile; Maceration; Hyperpigmented 08/19/22 1200   Margins Unattached edges; Undefined edges 08/19/22 1200   Wound Thickness Description not for Pressure Injury Full thickness 08/15/22 1102   Number of days: 38       Wound 08/13/22 Femoral Anterior;Left;Proximal (Active)   Wound Etiology Surgical 08/19/22 1200   Dressing Status Other (Comment) 08/19/22 1200   Wound Cleansed Not Cleansed 08/14/22 0400   Dressing/Treatment Other (comment) 08/19/22 1200   Dressing Change Due 08/15/22 08/14/22 0830   Wound Assessment Other (Comment) 08/19/22 1200   Drainage Amount None 08/19/22 1200   Drainage Description Serosanguinous 08/13/22 2130   Odor None 08/19/22 1200   Lillie-wound Assessment Other (Comment) 08/19/22 1200   Margins Other (Comment) 08/19/22 1200   Number of days: 6       Incision 07/08/22 Pretibial Left;Lateral (Active)   Wound Image   07/11/22 0215   Dressing Status Clean;Dry; Intact 08/19/22 1200   Dressing Change Due 08/14/22 08/14/22 0830   Incision Cleansed Not Cleansed 08/19/22 1200   Dressing/Treatment Roll gauze 08/19/22 1200   Closure Other (Comment) 08/19/22 1200   Margins Other (Comment) 08/19/22 1200   Incision Assessment Other (Comment) 08/19/22 1200   Drainage Amount None 08/19/22 1200   Drainage Description Sanguinous 08/13/22 1600   Odor None 08/19/22 1200   Lillie-incision Assessment Other (Comment) 08/19/22 1200   Number of days: 41       Incision Pretibial Left;Medial (Active)   Wound Image   07/11/22 0215   Dressing Status Clean;Dry; Intact 08/19/22 1200   Dressing Change Due 08/14/22 08/14/22 0830   Incision Cleansed Not Cleansed 08/19/22 1200   Dressing/Treatment Roll gauze 08/19/22 1200   Closure Other (Comment) 08/19/22 1200   Margins Other (Comment) 08/19/22 1200   Incision Assessment Other (Comment) 08/19/22 1200   Drainage Amount None 08/19/22 1200   Drainage Description Sanguinous 08/13/22 1600   Odor None 08/19/22 1200   Lillie-incision Assessment Other (Comment) 08/19/22 1200   Number of days:       SACROCOCCYGEAL:  Rinse wounds with saline. Apply SurePREP to wound perimeter. Apply OPTICELL AG to wounds and cover with Sacral Mepilex foam.  Change every 2 days    Wound vac-black foam, at 125mmhg, change every MWF    Elimination:  Continence: Bowel: Yes  Bladder: Yes  Urinary Catheter: None   Colostomy/Ileostomy/Ileal Conduit: No       Date of Last BM: 8-19-22    Intake/Output Summary (Last 24 hours) at 8/19/2022 1247  Last data filed at 8/19/2022 0644  Gross per 24 hour   Intake --   Output 350 ml   Net -350 ml     I/O last 3 completed shifts:  In: -   Out: 1150 [Urine:1150]    Safety Concerns: At Risk for Falls    Impairments/Disabilities:      None    Nutrition Therapy:  Current Nutrition Therapy:   - Oral Diet:  General    Routes of Feeding: Oral  Liquids: No Restrictions  Daily Fluid Restriction: no  Last Modified Barium Swallow with Video (Video Swallowing Test): not done    Treatments at the Time of Hospital Discharge:   Respiratory Treatments: none  Oxygen Therapy:  is not on home oxygen therapy.   Ventilator:    - No ventilator support    Rehab Therapies: Physical Therapy  Weight Bearing Status/Restrictions: Partial weight bearing (30-50%) only on leg right leg  Other Medical Equipment (for information only, NOT a DME order):  wheelchair  Other Treatments: wound vac    Patient's personal belongings (please select all that are sent with patient):  None    RN SIGNATURE:  Electronically signed by Ranjeet El RN on 8/20/22 at 1:10 PM EDT    CASE MANAGEMENT/SOCIAL WORK SECTION    Inpatient Status Date: 8-    Readmission Risk Assessment Score:  Readmission Risk              Risk of Unplanned Readmission:  29           Discharging to Facility/ Agency   Name: [facility]  Address:  Phone:  Fax:    Dialysis Facility (if applicable) Name:  Address:  Dialysis Schedule:  Phone:  Fax:    / signature: Electronically signed by Dennis Fan RN on 8/20/22 at 9:51 AM EDT    PHYSICIAN SECTION    Prognosis: Fair    Condition at Discharge: Stable    Rehab Potential (if transferring to Rehab): Good    Recommended Labs or Other Treatments After Discharge: Continue wound vac care and care of fasciotomy during rehab-Black foam, 125mmhg, change every MWF    Physician Certification: I certify the above information and transfer of Diane Bailon  is necessary for the continuing treatment of the diagnosis listed and that he requires Acute Rehab for less 30 days.      Update Admission H&P: No change in H&P    PHYSICIAN SIGNATURE:  Electronically signed by Ministerio Zabala MD on 8/20/22 at 9:56 AM EDT

## 2022-08-19 NOTE — DISCHARGE INSTR - DIET

## 2022-08-19 NOTE — ADT AUTH CERT
Utilization Reviews         Wound and Skin Management GRG - Care Day 6 (8/17/2022) by Angel Luis Mata, OFE       Review Status Review Entered   Completed 8/19/2022 09:11      Criteria Review      Care Day: 6 Care Date: 8/17/2022 Level of Care: Inpatient Floor    Guideline Day 3    Level Of Care    ( ) * Activity level acceptable    8/19/2022 9:11 AM EDT by Samreen Uriarte      STS transfer completed with the SS. Heavy posterior lean with standing. Pt became lightheaded sitting on pads of refguio stedy; pt repositioned at EOB and became unresponsive with eyes open and immediately repositioned in supine with B LEs elevated    Clinical Status    (X) * Temperature status acceptable    (X) * No infection, or status acceptable    ( ) * Pain and nausea absent or adequately managed    8/19/2022 9:11 AM EDT by Samreen Uriarte      pain 8-10/10    ( ) * Wound and ulcer problems absent or status acceptable    8/19/2022 9:11 AM EDT by Samreen Uriarte      Extremities - wound vac on the left groin, LLE wound dressing intact without breakthrough drainage, peripheral pulses palpable, no pedal edema.      stage III wounds x 3 to coccyx    ( ) * Burn injury absent or acceptable for next level of care    8/19/2022 9:11 AM EDT by Samreen sparrow    ( ) * Compartment syndrome absent    8/19/2022 9:11 AM EDT by Samreen sparrow    ( ) * Skin disease absent or acceptable for next level of care    8/19/2022 9:11 AM EDT by Noemy Finley    ( ) * General Discharge Criteria met    Interventions    (X) * Intake acceptable    8/19/2022 9:11 AM EDT by Samreen Uriarte      carb control diet    ( ) * No inpatient interventions needed    8/19/2022 9:11 AM EDT by Samreen Uriarte      blood sugar ac and hs, daily weight, I&O, routine vitals, telemetry, wound care q8, up with assist, wound care rn eval and tx, pulse ox checks, nc    * Milestone   Additional Notes   DATE: 8/17 care day 6         PERTINENT UPDATES:   Pt became hypotensive and po twice daily x 7 days until 8/19/22. · Need to eval the groin wound off VAC   · Placement pend      ===CARDIO   Recommendations:     -Hold antihypertensive medications.   -Stop Imdur   -Orthostatic vitals   -Carotid ultrasound pending   -Monitor on telemetry   -Agree with gentle IV fluids   -Obtain records of previous cardiac workup.   -Life vest on discharge. Clinical Rationale:   Suspect syncopal episode related to orthostasis and hypovolemia. No orthostatic vitals documented in chart at time of evaluation however patient does report that his syncopal episode occurred while standing from a seated position. Recent echo 7/9/2022 does show severely reduced EF. Recommend evaluation for LifeVest prior to discharge sever ICM. Patient was noted to have elevated troponins however there were no ST changes on EKG patient is noted to have baseline troponin elevation ranging from  given his previous cardiac history. Patient denies any active chest pain and is asymptomatic at time of evaluation.  Obtain previous cardiac workup and will consider ischemia workup as OP         MEDICATIONS:   Tyelnol 1g po tid   Plavix 75mg po daily   Vibra tabs 100mg po bid   Neurontin 300mg po tid   Mag 2g iv x 1   Ns 75-dc 0310   Heparin gtt   Proamitine 5mg po x 1   Oxycodone 5mg po x 2   Ultram 50mg po x 2         PT/OT/SLP/CM ASSESSMENT OR NOTES:   DC PLAN -[facility]            Wound and Skin Management GRG - Care Day 4 (8/15/2022) by Daya Grayson RN       Review Status Review Entered   Completed 8/19/2022 09:03      Criteria Review      Care Day: 4 Care Date: 8/15/2022 Level of Care: Inpatient Floor    Guideline Day 3    Level Of Care    ( ) * Activity level acceptable    8/19/2022 9:03 AM EDT by Rudy Hernandez      Comment: pt unable to obtain full upright standing despite Max A x2    Clinical Status    (X) * Temperature status acceptable    8/19/2022 9:03 AM EDT by Clarissa Vargas      36.4 18 79 100/59 100% ra pain 6    (X) * No infection, or status acceptable    8/19/2022 9:03 AM EDT by Georgia Franco      id following, doxycycline 100mg po bid    ( ) * Pain and nausea absent or adequately managed    8/19/2022 9:03 AM EDT by Georgia Franco      pain 6/10    ( ) * Wound and ulcer problems absent or status acceptable    8/19/2022 9:03 AM EDT by Georgia Franco      Extremities - wound vac on the left groin. Fasciotomy site are clean and in tact, no erythema or purulent drainage. peripheral pulses palpable, no pedal edema or calf pain with palpation    stage III ulcer x 3 to coccyx    ( ) * Burn injury absent or acceptable for next level of care    8/19/2022 9:03 AM EDT by Georgia sparrow    ( ) * Compartment syndrome absent    8/19/2022 9:03 AM EDT by Georgia sparrow    ( ) * Skin disease absent or acceptable for next level of care    8/19/2022 9:03 AM EDT by Jeanette Morris    ( ) * General Discharge Criteria met    Interventions    (X) * Intake acceptable    8/19/2022 9:03 AM EDT by Georgia Franco      carb control diet    ( ) * No inpatient interventions needed    8/19/2022 9:03 AM EDT by Georgia Franco      blood sugar ac and hs, daily weight, I&O, routine vitals, telemetry, wound care q8, up with assist, wound care rn eval and tx, pulse ox checks, nc    * Milestone   Additional Notes   DATE: 8/15 care day 4         PERTINENT UPDATES:   .Wound dressing was loosely intact this morning, we changed his fasciotomy wound dressing with wet-dry. Wound vac is functioning well with <5cc output. K was replaced, came back 3.7. PHYSICAL EXAM:   Extremities - wound vac on the left groin. Fasciotomy site are clean and in tact, no erythema or purulent drainage. peripheral pulses palpable, no pedal edema or calf pain with palpation         MD CONSULTS/ASSESSMENT AND PLAN:   ===vascular   1. Wound vac intact. Pending wound vac paper work from .  DC to facility with wound vac and wound vac supplies 1. Wound vac will need to be changed -W-   1. Continue Doxycycline until 2022   2. Regular diet   3. Wound dress changed this morning      ===id   · Continue Doxycycline 100 mg po twice daily x 7 days until 22. · Need to eval the groin wound off VAC   · Follow CBC and renal function   · Wound care. MEDICATIONS:   Tylenol 1g po tid   Eliquis 5mg po bid   Plavix 75mg po daily   Vibra tabs 100mg po bid   Neurontin 300mg po tid   Oxycodone 5mg po x 1            PT/OT/SLP/CM ASSESSMENT OR NOTES:   Dc plan -referral to [address] [place] [address]            [facility] INPT LETTER OF RECOMMENDATION by Farrah Pacheco RN       Review Status Review Entered   In Primary 8/15/2022 15:02      Criteria Review   slr keep in  We recommend that the following pt's current hospitalization under INPATIENT status is APPROPRIATE . Name: Mark Nascimento   : 1960   CSN: [id number]   INSURANCE: [address] [place] [address]        Clinical summary Assessment:     1. Mark Nascimento is a 64 y.o. male who is admitted for L groin wound care s/p bilateral groin exploration and thrombectomy of his aortobifemoral bypass graft, and fasciotomy. Plan:     1. Wound vac intact. Pending wound vac paper work from . NITIN to facility with wound vac and wound vac supplies  1. Wound vac will need to be changed --  1. Continue Doxycycline until 2022  2. Regular diet  3.  Wound dress changed this morning      Vitals vss  Labs and Imaging viewed  [facility] criteria applies yes,   Comments Inpt de      This chart was reviewed at 1:35 PM 8/15/2022    [address] [place] [address]   CELL : [phone]  _______________________________________________________________________________________

## 2022-08-19 NOTE — PROGRESS NOTES
Comprehensive Nutrition Assessment    Type and Reason for Visit:  Initial (Length of stay)    Nutrition Recommendations/Plan:   Recommend liberalizing diet to regular. Encourage PO intake as tolerated. Discussed ordering protein items with each meal.     Malnutrition Assessment:  Malnutrition Status:  meets criteria for Severe malnutrition (08/19/22 1008)    Context:  Acute Illness     Findings of the 6 clinical characteristics of malnutrition:  Energy Intake:  Mild decrease in energy intake (Comment) (over past month)  Weight Loss:  Greater than 5% over 1 month (~21-32% loss x 1 month (?d/t fluid))     Body Fat Loss:  Unable to assess     Muscle Mass Loss: Moderate muscle mass loss Calf (gastrocnemius)  Fluid Accumulation:  No significant fluid accumulation     Strength:  Not Performed    Nutrition Assessment:    Pt initially admitted d/t concern for post op wound infection. S/P aortobifem thrombectomy on 7/8/2022 d/t lower extremity ischemia from graft occlusion. During previous admission, pt required intubation x 4 days (7/8/22-7/12/22) and CVVHD x 5 days (7/10/22-7/15/22). Currently has wound vac in place. Noted stage III pressure wound on coccyx as well. Pt states his appetite is good and reports eating well. Ate ~90% of breakfast this morning (oatmeal and fruit). Noted significant weight loss over past month/since previous admission (~32% using wt prior to start of CVVHD (115.7 kg), ~21% using weight after CVVHD was completed (100 kg), ?d/t fluid vs. actual weight). Per EHR review, pt with significant edema present upon admission last hospitalization. Previous RD notes also reviewed from previous admission. - noted pt not eating overally well. Pt is not interested in any ONS at this time. Encouraged adequate PO intake (including protein) to help aid in wound healing. Labs reviewed, glucose controlled. On Carb Controlled diet.     Nutrition Related Findings:    meds/labs reviewed Wound Type: Pressure Injury, Stage III, Wound Vac, Multiple, Surgical Incision (Stage III coccyx pressure wound (cluster of 3))       Current Nutrition Intake & Therapies:    Average Meal Intake: %  Average Supplements Intake: None Ordered  ADULT DIET; Regular    Anthropometric Measures:  Height: 5' 11\" (180.3 cm)  Ideal Body Weight (IBW): 172 lbs (78 kg)       Current Body Weight: 174 lb 9.7 oz (79.2 kg), 101.5 % IBW. Weight Source: Bed Scale  Current BMI (kg/m2): 24.4                          BMI Categories: Normal Weight (BMI 18.5-24. 9)    Estimated Daily Nutrient Needs:  Energy Requirements Based On: Kcal/kg  Weight Used for Energy Requirements: Current  Energy (kcal/day): 9390-7914 kcals/day  Weight Used for Protein Requirements: Current  Protein (g/day): 120-145 gm/day  Method Used for Fluid Requirements: ml/Kg (30)  Fluid (ml/day): 2400 mL/day or per MD    Nutrition Diagnosis:   Increased nutrient needs related to  (healing) as evidenced by wounds  Severe malnutrition (acute) related to increase demand for energy/nutrients (predicted suboptimal nutrient intake over past month) as evidenced by weight loss, moderate muscle loss    Nutrition Interventions:   Food and/or Nutrient Delivery: Modify Current Diet  Nutrition Education/Counseling: No recommendation at this time  Coordination of Nutrition Care: Continue to monitor while inpatient  Plan of Care discussed with: Pt    Goals:  Previous Goal Met:  (goal set)  Goals: Meet at least 75% of estimated needs, prior to discharge       Nutrition Monitoring and Evaluation:   Behavioral-Environmental Outcomes: None Identified  Food/Nutrient Intake Outcomes: Food and Nutrient Intake  Physical Signs/Symptoms Outcomes: Weight, Skin, Biochemical Data, Nutrition Focused Physical Findings    Discharge Planning:     Too soon to determine     Carmen Bermudez MS, RD, LD  Contact: [phone]

## 2022-08-19 NOTE — PROGRESS NOTES
Physical Therapy  Facility/Department: [address] STEPDOWN  Daily Treatment Note     Name: Joanne Fletcher  : 1960  MRN: [id number]  Date of Service: 2022    Discharge Recommendations:  Patient would benefit from continued therapy after discharge   PT Equipment Recommendations  Equipment Needed: No      Patient Diagnosis(es): There were no encounter diagnoses. Past Medical History:  has a past medical history of Anxiety, Arthritis associated with another disorder, CAD (coronary artery disease), Carotid artery occlusion, Family history of kidney stone, HTN (hypertension), Hyperkalemia, Hyperlipidemia, Hypertension, Nephrolithiasis, Neuropathy, PAD (peripheral artery disease) ([facility] [address] Peripheral vascular disease ([facility] [address] Personal history of MRSA (methicillin resistant Staphylococcus aureus), Seasonal allergies, and Vasculopathy. Past Surgical History:  has a past surgical history that includes Tonsillectomy and adenoidectomy; Percutaneous Transluminal Coronary Angio; Carotid endarterectomy (08); IR Femoral Popliteal Bypass Graft ( Dr Janusz Camp); Femoral-tibial Bypass Graft (07  Gila Lacy ); Balloon angioplasty, artery (11/23/10 Allie Muñoz); Aorto-femoral Bypass Graft (2011-[facility]); Abdominal adhesion surgery (2011-[facility]); Coronary artery bypass graft (); and Axillary-femoral Bypass Graft (Bilateral, 2022). Assessment   Body Structures, Functions, Activity Limitations Requiring Skilled Therapeutic Intervention: Decreased functional mobility ; Decreased endurance;Decreased strength;Decreased balance; Increased pain  Assessment: Pt required maxA for bed mobility along with increased time and effort to complete. Pt sat EOB for 30 mins with CGA while performing dynamic activities with no LOB noted. Pt c/o dizziness upon sitting upright which subsides with time. Pt demos slight posterior lean in sitting but is able to correct with verbal/ tactile cues.  Pt is currently unsafe to return to prior living arrangements due to high fall risk and would benefit from continued PT following discharge to address functional deficits. Therapy Prognosis: Fair  Decision Making: High Complexity  Requires PT Follow-Up: Yes  Activity Tolerance  Activity Tolerance: Patient limited by endurance; Patient limited by pain;Treatment limited secondary to medical complications     Plan   Plan  Plan:  (5-6x/wk)  Specific Instructions for Next Treatment: check BP t/o session  Current Treatment Recommendations: Strengthening, ROM, Balance training, Functional mobility training, Transfer training, Endurance training, Therapeutic activities, Patient/Caregiver education & training, Safety education & training, Home exercise program, Gait training, Equipment evaluation, education, & procurement, Neuromuscular re-education, Wheelchair mobility training  Safety Devices  Type of Devices: Gait belt, Left in bed, Call light within reach, Bed alarm in place, Patient at risk for falls, Nurse notified  Restraints  Restraints Initially in Place: No     Restrictions  Restrictions/Precautions  Restrictions/Precautions: Fall Risk  Required Braces or Orthoses?: No  Position Activity Restriction  Other position/activity restrictions: up w/ assist, R side deficits from prior CVA; wound vac L groin; wound on L LE     Subjective   General  Chart Reviewed: Yes  Response To Previous Treatment: Patient with no complaints from previous session. Family / Caregiver Present: No  Follows Commands: Within Functional Limits  General Comment  Comments: Pt reports buttocks/ LLE pain 6/10. Pt returned to supine in bed post PT. Subjective  Subjective: RN and pt agreeable to PT. Pt supine in bed upon arrival, Pt pleasant and cooperative t/o.             Cognition   Orientation  Overall Orientation Status: Within Functional Limits  Cognition  Overall Cognitive Status: Exceptions  Arousal/Alertness: Appropriate responses to stimuli  Following Commands: Follows multistep commands with repitition; Follows multistep commands with increased time  Attention Span: Appears intact  Memory: Appears intact  Safety Judgement: Decreased awareness of need for assistance  Problem Solving: Assistance required to identify errors made;Assistance required to correct errors made  Insights: Decreased awareness of deficits  Initiation: Requires cues for some  Sequencing: Requires cues for some     Objective   Bed mobility  Supine to Sit: Maximum assistance  Sit to Supine: Maximum assistance  Scooting: Maximal assistance  Transfers  Sit to Stand: Unable to assess  Stand to sit: Unable to assess        Balance  Posture: Fair  Sitting - Static: Fair  Sitting - Dynamic: Fair;-  Comments: Assessed sitting EOB with CGA 30 mins. A/AROM Exercises: Ankle pumps - L LE AROM, R LE PROM x 20 reps, LAQs LLE AROM, RLE PROM x20  Static Sitting Balance Exercises: Pt sat EOB ~30 minues with CGA to maintain d/t posterior lean with verbal cues needed for posture / trunk awareness; pt limited d/t orthostatic hypotension.                AM-PAC Score  AM-PAC Inpatient Mobility Raw Score : 9 (08/16/22 1602)  AM-PAC Inpatient T-Scale Score : 30.55 (08/16/22 1602)  Mobility Inpatient CMS 0-100% Score: 81.38 (08/16/22 1602)  Mobility Inpatient CMS G-Code Modifier : CM (08/16/22 1602)       Goals  Short Term Goals  Time Frame for Short term goals: 14 visits  Short term goal 1: Perform bed mobility with Mod A  Short term goal 2: Perform functional transfers with Min A  Short term goal 3: Demo Fair- dynamic standing balance to decrease risk of falls  Short term goal 4: Ambulate 20ft with appropriate AD and Mod A  Short term goal 5: Propel wheelchair 100ft with Min A         Therapy Time   Individual Concurrent Group Co-treatment   Time In 0910         Time Out 1005         Minutes 55         Timed Code Treatment Minutes: [address] [facility]

## 2022-08-19 NOTE — PLAN OF CARE
Problem: Discharge Planning  Goal: Discharge to home or other facility with appropriate resources  8/19/2022 1130 by Amadou Burkett RN  Outcome: Progressing  8/19/2022 0130 by Alexsi Narayanan RN  Outcome: Progressing     Problem: Chronic Conditions and Co-morbidities  Goal: Patient's chronic conditions and co-morbidity symptoms are monitored and maintained or improved  8/19/2022 1130 by Amadou Burkett RN  Outcome: Progressing  8/19/2022 0130 by Alexis Narayanan RN  Outcome: Progressing     Problem: Skin/Tissue Integrity  Goal: Absence of new skin breakdown  Description: 1. Monitor for areas of redness and/or skin breakdown  2. Assess vascular access sites hourly  3. Every 4-6 hours minimum:  Change oxygen saturation probe site  4. Every 4-6 hours:  If on nasal continuous positive airway pressure, respiratory therapy assess nares and determine need for appliance change or resting period.   8/19/2022 1130 by Amadou Burkett RN  Outcome: Progressing  8/19/2022 0130 by Alexis Narayanan RN  Outcome: Progressing     Problem: Safety - Adult  Goal: Free from fall injury  8/19/2022 1130 by Amadou Burkett RN  Outcome: Progressing  8/19/2022 0130 by Alexis Narayanan RN  Outcome: Progressing     Problem: Pain  Goal: Verbalizes/displays adequate comfort level or baseline comfort level  8/19/2022 1130 by Amadou Burkett RN  Outcome: Progressing  8/19/2022 0130 by Alexis Narayanan RN  Outcome: Progressing     Problem: ABCDS Injury Assessment  Goal: Absence of physical injury  8/19/2022 1130 by Amadou Burkett RN  Outcome: Progressing  8/19/2022 0130 by Alexis Narayanan RN  Outcome: Progressing     Problem: Nutrition Deficit:  Goal: Optimize nutritional status  Outcome: Progressing

## 2022-08-19 NOTE — PROGRESS NOTES
Division of Vascular Surgery             Progress Note      Name: Kale Pickett  MRN: [id number]         Overnight Events:      No acute event      Subjective:   Pt seen and examined. Nursing at bedside; reports no issues overnight. Pt still complaining of legs pain with burning and pain, more on the left leg where the fasciotomy at. Wound vac will be changed this afternoon.  was contacted regarding the displacement planning. Pending for insurance and they are working on getting the "[employer]-life vest ready. No confusion or stuttering on exam. Denies CP, SOB, nausea or vomiting. Physical Exam:     Vitals:  /66   Pulse 65   Temp 97.9 °F (36.6 °C) (Oral)   Resp 14   Ht 5' 11\" (1.803 m)   Wt 174 lb 9.7 oz (79.2 kg)   SpO2 100%   BMI 24.35 kg/m²     General appearance - alert, well appearing and in no acute distress  Mental status - oriented to person, place and time with normal affect  Head - normocephalic and atraumatic  Neck - supple, no JVD  Chest - normal work of breathing on room air   Heart - normal rate, regular rhythm  Abdomen - soft, non-tender, non-distended  Neurological - normal speech, no focal findings or movement disorder noted  Extremities - wound vac on the left groin, LLE wound dressing intact without breakthrough drainage, peripheral pulses palpable, no pedal edema.      Data:  CBC:   Recent Labs     08/16/22  1850 08/17/22  0626   WBC 9.0 8.7   HGB 10.6* 10.7*    425     Chemistry:   Recent Labs     08/16/22  1850 08/16/22  2112 08/17/22  0205 08/17/22  0626 08/17/22  1244 08/19/22  0551   *  --   --  136  --  137   K 3.5*  --   --  3.3* 4.5 3.6*     --   --  103  --  106   CO2 18*  --   --  23  --  20   GLUCOSE 189*  --   --  115*  --  102*   BUN 29*  --   --  26*  --  20   CREATININE 1.31*  --   --  1.03  --  0.94   MG 1.6  --   --  2.2  --   --    ANIONGAP 15  --   --  10  --  11   LABGLOM 55*  --   --  >60  --  >60   GFRAA >60  --   --  >60  -- >60   CALCIUM 8.1*  --   --  8.5*  --  8.2*   CAION  --   --   --  1.18  --   --    PHOS 3.6  --   --  3.7  --   --    TROPHS 229*  --  235*  --   --   --    LACTACIDWB  --  2.5*  --  1.4  --   --      Hepatic:   Recent Labs     08/16/22  1850   AST 22   ALT 29   ALKPHOS 166*   BILITOT 0.26*     Coagulation:   Recent Labs     08/16/22  1850 08/16/22  2112 08/18/22  0022 08/18/22  0558 08/19/22  0551   APTT  --    < > 64.2* 65.5* 46.2*   PROT 4.8*  --   --   --   --     < > = values in this interval not displayed. Radiology Review:    No results found. Plan: 1. Wound vac intact. Pending placement  Wound vac will need to be changed M-W-F. (WET TO DRY)  LLE dressing change today. 2.Continue Doxycycline until 8/19/2022. 3.Regular diet. 4.Follow up on labs and VL Dup Carotid. -AM labs pending this morning.              -Trend troponin: 235, 229. Heart cath per cardiology              -follow up life vest with cardiology  5. Neurology and ID on board, follow up recs. 6.Discharge planning to facility pending acceptance.   is working on displacement and [address]  Electronically signed by Laureano Calvillo DO  on 8/19/2022 at 12:43 PM

## 2022-08-20 VITALS
DIASTOLIC BLOOD PRESSURE: 84 MMHG | OXYGEN SATURATION: 97 % | RESPIRATION RATE: 20 BRPM | BODY MASS INDEX: 25.03 KG/M2 | TEMPERATURE: 98 F | SYSTOLIC BLOOD PRESSURE: 123 MMHG | WEIGHT: 178.79 LBS | HEART RATE: 64 BPM | HEIGHT: 71 IN

## 2022-08-20 LAB
GLUCOSE BLD-MCNC: 109 MG/DL (ref 75–110)
GLUCOSE BLD-MCNC: 130 MG/DL (ref 75–110)

## 2022-08-20 PROCEDURE — 97110 THERAPEUTIC EXERCISES: CPT

## 2022-08-20 PROCEDURE — 82947 ASSAY GLUCOSE BLOOD QUANT: CPT

## 2022-08-20 PROCEDURE — 6370000000 HC RX 637 (ALT 250 FOR IP): Performed by: STUDENT IN AN ORGANIZED HEALTH CARE EDUCATION/TRAINING PROGRAM

## 2022-08-20 PROCEDURE — 2580000003 HC RX 258: Performed by: NURSE PRACTITIONER

## 2022-08-20 PROCEDURE — 97530 THERAPEUTIC ACTIVITIES: CPT

## 2022-08-20 PROCEDURE — 6370000000 HC RX 637 (ALT 250 FOR IP): Performed by: NURSE PRACTITIONER

## 2022-08-20 RX ADMIN — DOCUSATE SODIUM 50 MG AND SENNOSIDES 8.6 MG 2 TABLET: 8.6; 5 TABLET, FILM COATED ORAL at 09:37

## 2022-08-20 RX ADMIN — SODIUM CHLORIDE, PRESERVATIVE FREE 10 ML: 5 INJECTION INTRAVENOUS at 09:39

## 2022-08-20 RX ADMIN — ACETAMINOPHEN 1000 MG: 500 TABLET ORAL at 06:16

## 2022-08-20 RX ADMIN — TRAMADOL HYDROCHLORIDE 50 MG: 50 TABLET, COATED ORAL at 09:36

## 2022-08-20 RX ADMIN — OXYCODONE 5 MG: 5 TABLET ORAL at 06:12

## 2022-08-20 RX ADMIN — APIXABAN 5 MG: 5 TABLET, FILM COATED ORAL at 09:38

## 2022-08-20 RX ADMIN — GABAPENTIN 300 MG: 300 CAPSULE ORAL at 14:29

## 2022-08-20 RX ADMIN — MIDODRINE HYDROCHLORIDE 5 MG: 5 TABLET ORAL at 09:37

## 2022-08-20 RX ADMIN — CLOPIDOGREL 75 MG: 75 TABLET, FILM COATED ORAL at 09:38

## 2022-08-20 RX ADMIN — ACETAMINOPHEN 1000 MG: 500 TABLET ORAL at 14:29

## 2022-08-20 RX ADMIN — TAMSULOSIN HYDROCHLORIDE 0.4 MG: 0.4 CAPSULE ORAL at 09:37

## 2022-08-20 RX ADMIN — BACLOFEN 10 MG: 10 TABLET ORAL at 14:29

## 2022-08-20 RX ADMIN — EZETIMIBE 10 MG: 10 TABLET ORAL at 09:36

## 2022-08-20 RX ADMIN — METOPROLOL SUCCINATE 12.5 MG: 25 TABLET, FILM COATED, EXTENDED RELEASE ORAL at 09:36

## 2022-08-20 RX ADMIN — BACLOFEN 10 MG: 10 TABLET ORAL at 09:39

## 2022-08-20 RX ADMIN — ATORVASTATIN CALCIUM 10 MG: 10 TABLET, FILM COATED ORAL at 09:38

## 2022-08-20 RX ADMIN — GABAPENTIN 300 MG: 300 CAPSULE ORAL at 06:16

## 2022-08-20 RX ADMIN — MIDODRINE HYDROCHLORIDE 5 MG: 5 TABLET ORAL at 12:29

## 2022-08-20 ASSESSMENT — PAIN DESCRIPTION - ORIENTATION: ORIENTATION: LEFT

## 2022-08-20 ASSESSMENT — PAIN DESCRIPTION - LOCATION
LOCATION: LEG
LOCATION: FOOT

## 2022-08-20 ASSESSMENT — PAIN SCALES - GENERAL
PAINLEVEL_OUTOF10: 8
PAINLEVEL_OUTOF10: 8
PAINLEVEL_OUTOF10: 10
PAINLEVEL_OUTOF10: 3
PAINLEVEL_OUTOF10: 10

## 2022-08-20 NOTE — PROGRESS NOTES
Physical Therapy  Facility/Department: [address] STEPDOWN  Daily Treatment Note     Name: Prince Mallory  : 1960  MRN: [id number]  Date of Service: 2022    Discharge Recommendations:  Patient would benefit from continued therapy after discharge   PT Equipment Recommendations  Equipment Needed: No      Patient Diagnosis(es): There were no encounter diagnoses. Past Medical History:  has a past medical history of Anxiety, Arthritis associated with another disorder, CAD (coronary artery disease), Carotid artery occlusion, Family history of kidney stone, HTN (hypertension), Hyperkalemia, Hyperlipidemia, Hypertension, Nephrolithiasis, Neuropathy, PAD (peripheral artery disease) ([facility] [address] Peripheral vascular disease ([facility] [address] Personal history of MRSA (methicillin resistant Staphylococcus aureus), Seasonal allergies, and Vasculopathy. Past Surgical History:  has a past surgical history that includes Tonsillectomy and adenoidectomy; Percutaneous Transluminal Coronary Angio; Carotid endarterectomy (08); IR Femoral Popliteal Bypass Graft ( Dr Jose Erickson); Femoral-tibial Bypass Graft (07  Coye Meigs ); Balloon angioplasty, artery (11/23/10 Allie Muñoz); Aorto-femoral Bypass Graft (2011-[facility]); Abdominal adhesion surgery (2011-[facility]); Coronary artery bypass graft (); and Axillary-femoral Bypass Graft (Bilateral, 2022). Assessment   Body Structures, Functions, Activity Limitations Requiring Skilled Therapeutic Intervention: Decreased functional mobility ; Decreased endurance;Decreased strength;Decreased balance; Increased pain  Assessment: Pt required maxA for bed mobility along with increased time and effort. Pt sat EOB fro 40 mins with CGA while performing dynamic activities with no LOB noted. Pt c/o dizziness upon sitting upright which subsides with time. Pt demos slight posterior lean in sitting but is able to correct with verbal/ tactile cues.  Pt is currently unsafe to return to prior living arrangements due to high fall risk and would benefit from continued PT following discharge to address functional deficits. Therapy Prognosis: Fair  Decision Making: High Complexity  Requires PT Follow-Up: Yes  Activity Tolerance  Activity Tolerance: Patient limited by endurance; Patient limited by pain;Treatment limited secondary to medical complications     Plan   Plan  Plan:  (5-6x/wk)  Specific Instructions for Next Treatment: check BP t/o session  Current Treatment Recommendations: Strengthening, ROM, Balance training, Functional mobility training, Transfer training, Endurance training, Therapeutic activities, Patient/Caregiver education & training, Safety education & training, Home exercise program, Gait training, Equipment evaluation, education, & procurement, Neuromuscular re-education, Wheelchair mobility training  Safety Devices  Type of Devices: Gait belt, Left in bed, Call light within reach, Bed alarm in place, Patient at risk for falls, Nurse notified  Restraints  Restraints Initially in Place: No     Restrictions  Restrictions/Precautions  Restrictions/Precautions: Fall Risk  Required Braces or Orthoses?: No  Position Activity Restriction  Other position/activity restrictions: up w/ assist, R side deficits from prior CVA; wound vac L groin; wound on L LE     Subjective   General  Chart Reviewed: Yes  Response To Previous Treatment: Patient with no complaints from previous session. Family / Caregiver Present: No  Follows Commands: Within Functional Limits  General Comment  Comments: Pt reports buttocks/ LLE pain 6/10. Pt returned to supine in bed post PT. Subjective  Subjective: RN and pt agreeable to PT. Pt supine in bed upon arrival, Pt pleasant and cooperative t/o. Cognition   Orientation  Overall Orientation Status: Within Functional Limits  Orientation Level: Oriented to person;Oriented to place; Disoriented to situation;Oriented to time  Cognition  Overall Cognitive Status: Exceptions  Arousal/Alertness: Appropriate responses to stimuli  Following Commands: Follows multistep commands with repitition; Follows multistep commands with increased time  Attention Span: Appears intact  Memory: Appears intact  Safety Judgement: Decreased awareness of need for assistance  Problem Solving: Assistance required to identify errors made;Assistance required to correct errors made  Insights: Decreased awareness of deficits  Initiation: Requires cues for some  Sequencing: Requires cues for some     Objective   Bed mobility  Supine to Sit: Maximum assistance  Sit to Supine: Maximum assistance  Scooting: Maximal assistance  Transfers  Sit to Stand: Unable to assess  Stand to sit: Unable to assess        Balance  Posture: Fair  Sitting - Static: Fair  Sitting - Dynamic: Fair;-  Standing - Static: Poor  Standing - Dynamic: Poor  Comments: Assessed sitting EOB with CGA 40 mins. A/AROM Exercises: Ankle pumps - L LE AROM, R LE PROM x 20 reps, LAQs LLE AROM, RLE PROM x20  Static Sitting Balance Exercises: Pt sat EOB ~40 minues with CGA to maintain d/t posterior lean with verbal cues needed for posture / trunk awareness; pt limited d/t orthostatic hypotension.           AM-PAC Score  -PAC Inpatient Mobility Raw Score : 9 (08/20/22 1340)  -PAC Inpatient T-Scale Score : 30.55 (08/20/22 1340)  Mobility Inpatient CMS 0-100% Score: 81.38 (08/20/22 1340)  Mobility Inpatient CMS G-Code Modifier : CM (08/20/22 1340)            Goals  Short Term Goals  Time Frame for Short term goals: 14 visits  Short term goal 1: Perform bed mobility with Mod A  Short term goal 2: Perform functional transfers with Min A  Short term goal 3: Demo Fair- dynamic standing balance to decrease risk of falls  Short term goal 4: Ambulate 20ft with appropriate AD and Mod A  Short term goal 5: Propel wheelchair 100ft with Min A           Therapy Time   Individual Concurrent Group Co-treatment   Time In 1399         Time Out 3306 Minutes 60         Timed Code Treatment Minutes: [address] [place] [address]

## 2022-08-20 NOTE — DISCHARGE INSTRUCTIONS
Wet to dry on both wounds. Have the rehab facility apply wound vac when patient arrived the facility.

## 2022-08-20 NOTE — PROGRESS NOTES
(WET TO DRY). LLE dressing change today. Regular diet. life vest has been approved. Cardiology, Neurology and ID on board, follow up recs.   Discharge planning to facility pending insurance approval.  is working on the insurance approval part          [address] [place] [address]  Electronically signed by Taj Hardwick DO  on 8/20/2022 at 7:16 AM

## 2022-08-20 NOTE — CARE COORDINATION
Transitional Planning  HENS completed and faxed to [facility]. 925 am Patient was fitted for life vest and currently wearing it. Satinder Gordon, spoke with Emily regarding status of precertification. She will check and return call. 930 am Return call from Emily, they received precert okay for patient to come today. [facility], transportation set up for 230-3 pm today. Called Emily at [address], voicemail left regarding transportation time. 1100 am Spoke with patient at bedside. Informed him of transportation time. Called ex spouse Chandni Vail at [phone] no answer, unable to leave voicemail, mailbox is full. RN [employer] notified. [phone] am Spoke with Emily notified of transportation time. 150 pm Call from Greer Adames wanting to know if facility has wound vac. Satinder Gordon, spoke with Emily, inquired about wound vac. Will call back. 210 pm Call from Javier Bardales at [facility], wound vac will not be there when patient arrives, but will have delivered today. 255 pm AYO faxed to [facility] at [phone] and notified patient is on his way.

## 2022-08-26 NOTE — DISCHARGE SUMMARY
Physician Discharge Summary     Patient ID:  Chet Pope  [id number]  48 y.o.  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 male who admitted for L groin wound care s/p bilateral groin exploration and thrombectomy of his aortobifemoral bypass graft. He has had bilateral groin exploration and thrombectomy of his aortobifemoral bypass graft. Recall that he was admitted under extreme duress with extreme acidemia due to lower extremity ischemia from his graft occlusion. We were able to get it back on line and have flow to the lower extremities in a meaningful fashion. He did undergo fasciotomy on the left but not the right. The nursing facility has not been doing daily dressing changes and there is purulence in the left groin wound during admission. The fasciotomy wounds are healing well at this time however the left groin has some issues with colonization and poor wound healing. We are consulted to clean this wound over the weekend with [facility] solution and acetic acid solution and wound vac application. Wound care was also consulted for decubitus sacral ulcer. At time of discharge, Chet Pope was tolerating a regular diet, ambulating on her own accord, had adequate analgesia on oral pain medications, and had no signs of symptoms of complications. His fasciotomy wound looks good, dressing covered with wet (saline)-dry. No more purulent drainage at his groin, patient is discharged with facility wound vac arrangement.  She was deemed medically stable and was DC to rehab facility on 8/20/2022 w/ instructions to f/u in office in 2 week. Pt expressed understanding of and agreement w/ DC instructions. Consults: Infectious disease, neurology and cardiology    Discharge Exam:  /84   Pulse 64   Temp 98 °F (36.7 °C) (Oral)   Resp 20   Ht 5' 11\" (1.803 m)   Wt 178 lb 12.7 oz (81.1 kg)   SpO2 97%   BMI 24.94 kg/m²     General Appearance:    Alert, cooperative, no distress, appears stated age   Head:    Normocephalic, without obvious abnormality, atraumatic   Eyes:    PERRL, conjunctiva/corneas clear, EOM's intact, fundi     benign, both eyes        Ears:    Normal TM's and external ear canals, both ears   Nose:   Nares normal, septum midline, mucosa normal, no drainage    or sinus tenderness   Throat:   Lips, mucosa, and tongue normal; teeth and gums normal   Neck:   Supple, symmetrical, trachea midline, no adenopathy;        thyroid:  No enlargement/tenderness/nodules; no carotid    bruit or JVD   Back:     Symmetric, no curvature, ROM normal, no CVA tenderness   Lungs:     Clear to auscultation bilaterally, respirations unlabored   Chest wall:    No tenderness or deformity   Heart:    Regular rate and rhythm, S1 and S2 normal, no murmur, rub   or gallop   Abdomen:     Soft, non-tender, bowel sounds active all four quadrants,     no masses, no organomegaly   Genitalia:    Normal male without lesion, discharge or tenderness   Rectal:    Normal tone, normal prostate, no masses or tenderness;    guaiac negative stool   Extremities:   Extremities normal, atraumatic, no cyanosis or edema   Pulses:   2+ and symmetric all extremities   Skin:   Fasciotomy site looks good, cover with health granulation tissue and healing well. Groin site looks clean and no purulent drainage, covered with wound vac. Lymph nodes:   Cervical, supraclavicular, and axillary nodes normal   Neurologic:   CNII-XII intact.  Normal strength, sensation and reflexes       throughout       Disposition: rehab    In process/preliminary results:  Outstanding Order Results       Date and Time Order Name Status Description    8/17/2022  8:00 AM PREVIOUS SPECIMEN In process     7/19/2022  7:29 AM PREVIOUS SPECIMEN In process     7/18/2022  9:53 AM PREVIOUS SPECIMEN In process     7/11/2022  7:00 PM PREVIOUS SPECIMEN In process     7/8/2022  7:11 PM PREVIOUS SPECIMEN In process             Patient Instructions:   Discharge Medication List as of 8/20/2022  2:46 PM        CONTINUE these medications which have NOT CHANGED    Details   atorvastatin (LIPITOR) 10 MG tablet Take 1 tablet by mouth in the morning., Disp-30 tablet, R-2Normal      metoprolol succinate (TOPROL XL) 25 MG extended release tablet Take 0.5 tablets by mouth in the morning and at bedtime, Disp-30 tablet, R-3Normal      torsemide (DEMADEX) 20 MG tablet Take 1 tablet by mouth in the morning., Disp-30 tablet, R-3Normal      melatonin 5 MG TABS tablet Take 1 tablet by mouth nightly, Disp-30 tablet, R-0Normal      baclofen (LIORESAL) 10 MG tablet Take 10 mg by mouth in the morning and 10 mg at noon and 10 mg before bedtime. Historical Med      clopidogrel (PLAVIX) 75 MG tablet Take 75 mg by mouth in the morning. Historical Med      ezetimibe (ZETIA) 10 MG tablet Take 10 mg by mouth in the morning. Historical Med      apixaban (ELIQUIS) 5 MG TABS tablet Take 1 tablet by mouth in the morning and 1 tablet before bedtime. , Disp-60 tablet, R-3Normal      isosorbide mononitrate (IMDUR) 30 MG CR tablet Take 30 mg by mouth daily. Historical Med      mirtazapine (REMERON) 30 MG tablet Take 1 tablet by mouth nightly., Disp-30 tablet, R-1      tamsulosin (FLOMAX) 0.4 MG capsule Take 1 capsule by mouth daily. , Disp-30 capsule, R-1      TRUETEST TEST strip TEST 2 TIMES DAILY, Disp-60 strip, R-11           STOP taking these medications       fenofibrate (TRICOR) 145 MG tablet Comments:   Reason for Stopping:         lisinopril (PRINIVIL;ZESTRIL) 10 MG tablet Comments:   Reason for Stopping:         dipyridamole-aspirin (AGGRENOX)  MG per SR capsule Comments:   Reason for Stopping:         simvastatin (ZOCOR) 40 MG tablet Comments:   Reason for Stopping:         varenicline (CHANTIX STARTING MONTH PAK) 0.5 MG X 11 & 1 MG X 42 tablet Comments:   Reason for Stopping:         metoprolol (LOPRESSOR) 25 MG tablet Comments:   Reason for Stopping:             Activity: activity as tolerated  Diet: regular diet  Wound Care: Fasciotomy site wet-dry. Wound vac on the groin area. Follow-up with vascular in 2 weeks.     Signed:  Electronically signed by Nick Nunes DO    8/26/2022  11:18 AM

## 2022-09-09 ENCOUNTER — OFFICE VISIT (OUTPATIENT)
Dept: VASCULAR SURGERY | Age: 62
End: 2022-09-09

## 2022-09-09 VITALS
SYSTOLIC BLOOD PRESSURE: 118 MMHG | WEIGHT: 161 LBS | HEIGHT: 71 IN | DIASTOLIC BLOOD PRESSURE: 70 MMHG | BODY MASS INDEX: 22.54 KG/M2 | HEART RATE: 75 BPM

## 2022-09-09 DIAGNOSIS — Z95.828 S/P AORTO-BIFEMORAL BYPASS SURGERY: ICD-10-CM

## 2022-09-09 DIAGNOSIS — Z09 POSTOPERATIVE EXAMINATION: Primary | ICD-10-CM

## 2022-09-09 DIAGNOSIS — I70.213 ATHEROSCLEROSIS OF NATIVE ARTERY OF BOTH LOWER EXTREMITIES WITH INTERMITTENT CLAUDICATION (HCC): ICD-10-CM

## 2022-09-09 PROCEDURE — 99024 POSTOP FOLLOW-UP VISIT: CPT | Performed by: SURGERY

## 2022-09-09 RX ORDER — OXYCODONE HYDROCHLORIDE AND ACETAMINOPHEN 5; 325 MG/1; MG/1
TABLET ORAL EVERY 4 HOURS PRN
COMMUNITY
Start: 2022-08-21

## 2022-09-09 RX ORDER — HYDRALAZINE HYDROCHLORIDE 25 MG/1
25 TABLET, FILM COATED ORAL DAILY
COMMUNITY
Start: 2022-09-06

## 2022-09-09 RX ORDER — SERTRALINE HYDROCHLORIDE 25 MG/1
25 TABLET, FILM COATED ORAL DAILY
COMMUNITY
Start: 2022-09-06

## 2022-09-09 NOTE — PROGRESS NOTES
Postop Progress Note    Subjective    Slick Haque presents to the office for postop follow up. Recall that he has an aortobifemoral bypass graft which had occluded in the past.  We had removed the thrombus with open technique in both graft limbs to revascularize lower extremities. At that time he needed fasciotomy on the left. He is doing well from this and had a groin wound problem on the left which was managed locally. He is having no fevers chills and no abdominal pain. Objective    Vitals:    09/09/22 1020   BP: 118/70   Pulse: 75     General: alert, cooperative and no distress  Incision: healing well. There is granulation tissue in the base of the wound on the right groin and it is nearly flat with the surrounding tissue. In addition the fasciotomy site is healing well. Assessment  Doing well postoperatively. Plan  At this point we will follow-up in 3 months with aortoiliac duplex examination to investigate the graft to make certain that it is doing well.     Electronically signed by Silver Velázquez MD on 9/9/2022 at 10:42 AM

## 2023-02-15 NOTE — CARE COORDINATION
Transitional planning. Intubated/ Sedated FiO2 30%, PEEP of 5, CRRT, EF 15% Needs Cardiac Cath (will need Nephology Clearance), Mary with [facility] called, [facility] - will need PT/OT evals. They will  start pre-cert once pt is extubated and has PT/OT notes. Render In Strict Bullet Format?: No Detail Level: Zone Initiate Treatment: Tranexamic acid 650 mg- take 1/2 pill BID ( will send after labs are reviewed)

## (undated) DEVICE — GOWN,SIRUS,NONRNF,SETINSLV,XL,20/CS: Brand: MEDLINE

## (undated) DEVICE — MARKER,SKIN,WI/RULER AND LABELS: Brand: MEDLINE

## (undated) DEVICE — GLOVE SURG SZ 65 L12IN FNGR THK79MIL GRN LTX FREE

## (undated) DEVICE — GLOVE SURG SZ 6 CRM LTX FREE POLYISOPRENE POLYMER BEAD ANTI

## (undated) DEVICE — CONTAINER,SPECIMEN,4OZ,OR STRL: Brand: MEDLINE

## (undated) DEVICE — SVMMC VASC MAJ PK

## (undated) DEVICE — TAPE MED W1/8XL30IN WHT POLY

## (undated) DEVICE — SUTURE PROL SZ 5-0 L36IN NONABSORBABLE BLU L13MM C-1 3/8 8720H

## (undated) DEVICE — RADIFOCUS GLIDEWIRE ADVANTAGE GUIDEWIRE: Brand: GLIDEWIRE ADVANTAGE

## (undated) DEVICE — CLIP INT M L GRN TI TRNSVRS GRV CHEVRON SHP W/ PRECIS TIP

## (undated) DEVICE — APPLICATOR MEDICATED 26 CC SOLUTION HI LT ORNG CHLORAPREP

## (undated) DEVICE — 5F 80 CM LEMAITRE EMBOLECTOMY CATHETER: Brand: LEMAITRE EMBOLECTOMY CATHETER

## (undated) DEVICE — PATIENT RETURN ELECTRODE, SINGLE-USE, CONTACT QUALITY MONITORING, ADULT, WITH 9FT CORD, FOR PATIENTS WEIGING OVER 33LBS. (15KG): Brand: MEGADYNE

## (undated) DEVICE — GLOVE SURG SZ 8 CRM LTX FREE POLYISOPRENE POLYMER BEAD ANTI

## (undated) DEVICE — PTA BALLOON DILATATION CATHETER: Brand: MUSTANG™

## (undated) DEVICE — SUTURE PROL SZ 5-0 L30IN NONABSORBABLE BLU L13MM RB-2 1/2 8710H

## (undated) DEVICE — SUTURE VCRL + SZ 3-0 L27IN ABSRB UD L26MM SH 1/2 CIR VCP416H

## (undated) DEVICE — INTRODUCER SHTH STD 8 FRX11 CM FLX KINK RESIST CANN AVNT +

## (undated) DEVICE — GOWN,AURORA,NONREINFORCED,LARGE: Brand: MEDLINE

## (undated) DEVICE — INFLATION KIT CUST REV

## (undated) DEVICE — SUTURE VCRL + SZ 2-0 L27IN ABSRB CLR CT-1 1/2 CIR TAPERCUT VCP259H

## (undated) DEVICE — TUBING SUCT 12FR MAL ALUM SHFT FN CAP VENT UNIV CONN W/ OBT

## (undated) DEVICE — 3F 80CM OVER-THE-WIRE EMBOLECTOMY CATHETER, EIFU: Brand: LEMAITRE EMBOLECTOMY CATHETER

## (undated) DEVICE — SUTURE PERMAHAND SZ 4-0 L18IN NONABSORBABLE BLK SILK BRAID A183H

## (undated) DEVICE — SUTURE ETHLN SZ 4-0 L18IN NONABSORBABLE BLK L19MM PS-2 3/8 1667H

## (undated) DEVICE — SHEET, ORTHO, SPLIT, STERILE: Brand: MEDLINE

## (undated) DEVICE — INTENDED FOR TISSUE SEPARATION, AND OTHER PROCEDURES THAT REQUIRE A SHARP SURGICAL BLADE TO PUNCTURE OR CUT.: Brand: BARD-PARKER ® CARBON RIB-BACK BLADES

## (undated) DEVICE — SUTURE NONABSORBABLE MONOFILAMENT 7-0 BV-1 1X24 IN PROLENE 8702H

## (undated) DEVICE — GLOVE SURG SZ 65 CRM LTX FREE POLYISOPRENE POLYMER BEAD ANTI

## (undated) DEVICE — SUTURE NONABSORBABLE MONOFILAMENT 6-0 C-1 1X30 IN PROLENE 8706H

## (undated) DEVICE — GLOVE SURG SZ 7.5 L11.73IN FNGR THK9.8MIL STRW LTX POLYMER

## (undated) DEVICE — FOGARTY - HYDRAGRIP SURGICAL - CLAMP INSERTS: Brand: FOGARTY SOFTJAW

## (undated) DEVICE — BANDAGE,GAUZE,BULKEE II,4.5"X4.1YD,STRL: Brand: MEDLINE

## (undated) DEVICE — TOTAL TRAY, 16FR 10ML SIL FOLEY, URN: Brand: MEDLINE